# Patient Record
Sex: MALE | Race: BLACK OR AFRICAN AMERICAN | NOT HISPANIC OR LATINO | Employment: OTHER | URBAN - METROPOLITAN AREA
[De-identification: names, ages, dates, MRNs, and addresses within clinical notes are randomized per-mention and may not be internally consistent; named-entity substitution may affect disease eponyms.]

---

## 2017-01-09 ENCOUNTER — HOSPITAL ENCOUNTER (OUTPATIENT)
Dept: NON INVASIVE DIAGNOSTICS | Facility: CLINIC | Age: 72
Discharge: HOME | End: 2017-01-09
Payer: MEDICARE

## 2017-01-09 DIAGNOSIS — I26.99 OTHER PULMONARY EMBOLISM WITHOUT ACUTE COR PULMONALE (HCC): ICD-10-CM

## 2017-01-09 PROCEDURE — 93306 TTE W/DOPPLER COMPLETE: CPT

## 2017-03-16 ENCOUNTER — GENERIC CONVERSION - ENCOUNTER (OUTPATIENT)
Dept: OTHER | Facility: OTHER | Age: 72
End: 2017-03-16

## 2017-03-16 ENCOUNTER — HOSPITAL ENCOUNTER (OUTPATIENT)
Dept: CT IMAGING | Facility: HOSPITAL | Age: 72
Discharge: HOME | End: 2017-03-16
Attending: INTERNAL MEDICINE
Payer: MEDICARE

## 2017-03-16 ENCOUNTER — ALLSCRIPTS OFFICE VISIT (OUTPATIENT)
Dept: OTHER | Facility: OTHER | Age: 72
End: 2017-03-16

## 2017-03-16 ENCOUNTER — APPOINTMENT (OUTPATIENT)
Dept: LAB | Facility: HOSPITAL | Age: 72
End: 2017-03-16
Attending: INTERNAL MEDICINE
Payer: MEDICARE

## 2017-03-16 ENCOUNTER — TRANSCRIBE ORDERS (OUTPATIENT)
Dept: ADMINISTRATIVE | Facility: HOSPITAL | Age: 72
End: 2017-03-16

## 2017-03-16 DIAGNOSIS — R06.09 OTHER FORMS OF DYSPNEA: ICD-10-CM

## 2017-03-16 DIAGNOSIS — I26.99 OTHER PULMONARY EMBOLISM WITHOUT ACUTE COR PULMONALE (HCC): ICD-10-CM

## 2017-03-16 LAB
ANION GAP SERPL CALCULATED.3IONS-SCNC: 9 MMOL/L (ref 4–13)
BUN SERPL-MCNC: 17 MG/DL (ref 5–25)
CALCIUM SERPL-MCNC: 8.9 MG/DL (ref 8.3–10.1)
CHLORIDE SERPL-SCNC: 107 MMOL/L (ref 100–108)
CO2 SERPL-SCNC: 27 MMOL/L (ref 21–32)
CREAT SERPL-MCNC: 1.19 MG/DL (ref 0.6–1.3)
GFR SERPL CREATININE-BSD FRML MDRD: >60 ML/MIN/1.73SQ M
GLUCOSE SERPL-MCNC: 115 MG/DL (ref 65–140)
POTASSIUM SERPL-SCNC: 3.8 MMOL/L (ref 3.5–5.3)
SODIUM SERPL-SCNC: 143 MMOL/L (ref 136–145)

## 2017-03-16 PROCEDURE — 71275 CT ANGIOGRAPHY CHEST: CPT

## 2017-03-16 PROCEDURE — 80048 BASIC METABOLIC PNL TOTAL CA: CPT

## 2017-03-16 PROCEDURE — 36415 COLL VENOUS BLD VENIPUNCTURE: CPT

## 2017-03-16 RX ADMIN — IOHEXOL 85 ML: 350 INJECTION, SOLUTION INTRAVENOUS at 17:23

## 2017-03-21 ENCOUNTER — HOSPITAL ENCOUNTER (OUTPATIENT)
Dept: NON INVASIVE DIAGNOSTICS | Facility: CLINIC | Age: 72
Discharge: HOME | End: 2017-03-21
Payer: MEDICARE

## 2017-03-21 DIAGNOSIS — R06.09 OTHER FORMS OF DYSPNEA: ICD-10-CM

## 2017-03-21 LAB
MAX DIASTOLIC BP: 94 MMHG
MAX HEART RATE: 142 BPM
MAX PREDICTED HEART RATE: 149 BPM
MAX. SYSTOLIC BP: 194 MMHG
PROTOCOL NAME: NORMAL
TARGET HR FORMULA: NORMAL
TEST INDICATION: NORMAL
TIME IN EXERCISE PHASE: 180 S

## 2017-03-21 PROCEDURE — 93350 STRESS TTE ONLY: CPT

## 2017-06-01 ENCOUNTER — ALLSCRIPTS OFFICE VISIT (OUTPATIENT)
Dept: OTHER | Facility: OTHER | Age: 72
End: 2017-06-01

## 2017-07-06 ENCOUNTER — APPOINTMENT (EMERGENCY)
Dept: CT IMAGING | Facility: HOSPITAL | Age: 72
End: 2017-07-06
Payer: MEDICARE

## 2017-07-06 ENCOUNTER — HOSPITAL ENCOUNTER (EMERGENCY)
Facility: HOSPITAL | Age: 72
Discharge: HOME | End: 2017-07-06
Attending: EMERGENCY MEDICINE | Admitting: EMERGENCY MEDICINE
Payer: MEDICARE

## 2017-07-06 VITALS
HEIGHT: 75 IN | HEART RATE: 69 BPM | TEMPERATURE: 98.5 F | WEIGHT: 260.14 LBS | RESPIRATION RATE: 18 BRPM | DIASTOLIC BLOOD PRESSURE: 96 MMHG | BODY MASS INDEX: 32.35 KG/M2 | OXYGEN SATURATION: 96 % | SYSTOLIC BLOOD PRESSURE: 176 MMHG

## 2017-07-06 DIAGNOSIS — M54.32 SCIATICA, LEFT SIDE: Primary | ICD-10-CM

## 2017-07-06 DIAGNOSIS — M51.26 LUMBAR HERNIATED DISC: ICD-10-CM

## 2017-07-06 PROCEDURE — 99283 EMERGENCY DEPT VISIT LOW MDM: CPT

## 2017-07-06 PROCEDURE — 72131 CT LUMBAR SPINE W/O DYE: CPT

## 2017-07-06 PROCEDURE — 96372 THER/PROPH/DIAG INJ SC/IM: CPT

## 2017-07-06 RX ORDER — PREDNISONE 10 MG/1
TABLET ORAL
Qty: 30 TABLET | Refills: 0 | Status: ON HOLD | OUTPATIENT
Start: 2017-07-06 | End: 2017-08-21

## 2017-07-06 RX ORDER — PREDNISONE 20 MG/1
60 TABLET ORAL ONCE
Status: COMPLETED | OUTPATIENT
Start: 2017-07-06 | End: 2017-07-06

## 2017-07-06 RX ORDER — KETOROLAC TROMETHAMINE 30 MG/ML
60 INJECTION, SOLUTION INTRAMUSCULAR; INTRAVENOUS ONCE
Status: COMPLETED | OUTPATIENT
Start: 2017-07-06 | End: 2017-07-06

## 2017-07-06 RX ORDER — CYCLOBENZAPRINE HCL 10 MG
10 TABLET ORAL ONCE
Status: COMPLETED | OUTPATIENT
Start: 2017-07-06 | End: 2017-07-06

## 2017-07-06 RX ORDER — SIMVASTATIN 40 MG
TABLET ORAL
COMMUNITY
End: 2019-04-03 | Stop reason: ALTCHOICE

## 2017-07-06 RX ORDER — CYCLOBENZAPRINE HCL 10 MG
10 TABLET ORAL
Qty: 10 TABLET | Refills: 0 | Status: SHIPPED | OUTPATIENT
Start: 2017-07-06 | End: 2018-02-13

## 2017-07-06 RX ADMIN — PREDNISONE 60 MG: 20 TABLET ORAL at 11:38

## 2017-07-06 RX ADMIN — CYCLOBENZAPRINE HYDROCHLORIDE 10 MG: 10 TABLET, FILM COATED ORAL at 08:42

## 2017-07-06 RX ADMIN — KETOROLAC TROMETHAMINE 60 MG: 30 INJECTION, SOLUTION INTRAMUSCULAR at 08:41

## 2017-07-10 ENCOUNTER — TRANSCRIBE ORDERS (OUTPATIENT)
Dept: ADMINISTRATIVE | Facility: HOSPITAL | Age: 72
End: 2017-07-10

## 2017-07-10 DIAGNOSIS — M54.40 LOW BACK PAIN WITH SCIATICA, SCIATICA LATERALITY UNSPECIFIED, UNSPECIFIED BACK PAIN LATERALITY, UNSPECIFIED CHRONICITY: Primary | ICD-10-CM

## 2017-07-11 ENCOUNTER — HOSPITAL ENCOUNTER (OUTPATIENT)
Dept: MRI IMAGING | Facility: HOSPITAL | Age: 72
Discharge: HOME | End: 2017-07-11
Payer: MEDICARE

## 2017-07-11 DIAGNOSIS — M54.40 LOW BACK PAIN WITH SCIATICA, SCIATICA LATERALITY UNSPECIFIED, UNSPECIFIED BACK PAIN LATERALITY, UNSPECIFIED CHRONICITY: ICD-10-CM

## 2017-07-11 PROCEDURE — 72148 MRI LUMBAR SPINE W/O DYE: CPT

## 2017-07-13 ENCOUNTER — HOSPITAL ENCOUNTER (EMERGENCY)
Facility: HOSPITAL | Age: 72
Discharge: HOME | End: 2017-07-13
Attending: EMERGENCY MEDICINE | Admitting: EMERGENCY MEDICINE
Payer: MEDICARE

## 2017-07-13 VITALS
SYSTOLIC BLOOD PRESSURE: 116 MMHG | OXYGEN SATURATION: 98 % | DIASTOLIC BLOOD PRESSURE: 77 MMHG | RESPIRATION RATE: 18 BRPM | TEMPERATURE: 98.2 F | WEIGHT: 252 LBS | HEIGHT: 76 IN | BODY MASS INDEX: 30.69 KG/M2 | HEART RATE: 90 BPM

## 2017-07-13 DIAGNOSIS — M51.26 LUMBAR DISC HERNIATION: Primary | ICD-10-CM

## 2017-07-13 DIAGNOSIS — M54.32 SCIATICA OF LEFT SIDE: ICD-10-CM

## 2017-07-13 PROCEDURE — 99283 EMERGENCY DEPT VISIT LOW MDM: CPT

## 2017-07-13 PROCEDURE — 96372 THER/PROPH/DIAG INJ SC/IM: CPT

## 2017-07-13 RX ORDER — KETOROLAC TROMETHAMINE 30 MG/ML
30 INJECTION, SOLUTION INTRAMUSCULAR; INTRAVENOUS ONCE
Status: COMPLETED | OUTPATIENT
Start: 2017-07-13 | End: 2017-07-13

## 2017-07-13 RX ORDER — OXYCODONE HYDROCHLORIDE AND ACETAMINOPHEN 5; 325 MG/1; MG/1
1 TABLET ORAL ONCE
Status: COMPLETED | OUTPATIENT
Start: 2017-07-13 | End: 2017-07-13

## 2017-07-13 RX ORDER — OXYCODONE HYDROCHLORIDE AND ACETAMINOPHEN 5; 325 MG/1; MG/1
TABLET ORAL
Qty: 20 TABLET | Refills: 0 | Status: ON HOLD | OUTPATIENT
Start: 2017-07-13 | End: 2017-08-21

## 2017-07-13 RX ADMIN — KETOROLAC TROMETHAMINE 30 MG: 30 INJECTION, SOLUTION INTRAMUSCULAR at 08:55

## 2017-07-13 RX ADMIN — OXYCODONE HYDROCHLORIDE AND ACETAMINOPHEN 1 TABLET: 5; 325 TABLET ORAL at 08:55

## 2017-08-21 ENCOUNTER — APPOINTMENT (EMERGENCY)
Dept: CT IMAGING | Facility: HOSPITAL | Age: 72
DRG: 176 | End: 2017-08-21
Payer: MEDICARE

## 2017-08-21 ENCOUNTER — APPOINTMENT (INPATIENT)
Dept: ULTRASOUND IMAGING | Facility: HOSPITAL | Age: 72
DRG: 176 | End: 2017-08-21
Payer: MEDICARE

## 2017-08-21 ENCOUNTER — HOSPITAL ENCOUNTER (INPATIENT)
Facility: HOSPITAL | Age: 72
LOS: 3 days | Discharge: HOME | DRG: 176 | End: 2017-08-24
Attending: EMERGENCY MEDICINE | Admitting: INTERNAL MEDICINE
Payer: MEDICARE

## 2017-08-21 ENCOUNTER — APPOINTMENT (EMERGENCY)
Dept: RADIOLOGY | Facility: HOSPITAL | Age: 72
DRG: 176 | End: 2017-08-21
Payer: MEDICARE

## 2017-08-21 ENCOUNTER — GENERIC CONVERSION - ENCOUNTER (OUTPATIENT)
Dept: OTHER | Facility: OTHER | Age: 72
End: 2017-08-21

## 2017-08-21 ENCOUNTER — APPOINTMENT (INPATIENT)
Dept: NON INVASIVE DIAGNOSTICS | Facility: HOSPITAL | Age: 72
DRG: 176 | End: 2017-08-21
Payer: MEDICARE

## 2017-08-21 DIAGNOSIS — I26.99 BILATERAL PULMONARY EMBOLISM (HCC): Primary | ICD-10-CM

## 2017-08-21 DIAGNOSIS — R00.0 TACHYCARDIA: ICD-10-CM

## 2017-08-21 DIAGNOSIS — E78.5 HYPERLIPIDEMIA: ICD-10-CM

## 2017-08-21 DIAGNOSIS — R06.02 SOB (SHORTNESS OF BREATH): ICD-10-CM

## 2017-08-21 PROBLEM — D69.6 THROMBOCYTOPENIA (HCC): Status: ACTIVE | Noted: 2017-08-21

## 2017-08-21 PROBLEM — Z86.2 HISTORY OF SICKLE CELL TRAIT: Status: ACTIVE | Noted: 2017-08-21

## 2017-08-21 PROBLEM — Z86.711 HISTORY OF PULMONARY EMBOLISM: Status: ACTIVE | Noted: 2017-08-21

## 2017-08-21 PROBLEM — R60.0 LEG EDEMA, LEFT: Status: ACTIVE | Noted: 2017-08-21

## 2017-08-21 PROBLEM — R91.1 LUNG NODULE: Status: ACTIVE | Noted: 2017-08-21

## 2017-08-21 PROBLEM — E66.9 OBESITY: Status: ACTIVE | Noted: 2017-08-21

## 2017-08-21 PROBLEM — N18.2 CHRONIC KIDNEY DISEASE (CKD), STAGE II (MILD): Status: ACTIVE | Noted: 2017-08-21

## 2017-08-21 LAB
ALBUMIN SERPL BCP-MCNC: 3.4 G/DL (ref 3.5–5)
ALP SERPL-CCNC: 68 U/L (ref 46–116)
ALT SERPL W P-5'-P-CCNC: 17 U/L (ref 12–78)
ANION GAP SERPL CALCULATED.3IONS-SCNC: 9 MMOL/L (ref 4–13)
APTT PPP: 114 SECONDS (ref 23–35)
APTT PPP: 38 SECONDS (ref 23–35)
AST SERPL W P-5'-P-CCNC: 11 U/L (ref 5–45)
BASOPHILS # BLD AUTO: 0.02 THOUSANDS/ΜL (ref 0–0.1)
BASOPHILS NFR BLD AUTO: 0 % (ref 0–1)
BILIRUB DIRECT SERPL-MCNC: 0.18 MG/DL (ref 0–0.2)
BILIRUB SERPL-MCNC: 0.7 MG/DL (ref 0.2–1)
BUN SERPL-MCNC: 10 MG/DL (ref 5–25)
CALCIUM SERPL-MCNC: 9 MG/DL (ref 8.3–10.1)
CHLORIDE SERPL-SCNC: 105 MMOL/L (ref 100–108)
CO2 SERPL-SCNC: 26 MMOL/L (ref 21–32)
COLOR, POC: ABNORMAL
CREAT SERPL-MCNC: 1.32 MG/DL (ref 0.6–1.3)
EOSINOPHIL # BLD AUTO: 0.08 THOUSAND/ΜL (ref 0–0.61)
EOSINOPHIL NFR BLD AUTO: 1 % (ref 0–6)
ERYTHROCYTE [DISTWIDTH] IN BLOOD BY AUTOMATED COUNT: 14.6 % (ref 11.6–15.1)
EXT BILIRUBIN, UA: ABNORMAL
EXT BLOOD URINE: ABNORMAL
EXT GLUCOSE, UA: ABNORMAL
EXT KETONES: ABNORMAL
EXT NITRITE, UA: ABNORMAL
EXT PH, UA: 6
EXT PROTEIN, UA: ABNORMAL
EXT SPECIFIC GRAVITY, UA: 1
EXT UROBILINOGEN: 0.2
GFR SERPL CREATININE-BSD FRML MDRD: 62 ML/MIN/1.73SQ M
GLUCOSE SERPL-MCNC: 127 MG/DL (ref 65–140)
HCT VFR BLD AUTO: 36.9 % (ref 36.5–49.3)
HCYS SERPL-SCNC: 10.6 UMOL/L (ref 5.3–14.2)
HGB BLD-MCNC: 12.7 G/DL (ref 12–17)
INR PPP: 1.12 (ref 0.86–1.16)
LYMPHOCYTES # BLD AUTO: 1.11 THOUSANDS/ΜL (ref 0.6–4.47)
LYMPHOCYTES NFR BLD AUTO: 15 % (ref 14–44)
MAGNESIUM SERPL-MCNC: 2.1 MG/DL (ref 1.6–2.6)
MCH RBC QN AUTO: 31.3 PG (ref 26.8–34.3)
MCHC RBC AUTO-ENTMCNC: 34.4 G/DL (ref 31.4–37.4)
MCV RBC AUTO: 91 FL (ref 82–98)
MONOCYTES # BLD AUTO: 0.88 THOUSAND/ΜL (ref 0.17–1.22)
MONOCYTES NFR BLD AUTO: 12 % (ref 4–12)
NEUTROPHILS # BLD AUTO: 5.32 THOUSANDS/ΜL (ref 1.85–7.62)
NEUTS SEG NFR BLD AUTO: 72 % (ref 43–75)
NRBC BLD AUTO-RTO: 0 /100 WBCS
NT-PROBNP SERPL-MCNC: 43 PG/ML
PLATELET # BLD AUTO: 123 THOUSANDS/UL (ref 149–390)
PMV BLD AUTO: 11.7 FL (ref 8.9–12.7)
POTASSIUM SERPL-SCNC: 3.5 MMOL/L (ref 3.5–5.3)
PROT SERPL-MCNC: 7.2 G/DL (ref 6.4–8.2)
PROTHROMBIN TIME: 14.6 SECONDS (ref 12.1–14.4)
PSA SERPL-MCNC: 0.8 NG/ML (ref 0–4)
RBC # BLD AUTO: 4.06 MILLION/UL (ref 3.88–5.62)
SODIUM SERPL-SCNC: 140 MMOL/L (ref 136–145)
TROPONIN I SERPL-MCNC: <0.02 NG/ML
TSH SERPL DL<=0.05 MIU/L-ACNC: 1.83 UIU/ML (ref 0.36–3.74)
WBC # BLD AUTO: 7.44 THOUSAND/UL (ref 4.31–10.16)
WBC # BLD EST: ABNORMAL 10*3/UL

## 2017-08-21 PROCEDURE — 85732 THROMBOPLASTIN TIME PARTIAL: CPT | Performed by: INTERNAL MEDICINE

## 2017-08-21 PROCEDURE — 85025 COMPLETE CBC W/AUTO DIFF WBC: CPT | Performed by: PHYSICIAN ASSISTANT

## 2017-08-21 PROCEDURE — 86146 BETA-2 GLYCOPROTEIN ANTIBODY: CPT | Performed by: INTERNAL MEDICINE

## 2017-08-21 PROCEDURE — 85303 CLOT INHIBIT PROT C ACTIVITY: CPT | Performed by: INTERNAL MEDICINE

## 2017-08-21 PROCEDURE — 80048 BASIC METABOLIC PNL TOTAL CA: CPT | Performed by: PHYSICIAN ASSISTANT

## 2017-08-21 PROCEDURE — 83090 ASSAY OF HOMOCYSTEINE: CPT | Performed by: INTERNAL MEDICINE

## 2017-08-21 PROCEDURE — 99285 EMERGENCY DEPT VISIT HI MDM: CPT

## 2017-08-21 PROCEDURE — 84443 ASSAY THYROID STIM HORMONE: CPT | Performed by: PHYSICIAN ASSISTANT

## 2017-08-21 PROCEDURE — 93971 EXTREMITY STUDY: CPT

## 2017-08-21 PROCEDURE — 71010 HB CHEST X-RAY 1 VIEW FRONTAL (PORTABLE): CPT

## 2017-08-21 PROCEDURE — 84153 ASSAY OF PSA TOTAL: CPT | Performed by: INTERNAL MEDICINE

## 2017-08-21 PROCEDURE — 85305 CLOT INHIBIT PROT S TOTAL: CPT | Performed by: INTERNAL MEDICINE

## 2017-08-21 PROCEDURE — 36415 COLL VENOUS BLD VENIPUNCTURE: CPT | Performed by: PHYSICIAN ASSISTANT

## 2017-08-21 PROCEDURE — 85300 ANTITHROMBIN III ACTIVITY: CPT | Performed by: INTERNAL MEDICINE

## 2017-08-21 PROCEDURE — 85306 CLOT INHIBIT PROT S FREE: CPT | Performed by: INTERNAL MEDICINE

## 2017-08-21 PROCEDURE — 93005 ELECTROCARDIOGRAM TRACING: CPT | Performed by: PHYSICIAN ASSISTANT

## 2017-08-21 PROCEDURE — 85613 RUSSELL VIPER VENOM DILUTED: CPT | Performed by: INTERNAL MEDICINE

## 2017-08-21 PROCEDURE — 93306 TTE W/DOPPLER COMPLETE: CPT

## 2017-08-21 PROCEDURE — 81240 F2 GENE: CPT | Performed by: INTERNAL MEDICINE

## 2017-08-21 PROCEDURE — 84484 ASSAY OF TROPONIN QUANT: CPT | Performed by: PHYSICIAN ASSISTANT

## 2017-08-21 PROCEDURE — 74177 CT ABD & PELVIS W/CONTRAST: CPT

## 2017-08-21 PROCEDURE — 96374 THER/PROPH/DIAG INJ IV PUSH: CPT

## 2017-08-21 PROCEDURE — 80076 HEPATIC FUNCTION PANEL: CPT | Performed by: PHYSICIAN ASSISTANT

## 2017-08-21 PROCEDURE — 83880 ASSAY OF NATRIURETIC PEPTIDE: CPT | Performed by: PHYSICIAN ASSISTANT

## 2017-08-21 PROCEDURE — 85705 THROMBOPLASTIN INHIBITION: CPT | Performed by: INTERNAL MEDICINE

## 2017-08-21 PROCEDURE — 93005 ELECTROCARDIOGRAM TRACING: CPT

## 2017-08-21 PROCEDURE — 96361 HYDRATE IV INFUSION ADD-ON: CPT

## 2017-08-21 PROCEDURE — 85730 THROMBOPLASTIN TIME PARTIAL: CPT | Performed by: INTERNAL MEDICINE

## 2017-08-21 PROCEDURE — 85610 PROTHROMBIN TIME: CPT | Performed by: PHYSICIAN ASSISTANT

## 2017-08-21 PROCEDURE — 83735 ASSAY OF MAGNESIUM: CPT | Performed by: PHYSICIAN ASSISTANT

## 2017-08-21 PROCEDURE — 86147 CARDIOLIPIN ANTIBODY EA IG: CPT | Performed by: INTERNAL MEDICINE

## 2017-08-21 PROCEDURE — 85730 THROMBOPLASTIN TIME PARTIAL: CPT | Performed by: PHYSICIAN ASSISTANT

## 2017-08-21 PROCEDURE — 71275 CT ANGIOGRAPHY CHEST: CPT

## 2017-08-21 PROCEDURE — 81241 F5 GENE: CPT | Performed by: INTERNAL MEDICINE

## 2017-08-21 PROCEDURE — 85598 HEXAGNAL PHOSPH PLTLT NEUTRL: CPT | Performed by: INTERNAL MEDICINE

## 2017-08-21 PROCEDURE — 81002 URINALYSIS NONAUTO W/O SCOPE: CPT | Performed by: PHYSICIAN ASSISTANT

## 2017-08-21 PROCEDURE — 85670 THROMBIN TIME PLASMA: CPT | Performed by: INTERNAL MEDICINE

## 2017-08-21 RX ORDER — METOPROLOL TARTRATE 5 MG/5ML
5 INJECTION INTRAVENOUS EVERY 6 HOURS PRN
Status: DISCONTINUED | OUTPATIENT
Start: 2017-08-21 | End: 2017-08-24 | Stop reason: HOSPADM

## 2017-08-21 RX ORDER — ATORVASTATIN CALCIUM 40 MG/1
40 TABLET, FILM COATED ORAL
Status: DISCONTINUED | OUTPATIENT
Start: 2017-08-21 | End: 2017-08-24 | Stop reason: HOSPADM

## 2017-08-21 RX ORDER — ACETAMINOPHEN 325 MG/1
650 TABLET ORAL EVERY 6 HOURS PRN
Status: DISCONTINUED | OUTPATIENT
Start: 2017-08-21 | End: 2017-08-24 | Stop reason: HOSPADM

## 2017-08-21 RX ORDER — FAMOTIDINE 20 MG/1
20 TABLET, FILM COATED ORAL 2 TIMES DAILY
Status: DISCONTINUED | OUTPATIENT
Start: 2017-08-21 | End: 2017-08-24 | Stop reason: HOSPADM

## 2017-08-21 RX ORDER — HEPARIN SODIUM 1000 [USP'U]/ML
4600 INJECTION, SOLUTION INTRAVENOUS; SUBCUTANEOUS AS NEEDED
Status: DISCONTINUED | OUTPATIENT
Start: 2017-08-21 | End: 2017-08-23

## 2017-08-21 RX ORDER — HEPARIN SODIUM 10000 [USP'U]/100ML
3-30 INJECTION, SOLUTION INTRAVENOUS
Status: DISCONTINUED | OUTPATIENT
Start: 2017-08-21 | End: 2017-08-23

## 2017-08-21 RX ORDER — FLUTICASONE PROPIONATE 50 MCG
1 SPRAY, SUSPENSION (ML) NASAL DAILY
Status: DISCONTINUED | OUTPATIENT
Start: 2017-08-21 | End: 2017-08-24 | Stop reason: HOSPADM

## 2017-08-21 RX ORDER — OXYCODONE HYDROCHLORIDE AND ACETAMINOPHEN 5; 325 MG/1; MG/1
1 TABLET ORAL EVERY 4 HOURS PRN
Status: DISCONTINUED | OUTPATIENT
Start: 2017-08-21 | End: 2017-08-24 | Stop reason: HOSPADM

## 2017-08-21 RX ORDER — POTASSIUM CHLORIDE 20 MEQ/1
40 TABLET, EXTENDED RELEASE ORAL ONCE
Status: COMPLETED | OUTPATIENT
Start: 2017-08-21 | End: 2017-08-21

## 2017-08-21 RX ORDER — ONDANSETRON 2 MG/ML
4 INJECTION INTRAMUSCULAR; INTRAVENOUS EVERY 6 HOURS PRN
Status: DISCONTINUED | OUTPATIENT
Start: 2017-08-21 | End: 2017-08-24 | Stop reason: HOSPADM

## 2017-08-21 RX ORDER — ASPIRIN 81 MG/1
81 TABLET, CHEWABLE ORAL DAILY
Status: DISCONTINUED | OUTPATIENT
Start: 2017-08-21 | End: 2017-08-24 | Stop reason: HOSPADM

## 2017-08-21 RX ORDER — HEPARIN SODIUM 1000 [USP'U]/ML
9200 INJECTION, SOLUTION INTRAVENOUS; SUBCUTANEOUS ONCE
Status: COMPLETED | OUTPATIENT
Start: 2017-08-21 | End: 2017-08-21

## 2017-08-21 RX ORDER — SIMETHICONE 80 MG
80 TABLET,CHEWABLE ORAL 4 TIMES DAILY PRN
Status: DISCONTINUED | OUTPATIENT
Start: 2017-08-21 | End: 2017-08-24 | Stop reason: HOSPADM

## 2017-08-21 RX ORDER — HEPARIN SODIUM 1000 [USP'U]/ML
9200 INJECTION, SOLUTION INTRAVENOUS; SUBCUTANEOUS AS NEEDED
Status: DISCONTINUED | OUTPATIENT
Start: 2017-08-21 | End: 2017-08-23

## 2017-08-21 RX ORDER — ADENOSINE 3 MG/ML
6 INJECTION INTRAVENOUS ONCE
Status: COMPLETED | OUTPATIENT
Start: 2017-08-21 | End: 2017-08-21

## 2017-08-21 RX ADMIN — IOHEXOL 100 ML: 350 INJECTION, SOLUTION INTRAVENOUS at 09:45

## 2017-08-21 RX ADMIN — POTASSIUM CHLORIDE 40 MEQ: 1500 TABLET, EXTENDED RELEASE ORAL at 12:39

## 2017-08-21 RX ADMIN — METOPROLOL TARTRATE 25 MG: 25 TABLET, FILM COATED ORAL at 13:44

## 2017-08-21 RX ADMIN — METOPROLOL TARTRATE 25 MG: 25 TABLET, FILM COATED ORAL at 20:38

## 2017-08-21 RX ADMIN — FAMOTIDINE 20 MG: 20 TABLET, FILM COATED ORAL at 18:38

## 2017-08-21 RX ADMIN — HEPARIN SODIUM 9200 UNITS: 1000 INJECTION, SOLUTION INTRAVENOUS; SUBCUTANEOUS at 12:26

## 2017-08-21 RX ADMIN — SODIUM CHLORIDE 1000 ML: 0.9 INJECTION, SOLUTION INTRAVENOUS at 08:22

## 2017-08-21 RX ADMIN — HEPARIN SODIUM AND DEXTROSE 18 UNITS/KG/HR: 10000; 5 INJECTION INTRAVENOUS at 12:26

## 2017-08-21 RX ADMIN — ASPIRIN 81 MG 81 MG: 81 TABLET ORAL at 12:38

## 2017-08-21 RX ADMIN — METOPROLOL TARTRATE 5 MG: 5 INJECTION INTRAVENOUS at 13:41

## 2017-08-21 RX ADMIN — ATORVASTATIN CALCIUM 40 MG: 40 TABLET, FILM COATED ORAL at 17:03

## 2017-08-21 RX ADMIN — FAMOTIDINE 20 MG: 20 TABLET, FILM COATED ORAL at 12:39

## 2017-08-21 RX ADMIN — ACETAMINOPHEN 650 MG: 325 TABLET ORAL at 20:38

## 2017-08-21 RX ADMIN — ADENOSINE 6 MG: 3 INJECTION, SOLUTION INTRAVENOUS at 08:31

## 2017-08-22 PROBLEM — G89.29 CHRONIC LOW BACK PAIN: Status: ACTIVE | Noted: 2017-08-22

## 2017-08-22 PROBLEM — M54.50 CHRONIC LOW BACK PAIN: Status: ACTIVE | Noted: 2017-08-22

## 2017-08-22 LAB
ANION GAP SERPL CALCULATED.3IONS-SCNC: 6 MMOL/L (ref 4–13)
APTT PPP: 59 SECONDS (ref 23–35)
APTT PPP: 64 SECONDS (ref 23–35)
APTT PPP: 85 SECONDS (ref 23–35)
APTT PPP: 95 SECONDS (ref 23–35)
ATRIAL RATE: 143 BPM
ATRIAL RATE: 95 BPM
BUN SERPL-MCNC: 11 MG/DL (ref 5–25)
CALCIUM SERPL-MCNC: 8.7 MG/DL (ref 8.3–10.1)
CARDIOLIPIN IGA SER IA-ACNC: <9 APL U/ML (ref 0–11)
CARDIOLIPIN IGG SER IA-ACNC: <9 GPL U/ML (ref 0–14)
CARDIOLIPIN IGM SER IA-ACNC: 12 MPL U/ML (ref 0–12)
CHLORIDE SERPL-SCNC: 105 MMOL/L (ref 100–108)
CHOLEST SERPL-MCNC: 207 MG/DL (ref 50–200)
CO2 SERPL-SCNC: 28 MMOL/L (ref 21–32)
CREAT SERPL-MCNC: 1.19 MG/DL (ref 0.6–1.3)
DEPRECATED AT III PPP: 101 % OF NORMAL (ref 92–136)
ERYTHROCYTE [DISTWIDTH] IN BLOOD BY AUTOMATED COUNT: 14.5 % (ref 11.6–15.1)
GFR SERPL CREATININE-BSD FRML MDRD: 71 ML/MIN/1.73SQ M
GLUCOSE SERPL-MCNC: 122 MG/DL (ref 65–140)
HCT VFR BLD AUTO: 35.6 % (ref 36.5–49.3)
HDLC SERPL-MCNC: 45 MG/DL (ref 40–60)
HGB BLD-MCNC: 12 G/DL (ref 12–17)
LDLC SERPL CALC-MCNC: 129 MG/DL (ref 0–100)
MCH RBC QN AUTO: 31.3 PG (ref 26.8–34.3)
MCHC RBC AUTO-ENTMCNC: 33.7 G/DL (ref 31.4–37.4)
MCV RBC AUTO: 93 FL (ref 82–98)
P AXIS: 44 DEGREES
PLATELET # BLD AUTO: 117 THOUSANDS/UL (ref 149–390)
PMV BLD AUTO: 11.6 FL (ref 8.9–12.7)
POTASSIUM SERPL-SCNC: 4 MMOL/L (ref 3.5–5.3)
PR INTERVAL: 120 MS
PR INTERVAL: 160 MS
QRS AXIS: -23 DEGREES
QRS AXIS: -24 DEGREES
QRSD INTERVAL: 86 MS
QRSD INTERVAL: 92 MS
QT INTERVAL: 334 MS
QT INTERVAL: 344 MS
QTC INTERVAL: 419 MS
QTC INTERVAL: 530 MS
RBC # BLD AUTO: 3.84 MILLION/UL (ref 3.88–5.62)
SODIUM SERPL-SCNC: 139 MMOL/L (ref 136–145)
T WAVE AXIS: -24 DEGREES
T WAVE AXIS: 224 DEGREES
TRIGL SERPL-MCNC: 164 MG/DL
VENTRICULAR RATE: 143 BPM
VENTRICULAR RATE: 95 BPM
WBC # BLD AUTO: 7.02 THOUSAND/UL (ref 4.31–10.16)

## 2017-08-22 PROCEDURE — 85730 THROMBOPLASTIN TIME PARTIAL: CPT | Performed by: FAMILY MEDICINE

## 2017-08-22 PROCEDURE — 80048 BASIC METABOLIC PNL TOTAL CA: CPT | Performed by: INTERNAL MEDICINE

## 2017-08-22 PROCEDURE — 85730 THROMBOPLASTIN TIME PARTIAL: CPT | Performed by: INTERNAL MEDICINE

## 2017-08-22 PROCEDURE — 80061 LIPID PANEL: CPT | Performed by: INTERNAL MEDICINE

## 2017-08-22 PROCEDURE — 85027 COMPLETE CBC AUTOMATED: CPT | Performed by: INTERNAL MEDICINE

## 2017-08-22 RX ORDER — KETOROLAC TROMETHAMINE 30 MG/ML
15 INJECTION, SOLUTION INTRAMUSCULAR; INTRAVENOUS ONCE
Status: COMPLETED | OUTPATIENT
Start: 2017-08-22 | End: 2017-08-22

## 2017-08-22 RX ADMIN — OXYCODONE HYDROCHLORIDE AND ACETAMINOPHEN 1 TABLET: 5; 325 TABLET ORAL at 06:12

## 2017-08-22 RX ADMIN — HEPARIN SODIUM AND DEXTROSE 15 UNITS/KG/HR: 10000; 5 INJECTION INTRAVENOUS at 03:57

## 2017-08-22 RX ADMIN — ASPIRIN 81 MG 81 MG: 81 TABLET ORAL at 08:40

## 2017-08-22 RX ADMIN — ATORVASTATIN CALCIUM 40 MG: 40 TABLET, FILM COATED ORAL at 17:03

## 2017-08-22 RX ADMIN — METOPROLOL TARTRATE 25 MG: 25 TABLET, FILM COATED ORAL at 08:40

## 2017-08-22 RX ADMIN — FLUTICASONE PROPIONATE 1 SPRAY: 50 SPRAY, METERED NASAL at 12:14

## 2017-08-22 RX ADMIN — HEPARIN SODIUM AND DEXTROSE 13 UNITS/KG/HR: 10000; 5 INJECTION INTRAVENOUS at 19:44

## 2017-08-22 RX ADMIN — FAMOTIDINE 20 MG: 20 TABLET, FILM COATED ORAL at 17:03

## 2017-08-22 RX ADMIN — METOPROLOL TARTRATE 25 MG: 25 TABLET, FILM COATED ORAL at 20:37

## 2017-08-22 RX ADMIN — OXYCODONE HYDROCHLORIDE AND ACETAMINOPHEN 1 TABLET: 5; 325 TABLET ORAL at 01:31

## 2017-08-22 RX ADMIN — FAMOTIDINE 20 MG: 20 TABLET, FILM COATED ORAL at 08:40

## 2017-08-22 RX ADMIN — KETOROLAC TROMETHAMINE 15 MG: 30 INJECTION, SOLUTION INTRAMUSCULAR at 13:05

## 2017-08-22 RX ADMIN — HEPARIN SODIUM 4600 UNITS: 1000 INJECTION, SOLUTION INTRAVENOUS; SUBCUTANEOUS at 20:47

## 2017-08-23 PROBLEM — R50.9 FEVER: Status: ACTIVE | Noted: 2017-08-23

## 2017-08-23 LAB
APTT HEX PL PPP: 6 SEC (ref 0–11)
APTT PPP: 53 SECONDS (ref 23–35)
APTT PPP: 84 SECONDS (ref 23–35)
APTT SCREEN TO CONFIRM RATIO: 0.93 RATIO (ref 0–1.4)
APTT-LA IMM 4:1 NP PPP: 56.9 SEC (ref 0–48.9)
B2 GLYCOPROT1 IGA SER-ACNC: <9 GPI IGA UNITS (ref 0–25)
B2 GLYCOPROT1 IGG SER-ACNC: <9 GPI IGG UNITS (ref 0–20)
B2 GLYCOPROT1 IGM SER-ACNC: <9 GPI IGM UNITS (ref 0–32)
CONFIRM APTT/NORMAL: 50.4 SEC (ref 0–55)
DRVVT IMM 1:2 NP PPP: 46.3 SEC (ref 0–47)
LA PPP-IMP: ABNORMAL
PROT S ACT/NOR PPP: 76 % (ref 63–140)
PROT S ACT/NOR PPP: 77 % (ref 57–157)
PROT S PPP-ACNC: 108 % (ref 60–150)
SCREEN APTT: 52.1 SEC (ref 0–51.9)
SCREEN DRVVT: 55.4 SEC (ref 0–47)
SCREEN DRVVT: 56.7 SEC (ref 0–47)
THROMBIN TIME: 16.1 SEC (ref 0–23)

## 2017-08-23 PROCEDURE — 85730 THROMBOPLASTIN TIME PARTIAL: CPT | Performed by: INTERNAL MEDICINE

## 2017-08-23 RX ORDER — ACETAMINOPHEN 325 MG/1
TABLET ORAL
Status: DISPENSED
Start: 2017-08-23 | End: 2017-08-23

## 2017-08-23 RX ORDER — LEVOFLOXACIN 5 MG/ML
750 INJECTION, SOLUTION INTRAVENOUS EVERY 24 HOURS
Status: DISCONTINUED | OUTPATIENT
Start: 2017-08-23 | End: 2017-08-24

## 2017-08-23 RX ADMIN — FAMOTIDINE 20 MG: 20 TABLET, FILM COATED ORAL at 17:03

## 2017-08-23 RX ADMIN — ATORVASTATIN CALCIUM 40 MG: 40 TABLET, FILM COATED ORAL at 15:59

## 2017-08-23 RX ADMIN — FLUTICASONE PROPIONATE 1 SPRAY: 50 SPRAY, METERED NASAL at 08:48

## 2017-08-23 RX ADMIN — ASPIRIN 81 MG 81 MG: 81 TABLET ORAL at 08:48

## 2017-08-23 RX ADMIN — FAMOTIDINE 20 MG: 20 TABLET, FILM COATED ORAL at 08:48

## 2017-08-23 RX ADMIN — APIXABAN 10 MG: 5 TABLET, FILM COATED ORAL at 17:04

## 2017-08-23 RX ADMIN — APIXABAN 10 MG: 5 TABLET, FILM COATED ORAL at 10:09

## 2017-08-23 RX ADMIN — ACETAMINOPHEN 650 MG: 325 TABLET ORAL at 00:08

## 2017-08-23 RX ADMIN — METOPROLOL TARTRATE 25 MG: 25 TABLET, FILM COATED ORAL at 08:48

## 2017-08-23 RX ADMIN — ACETAMINOPHEN 650 MG: 325 TABLET ORAL at 23:31

## 2017-08-23 RX ADMIN — LEVOFLOXACIN 750 MG: 5 INJECTION, SOLUTION INTRAVENOUS at 10:09

## 2017-08-23 RX ADMIN — METOPROLOL TARTRATE 25 MG: 25 TABLET, FILM COATED ORAL at 21:22

## 2017-08-24 ENCOUNTER — APPOINTMENT (INPATIENT)
Dept: RADIOLOGY | Facility: HOSPITAL | Age: 72
DRG: 176 | End: 2017-08-24
Payer: MEDICARE

## 2017-08-24 VITALS
HEIGHT: 75 IN | RESPIRATION RATE: 18 BRPM | OXYGEN SATURATION: 96 % | HEART RATE: 116 BPM | TEMPERATURE: 98.9 F | SYSTOLIC BLOOD PRESSURE: 126 MMHG | BODY MASS INDEX: 32.37 KG/M2 | DIASTOLIC BLOOD PRESSURE: 84 MMHG | WEIGHT: 260.36 LBS

## 2017-08-24 LAB
ALBUMIN SERPL BCP-MCNC: 2.9 G/DL (ref 3.5–5)
ALP SERPL-CCNC: 67 U/L (ref 46–116)
ALT SERPL W P-5'-P-CCNC: 19 U/L (ref 12–78)
ANION GAP SERPL CALCULATED.3IONS-SCNC: 9 MMOL/L (ref 4–13)
AST SERPL W P-5'-P-CCNC: 17 U/L (ref 5–45)
ATRIAL RATE: 143 BPM
BILIRUB SERPL-MCNC: 0.7 MG/DL (ref 0.2–1)
BUN SERPL-MCNC: 12 MG/DL (ref 5–25)
CALCIUM SERPL-MCNC: 9.1 MG/DL (ref 8.3–10.1)
CHLORIDE SERPL-SCNC: 104 MMOL/L (ref 100–108)
CO2 SERPL-SCNC: 26 MMOL/L (ref 21–32)
CREAT SERPL-MCNC: 1.24 MG/DL (ref 0.6–1.3)
ERYTHROCYTE [DISTWIDTH] IN BLOOD BY AUTOMATED COUNT: 13.8 % (ref 11.6–15.1)
GFR SERPL CREATININE-BSD FRML MDRD: 67 ML/MIN/1.73SQ M
GLUCOSE SERPL-MCNC: 111 MG/DL (ref 65–140)
HCT VFR BLD AUTO: 34 % (ref 36.5–49.3)
HGB BLD-MCNC: 11.6 G/DL (ref 12–17)
MCH RBC QN AUTO: 30.9 PG (ref 26.8–34.3)
MCHC RBC AUTO-ENTMCNC: 34.1 G/DL (ref 31.4–37.4)
MCV RBC AUTO: 90 FL (ref 82–98)
P AXIS: -67 DEGREES
PLATELET # BLD AUTO: 142 THOUSANDS/UL (ref 149–390)
PMV BLD AUTO: 11.3 FL (ref 8.9–12.7)
POTASSIUM SERPL-SCNC: 3.6 MMOL/L (ref 3.5–5.3)
PR INTERVAL: 96 MS
PROT C AG ACT/NOR PPP IA: 107 % OF NORMAL (ref 60–150)
PROT SERPL-MCNC: 7 G/DL (ref 6.4–8.2)
QRS AXIS: -21 DEGREES
QRSD INTERVAL: 86 MS
QT INTERVAL: 348 MS
QTC INTERVAL: 537 MS
RBC # BLD AUTO: 3.76 MILLION/UL (ref 3.88–5.62)
SODIUM SERPL-SCNC: 139 MMOL/L (ref 136–145)
T WAVE AXIS: 229 DEGREES
VENTRICULAR RATE: 143 BPM
WBC # BLD AUTO: 7.53 THOUSAND/UL (ref 4.31–10.16)

## 2017-08-24 PROCEDURE — 80053 COMPREHEN METABOLIC PANEL: CPT | Performed by: INTERNAL MEDICINE

## 2017-08-24 PROCEDURE — 71020 HB CHEST X-RAY 2VW FRONTAL&LATL: CPT

## 2017-08-24 PROCEDURE — 85027 COMPLETE CBC AUTOMATED: CPT | Performed by: INTERNAL MEDICINE

## 2017-08-24 PROCEDURE — 94668 MNPJ CHEST WALL SBSQ: CPT

## 2017-08-24 PROCEDURE — 87040 BLOOD CULTURE FOR BACTERIA: CPT | Performed by: INTERNAL MEDICINE

## 2017-08-24 RX ORDER — FAMOTIDINE 20 MG/1
20 TABLET, FILM COATED ORAL 2 TIMES DAILY
Qty: 60 TABLET | Refills: 11 | Status: SHIPPED | OUTPATIENT
Start: 2017-08-24 | End: 2019-04-03 | Stop reason: ALTCHOICE

## 2017-08-24 RX ORDER — LEVOFLOXACIN 750 MG/1
750 TABLET ORAL EVERY 24 HOURS
Qty: 7 TABLET | Refills: 0 | Status: SHIPPED | OUTPATIENT
Start: 2017-08-25 | End: 2017-09-01

## 2017-08-24 RX ADMIN — FAMOTIDINE 20 MG: 20 TABLET, FILM COATED ORAL at 17:06

## 2017-08-24 RX ADMIN — FLUTICASONE PROPIONATE 1 SPRAY: 50 SPRAY, METERED NASAL at 08:35

## 2017-08-24 RX ADMIN — APIXABAN 10 MG: 5 TABLET, FILM COATED ORAL at 17:06

## 2017-08-24 RX ADMIN — METOPROLOL TARTRATE 25 MG: 25 TABLET, FILM COATED ORAL at 08:31

## 2017-08-24 RX ADMIN — ASPIRIN 81 MG 81 MG: 81 TABLET ORAL at 08:30

## 2017-08-24 RX ADMIN — FAMOTIDINE 20 MG: 20 TABLET, FILM COATED ORAL at 08:30

## 2017-08-24 RX ADMIN — OXYCODONE HYDROCHLORIDE AND ACETAMINOPHEN 1 TABLET: 5; 325 TABLET ORAL at 07:50

## 2017-08-24 RX ADMIN — APIXABAN 10 MG: 5 TABLET, FILM COATED ORAL at 08:31

## 2017-08-24 RX ADMIN — ATORVASTATIN CALCIUM 40 MG: 40 TABLET, FILM COATED ORAL at 17:06

## 2017-08-24 RX ADMIN — LEVOFLOXACIN 750 MG: 5 INJECTION, SOLUTION INTRAVENOUS at 08:31

## 2017-08-25 LAB
COMMENT: NORMAL
F5 GENE MUT ANL BLD/T: NORMAL

## 2017-08-28 LAB
F2 GENE MUT ANL BLD/T: NORMAL
Lab: NORMAL

## 2017-08-29 LAB
BACTERIA BLD CULT: NORMAL
BACTERIA BLD CULT: NORMAL

## 2017-09-07 ENCOUNTER — ALLSCRIPTS OFFICE VISIT (OUTPATIENT)
Dept: OTHER | Facility: OTHER | Age: 72
End: 2017-09-07

## 2017-09-13 ENCOUNTER — GENERIC CONVERSION - ENCOUNTER (OUTPATIENT)
Dept: OTHER | Facility: OTHER | Age: 72
End: 2017-09-13

## 2017-09-14 ENCOUNTER — TRANSCRIBE ORDERS (OUTPATIENT)
Dept: ADMINISTRATIVE | Facility: HOSPITAL | Age: 72
End: 2017-09-14

## 2017-09-14 DIAGNOSIS — J42 UNSPECIFIED CHRONIC BRONCHITIS (HCC): Primary | ICD-10-CM

## 2017-09-27 ENCOUNTER — GENERIC CONVERSION - ENCOUNTER (OUTPATIENT)
Dept: OTHER | Facility: OTHER | Age: 72
End: 2017-09-27

## 2017-10-04 ENCOUNTER — GENERIC CONVERSION - ENCOUNTER (OUTPATIENT)
Dept: OTHER | Facility: OTHER | Age: 72
End: 2017-10-04

## 2017-10-04 ENCOUNTER — HOSPITAL ENCOUNTER (OUTPATIENT)
Dept: PULMONOLOGY | Facility: HOSPITAL | Age: 72
Discharge: HOME | End: 2017-10-04
Payer: MEDICARE

## 2017-10-04 DIAGNOSIS — J42 CHRONIC BRONCHITIS, UNSPECIFIED CHRONIC BRONCHITIS TYPE (HCC): ICD-10-CM

## 2017-10-04 DIAGNOSIS — J42 CHRONIC BRONCHITIS (HCC): ICD-10-CM

## 2017-10-04 PROCEDURE — 94760 N-INVAS EAR/PLS OXIMETRY 1: CPT

## 2017-10-04 PROCEDURE — 94060 EVALUATION OF WHEEZING: CPT

## 2017-10-04 PROCEDURE — 94727 GAS DIL/WSHOT DETER LNG VOL: CPT

## 2017-10-04 PROCEDURE — 94729 DIFFUSING CAPACITY: CPT

## 2017-10-04 RX ORDER — ALBUTEROL SULFATE 2.5 MG/3ML
2.5 SOLUTION RESPIRATORY (INHALATION) ONCE
Status: COMPLETED | OUTPATIENT
Start: 2017-10-04 | End: 2017-10-04

## 2017-10-04 RX ADMIN — ALBUTEROL SULFATE 2.5 MG: 2.5 SOLUTION RESPIRATORY (INHALATION) at 10:44

## 2017-10-11 ENCOUNTER — GENERIC CONVERSION - ENCOUNTER (OUTPATIENT)
Dept: OTHER | Facility: OTHER | Age: 72
End: 2017-10-11

## 2018-01-12 VITALS
HEIGHT: 75 IN | HEART RATE: 60 BPM | DIASTOLIC BLOOD PRESSURE: 82 MMHG | SYSTOLIC BLOOD PRESSURE: 142 MMHG | OXYGEN SATURATION: 96 % | WEIGHT: 260 LBS | BODY MASS INDEX: 32.33 KG/M2

## 2018-01-12 NOTE — MISCELLANEOUS
Message  Return to work or school:   Saw Bethea is under my professional care  He was seen in my office on 9/13/17       Patient is cleared to return to work          Signatures   Electronically signed by : Mere Robb, [facility]; Sep 27 2017  9:43AM EST                       (Author)

## 2018-01-13 VITALS
DIASTOLIC BLOOD PRESSURE: 78 MMHG | TEMPERATURE: 97.5 F | HEART RATE: 90 BPM | HEIGHT: 75 IN | SYSTOLIC BLOOD PRESSURE: 122 MMHG | OXYGEN SATURATION: 96 % | BODY MASS INDEX: 32.45 KG/M2 | WEIGHT: 261 LBS | RESPIRATION RATE: 16 BRPM

## 2018-01-14 VITALS
OXYGEN SATURATION: 94 % | DIASTOLIC BLOOD PRESSURE: 82 MMHG | BODY MASS INDEX: 32.33 KG/M2 | SYSTOLIC BLOOD PRESSURE: 120 MMHG | WEIGHT: 260 LBS | HEIGHT: 75 IN | HEART RATE: 76 BPM

## 2018-01-17 NOTE — RESULT NOTES
Verified Results  CTA CHEST PE STUDY [id number] 05:07PM Felix Farley Order Number: [id number]   Performing Comments: follow up CTA on anticoagulation   - Patient Instructions: To schedule this appointment, please contact Central Scheduling at [phone]  Test Name Result Flag Reference   CTA CHEST PE STUDY (Report)     CTA - CHEST WITH IV CONTRAST - PULMONARY ANGIOGRAM     INDICATION: History of prior pulmonary embolus  Shortness breath, chest pain  Patient on anticoagulation  COMPARISON: CTA chest dated December 19, 2016  TECHNIQUE: CTA examination of the chest was performed using angiographic technique according to a protocol specifically tailored to evaluate for pulmonary embolism  Reformatted images were created in axial, sagittal, and coronal planes  In addition,    coronal 3D MIP postprocessing was performed on the acquisition scanner  Radiation dose length product (DLP) for this visit: 957 mGy -cm  This examination, like all CT scans performed in the [facility], was performed utilizing techniques to minimize radiation dose exposure, including the use of iterative    reconstruction and automated exposure control  IV Contrast: 85 mL of iohexol (OMNIPAQUE)           FINDINGS:     PULMONARY ARTERIAL TREE: There is minimal chronic thrombus noted within the distal branches of the right inferior pulmonary artery, decreased from the prior exam  No new foci of acute pulmonary embolus is noted  LUNGS: Lungs are clear  There is no tracheal or endobronchial lesion  PLEURA: There is trace pleural thickening at the posterior right lung base  HEART/AORTA: The heart is mildly enlarged and there is no pericardial effusion  MEDIASTINUM AND SE: Unremarkable  CHEST WALL AND LOWER NECK: Unremarkable  VISUALIZED STRUCTURES IN THE UPPER ABDOMEN: Unremarkable  OSSEOUS STRUCTURES: No acute fracture or destructive osseous lesion         IMPRESSION: Minimal residual chronic thrombus noted within the distal inferior right pulmonary artery branches  No new intraluminal filling defect to suggest acute pulmonary embolus  No acute infiltrate or consolidation  Stable mild cardiomegaly  Workstation performed: [id number]     Signed by:   Yessica Faustin MD   3/16/17     (1) Kristian [id number] 04:36PM Andre Botello Order Number: [id number]     Test Name Result Flag Reference   GLUCOSE,RANDM 115 mg/dL     If the patient is fasting, the ADA then defines impaired fasting glucose as > 100 mg/dL and diabetes as > or equal to 123 mg/dL  SODIUM 143 mmol/L  136-145   POTASSIUM 3 8 mmol/L  3 5-5 3   CHLORIDE 107 mmol/L  100-108   CARBON DIOXIDE 27 mmol/L  21-32   ANION GAP (CALC) 9 mmol/L  4-13   BLOOD UREA NITROGEN 17 mg/dL  5-25   CREATININE 1 19 mg/dL  0 60-1 30   Standardized to IDMS reference method   CALCIUM 8 9 mg/dL  8 3-10 1   eGFR Non-African American      >60 0 ml/min/1 73sq [facility] Disease Education Program recommendations are as follows:  GFR calculation is accurate only with a steady state creatinine  Chronic Kidney disease less than 60 ml/min/1 73 sq  meters  Kidney failure less than 15 ml/min/1 73 sq  meters

## 2018-01-22 VITALS
HEIGHT: 75 IN | DIASTOLIC BLOOD PRESSURE: 78 MMHG | OXYGEN SATURATION: 94 % | WEIGHT: 260 LBS | HEART RATE: 66 BPM | BODY MASS INDEX: 32.33 KG/M2 | SYSTOLIC BLOOD PRESSURE: 130 MMHG

## 2018-01-22 VITALS
WEIGHT: 267 LBS | SYSTOLIC BLOOD PRESSURE: 132 MMHG | HEART RATE: 67 BPM | HEIGHT: 75 IN | OXYGEN SATURATION: 97 % | BODY MASS INDEX: 33.2 KG/M2 | DIASTOLIC BLOOD PRESSURE: 80 MMHG

## 2018-01-24 ENCOUNTER — TRANSCRIBE ORDERS (OUTPATIENT)
Dept: ADMINISTRATIVE | Facility: HOSPITAL | Age: 73
End: 2018-01-24

## 2018-01-24 DIAGNOSIS — R42 DIZZINESS AND GIDDINESS: Primary | ICD-10-CM

## 2018-01-24 DIAGNOSIS — R00.9 ABNORMAL HEART RATE: ICD-10-CM

## 2018-02-01 ENCOUNTER — HOSPITAL ENCOUNTER (OUTPATIENT)
Dept: ULTRASOUND IMAGING | Facility: HOSPITAL | Age: 73
Discharge: HOME | End: 2018-02-01
Payer: MEDICARE

## 2018-02-01 DIAGNOSIS — R42 DIZZINESS AND GIDDINESS: ICD-10-CM

## 2018-02-01 PROCEDURE — 93880 EXTRACRANIAL BILAT STUDY: CPT

## 2018-02-01 PROCEDURE — 93880 EXTRACRANIAL BILAT STUDY: CPT | Performed by: SURGERY

## 2018-02-09 ENCOUNTER — HOSPITAL ENCOUNTER (OUTPATIENT)
Dept: NON INVASIVE DIAGNOSTICS | Facility: CLINIC | Age: 73
Discharge: HOME | End: 2018-02-09
Payer: MEDICARE

## 2018-02-09 DIAGNOSIS — R42 DIZZINESS AND GIDDINESS: ICD-10-CM

## 2018-02-09 DIAGNOSIS — R00.9 ABNORMAL HEART RATE: ICD-10-CM

## 2018-02-09 LAB
ARRHY DURING EX: NORMAL
CHEST PAIN STATEMENT: NORMAL
MAX DIASTOLIC BP: 78 MMHG
MAX HEART RATE: 153 BPM
MAX PREDICTED HEART RATE: 148 BPM
MAX. SYSTOLIC BP: 164 MMHG
PROTOCOL NAME: NORMAL
REASON FOR TERMINATION: NORMAL
TARGET HR FORMULA: NORMAL
TEST INDICATION: NORMAL
TIME IN EXERCISE PHASE: NORMAL

## 2018-02-09 PROCEDURE — A9502 TC99M TETROFOSMIN: HCPCS

## 2018-02-09 PROCEDURE — 78452 HT MUSCLE IMAGE SPECT MULT: CPT

## 2018-02-09 PROCEDURE — 93017 CV STRESS TEST TRACING ONLY: CPT

## 2018-02-13 ENCOUNTER — OFFICE VISIT (OUTPATIENT)
Dept: CARDIOLOGY CLINIC | Facility: CLINIC | Age: 73
End: 2018-02-13
Payer: MEDICARE

## 2018-02-13 VITALS
OXYGEN SATURATION: 96 % | DIASTOLIC BLOOD PRESSURE: 80 MMHG | HEIGHT: 75 IN | SYSTOLIC BLOOD PRESSURE: 134 MMHG | HEART RATE: 83 BPM | WEIGHT: 261 LBS | BODY MASS INDEX: 32.45 KG/M2

## 2018-02-13 DIAGNOSIS — I26.99 BILATERAL PULMONARY EMBOLISM (HCC): ICD-10-CM

## 2018-02-13 DIAGNOSIS — E78.00 PURE HYPERCHOLESTEROLEMIA: ICD-10-CM

## 2018-02-13 DIAGNOSIS — Z79.01 CHRONIC ANTICOAGULATION: ICD-10-CM

## 2018-02-13 DIAGNOSIS — I42.0 DILATED CARDIOMYOPATHY (HCC): ICD-10-CM

## 2018-02-13 DIAGNOSIS — I47.1 SVT (SUPRAVENTRICULAR TACHYCARDIA) (HCC): Primary | ICD-10-CM

## 2018-02-13 PROBLEM — I47.10 SVT (SUPRAVENTRICULAR TACHYCARDIA): Status: ACTIVE | Noted: 2018-02-13

## 2018-02-13 PROBLEM — R00.0 TACHYCARDIA: Status: RESOLVED | Noted: 2017-08-21 | Resolved: 2018-02-13

## 2018-02-13 PROBLEM — R50.9 FEVER: Status: RESOLVED | Noted: 2017-08-23 | Resolved: 2018-02-13

## 2018-02-13 PROBLEM — Z86.711 HISTORY OF PULMONARY EMBOLISM: Status: RESOLVED | Noted: 2017-08-21 | Resolved: 2018-02-13

## 2018-02-13 PROCEDURE — 99214 OFFICE O/P EST MOD 30 MIN: CPT | Performed by: INTERNAL MEDICINE

## 2018-02-13 RX ORDER — TAMSULOSIN HYDROCHLORIDE 0.4 MG/1
0.4 CAPSULE ORAL
COMMUNITY

## 2018-02-13 RX ORDER — ASPIRIN 81 MG/1
81 TABLET ORAL DAILY
COMMUNITY
End: 2018-02-13 | Stop reason: ALTCHOICE

## 2018-02-13 NOTE — PROGRESS NOTES
Cardiology Follow Up    Chelle Johnson   1945  [id number]  [address] [facility] [address] [address]    1  SVT (supraventricular tachycardia) ([facility] [address] )     2  Chronic anticoagulation     3  Dilated cardiomyopathy ([facility] [address] )  Echo limited with contrast if indicated   4  [facility]     5  Pure hypercholesterolemia       Chief Complaint   Patient presents with    Results     Stress test       Interval History: For follow-up visit  Patient recently had a nuclear stress test   Patient had episodes of brief supraventricular tachycardia on the treadmill  Stress test did not show any evidence of ischemia  There was a fixed inferior defect  Ejection fraction was reported as 40 percent which is lower than usual for him  Patient does have history of pulmonary embolism on anticoagulation  Patient has had recurrent pulmonary embolism and needs lifelong anticoagulation  Patient denies any bleeding issues  Patient denies any history of shortness of breath  Occasional palpitations  Patient has a tendency for bradycardia  No history of syncope or presyncope  He states that he has been compliant with all his present medications      Patient Active Problem List   Diagnosis    Bilateral pulmonary embolism (HCC)    SOB (shortness of breath)    Hyperlipidemia    Lung nodule    Obesity    History of sickle cell trait    Leg edema, left    Thrombocytopenia (HCC)    Chronic kidney disease (CKD), stage II (mild)    Chronic low back pain    SVT (supraventricular tachycardia) (HCC)    Chronic anticoagulation     Past Medical History:   Diagnosis Date    Hyperlipidemia     Post-nasal drip     Pulmonary embolism (HCC)      Social History     Social History    Marital status: /Civil Union     Spouse name: N/A    Number of children: N/A    Years of education: N/A Occupational History    Not on file  Social History Main Topics    Smoking status: Former Smoker    Smokeless tobacco: Never Used    Alcohol use Yes      Comment: socially    Drug use: No    Sexual activity: Not on file     Other Topics Concern    Not on file     Social History Narrative    No narrative on file      No family history on file  Past Surgical History:   Procedure Laterality Date    ARTHROSCOPY KNEE Left     30 years ago       Current Outpatient Prescriptions:     apixaban (ELIQUIS) 5 mg, Take 1 tablet by mouth 2 (two) times a day, Disp: 60 tablet, Rfl: 11    famotidine (PEPCID) 20 mg tablet, Take 1 tablet by mouth 2 (two) times a day, Disp: 60 tablet, Rfl: 11    fluticasone (FLONASE) 50 mcg/act nasal spray, 1 spray into each nostril daily  , Disp: , Rfl:     simvastatin (ZOCOR) 40 mg tablet, Take by mouth, Disp: , Rfl:     tamsulosin (FLOMAX) 0 4 mg, Take 0 4 mg by mouth daily with dinner, Disp: , Rfl:   No Known Allergies    Labs:  Hospital Outpatient Visit on 02/09/2018   Component Date Value    Protocol Name 02/09/2018 SKYLER     Time In Exercise Phase 02/09/2018 00:03:54     MAX  SYSTOLIC BP 87/30/1859 782     Max Diastolic Bp 54/28/9938 78     Max Heart Rate 02/09/2018 153     Max Predicted Heart Rate 02/09/2018 148     Reason for Termination 02/09/2018 Target Heart Rate Achieved     Test Indication 02/09/2018 Syncope     Target Hr Formular 02/09/2018 (220 - Age)*85%     Arrhy During Ex 02/09/2018 atrial premature beats, FREQUENT     Chest Pain Statement 02/09/2018 none      Imaging: Vas Carotid Complete Study    Result Date: 2/1/2018  Narrative:  [facility] REPORT CLINICAL: Indications:  Bruit [R09 89]  Patient presents for a general health evaluation secondary to other cardiovascular symptoms  Patient is asymptomatic from a cerebral vascular standpoint  Risk Factors The patient has no history of HTN, Diabetes or hyperlipidemia   Clinical Right Pressure: 138/77 mm Hg, Left Pressure:  142/87 mm Hg  FINDINGS:  Right        Impression  PSV  EDV (cm/s)  Direction of Flow  Ratio  Dist  ICA                 72          30                      1 27  Mid  ICA                  53          20                      0 93  Prox  ICA    Normal       40          13                      0 71  Dist CCA                  42          14                            Mid CCA                   57          15                      0 93  Prox CCA                  61          16                            Ext Carotid               45           6                      0 80  Prox Vert                 76          22  Antegrade                 Subclavian               112           0                             Left         Impression  PSV  EDV (cm/s)  Direction of Flow  Ratio  Dist  ICA                 65          27                      1 40  Mid  ICA                  49          21                      1 06  Prox  ICA    Normal       42          16                      0 91  Dist CCA                  41          11                            Mid CCA                   46          13                      0 82  Prox CCA                  57          12                            Ext Carotid               34           6                      0 74  Prox Vert                 33           7  Antegrade                 Subclavian                98           0                               CONCLUSION: Impression RIGHT: There is no evidence of significant stenosis noted  Vertebral artery flow is antegrade  There is no significant subclavian artery disease  LEFT: There is no evidence of significant stenosis noted  Vertebral artery flow is antegrade  There is no significant subclavian artery disease    Internal carotid artery stenosis determination by consensus criteria from: Basilio Trotter , et al  Carotid Artery Stenosis: Gray-Scale and Doppler US Diagnosis - Society of Radiologists in Ultrasound Consensus Conference, Radiology 2003; [id number]  SIGNATURE: Electronically Signed by: Clark Liriano MD on 2018-02-01 09:03:28 PM      Review of Systems:  Review of Systems   REVIEW OF SYSTEMS:  Constitutional:  Denies fever or chills   Eyes:  Denies change in visual acuity   HENT:  Denies nasal congestion or sore throat   Respiratory:   shortness of breath   Cardiovascular:  Denies chest pain or edema   GI:  Denies abdominal pain, nausea, vomiting, bloody stools or diarrhea   :  Denies dysuria, frequency, difficulty in micturition and nocturia  Musculoskeletal:  Denies back pain or joint pain   Neurologic:  Denies headache, focal weakness or sensory changes   Endocrine:  Denies polyuria or polydipsia   Lymphatic:  Denies swollen glands   Psychiatric:  Denies depression or anxiety   /80   Pulse 83   Ht 6' 3" (1 905 m)   Wt 118 kg (261 lb)   SpO2 96%   BMI 32 62 kg/m²     Physical Exam:  Physical Exam   PHYSICAL EXAM:  General:  Patient is not in acute distress   Head: Normocephalic, Atraumatic  HEENT:  Both pupils normal-size atraumatic, normocephalic, nonicteric  Neck:  JVP not raised  Trachea central  No carotid bruit  Respiratory:  normal breath sounds no crackles  no rhonchi  Cardiovascular:  Regular rate and rhythm no S3 no murmurs  GI:  Abdomen soft nontender  No organomegaly  Lymphatic:  No cervical or inguinal lymphadenopathy  Neurologic:  Patient is awake alert, oriented   Grossly nonfocal    Discussion/Summary:  Patient with multiple medical problems who seems to be doing reasonably well from cardiac standpoint  Previous studies reviewed with patient  Medications reviewed and possible side effects discussed  concepts of cardiovascular disease , signs and symptoms of heart disease  Dietary and risk factor modification reinforced  All questions answered  Safety measures reviewed  Patient advised to report any problems prompting medical attention      Lengthy discussion with patient and family regarding supraventricular tachycardia  Patient is predominantly asymptomatic  In addition patient has a tendency for bradycardia  Adding any AV node slowing medications including beta-blockers or calcium blockers will be fraught with risk of bradycardia  This was discussed with patient and family  If patient is significantly symptomatic with supraventricular tachycardia, will consider electrophysiology evaluation for possibility of ablation  Patient will have a limited echocardiogram to reassess ejection fraction given recent lower ejection fraction by nuclear stress test   Symptoms to watch out from cardiac standpoint discussed at length with patient and family  Patient and family had a few questions which were answered  Follow-up with primary care physician  Patient understands that he needs lifelong anticoagulation for recurrent pulmonary embolism

## 2018-02-14 ENCOUNTER — OFFICE VISIT (OUTPATIENT)
Dept: PULMONOLOGY | Facility: CLINIC | Age: 73
End: 2018-02-14
Payer: MEDICARE

## 2018-02-14 VITALS
BODY MASS INDEX: 32.45 KG/M2 | DIASTOLIC BLOOD PRESSURE: 88 MMHG | HEIGHT: 75 IN | OXYGEN SATURATION: 99 % | WEIGHT: 261 LBS | HEART RATE: 98 BPM | SYSTOLIC BLOOD PRESSURE: 130 MMHG

## 2018-02-14 DIAGNOSIS — I26.99 RECURRENT PULMONARY EMBOLI (HCC): ICD-10-CM

## 2018-02-14 DIAGNOSIS — G47.33 OSA (OBSTRUCTIVE SLEEP APNEA): Primary | ICD-10-CM

## 2018-02-14 DIAGNOSIS — I27.20 PULMONARY HTN (HCC): ICD-10-CM

## 2018-02-14 PROCEDURE — 99214 OFFICE O/P EST MOD 30 MIN: CPT | Performed by: INTERNAL MEDICINE

## 2018-02-14 NOTE — PROGRESS NOTES
Assessment/Plan:   Diagnoses and all orders for this visit:    ARLENE (obstructive sleep apnea)  -     Diagnostic Sleep Study; Future    Recurrent pulmonary emboli (HCC)    Pulmonary HTN     pulmonary embolis, currently on Eliquis he will continue with his medication needs to be on it lifelong currently does not have any bleeding complications  Patient has signs and symptoms of obstructive sleep apnea  Etiology pathogenesis of obstructive sleep apnea discussed in detail  Testing procedure was discussed with the patient in detail  He will undergo an all-night polysomnogram and if there is evidence of abnormal nocturnal breathing he will undergo a CPAP titration study for maximal benefit  Recommend weight loss      Return in about 3 months (around 5/14/2018)  All questions are answered to the patient's satisfaction and understanding  He verbalizes understanding  He is encouraged to call with any further questions or concerns  Portions of the record may have been created with voice recognition software  Occasional wrong word or "sound a like" substitutions may have occurred due to the inherent limitations of voice recognition software  Read the chart carefully and recognize, using context, where substitutions have occurred  ______________________________________________________________________    Chief Complaint: No chief complaint on file  Patient ID: Ruma Hernandez is a 67 y o  y o  male has a past medical history of Hyperlipidemia; Post-nasal drip; and Pulmonary embolism ([facility] [address] )  2/14/2018  HPI the patient is a former smoker who quit several years ago, with history of chronic cough with mild mucoid sputum especially in the mornings, not on any bronchodilators,  History of recurrent PE on lifelong anticoagulation currently on Eliquis doing well  Patient is a  by occupation, he states he goes to bed at around 10 o'clock, falls asleep right away he is out of bed at 530 in the morning    He has 1 nocturnal awakening  When he wakes up in the morning he states he has fresh and he goes to work at around 6:30, he is back home at around 10 o'clock and he states he takes a nap for about an hour, and then goes back to work again in the afternoon to drive for about 3 hours  There is history of snoring, no witnessed apneic spells no early morning headaches comma nocturia only once, no lack of concentration no short-term memory loss,  Review of Systems   Constitutional: Negative for activity change, appetite change, chills, diaphoresis, fatigue, fever and unexpected weight change  HENT: Negative for congestion, dental problem, drooling, nosebleeds, postnasal drip, rhinorrhea, sinus pressure, sore throat and voice change  Eyes: Negative for discharge, itching and visual disturbance  Respiratory: Negative for cough (clear sputum, no hemoptysis), shortness of breath and wheezing  Cardiovascular: Negative for chest pain, palpitations and leg swelling  Endocrine: Negative for cold intolerance and heat intolerance  Allergic/Immunologic: Negative for food allergies and immunocompromised state  Neurological: Negative for dizziness, facial asymmetry, speech difficulty and weakness  Hematological: Negative for adenopathy  Psychiatric/Behavioral: Negative for agitation, confusion and sleep disturbance  The patient is not nervous/anxious  Smoking history: He reports that he has quit smoking  He has never used smokeless tobacco     The following portions of the patient's history were reviewed and updated as appropriate: allergies, current medications, past family history, past medical history, past social history, past surgical history and problem list       There is no immunization history on file for this patient    Current Outpatient Prescriptions   Medication Sig Dispense Refill    apixaban (ELIQUIS) 5 mg Take 1 tablet by mouth 2 (two) times a day 60 tablet 11    famotidine (PEPCID) 20 mg tablet Take 1 tablet by mouth 2 (two) times a day 60 tablet 11    fluticasone (FLONASE) 50 mcg/act nasal spray 1 spray into each nostril daily   simvastatin (ZOCOR) 40 mg tablet Take by mouth      tamsulosin (FLOMAX) 0 4 mg Take 0 4 mg by mouth daily with dinner       No current facility-administered medications for this visit  Allergies: Patient has no known allergies  Objective:  Vitals:    02/14/18 1232 02/14/18 1234   BP: 130/88    Pulse: 98    SpO2:  99%   Weight: 118 kg (261 lb)    Height: 6' 3" (1 905 m)    Oxygen Therapy  SpO2: 99 %  Oxygen Therapy: None (Room air)    Wt Readings from Last 3 Encounters:   02/14/18 118 kg (261 lb)   02/13/18 118 kg (261 lb)   10/11/17 118 kg (260 lb)     Body mass index is 32 62 kg/m²  Physical Exam   Constitutional: He is oriented to person, place, and time  He appears well-developed and well-nourished  HENT:   Head: Normocephalic and atraumatic  Crowded oropharyngeal airways, Mallampati score of 3   Eyes: Conjunctivae are normal  Pupils are equal, round, and reactive to light  Neck: Normal range of motion  Neck supple  No JVD present  No thyromegaly present  Short and wide neck   Cardiovascular: Normal rate, regular rhythm and normal heart sounds  Exam reveals no gallop and no friction rub  No murmur heard  Pulmonary/Chest: Effort normal and breath sounds normal  No respiratory distress  He has no wheezes  He has no rales  He exhibits no tenderness  Abdominal: Soft  Bowel sounds are normal    Musculoskeletal: Normal range of motion  He exhibits no edema, tenderness or deformity  Lymphadenopathy:     He has no cervical adenopathy  Neurological: He is alert and oriented to person, place, and time  Skin: Skin is warm and dry  Psychiatric: He has a normal mood and affect  Nursing note and vitals reviewed  Lab Review:   not applicable    Diagnostics:  I have personally reviewed pertinent reports      none pertinent  Office Spirometry Results:        Vas Carotid Complete Study    Result Date: 2/1/2018  Narrative:  [facility] REPORT CLINICAL: Indications:  Bruit [R09 89]  Patient presents for a general health evaluation secondary to other cardiovascular symptoms  Patient is asymptomatic from a cerebral vascular standpoint  Risk Factors The patient has no history of HTN, Diabetes or hyperlipidemia  Clinical Right Pressure:  138/77 mm Hg, Left Pressure:  142/87 mm Hg  FINDINGS:  Right        Impression  PSV  EDV (cm/s)  Direction of Flow  Ratio  Dist  ICA                 72          30                      1 27  Mid  ICA                  53          20                      0 93  Prox  ICA    Normal       40          13                      0 71  Dist CCA                  42          14                            Mid CCA                   57          15                      0 93  Prox CCA                  61          16                            Ext Carotid               45           6                      0 80  Prox Vert                 76          22  Antegrade                 Subclavian               112           0                             Left         Impression  PSV  EDV (cm/s)  Direction of Flow  Ratio  Dist  ICA                 65          27                      1 40  Mid  ICA                  49          21                      1 06  Prox  ICA    Normal       42          16                      0 91  Dist CCA                  41          11                            Mid CCA                   46          13                      0 82  Prox CCA                  57          12                            Ext Carotid               34           6                      0 74  Prox Vert                 33           7  Antegrade                 Subclavian                98           0                               CONCLUSION: Impression RIGHT: There is no evidence of significant stenosis noted  Vertebral artery flow is antegrade   There is no significant subclavian artery disease  LEFT: There is no evidence of significant stenosis noted  Vertebral artery flow is antegrade  There is no significant subclavian artery disease  Internal carotid artery stenosis determination by consensus criteria from: aidee Alfonso al  Carotid Artery Stenosis: Gray-Scale and Doppler US Diagnosis - Society of Radiologists in [address] [place] [address] Drive, Radiology 2003; [phone]    SIGNATURE: Electronically Signed by: Naun Carvajal MD on 2018-02-01 09:03:28 PM

## 2018-02-22 ENCOUNTER — TRANSCRIBE ORDERS (OUTPATIENT)
Dept: ADMINISTRATIVE | Facility: HOSPITAL | Age: 73
End: 2018-02-22

## 2018-02-22 DIAGNOSIS — Z13.6 ENCOUNTER FOR SCREENING FOR VASCULAR DISEASE: Primary | ICD-10-CM

## 2018-03-30 ENCOUNTER — HOSPITAL ENCOUNTER (OUTPATIENT)
Dept: SLEEP CENTER | Facility: CLINIC | Age: 73
Discharge: HOME | End: 2018-03-30
Payer: MEDICARE

## 2018-03-30 DIAGNOSIS — G47.33 OSA (OBSTRUCTIVE SLEEP APNEA): ICD-10-CM

## 2018-03-30 PROCEDURE — 95810 POLYSOM 6/> YRS 4/> PARAM: CPT

## 2018-03-30 PROCEDURE — 95810 POLYSOM 6/> YRS 4/> PARAM: CPT | Performed by: INTERNAL MEDICINE

## 2018-03-31 NOTE — PROGRESS NOTES
Sleep Study Documentation    Pre-Sleep Study       Sleep testing procedure explained to patient:YES    Patient napped prior to study:YES- less than 30 minutes  Napped after 2PM: no    Caffeine:Dayshift worker after 12PM   Caffeine use:NO    Alcohol:Dayshift workers after 5PM: Alcohol use:NO    Typical day for patient:YES       Study Documentation  Diagnostic   Snore: Moderate  Supplemental O2: no    O2 flow rate (L/min) range N/A  O2 flow rate (L/min) final N/A  Minimum SaO2 80%  Baseline SaO2 97%        Mode of Therapy:N/A    EKG abnormalities: no     EEG abnormalities: no    Study Terminated:no    Patient classification: employed      Post-Sleep Study    Medication used at bedtime or during sleep study:YES other prescription medications    Patient reports time it took to fall asleep:less than 20 minutes    Patient reports waking up during study:1 to 2 times  Patient reports returning to sleep in 10 to 30 minutes  Patient reports sleeping 4 to 6 hours without dreaming  Patient reports sleep during study:better than usual    Patient rated sleepiness: Not sleepy or tired    PAP treatment:no

## 2018-05-09 ENCOUNTER — OFFICE VISIT (OUTPATIENT)
Dept: PULMONOLOGY | Facility: CLINIC | Age: 73
End: 2018-05-09
Payer: MEDICARE

## 2018-05-09 VITALS
WEIGHT: 257 LBS | OXYGEN SATURATION: 94 % | HEIGHT: 75 IN | BODY MASS INDEX: 31.95 KG/M2 | SYSTOLIC BLOOD PRESSURE: 124 MMHG | HEART RATE: 60 BPM | DIASTOLIC BLOOD PRESSURE: 84 MMHG

## 2018-05-09 DIAGNOSIS — E66.9 OBESITY (BMI 30-39.9): ICD-10-CM

## 2018-05-09 DIAGNOSIS — G47.33 OSA (OBSTRUCTIVE SLEEP APNEA): Primary | ICD-10-CM

## 2018-05-09 DIAGNOSIS — I26.99 RECURRENT PULMONARY EMBOLI (HCC): ICD-10-CM

## 2018-05-09 DIAGNOSIS — I27.20 PULMONARY HTN (HCC): ICD-10-CM

## 2018-05-09 PROCEDURE — 99214 OFFICE O/P EST MOD 30 MIN: CPT | Performed by: INTERNAL MEDICINE

## 2018-05-09 NOTE — PROGRESS NOTES
Assessment/Plan:   Diagnoses and all orders for this visit:    ARLENE (obstructive sleep apnea)  -     Polysomnography 4 or more parameters with CPAP; Future    Obesity (BMI 30-39  9)    Recurrent pulmonary emboli (HCC)    Pulmonary HTN ([address] )     obstructiv sleep apnea, reviewed the recent diagnostic study done with moderate obstructive sleep apnea with an AHI of 20 9  Etiology pathogenesis of obstructive sleep apnea discussed in detail  Consequences of untreated sleep apnea discussed  Treatment options for obstructive sleep apnea with weight loss, mandibular advancement device, uvula pharyngoplasty and CPAP titration discussed  Patient would like to go in for the CPAP titration study  Discussed the need for compliance with the CPAP machine  Also discussed the testing procedure in detail  He will undergo the CPAP titration study and will order the CPAP machine for him and will follow up in 3 months following the CPAP use with the CPAP download compliance  Recommend weight loss  Recurrent pulmonary emboli on Eliquis to continue on lifelong anticoagulation  Follow-up in 4 months or p r n  earlier as needed  Return in about 4 months (around 9/9/2018)  All questions are answered to the patient's satisfaction and understanding  He verbalizes understanding  He is encouraged to call with any further questions or concerns  Portions of the record may have been created with voice recognition software  Occasional wrong word or "sound a like" substitutions may have occurred due to the inherent limitations of voice recognition software  Read the chart carefully and recognize, using context, where substitutions have occurred      ______________________________________________________________________    Chief Complaint:   Chief Complaint   Patient presents with    Follow-up    Sleep Apnea       Patient ID: No Pittman is a 67 y o  y o  male has a past medical history of Hyperlipidemia; Post-nasal drip; and Pulmonary embolism ([facility] [address] )  5/9/2018  Patient is here for follow-up of his diagnostic sleep study  Review of Systems   Constitutional: Positive for fatigue  Negative for appetite change, chills, diaphoresis, fever and unexpected weight change  HENT: Negative for congestion, ear discharge, ear pain, nosebleeds, postnasal drip, rhinorrhea, sinus pain, sore throat and voice change  Eyes: Negative for pain, discharge and visual disturbance  Respiratory: Positive for shortness of breath  Negative for apnea, cough, choking, chest tightness, wheezing and stridor  Snoring witnessed apneic spells  Cardiovascular: Negative for chest pain, palpitations and leg swelling  Gastrointestinal: Negative for abdominal pain, blood in stool, constipation, diarrhea and vomiting  Endocrine: Negative for cold intolerance, heat intolerance, polydipsia, polyphagia and polyuria  Genitourinary: Negative for difficulty urinating and dysuria  Musculoskeletal: Negative for arthralgias and neck pain  Skin: Negative for pallor and rash  Allergic/Immunologic: Negative for environmental allergies and food allergies  Neurological: Negative for dizziness, speech difficulty, weakness and light-headedness  Hematological: Negative for adenopathy  Does not bruise/bleed easily  Psychiatric/Behavioral: Negative for agitation, confusion and sleep disturbance  The patient is not nervous/anxious  Smoking history: He reports that he has quit smoking  He has never used smokeless tobacco     The following portions of the patient's history were reviewed and updated as appropriate: allergies, current medications, past family history, past medical history, past social history, past surgical history and problem list       There is no immunization history on file for this patient    Current Outpatient Prescriptions   Medication Sig Dispense Refill    apixaban (ELIQUIS) 5 mg Take 1 tablet by mouth 2 (two) times a day 60 tablet 11  famotidine (PEPCID) 20 mg tablet Take 1 tablet by mouth 2 (two) times a day 60 tablet 11    fluticasone (FLONASE) 50 mcg/act nasal spray 1 spray into each nostril daily   simvastatin (ZOCOR) 40 mg tablet Take by mouth      tamsulosin (FLOMAX) 0 4 mg Take 0 4 mg by mouth daily with dinner       No current facility-administered medications for this visit  Allergies: Patient has no known allergies  Objective:  Vitals:    05/09/18 1143   BP: 124/84   Pulse: 60   SpO2: 94%   Weight: 117 kg (257 lb)   Height: 6' 3" (1 905 m)   Oxygen Therapy  SpO2: 94 %    Wt Readings from Last 3 Encounters:   05/09/18 117 kg (257 lb)   02/14/18 118 kg (261 lb)   02/13/18 118 kg (261 lb)     Body mass index is 32 12 kg/m²  Physical Exam   Constitutional: He is oriented to person, place, and time  He appears well-developed and well-nourished  HENT:   Head: Normocephalic and atraumatic  Crowded oropharyngeal airways, Mallampati score of 3   Eyes: Conjunctivae are normal  Pupils are equal, round, and reactive to light  Neck: Normal range of motion  Neck supple  No JVD present  No thyromegaly present  Short and wide neck   Cardiovascular: Normal rate, regular rhythm and normal heart sounds  Exam reveals no gallop and no friction rub  No murmur heard  Pulmonary/Chest: Effort normal and breath sounds normal  No respiratory distress  He has no wheezes  He has no rales  He exhibits no tenderness  Abdominal: Soft  Bowel sounds are normal    Musculoskeletal: Normal range of motion  He exhibits no edema, tenderness or deformity  Lymphadenopathy:     He has no cervical adenopathy  Neurological: He is alert and oriented to person, place, and time  Skin: Skin is warm and dry  Psychiatric: He has a normal mood and affect  Nursing note and vitals reviewed

## 2018-07-18 ENCOUNTER — HOSPITAL ENCOUNTER (OUTPATIENT)
Dept: NON INVASIVE DIAGNOSTICS | Facility: CLINIC | Age: 73
Discharge: HOME | End: 2018-07-18
Payer: MEDICARE

## 2018-07-18 DIAGNOSIS — I42.0 DILATED CARDIOMYOPATHY (HCC): ICD-10-CM

## 2018-07-18 PROCEDURE — 93308 TTE F-UP OR LMTD: CPT

## 2018-07-19 PROCEDURE — 93321 DOPPLER ECHO F-UP/LMTD STD: CPT | Performed by: INTERNAL MEDICINE

## 2018-07-19 PROCEDURE — 93308 TTE F-UP OR LMTD: CPT | Performed by: INTERNAL MEDICINE

## 2018-07-19 PROCEDURE — 93325 DOPPLER ECHO COLOR FLOW MAPG: CPT | Performed by: INTERNAL MEDICINE

## 2018-08-16 ENCOUNTER — TELEPHONE (OUTPATIENT)
Dept: CARDIOLOGY CLINIC | Facility: CLINIC | Age: 73
End: 2018-08-16

## 2018-08-16 ENCOUNTER — OFFICE VISIT (OUTPATIENT)
Dept: CARDIOLOGY CLINIC | Facility: CLINIC | Age: 73
End: 2018-08-16
Payer: MEDICARE

## 2018-08-16 VITALS
OXYGEN SATURATION: 98 % | DIASTOLIC BLOOD PRESSURE: 78 MMHG | BODY MASS INDEX: 33.12 KG/M2 | HEART RATE: 83 BPM | HEIGHT: 75 IN | SYSTOLIC BLOOD PRESSURE: 142 MMHG | WEIGHT: 266.4 LBS

## 2018-08-16 DIAGNOSIS — I26.99 BILATERAL PULMONARY EMBOLISM (HCC): Primary | ICD-10-CM

## 2018-08-16 DIAGNOSIS — I42.0 DILATED CARDIOMYOPATHY (HCC): ICD-10-CM

## 2018-08-16 DIAGNOSIS — I47.1 SVT (SUPRAVENTRICULAR TACHYCARDIA) (HCC): ICD-10-CM

## 2018-08-16 DIAGNOSIS — E78.00 PURE HYPERCHOLESTEROLEMIA: ICD-10-CM

## 2018-08-16 PROCEDURE — 99214 OFFICE O/P EST MOD 30 MIN: CPT | Performed by: INTERNAL MEDICINE

## 2018-08-16 NOTE — TELEPHONE ENCOUNTER
Patient needs letter for clearance for cataract surgery with [facility] in September  Saw you 8/16  Also he will need in the letter instructions for his eliquis for his procedure   TY

## 2018-08-16 NOTE — PROGRESS NOTES
[facility]  [address]  [facility] [id number]  Cardiology Consultation     Cassandra Tong  [id number]  1945      1  Bilateral pulmonary embolism ([address] )     2  SVT (supraventricular tachycardia) ([facility] [address] )     3  Dilated cardiomyopathy ([facility] [address] )     4  Pure hypercholesterolemia         Chief Complaint   Patient presents with    Follow-up    Palpitations     on occasion       HPI:  Patient with history of recurrent PE on lifelong anticoagulation, paroxysmal SVT, dilated cardiomyopathy, HLD presents for follow up  No recurrence of PE since last visit  Denies chest pain, SOB, lightheadedness, palpitations, leg swelling, syncope  Compliant on medications  Patient Active Problem List   Diagnosis    Bilateral pulmonary embolism (HCC)    SOB (shortness of breath)    Hyperlipidemia    Lung nodule    Obesity    History of sickle cell trait    Leg edema, left    Thrombocytopenia (HCC)    Chronic kidney disease (CKD), stage II (mild)    Chronic low back pain    SVT (supraventricular tachycardia) (HCC)    Chronic anticoagulation    ARLENE (obstructive sleep apnea)     Past Medical History:   Diagnosis Date    Hyperlipidemia     Post-nasal drip     Pulmonary embolism (HCC)      Social History     Social History    Marital status: /Civil Union     Spouse name: N/A    Number of children: 7    Years of education: N/A     Occupational History    employed      Social History Main Topics    Smoking status: Former Smoker    Smokeless tobacco: Never Used    Alcohol use 0 0 oz/week      Comment: socially    Drug use: No    Sexual activity: Not on file     Other Topics Concern    Not on file     Social History Narrative    No narrative on file      History reviewed  No pertinent family history    Past Surgical History:   Procedure Laterality Date    ARTHROSCOPY KNEE Left     30 years ago       Current Outpatient Prescriptions:     apixaban (ELIQUIS) 5 mg, Take 1 tablet by mouth 2 (two) times a day, Disp: 60 tablet, Rfl: 11    Cyanocobalamin (VITAMIN B 12 PO), Take 1 tablet by mouth daily, Disp: , Rfl:     famotidine (PEPCID) 20 mg tablet, Take 1 tablet by mouth 2 (two) times a day, Disp: 60 tablet, Rfl: 11    fluticasone (FLONASE) 50 mcg/act nasal spray, 1 spray into each nostril daily  , Disp: , Rfl:     simvastatin (ZOCOR) 40 mg tablet, Take by mouth, Disp: , Rfl:     tamsulosin (FLOMAX) 0 4 mg, Take 0 4 mg by mouth daily with dinner, Disp: , Rfl:   No Known Allergies  Vitals:    08/16/18 1342   BP: 142/78   BP Location: Left arm   Patient Position: Sitting   Cuff Size: Adult   Pulse: 83   SpO2: 98%   Weight: 121 kg (266 lb 6 4 oz)   Height: 6' 3" (1 905 m)       Labs:  No visits with results within 2 Month(s) from this visit  Latest known visit with results is:   Hospital Outpatient Visit on 02/09/2018   Component Date Value    Protocol Name 02/09/2018 Delfino Tena Time In Exercise Phase 02/09/2018 00:03:54     MAX  SYSTOLIC BP 02/03/7023 959     Max Diastolic Bp 53/25/8950 78     Max Heart Rate 02/09/2018 153     Max Predicted Heart Rate 02/09/2018 148     Reason for Termination 02/09/2018 Target Heart Rate Achieved     Test Indication 02/09/2018 Syncope     Target Hr Formular 02/09/2018 (220 - Age)*85%     Arrhy During Ex 02/09/2018 atrial premature beats, FREQUENT     Chest Pain Statement 02/09/2018 none      Imaging: No results found  Review of Systems:  Review of Systems   Constitutional: Negative for diaphoresis, fatigue and fever  HENT: Negative for congestion, ear discharge, hearing loss, sinus pain and sore throat  Eyes: Negative for pain, redness and visual disturbance  Respiratory: Negative for cough, chest tightness, shortness of breath and wheezing  Cardiovascular: Negative for chest pain, palpitations and leg swelling  Gastrointestinal: Negative for abdominal distention, abdominal pain, constipation, diarrhea, nausea and vomiting  Endocrine: Negative for cold intolerance and heat intolerance  Genitourinary: Negative for difficulty urinating and hematuria  Musculoskeletal: Negative for arthralgias, back pain, gait problem, joint swelling, myalgias, neck pain and neck stiffness  Skin: Negative for color change, pallor, rash and wound  Neurological: Negative for dizziness, syncope, weakness, numbness and headaches  Hematological: Does not bruise/bleed easily  Psychiatric/Behavioral: Negative for agitation, behavioral problems and confusion  The patient is not nervous/anxious  /78 (BP Location: Left arm, Patient Position: Sitting, Cuff Size: Adult)   Pulse 83   Ht 6' 3" (1 905 m)   Wt 121 kg (266 lb 6 4 oz)   SpO2 98%   BMI 33 30 kg/m²     Physical Exam:  Physical Exam   Constitutional: He is oriented to person, place, and time  He appears well-developed and well-nourished  HENT:   Head: Normocephalic and atraumatic  Eyes: Conjunctivae and EOM are normal  Pupils are equal, round, and reactive to light  Neck: Normal range of motion  Neck supple  Cardiovascular: Normal rate, regular rhythm, normal heart sounds and intact distal pulses  Exam reveals no gallop and no friction rub  No murmur heard  Pulmonary/Chest: Effort normal and breath sounds normal  No respiratory distress  He has no wheezes  He has no rales  He exhibits no tenderness  Abdominal: Soft  Bowel sounds are normal  He exhibits no distension  There is no rebound  Musculoskeletal: Normal range of motion  He exhibits no edema  Neurological: He is alert and oriented to person, place, and time  He has normal reflexes  Skin: Skin is warm and dry  Psychiatric: He has a normal mood and affect  His behavior is normal  Judgment and thought content normal        Discussion/Summary:  1  History of recurrent PE: Continue Eliquis    2  SVT: Brief, no recurrence  HR stable without rate control medications   No BB given tendency for bradycardia  3  Dilated cardiomyopathy: ECHO July 2018 shows EF 45-50%, mild diffuse hypokinesis  No BB given tendency for bradycardia  Does not want to take ACEI      4  HLD: Continue statin    F/u 6 months

## 2018-08-17 NOTE — TELEPHONE ENCOUNTER
Letter dated 8/16/2008 in the chart for surgery  Patient can stop Eliquis for 2 days prior to surgery and restart as soon as possible

## 2018-11-01 ENCOUNTER — TELEPHONE (OUTPATIENT)
Dept: CARDIOLOGY CLINIC | Facility: CLINIC | Age: 73
End: 2018-11-01

## 2018-11-02 ENCOUNTER — HOSPITAL ENCOUNTER (OUTPATIENT)
Dept: SLEEP CENTER | Facility: CLINIC | Age: 73
Discharge: HOME | End: 2018-11-02
Payer: MEDICARE

## 2018-11-02 DIAGNOSIS — G47.33 OSA (OBSTRUCTIVE SLEEP APNEA): ICD-10-CM

## 2018-11-02 PROCEDURE — 95811 POLYSOM 6/>YRS CPAP 4/> PARM: CPT

## 2018-11-02 PROCEDURE — 95811 POLYSOM 6/>YRS CPAP 4/> PARM: CPT | Performed by: INTERNAL MEDICINE

## 2018-11-03 NOTE — PROGRESS NOTES
Sleep Study Documentation    Pre-Sleep Study       Sleep testing procedure explained to patient:YES    Patient napped prior to study:YES- more than 30 minutes  Napped after 2PM: no    Caffeine:Dayshift worker after 12PM   Caffeine use:NO    Alcohol:Dayshift workers after 5PM: Alcohol use:NO    Typical day for patient:YES       Study Documentation  Treatment   Optimal PAP pressure: I9/E5  Leak:Small  Snore:Eliminated  REM Obtained:yes  Supplemental O2: no    Minimum SaO2 92%   Baseline SaO2 98%  PAP mask tried (list all) Resmed Airfit F20 full face mask large, N20 nasal mask large, P10 nasal pillows medium  PAP mask choice (final) Resmed Airfit P10 nasal mask medium  PAP mask type:pillows  PAP pressure at which snoring was eliminated 5 cm  Minimum SaO2 at final PAP pressure 93%  Mode of Therapy:BiPAP  ETCO2:No  CPAP changed to BiPAP:Yes  If yes why unable to tolerate pressure    Mode of Therapy:BiPAP    EKG abnormalities: no     EEG abnormalities: no    Study Terminated:no    Patient classification: employed       Post-Sleep Study    Medication used at bedtime or during sleep study:YES other prescription medications    Patient reports time it took to fall asleep:greater than 60 minutes    Patient reports waking up during study:3 or more times  Patient reports returning to sleep in greater than 30 minutes  Patient reports sleeping 2 to 4 hours without dreaming  Patient reports sleep during study:worse than usual    Patient rated sleepiness: Somewhat sleepy or tired    PAP treatment:yes: Post PAP treatment patient reports feeling unsure if a change is noted and  would wear PAP mask at home

## 2018-11-09 ENCOUNTER — TELEPHONE (OUTPATIENT)
Dept: SLEEP CENTER | Facility: CLINIC | Age: 73
End: 2018-11-09

## 2018-11-09 NOTE — TELEPHONE ENCOUNTER
LM for pt that sleep study was completed and he should contact Dr Suleiman Chun office for results and next step  Advised to call our office with any questions

## 2018-11-25 ENCOUNTER — OFFICE VISIT (OUTPATIENT)
Dept: URGENT CARE | Facility: CLINIC | Age: 73
End: 2018-11-25
Payer: MEDICARE

## 2018-11-25 VITALS
HEART RATE: 81 BPM | BODY MASS INDEX: 33.07 KG/M2 | RESPIRATION RATE: 18 BRPM | HEIGHT: 75 IN | WEIGHT: 266 LBS | OXYGEN SATURATION: 97 % | TEMPERATURE: 98 F | SYSTOLIC BLOOD PRESSURE: 140 MMHG | DIASTOLIC BLOOD PRESSURE: 90 MMHG

## 2018-11-25 DIAGNOSIS — R05.9 COUGH: ICD-10-CM

## 2018-11-25 DIAGNOSIS — J01.90 ACUTE SINUSITIS, RECURRENCE NOT SPECIFIED, UNSPECIFIED LOCATION: Primary | ICD-10-CM

## 2018-11-25 PROCEDURE — G0463 HOSPITAL OUTPT CLINIC VISIT: HCPCS | Performed by: PHYSICIAN ASSISTANT

## 2018-11-25 PROCEDURE — 99213 OFFICE O/P EST LOW 20 MIN: CPT | Performed by: PHYSICIAN ASSISTANT

## 2018-11-25 RX ORDER — FLUTICASONE PROPIONATE 50 MCG
1 SPRAY, SUSPENSION (ML) NASAL DAILY
Qty: 16 G | Refills: 0 | Status: SHIPPED | OUTPATIENT
Start: 2018-11-25 | End: 2021-12-10

## 2018-11-25 RX ORDER — AMOXICILLIN AND CLAVULANATE POTASSIUM 875; 125 MG/1; MG/1
1 TABLET, FILM COATED ORAL EVERY 12 HOURS SCHEDULED
Qty: 20 TABLET | Refills: 0 | Status: SHIPPED | OUTPATIENT
Start: 2018-11-25 | End: 2018-12-05

## 2018-11-25 RX ORDER — BENZONATATE 100 MG/1
100 CAPSULE ORAL 3 TIMES DAILY PRN
Qty: 20 CAPSULE | Refills: 0 | Status: SHIPPED | OUTPATIENT
Start: 2018-11-25 | End: 2019-04-03 | Stop reason: ALTCHOICE

## 2018-11-25 NOTE — PATIENT INSTRUCTIONS
1  Sinusitis  -Take Augmentin as directed with food  -use flonase nasal spray  -use tessalon perles as directed for cough  -Increase fluids  -Tylenol/motrin  -Salt H20 gargles/throat lozenges  -Saline nasal spray  -Try using humidifier at nighttime    -Follow-up with PCP within 5 days  -Go to ER with worsening symptoms, difficulty breathing, high fever, or any signs of distress    Sinusitis   AMBULATORY CARE:   Sinusitis  is inflammation or infection of your sinuses  It is most often caused by a virus  Acute sinusitis may last up to 12 weeks  Chronic sinusitis lasts longer than 12 weeks  Recurrent sinusitis means you have 4 or more times in 1 year  Common symptoms include the following:   · Fever    · Pain, pressure, redness, or swelling around the forehead, cheeks, or eyes    · Thick yellow or green discharge from your nose    · Tenderness when you touch your face over your sinuses    · Dry cough that happens mostly at night or when you lie down    · Headache and face pain that is worse when you lean forward    · Tooth pain, or pain when you chew  Seek care immediately if:   · Your eye and eyelid are red, swollen, and painful  · You cannot open your eye  · You have vision changes, such as double vision  · Your eyeball bulges out or you cannot move your eye  · You are more sleepy than normal, or you notice changes in your ability to think, move, or talk  · You have a stiff neck, a fever, or a bad headache  · You have swelling of your forehead or scalp  Contact your healthcare provider if:   · Your symptoms do not improve after 3 days  · Your symptoms do not go away after 10 days  · You have nausea and are vomiting  · Your nose is bleeding  · You have questions or concerns about your condition or care  Treatment for sinusitis:  Your symptoms may go away on their own  Your healthcare provider may recommend watchful waiting for up to 10 days before starting antibiotics   You may need any of the following:  · Acetaminophen  decreases pain and fever  It is available without a doctor's order  Ask how much to take and how often to take it  Follow directions  Read the labels of all other medicines you are using to see if they also contain acetaminophen, or ask your doctor or pharmacist  Acetaminophen can cause liver damage if not taken correctly  Do not use more than 4 grams (4,000 milligrams) total of acetaminophen in one day  · NSAIDs , such as ibuprofen, help decrease swelling, pain, and fever  This medicine is available with or without a doctor's order  NSAIDs can cause stomach bleeding or kidney problems in certain people  If you take blood thinner medicine, always ask your healthcare provider if NSAIDs are safe for you  Always read the medicine label and follow directions  · Nasal steroid sprays  may help decrease inflammation in your nose and sinuses  · Decongestants  help reduce swelling and drain mucus in the nose and sinuses  They may help you breathe easier  · Antihistamines  help dry mucus in the nose and relieve sneezing  · Antibiotics  help treat or prevent a bacterial infection  · Take your medicine as directed  Contact your healthcare provider if you think your medicine is not helping or if you have side effects  Tell him or her if you are allergic to any medicine  Keep a list of the medicines, vitamins, and herbs you take  Include the amounts, and when and why you take them  Bring the list or the pill bottles to follow-up visits  Carry your medicine list with you in case of an emergency  Self-care:   · Rinse your sinuses  Use a sinus rinse device to rinse your nasal passages with a saline (salt water) solution or distilled water  Do not use tap water  This will help thin the mucus in your nose and rinse away pollen and dirt  It will also help reduce swelling so you can breathe normally  Ask your healthcare provider how often to do this       · Breathe in steam   Heat a bowl of water until you see steam  Lean over the bowl and make a tent over your head with a large towel  Breathe deeply for about 20 minutes  Be careful not to get too close to the steam or burn yourself  Do this 3 times a day  You can also breathe deeply when you take a hot shower  · Sleep with your head elevated  Place an extra pillow under your head before you go to sleep to help your sinuses drain  · Drink liquids as directed  Ask your healthcare provider how much liquid to drink each day and which liquids are best for you  Liquids will thin the mucus in your nose and help it drain  Avoid drinks that contain alcohol or caffeine  · Do not smoke, and avoid secondhand smoke  Nicotine and other chemicals in cigarettes and cigars can make your symptoms worse  Ask your healthcare provider for information if you currently smoke and need help to quit  E-cigarettes or smokeless tobacco still contain nicotine  Talk to your healthcare provider before you use these products  Prevent the spread of germs that cause sinusitis:  Wash your hands often with soap and water  Wash your hands after you use the bathroom, change a child's diaper, or sneeze  Wash your hands before you prepare or eat food  Follow up with your healthcare provider as directed: You may be referred to an ear, nose, and throat specialist  Write down your questions so you remember to ask them during your visits  © 2017 [employer] Information is for End User's use only and may not be sold, redistributed or otherwise used for commercial purposes  All illustrations and images included in CareNotes® are the copyrighted property of [employer]  or [employer]  The above information is an  only  It is not intended as medical advice for individual conditions or treatments   Talk to your doctor, nurse or pharmacist before following any medical regimen to see if it is safe and effective for you

## 2018-11-25 NOTE — PROGRESS NOTES
[facility] Now        NAME: Austin Hendrickson  is a 68 y o  male  : 1945    MRN: [id number]  DATE: 2018  TIME: 11:31 AM    Assessment and Plan   Acute sinusitis, recurrence not specified, unspecified location [J01 90]  1  Acute sinusitis, recurrence not specified, unspecified location  amoxicillin-clavulanate (AUGMENTIN) 875-125 mg per tablet    fluticasone (FLONASE) 50 mcg/act nasal spray   2  Cough  benzonatate (TESSALON PERLES) 100 mg capsule         Patient Instructions     1  Sinusitis  -Take Augmentin as directed with food  -use flonase nasal spray  -use tessalon perles as directed for cough  -Increase fluids  -Tylenol/motrin  -Salt H20 gargles/throat lozenges  -Saline nasal spray  -Try using humidifier at nighttime    -Follow-up with PCP within 5 days  -Go to ER with worsening symptoms, difficulty breathing, high fever, or any signs of distress    Chief Complaint     Chief Complaint   Patient presents with    Cold Like Symptoms     x 1 week, c/o sinus congestion    Cough     x 1 week, reports green mucus production         History of Present Illness       Patient is a 68 year-old male who presents today for evaluation of cold symptoms that in present for the past week  Patient admits to nasal congestion along with sinus pain and pressure  Patient also states that he has had a cough for the past week as well  He states that he has tried taking over-the-counter medications without much relief  Review of Systems   Review of Systems   Constitutional: Negative for chills and fever  HENT: Positive for rhinorrhea, sinus pain and sinus pressure  Negative for ear pain and sore throat  Respiratory: Positive for cough  Negative for shortness of breath  Cardiovascular: Negative for chest pain  Musculoskeletal: Negative for arthralgias  Neurological: Negative for headaches           Current Medications       Current Outpatient Prescriptions:     apixaban (ELIQUIS) 5 mg, Take 1 tablet by mouth 2 (two) times a day, Disp: 60 tablet, Rfl: 11    Cyanocobalamin (VITAMIN B 12 PO), Take 1 tablet by mouth daily, Disp: , Rfl:     famotidine (PEPCID) 20 mg tablet, Take 1 tablet by mouth 2 (two) times a day, Disp: 60 tablet, Rfl: 11    fluticasone (FLONASE) 50 mcg/act nasal spray, 1 spray into each nostril daily  , Disp: , Rfl:     simvastatin (ZOCOR) 40 mg tablet, Take by mouth, Disp: , Rfl:     tamsulosin (FLOMAX) 0 4 mg, Take 0 4 mg by mouth daily with dinner, Disp: , Rfl:     amoxicillin-clavulanate (AUGMENTIN) 875-125 mg per tablet, Take 1 tablet by mouth every 12 (twelve) hours for 10 days, Disp: 20 tablet, Rfl: 0    benzonatate (TESSALON PERLES) 100 mg capsule, Take 1 capsule (100 mg total) by mouth 3 (three) times a day as needed for cough, Disp: 20 capsule, Rfl: 0    fluticasone (FLONASE) 50 mcg/act nasal spray, 1 spray into each nostril daily, Disp: 16 g, Rfl: 0    Current Allergies     Allergies as of 11/25/2018    (No Known Allergies)            The following portions of the patient's history were reviewed and updated as appropriate: allergies, current medications, past family history, past medical history, past social history, past surgical history and problem list      Past Medical History:   Diagnosis Date    Hyperlipidemia     Post-nasal drip     Pulmonary embolism ([address] )        Past Surgical History:   Procedure Laterality Date    ARTHROSCOPY KNEE Left     30 years ago       History reviewed  No pertinent family history  Medications have been verified  Objective   /90   Pulse 81   Temp 98 °F (36 7 °C) (Temporal)   Resp 18   Ht 6' 3" (1 905 m)   Wt 121 kg (266 lb)   SpO2 97%   BMI 33 25 kg/m²        Physical Exam     Physical Exam   Constitutional: He is oriented to person, place, and time  He appears well-developed and well-nourished  No distress     HENT:   Right Ear: Tympanic membrane and external ear normal    Left Ear: Tympanic membrane and external ear normal    Nose: Right sinus exhibits maxillary sinus tenderness  Left sinus exhibits maxillary sinus tenderness  Mouth/Throat: Uvula is midline, oropharynx is clear and moist and mucous membranes are normal    Eyes: Pupils are equal, round, and reactive to light  Conjunctivae and EOM are normal    Neck: Normal range of motion  Neck supple  Cardiovascular: Normal rate, regular rhythm and normal heart sounds  Pulmonary/Chest: Effort normal and breath sounds normal  He has no wheezes  He has no rales  Lymphadenopathy:     He has no cervical adenopathy  Neurological: He is alert and oriented to person, place, and time  Skin: Skin is warm and dry  Psychiatric: He has a normal mood and affect  Nursing note and vitals reviewed

## 2018-12-10 ENCOUNTER — TELEPHONE (OUTPATIENT)
Dept: PULMONOLOGY | Facility: CLINIC | Age: 73
End: 2018-12-10

## 2018-12-10 ENCOUNTER — TELEPHONE (OUTPATIENT)
Dept: CARDIOLOGY CLINIC | Facility: CLINIC | Age: 73
End: 2018-12-10

## 2018-12-10 DIAGNOSIS — G47.33 OSA (OBSTRUCTIVE SLEEP APNEA): Primary | ICD-10-CM

## 2018-12-10 DIAGNOSIS — I26.99 RECURRENT PULMONARY EMBOLISM (HCC): Primary | ICD-10-CM

## 2018-12-10 NOTE — TELEPHONE ENCOUNTER
PT NEEDS A NEW SCRIPT FOR ELIQUIS 5MG (30 DAY SUPPLY) SENT TO [facility] [place]  PT ONLY HAS 2 PILLS LEFT   [address]

## 2018-12-10 NOTE — TELEPHONE ENCOUNTER
Please let him know needs a bipap will order and he will start using it and follow up in 3 months    machine with horace

## 2018-12-13 NOTE — TELEPHONE ENCOUNTER
PT WAS UPSET THAT HIS REFILL ON ELIQUIS WASN'T AT THE PHARMACY   SPOKE TO MATT AND SHE IS GOING TO CALL SCRIPT INTO [facility] [place]

## 2019-01-25 ENCOUNTER — TRANSCRIBE ORDERS (OUTPATIENT)
Dept: ADMINISTRATIVE | Facility: HOSPITAL | Age: 74
End: 2019-01-25

## 2019-01-25 DIAGNOSIS — R07.9 CHEST PAIN, UNSPECIFIED TYPE: Primary | ICD-10-CM

## 2019-02-11 ENCOUNTER — HOSPITAL ENCOUNTER (OUTPATIENT)
Dept: NON INVASIVE DIAGNOSTICS | Facility: CLINIC | Age: 74
Discharge: HOME | End: 2019-02-11
Payer: MEDICARE

## 2019-02-11 DIAGNOSIS — R07.9 CHEST PAIN, UNSPECIFIED TYPE: ICD-10-CM

## 2019-02-11 PROCEDURE — 93351 STRESS TTE COMPLETE: CPT | Performed by: INTERNAL MEDICINE

## 2019-02-11 PROCEDURE — 93350 STRESS TTE ONLY: CPT

## 2019-02-12 LAB
MAX DIASTOLIC BP: 84 MMHG
MAX HEART RATE: 160 BPM
MAX PREDICTED HEART RATE: 147 BPM
MAX. SYSTOLIC BP: 176 MMHG
PROTOCOL NAME: NORMAL
TARGET HR FORMULA: NORMAL
TEST INDICATION: NORMAL
TIME IN EXERCISE PHASE: NORMAL

## 2019-03-06 ENCOUNTER — TELEPHONE (OUTPATIENT)
Dept: PULMONOLOGY | Facility: CLINIC | Age: 74
End: 2019-03-06

## 2019-03-07 NOTE — TELEPHONE ENCOUNTER
The order is in there from 12/10/18 for a BiPAP - Dr Nata Herrmann ordered it  Can you send that to [facility]?

## 2019-04-03 ENCOUNTER — CONSULT (OUTPATIENT)
Dept: CARDIOLOGY CLINIC | Facility: CLINIC | Age: 74
End: 2019-04-03
Payer: MEDICARE

## 2019-04-03 VITALS
HEIGHT: 75 IN | WEIGHT: 267.2 LBS | DIASTOLIC BLOOD PRESSURE: 80 MMHG | BODY MASS INDEX: 33.22 KG/M2 | HEART RATE: 73 BPM | SYSTOLIC BLOOD PRESSURE: 128 MMHG

## 2019-04-03 DIAGNOSIS — R00.2 PALPITATIONS: Primary | ICD-10-CM

## 2019-04-03 DIAGNOSIS — Z79.01 CHRONIC ANTICOAGULATION: ICD-10-CM

## 2019-04-03 DIAGNOSIS — I42.8 CARDIOMYOPATHY, NONISCHEMIC (HCC): ICD-10-CM

## 2019-04-03 DIAGNOSIS — E78.00 PURE HYPERCHOLESTEROLEMIA: ICD-10-CM

## 2019-04-03 DIAGNOSIS — I26.99 BILATERAL PULMONARY EMBOLISM (HCC): ICD-10-CM

## 2019-04-03 DIAGNOSIS — N18.2 CHRONIC KIDNEY DISEASE (CKD), STAGE II (MILD): ICD-10-CM

## 2019-04-03 DIAGNOSIS — I47.1 SVT (SUPRAVENTRICULAR TACHYCARDIA) (HCC): ICD-10-CM

## 2019-04-03 DIAGNOSIS — R42 DIZZINESS: ICD-10-CM

## 2019-04-03 PROBLEM — E78.5 DYSLIPIDEMIA: Status: ACTIVE | Noted: 2019-04-03

## 2019-04-03 PROCEDURE — 99215 OFFICE O/P EST HI 40 MIN: CPT | Performed by: INTERNAL MEDICINE

## 2019-04-03 PROCEDURE — 93000 ELECTROCARDIOGRAM COMPLETE: CPT | Performed by: INTERNAL MEDICINE

## 2019-04-03 RX ORDER — ROSUVASTATIN CALCIUM 20 MG/1
20 TABLET, COATED ORAL DAILY
COMMUNITY

## 2019-04-03 RX ORDER — RIBOFLAVIN (VITAMIN B2) 100 MG
100 TABLET ORAL DAILY
COMMUNITY
End: 2022-06-01

## 2019-10-15 ENCOUNTER — TELEPHONE (OUTPATIENT)
Dept: CARDIOLOGY CLINIC | Facility: CLINIC | Age: 74
End: 2019-10-15

## 2019-10-15 NOTE — TELEPHONE ENCOUNTER
Spoke with patient advised that CDL form is ready for pick-up @ [place] office  Patient stated that form did NOT need to be faxed anywhere  Will pick-up today   Form at

## 2019-10-18 ENCOUNTER — TELEPHONE (OUTPATIENT)
Dept: CARDIOLOGY CLINIC | Facility: CLINIC | Age: 74
End: 2019-10-18

## 2019-10-18 DIAGNOSIS — I42.8 CARDIOMYOPATHY, NONISCHEMIC (HCC): Primary | ICD-10-CM

## 2019-10-18 DIAGNOSIS — I42.9 CARDIOMYOPATHY, UNSPECIFIED TYPE (HCC): ICD-10-CM

## 2019-10-18 NOTE — TELEPHONE ENCOUNTER
[employer] called back with questions regarding form  Confirming METS completed on stress test were 3 4  And section D being filled out (this section is for disqualifying a renewal   Spoke with VP6 ~ patient did only reach 3 4 METS and will need a NMS, section D was for additional information patient was NOT being disqualified for renewal       Can you please place order for stress test  We will contact patient to schedule

## 2019-10-18 NOTE — TELEPHONE ENCOUNTER
Pt is currently at [place] and needs #4 filled out on his  cardiovascular waiver form  Please call pt as soon as it is done  [employer] told pt it has to be filled out  I spoke to Ruben Orellana and she is going to contact Dr Mainor Pope regarding this form  Thank you  Pt would like to be contacted on cell number

## 2019-10-18 NOTE — TELEPHONE ENCOUNTER
Spoke with patient advised the below  Verbally understands  Spoke with scheduling ~ will call patient to schedule stress test ASAP     Patient stated that he needs done prior to 10/29

## 2019-10-18 NOTE — TELEPHONE ENCOUNTER
I put in for a pharmacological nuclear stress test for him  Please explain to the patient that he needs this test for [facility] as he did not achieve the Mets outlined by pen dot  If we order treadmill stress test and he does not achieve the Mets, we will be back to square 1  After talking to the patient, please schedule this  Patient should not have caffeine on the day of the stress test     Please let me know if you have any questions

## 2019-10-18 NOTE — TELEPHONE ENCOUNTER
Spoke with Dr Cornelia Schumacher form was updated  Questions #4 date: 2/11/2019 ~ NO    Spoke with patient advised that form was updated   Faxed form to number given by patient for the [facility] [phone]     Form faxed ~ confirmation received

## 2019-10-22 ENCOUNTER — HOSPITAL ENCOUNTER (OUTPATIENT)
Dept: NON INVASIVE DIAGNOSTICS | Facility: CLINIC | Age: 74
Discharge: HOME | End: 2019-10-22
Payer: MEDICARE

## 2019-10-22 DIAGNOSIS — I42.9 CARDIOMYOPATHY, UNSPECIFIED TYPE (HCC): ICD-10-CM

## 2019-10-22 PROCEDURE — 93018 CV STRESS TEST I&R ONLY: CPT | Performed by: INTERNAL MEDICINE

## 2019-10-22 PROCEDURE — 93017 CV STRESS TEST TRACING ONLY: CPT

## 2019-10-22 PROCEDURE — 78452 HT MUSCLE IMAGE SPECT MULT: CPT | Performed by: INTERNAL MEDICINE

## 2019-10-22 PROCEDURE — 78452 HT MUSCLE IMAGE SPECT MULT: CPT

## 2019-10-22 PROCEDURE — A9502 TC99M TETROFOSMIN: HCPCS

## 2019-10-22 PROCEDURE — 93016 CV STRESS TEST SUPVJ ONLY: CPT | Performed by: INTERNAL MEDICINE

## 2019-10-22 RX ADMIN — REGADENOSON 0.4 MG: 0.08 INJECTION, SOLUTION INTRAVENOUS at 13:39

## 2019-10-22 NOTE — TELEPHONE ENCOUNTER
PT STOPPED IN & SAID THAT HE JUST FINISHED THE STRESS TEST & WOULD NEED IT FAXED TO THE [employer] & ALSO WOULD LIKE TO P/UP A COPY FOR HIMSELF

## 2019-10-23 LAB
MAX DIASTOLIC BP: 92 MMHG
MAX HEART RATE: 106 BPM
MAX PREDICTED HEART RATE: 147 BPM
MAX. SYSTOLIC BP: 154 MMHG
PROTOCOL NAME: NORMAL
REASON FOR TERMINATION: NORMAL
TARGET HR FORMULA: NORMAL
TEST INDICATION: NORMAL
TIME IN EXERCISE PHASE: NORMAL

## 2019-10-23 NOTE — TELEPHONE ENCOUNTER
Spoke with patient  Advised the below letter and stress test faxed to [phone]  Patient would also like to  copies at [facility] office

## 2019-10-23 NOTE — TELEPHONE ENCOUNTER
Stress test was read yesterday  Please review and advised so I can give patient an update and fax report to [facility]    Thanks

## 2019-10-23 NOTE — TELEPHONE ENCOUNTER
There is a letter in the chart dated today  Please let the patient know that there was no evidence of ischemia on stress test   Ejection fraction was mildly reduced at 44% which is no different than before  Please fax the latter as well as the stress test report  To [employer]

## 2019-11-05 NOTE — TELEPHONE ENCOUNTER
Pt dropped off another  cardio waiver form that needs to be filled out & signed by Dr Eli Bateman; Pt would like a call when the forms are completed so he can pick them up  Pt said that [employer] sent the forms in to late so now a new one has to be completed

## 2019-11-06 NOTE — TELEPHONE ENCOUNTER
Forms scanned into pts chart & pt notified that they are ready to be picked up in the [place] office

## 2020-02-03 ENCOUNTER — HOSPITAL ENCOUNTER (EMERGENCY)
Facility: HOSPITAL | Age: 75
Discharge: HOME | End: 2020-02-03
Attending: EMERGENCY MEDICINE | Admitting: EMERGENCY MEDICINE
Payer: COMMERCIAL

## 2020-02-03 ENCOUNTER — APPOINTMENT (EMERGENCY)
Dept: CT IMAGING | Facility: HOSPITAL | Age: 75
End: 2020-02-03
Payer: COMMERCIAL

## 2020-02-03 VITALS
HEIGHT: 75 IN | WEIGHT: 268.96 LBS | DIASTOLIC BLOOD PRESSURE: 85 MMHG | OXYGEN SATURATION: 97 % | TEMPERATURE: 98 F | HEART RATE: 59 BPM | BODY MASS INDEX: 33.44 KG/M2 | RESPIRATION RATE: 16 BRPM | SYSTOLIC BLOOD PRESSURE: 142 MMHG

## 2020-02-03 DIAGNOSIS — N20.0 KIDNEY STONE ON LEFT SIDE: ICD-10-CM

## 2020-02-03 DIAGNOSIS — R10.9 LEFT FLANK PAIN: Primary | ICD-10-CM

## 2020-02-03 DIAGNOSIS — N32.89 CALCIFICATION OF BLADDER: ICD-10-CM

## 2020-02-03 LAB
ALBUMIN SERPL BCP-MCNC: 3.6 G/DL (ref 3.5–5)
ALP SERPL-CCNC: 63 U/L (ref 46–116)
ALT SERPL W P-5'-P-CCNC: 21 U/L (ref 12–78)
ANION GAP SERPL CALCULATED.3IONS-SCNC: 8 MMOL/L (ref 4–13)
APTT PPP: 34 SECONDS (ref 23–37)
AST SERPL W P-5'-P-CCNC: 17 U/L (ref 5–45)
ATRIAL RATE: 71 BPM
BASOPHILS # BLD AUTO: 0.01 THOUSANDS/ΜL (ref 0–0.1)
BASOPHILS NFR BLD AUTO: 0 % (ref 0–1)
BILIRUB DIRECT SERPL-MCNC: 0.09 MG/DL (ref 0–0.2)
BILIRUB SERPL-MCNC: 0.4 MG/DL (ref 0.2–1)
BILIRUB UR QL STRIP: NEGATIVE
BUN SERPL-MCNC: 17 MG/DL (ref 5–25)
CALCIUM SERPL-MCNC: 8.8 MG/DL (ref 8.3–10.1)
CHLORIDE SERPL-SCNC: 108 MMOL/L (ref 100–108)
CLARITY UR: CLEAR
CO2 SERPL-SCNC: 27 MMOL/L (ref 21–32)
COLOR UR: YELLOW
CREAT SERPL-MCNC: 1.27 MG/DL (ref 0.6–1.3)
EOSINOPHIL # BLD AUTO: 0.06 THOUSAND/ΜL (ref 0–0.61)
EOSINOPHIL NFR BLD AUTO: 2 % (ref 0–6)
ERYTHROCYTE [DISTWIDTH] IN BLOOD BY AUTOMATED COUNT: 14.5 % (ref 11.6–15.1)
GFR SERPL CREATININE-BSD FRML MDRD: 64 ML/MIN/1.73SQ M
GLUCOSE SERPL-MCNC: 106 MG/DL (ref 65–140)
GLUCOSE UR STRIP-MCNC: NEGATIVE MG/DL
HCT VFR BLD AUTO: 39.9 % (ref 36.5–49.3)
HGB BLD-MCNC: 13.4 G/DL (ref 12–17)
HGB UR QL STRIP.AUTO: NEGATIVE
IMM GRANULOCYTES # BLD AUTO: 0.02 THOUSAND/UL (ref 0–0.2)
IMM GRANULOCYTES NFR BLD AUTO: 1 % (ref 0–2)
INR PPP: 1.07 (ref 0.84–1.19)
KETONES UR STRIP-MCNC: NEGATIVE MG/DL
LEUKOCYTE ESTERASE UR QL STRIP: NEGATIVE
LIPASE SERPL-CCNC: 90 U/L (ref 73–393)
LYMPHOCYTES # BLD AUTO: 0.9 THOUSANDS/ΜL (ref 0.6–4.47)
LYMPHOCYTES NFR BLD AUTO: 25 % (ref 14–44)
MCH RBC QN AUTO: 31.8 PG (ref 26.8–34.3)
MCHC RBC AUTO-ENTMCNC: 33.6 G/DL (ref 31.4–37.4)
MCV RBC AUTO: 95 FL (ref 82–98)
MONOCYTES # BLD AUTO: 0.45 THOUSAND/ΜL (ref 0.17–1.22)
MONOCYTES NFR BLD AUTO: 13 % (ref 4–12)
NEUTROPHILS # BLD AUTO: 2.13 THOUSANDS/ΜL (ref 1.85–7.62)
NEUTS SEG NFR BLD AUTO: 59 % (ref 43–75)
NITRITE UR QL STRIP: NEGATIVE
NRBC BLD AUTO-RTO: 0 /100 WBCS
P AXIS: 44 DEGREES
PH UR STRIP.AUTO: 5.5 [PH]
PLATELET # BLD AUTO: 141 THOUSANDS/UL (ref 149–390)
PMV BLD AUTO: 11.6 FL (ref 8.9–12.7)
POTASSIUM SERPL-SCNC: 4.1 MMOL/L (ref 3.5–5.3)
PR INTERVAL: 162 MS
PROT SERPL-MCNC: 7.2 G/DL (ref 6.4–8.2)
PROT UR STRIP-MCNC: NEGATIVE MG/DL
PROTHROMBIN TIME: 13.9 SECONDS (ref 11.6–14.5)
QRS AXIS: -19 DEGREES
QRSD INTERVAL: 96 MS
QT INTERVAL: 388 MS
QTC INTERVAL: 421 MS
RBC # BLD AUTO: 4.22 MILLION/UL (ref 3.88–5.62)
SODIUM SERPL-SCNC: 143 MMOL/L (ref 136–145)
SP GR UR STRIP.AUTO: 1.01 (ref 1–1.03)
T WAVE AXIS: -14 DEGREES
TROPONIN I SERPL-MCNC: <0.02 NG/ML
UROBILINOGEN UR QL STRIP.AUTO: 0.2 E.U./DL
VENTRICULAR RATE: 71 BPM
WBC # BLD AUTO: 3.57 THOUSAND/UL (ref 4.31–10.16)

## 2020-02-03 PROCEDURE — 96361 HYDRATE IV INFUSION ADD-ON: CPT

## 2020-02-03 PROCEDURE — 84484 ASSAY OF TROPONIN QUANT: CPT | Performed by: EMERGENCY MEDICINE

## 2020-02-03 PROCEDURE — 71275 CT ANGIOGRAPHY CHEST: CPT

## 2020-02-03 PROCEDURE — 93010 ELECTROCARDIOGRAM REPORT: CPT | Performed by: INTERNAL MEDICINE

## 2020-02-03 PROCEDURE — 96374 THER/PROPH/DIAG INJ IV PUSH: CPT

## 2020-02-03 PROCEDURE — 80076 HEPATIC FUNCTION PANEL: CPT | Performed by: EMERGENCY MEDICINE

## 2020-02-03 PROCEDURE — 80048 BASIC METABOLIC PNL TOTAL CA: CPT | Performed by: EMERGENCY MEDICINE

## 2020-02-03 PROCEDURE — 85610 PROTHROMBIN TIME: CPT | Performed by: EMERGENCY MEDICINE

## 2020-02-03 PROCEDURE — 85025 COMPLETE CBC W/AUTO DIFF WBC: CPT | Performed by: EMERGENCY MEDICINE

## 2020-02-03 PROCEDURE — 83690 ASSAY OF LIPASE: CPT | Performed by: EMERGENCY MEDICINE

## 2020-02-03 PROCEDURE — 74177 CT ABD & PELVIS W/CONTRAST: CPT

## 2020-02-03 PROCEDURE — 81003 URINALYSIS AUTO W/O SCOPE: CPT | Performed by: EMERGENCY MEDICINE

## 2020-02-03 PROCEDURE — 36415 COLL VENOUS BLD VENIPUNCTURE: CPT | Performed by: EMERGENCY MEDICINE

## 2020-02-03 PROCEDURE — 87086 URINE CULTURE/COLONY COUNT: CPT | Performed by: EMERGENCY MEDICINE

## 2020-02-03 PROCEDURE — 93005 ELECTROCARDIOGRAM TRACING: CPT

## 2020-02-03 PROCEDURE — 99284 EMERGENCY DEPT VISIT MOD MDM: CPT

## 2020-02-03 PROCEDURE — 99285 EMERGENCY DEPT VISIT HI MDM: CPT | Performed by: EMERGENCY MEDICINE

## 2020-02-03 PROCEDURE — 85730 THROMBOPLASTIN TIME PARTIAL: CPT | Performed by: EMERGENCY MEDICINE

## 2020-02-03 RX ORDER — KETOROLAC TROMETHAMINE 30 MG/ML
15 INJECTION, SOLUTION INTRAMUSCULAR; INTRAVENOUS ONCE
Status: COMPLETED | OUTPATIENT
Start: 2020-02-03 | End: 2020-02-03

## 2020-02-03 RX ADMIN — KETOROLAC TROMETHAMINE 15 MG: 30 INJECTION, SOLUTION INTRAMUSCULAR at 10:16

## 2020-02-03 RX ADMIN — SODIUM CHLORIDE 1000 ML: 0.9 INJECTION, SOLUTION INTRAVENOUS at 10:16

## 2020-02-03 RX ADMIN — IOHEXOL 100 ML: 350 INJECTION, SOLUTION INTRAVENOUS at 11:06

## 2020-02-03 NOTE — ED PROVIDER NOTES
History  Chief Complaint   Patient presents with    Flank Pain     c/o non radiating left flank pain for the past 4 days  Denies N/V,  but reports increased urinary frequency     Patient is a 66-year-old male with past medical history of prior pulmonary embolism 2 years ago on Eliquis, BPH, hyperlipidemia, presents to the emergency department for left flank pain for the past 2 weeks, getting progressively worse  Patient reports the pain is an achy pain that occasionally radiates around to his left side  He describes the pain is fairly constant and worsened when he is lying down  He reports having similar pain years ago when he was diagnosed with pneumonia  He does report having a cough for the past several weeks, productive of yellow and clear phlegm  He also reports new urinary frequency  He also states he has been having occasional dizziness, mostly when he bends forward and then stands up quickly  He denies any fever, shaking chills, headache, URI symptoms, hemoptysis, chest pain, dyspnea or wheezing, abdominal pain, nausea, vomiting, change in bowel habits, blood per rectum or melena, dysuria, hematuria, skin rash or color change, extremity swelling or pain, lateralizing extremity weakness or paresthesia or other focal neurologic deficits  Denies any recent fall or back injury  History provided by:  Patient   used: No    Flank Pain   Associated symptoms: cough    Associated symptoms: no chest pain, no chills, no constipation, no diarrhea, no dysuria, no fever, no hematuria, no nausea, no shortness of breath, no sore throat and no vomiting        Prior to Admission Medications   Prescriptions Last Dose Informant Patient Reported? Taking?    Ascorbic Acid (VITAMIN C) 100 MG tablet   Yes No   Sig: Take 100 mg by mouth daily   apixaban (ELIQUIS) 5 mg  Self No No   Sig: Take 1 tablet (5 mg total) by mouth 2 (two) times a day   fluticasone (FLONASE) 50 mcg/act nasal spray  Self No No Si spray into each nostril daily   rosuvastatin (CRESTOR) 20 MG tablet   Yes No   Sig: Take 20 mg by mouth daily   tamsulosin (FLOMAX) 0 4 mg  Self Yes No   Sig: Take 0 4 mg by mouth daily with dinner      Facility-Administered Medications: None       Past Medical History:   Diagnosis Date    Hyperlipidemia     Post-nasal drip     Pulmonary embolism (HCC)        Past Surgical History:   Procedure Laterality Date    ARTHROSCOPY KNEE Left     30 years ago       History reviewed  No pertinent family history  I have reviewed and agree with the history as documented  Social History     Tobacco Use    Smoking status: Former Smoker    Smokeless tobacco: Never Used   Substance Use Topics    Alcohol use: Yes     Alcohol/week: 0 0 standard drinks     Comment: socially    Drug use: No        Review of Systems   Constitutional: Negative for chills and fever  HENT: Negative for congestion, ear pain, rhinorrhea and sore throat  Respiratory: Positive for cough  Negative for chest tightness, shortness of breath and wheezing  Cardiovascular: Negative for chest pain, palpitations and leg swelling  Gastrointestinal: Negative for abdominal distention, abdominal pain, blood in stool, constipation, diarrhea, nausea and vomiting  Genitourinary: Positive for flank pain and frequency  Negative for dysuria and hematuria  Musculoskeletal: Negative for back pain, neck pain and neck stiffness  Skin: Negative for color change, pallor, rash and wound  Allergic/Immunologic: Negative for immunocompromised state  Neurological: Positive for dizziness and light-headedness  Negative for syncope, weakness, numbness and headaches  Hematological: Negative for adenopathy  Bruises/bleeds easily  Psychiatric/Behavioral: Negative for confusion and decreased concentration  All other systems reviewed and are negative  Physical Exam  Physical Exam   Constitutional: He is oriented to person, place, and time   He appears well-developed and well-nourished  No distress  HENT:   Head: Normocephalic and atraumatic  Mouth/Throat: Oropharynx is clear and moist  No oropharyngeal exudate  Eyes: Pupils are equal, round, and reactive to light  Conjunctivae and EOM are normal    Neck: Normal range of motion  Neck supple  No JVD present  Cardiovascular: Normal rate, regular rhythm, normal heart sounds and intact distal pulses  Exam reveals no gallop and no friction rub  No murmur heard  Pulmonary/Chest: Effort normal and breath sounds normal  No respiratory distress  He has no wheezes  He has no rales  He exhibits no tenderness  Abdominal: Soft  Bowel sounds are normal  He exhibits no distension  There is no tenderness  There is no rebound and no guarding  No CVA or abdominal tenderness  Musculoskeletal: Normal range of motion  He exhibits no edema or tenderness  Neurological: He is alert and oriented to person, place, and time  No gross motor or sensory deficits  Skin: Skin is warm and dry  No rash noted  He is not diaphoretic  No pallor  Psychiatric: He has a normal mood and affect  His behavior is normal    Nursing note and vitals reviewed        Vital Signs  ED Triage Vitals   Temperature Pulse Respirations Blood Pressure SpO2   02/03/20 1024 02/03/20 1024 02/03/20 1024 02/03/20 1024 02/03/20 1024   98 °F (36 7 °C) 70 17 145/86 97 %      Temp Source Heart Rate Source Patient Position - Orthostatic VS BP Location FiO2 (%)   02/03/20 1024 02/03/20 1024 02/03/20 1024 02/03/20 1024 --   Oral Monitor Sitting Left arm       Pain Score       02/03/20 1016       3         Vitals:    02/03/20 1024 02/03/20 1211   BP: 145/86 142/85   BP Location: Left arm Left arm   Pulse: 70 59   Resp: 17 16   Temp: 98 °F (36 7 °C)    TempSrc: Oral    SpO2: 97% 97%   Weight: 122 kg (268 lb 15 4 oz)    Height: 6' 3" (1 905 m)        Visual Acuity      ED Medications  Medications   sodium chloride 0 9 % bolus 1,000 mL (0 mL Intravenous Stopped 2/3/20 1235)   ketorolac (TORADOL) injection 15 mg (15 mg Intravenous Given 2/3/20 1016)   iohexol (OMNIPAQUE) 350 MG/ML injection (MULTI-DOSE) 100 mL (100 mL Intravenous Given 2/3/20 1106)       Diagnostic Studies  Results Reviewed     Procedure Component Value Units Date/Time    Urine culture [[id number]] Collected:  02/03/20 1018    Lab Status:   In process Specimen:  Urine, Clean Catch Updated:  02/03/20 2175    Basic metabolic panel [[id number]] Collected:  02/03/20 1015    Lab Status:  Final result Specimen:  Blood from Arm, Right Updated:  02/03/20 1043     Sodium 143 mmol/L      Potassium 4 1 mmol/L      Chloride 108 mmol/L      CO2 27 mmol/L      ANION GAP 8 mmol/L      BUN 17 mg/dL      Creatinine 1 27 mg/dL      Glucose 106 mg/dL      Calcium 8 8 mg/dL      eGFR 64 ml/min/1 73sq m     Narrative:       [place] guidelines for Chronic Kidney Disease (CKD):     Stage 1 with normal or high GFR (GFR > 90 mL/min/1 73 square meters)    Stage 2 Mild CKD (GFR = 60-89 mL/min/1 73 square meters)    Stage 3A Moderate CKD (GFR = 45-59 mL/min/1 73 square meters)    Stage 3B Moderate CKD (GFR = 30-44 mL/min/1 73 square meters)    Stage 4 Severe CKD (GFR = 15-29 mL/min/1 73 square meters)    Stage 5 End Stage CKD (GFR <15 mL/min/1 73 square meters)  Note: GFR calculation is accurate only with a steady state creatinine    Hepatic function panel [[id number]]  (Normal) Collected:  02/03/20 1015    Lab Status:  Final result Specimen:  Blood from Arm, Right Updated:  02/03/20 1043     Total Bilirubin 0 40 mg/dL      Bilirubin, Direct 0 09 mg/dL      Alkaline Phosphatase 63 U/L      AST 17 U/L      ALT 21 U/L      Total Protein 7 2 g/dL      Albumin 3 6 g/dL     Troponin I [[id number]]  (Normal) Collected:  02/03/20 1015    Lab Status:  Final result Specimen:  Blood from Arm, Right Updated:  02/03/20 1043     Troponin I <0 02 ng/mL     Lipase [[id number]]  (Normal) Collected:  02/03/20 1015 Lab Status:  Final result Specimen:  Blood from Arm, Right Updated:  02/03/20 1043     Lipase 90 u/L     Protime-INR [[id number]]  (Normal) Collected:  02/03/20 1015    Lab Status:  Final result Specimen:  Blood from Arm, Right Updated:  02/03/20 1034     Protime 13 9 seconds      INR 1 07    APTT [[id number]]  (Normal) Collected:  02/03/20 1015    Lab Status:  Final result Specimen:  Blood from Arm, Right Updated:  02/03/20 1034     PTT 34 seconds     UA (URINE) with reflex to Scope [[id number]] Collected:  02/03/20 1017    Lab Status:  Final result Specimen:  Urine, Clean Catch Updated:  02/03/20 1027     Color, UA Yellow     Clarity, UA Clear     Specific Gravity, UA 1 015     pH, UA 5 5     Leukocytes, UA Negative     Nitrite, UA Negative     Protein, UA Negative mg/dl      Glucose, UA Negative mg/dl      Ketones, UA Negative mg/dl      Urobilinogen, UA 0 2 E U /dl      Bilirubin, UA Negative     Blood, UA Negative    CBC and differential [[id number]]  (Abnormal) Collected:  02/03/20 1015    Lab Status:  Final result Specimen:  Blood from Arm, Right Updated:  02/03/20 1024     WBC 3 57 Thousand/uL      RBC 4 22 Million/uL      Hemoglobin 13 4 g/dL      Hematocrit 39 9 %      MCV 95 fL      MCH 31 8 pg      MCHC 33 6 g/dL      RDW 14 5 %      MPV 11 6 fL      Platelets 234 Thousands/uL      nRBC 0 /100 WBCs      Neutrophils Relative 59 %      Immat GRANS % 1 %      Lymphocytes Relative 25 %      Monocytes Relative 13 %      Eosinophils Relative 2 %      Basophils Relative 0 %      Neutrophils Absolute 2 13 Thousands/µL      Immature Grans Absolute 0 02 Thousand/uL      Lymphocytes Absolute 0 90 Thousands/µL      Monocytes Absolute 0 45 Thousand/µL      Eosinophils Absolute 0 06 Thousand/µL      Basophils Absolute 0 01 Thousands/µL                  PE Study with CT Abdomen and Pelvis with contrast   Final Result by Scott Morris MD (02/03 1207)      1    No pulmonary embolus   2   1 mm nonobstructing stone in the left kidney   3  Again noted is calcification within the anterior bladder wall  Recommend nonemergent ultrasound for further evaluation      The study was marked in EPIC for significant notification  Workstation performed: [id number]                    Procedures  ECG 12 Lead Documentation Only  Date/Time: 2/3/2020 10:50 AM  Performed by: Marialuisa Myles DO  Authorized by: Marialuisa Myles DO     ECG reviewed by me, the ED Provider: yes    Patient location:  ED  Previous ECG:     Previous ECG:  Compared to current    Comparison ECG info:  8-; rate has decreased and nonspecific T-wave abnormality has replaced inverted T-waves in the anterior and lateral leads  Rate:     ECG rate:  71    ECG rate assessment: normal    Rhythm:     Rhythm: sinus rhythm    Ectopy:     Ectopy: PAC    QRS:     QRS axis:  Normal    QRS intervals:  Normal  Conduction:     Conduction: normal    ST segments:     ST segments:  Normal  T waves:     T waves: non-specific    Other findings:     Other findings: LVH               ED Course  ED Course as of Feb 03 1238   Mon Feb 03, 2020   1049 Leukocytes, UA: Negative   1049 Nitrite, UA: Negative   1049 Blood, UA: Negative   1049 eGFR: 64   1049 Creatinine: 1 27   1236 Updated patient about essentially normal workup other than CT findings of anterior bladder wall calcification which was seen on prior imaging  Patient was not aware of this calcification and I advised him to follow up with Urology for further evaluation and to rule out bladder malignancy  Advised patient to follow up with his PCP in regards to his left flank pain  This could be muscular, possibly related to kidney stone however less likely since the stone is 1 mm and in the kidney  Recommended extra-strength Tylenol as needed for pain  Discussed ED return parameters                                    MDM  Number of Diagnoses or Management Options  Diagnosis management comments: 70-year-old male presents with 2 weeks of left flank pain, productive cough and urinary frequency  Differential includes left lower lobe pneumonia, recurrent PE, UTI or pyelonephritis, kidney stone, musculoskeletal pain, diverticulitis  Will check cardiac and abdominal labs, EKG, CTA PE study with CT abdomen and pelvis  Will give IV fluids, Toradol for pain  Amount and/or Complexity of Data Reviewed  Clinical lab tests: ordered and reviewed  Tests in the radiology section of CPT®: ordered and reviewed  Tests in the medicine section of CPT®: ordered and reviewed  Independent visualization of images, tracings, or specimens: yes          Disposition  Final diagnoses:   Left flank pain   Kidney stone on left side   Calcification of bladder     Time reflects when diagnosis was documented in both MDM as applicable and the Disposition within this note     Time User Action Codes Description Comment    2/3/2020 12:37 PM Jenna MOSELEY Add [R10 9] Left flank pain     2/3/2020 12:37 PM Jenna MOSELEY Add [N20 0] Kidney stone on left side     2/3/2020 12:37 PM Jenna MOSELEY Add [N32 89] Calcification of bladder       ED Disposition     ED Disposition Condition Date/Time Comment    Discharge Stable Mon Feb 3, 2020 12:36 PM Arben Vargas  discharge to home/self care  Follow-up Information     Follow up With Specialties Details Why Contact Info Additional Information    Brendan Johnson MD Internal Medicine Schedule an appointment as soon as possible for a visit   [address] [facility]  [address]  [address] [place] [address]  [address] [place] [address] For Urology [facility] Urology Schedule an appointment as soon as possible for a visit   [address] [address] [phone] [facility] For Urology [facility], [address], [address], [place], [place], [address] [facility] Emergency Department Emergency Medicine Go to  If symptoms worsen [address] [facility] [address] [phone] [place] [facility], [address] [place], [place], [address]          Patient's Medications   Discharge Prescriptions    No medications on file     No discharge procedures on file      ED Provider  Electronically Signed by           Marcio Skelton DO  02/03/20 1237

## 2020-02-03 NOTE — DISCHARGE INSTRUCTIONS
Flank Pain   WHAT YOU NEED TO KNOW:   Flank pain is felt in the area below your ribcage and above your hip bones, often in the lower back  Your pain may be dull or so severe that you cannot get comfortable  The pain may stay in one area or radiate to another area  It may worsen and lighten in waves  Flank pain is often a sign of problems with your urinary tract, such as a kidney stone or infection  DISCHARGE INSTRUCTIONS:   Return to the emergency department if:   · You have a fever  · Your heart is fluttering or jumping  · You see blood in your urine  · Your pain radiates into your lower abdomen and genital area  · You have intense pain in your low back next to your spine  · You are much more tired than usual and have no desire to eat  · You have a headache and your muscles jerk  Contact your healthcare provider if:   · You have an upset stomach and are vomiting  · You have to urinate more often, and with urgency  · Your pain worsens or does not improve, and you cannot get comfortable  · You pass a stone when you urinate  · You have questions or concerns about your condition or care  Medicines: The following medicines may be ordered for you:  · Pain medicine  may help decrease or relieve your pain  Do not wait until the pain is severe before you take your medicine  · Antibiotics  may help treat a urinary tract infection caused by bacteria  · Take your medicine as directed  Contact your healthcare provider if you think your medicine is not helping or if you have side effects  Tell him of her if you are allergic to any medicine  Keep a list of the medicines, vitamins, and herbs you take  Include the amounts, and when and why you take them  Bring the list or the pill bottles to follow-up visits  Carry your medicine list with you in case of an emergency    Follow up with your healthcare provider in 1 to 2 days or as directed:  Write down your questions so you remember to ask them during your visits  © 2017 [employer] Information is for End User's use only and may not be sold, redistributed or otherwise used for commercial purposes  All illustrations and images included in CareNotes® are the copyrighted property of [employer]  or Ghulam Nunes  The above information is an  only  It is not intended as medical advice for individual conditions or treatments  Talk to your doctor, nurse or pharmacist before following any medical regimen to see if it is safe and effective for you

## 2020-02-04 ENCOUNTER — TELEPHONE (OUTPATIENT)
Dept: UROLOGY | Facility: MEDICAL CENTER | Age: 75
End: 2020-02-04

## 2020-02-04 LAB — BACTERIA UR CULT: NORMAL

## 2020-02-04 NOTE — TELEPHONE ENCOUNTER
Called and spoke to patient  Patient was at ER on 02/03/2020 for a 1 mm non-obstructing kidney stone  Patient denies any blood in urine, pain or burning with urination, fevers, chills, nausea, vomiting  Informed Patient to increase water intake to continue to stay hydrated as well it could help to pass the stone  Patient understanding of information  Informed patient of ER precautions if any symptoms become present such as fever, chills, nausea, vomiting, increased flank pain     Patient scheduled to see PA on 03/16/2020

## 2020-02-04 NOTE — TELEPHONE ENCOUNTER
Complaint/diagnosis: Left flank pain Kidney stone    Insurance: medicare    History of Cancer: no    Previous Urologist:no    Outside testing/where:no    Records requested/where:no    Preferred Location: [facility]

## 2020-03-11 NOTE — PROGRESS NOTES
There are no diagnoses linked to this encounter  Assessment and plan:       1  BPH with lower urinary tract symptoms  - Patient continues to have bothersome lower urinary tract symptoms despite use of tamsulosin  Does report a significant improvement of symptoms with tamsulosin but continues to have also have significant bother   - He works as a  and will need to arrange for work accommodation should he undergo 1 of these procedures  We discussed that it would be reasonable to consider doing this over summer break  - We discussed both the Urolift and trans urethral resection of the prostate today in detail  Patient was provided with patient information regarding the Urolift procedure  - He will return in the near future for cystoscopy and transrectal ultrasound for evaluation of his bladder outlet obstruction  Recommendations for which procedure he would be a better candidate to will be made at that time  - Patient is on Eliquis for a history of blood clots and pulmonary embolism  He has come off of this in the perioperative period for other elective procedures in the past   - Patient elects to undergo digital rectal exam at that time  2  Nephrolithiasis  - Patient was recently diagnosed with a 1 mm nonobstructing renal stone  - We discussed that as this is not causing obstruction and is a passable size, no intervention is recommended  Brown Kolb PA-C      Chief Complaint     Chief Complaint   Patient presents with    Nephrolithiasis         History of Present Illness     Tonio Mack  is a 76 y o  male patient establishing care with [facility] for Urology for nephrolithiasis  He was seen in the emergency department on 02/03/2020 for left flank pain and urinary frequency  CT scan showed a 1 mm nonobstructing stone in the left kidney, midpole  Urine specimen at that time was negative for any abnormal findings      Last PSA was appropriately drawn on 08/21/2017 and normal at 0 8  Patient is emptying well today with a PVR of 31 mL  AUA symptom score today is 25 with an overall bother score of 2  Primary complaints are frequency, weak stream, intermittency, and urgency  Patient is currently on tamsulosin 0 4 mg daily  Patient does express interest in potential bladder outlet obstruction procedures  He is on Eliquis for previous pulmonary embolism  He reports that he has come off of this for short periods of time for other procedures in the past without difficulty  Laboratory     Lab Results   Component Value Date    CREATININE 1 27 02/03/2020       Lab Results   Component Value Date    PSA 0 8 08/21/2017       Recent Results (from the past 1 hour(s))   POCT Measure PVR    Collection Time: 03/16/20 10:04 AM   Result Value Ref Range    POST-VOID RESIDUAL VOLUME, ML POC 31 mL         Review of Systems     Review of Systems   Constitutional: Negative for chills and fever  HENT: Negative  Eyes: Negative  Respiratory: Negative for shortness of breath  Cardiovascular: Negative for chest pain  Gastrointestinal: Negative for constipation, diarrhea, nausea and vomiting  Genitourinary: Positive for frequency and urgency  Negative for difficulty urinating, dysuria, enuresis, flank pain and hematuria  Musculoskeletal: Negative  AUA SYMPTOM SCORE      Most Recent Value   AUA SYMPTOM SCORE   How often have you had a sensation of not emptying your bladder completely after you finished urinating? 5   How often have you had to urinate again less than two hours after you finished urinating? 5   How often have you found you stopped and started again several times when you urinate? 3   How often have you found it difficult to postpone urination? 5   How often have you had a weak urinary stream?  5   How often have you had to push or strain to begin urination?   0   How many times did you most typically get up to urinate from the time you went to bed at night until the time you got up in the morning? 2   Quality of Life: If you were to spend the rest of your life with your urinary condition just the way it is now, how would you feel about that?  2   AUA SYMPTOM SCORE  25                  Allergies     No Known Allergies    Physical Exam     Physical Exam   Constitutional: He is oriented to person, place, and time  He appears well-developed and well-nourished  No distress  HENT:   Head: Normocephalic and atraumatic  Right Ear: External ear normal    Left Ear: External ear normal    Nose: Nose normal    Eyes: Right eye exhibits no discharge  Left eye exhibits no discharge  No scleral icterus  Cardiovascular: Normal rate  Pulmonary/Chest: Effort normal    Genitourinary:   Genitourinary Comments: Deferred to next visit due to requirement of cystoscopy and transrectal ultrasound per patient request   Musculoskeletal:   Ambulates independently   Neurological: He is alert and oriented to person, place, and time  Skin: Skin is warm and dry  He is not diaphoretic  Psychiatric: He has a normal mood and affect  His behavior is normal  Judgment and thought content normal    Nursing note and vitals reviewed          Vital Signs     Vitals:    03/16/20 0957   BP: 140/82   BP Location: Left arm   Patient Position: Sitting   Cuff Size: Standard   Pulse: 75   Weight: 120 kg (264 lb)   Height: 6' 3" (1 905 m)         Current Medications       Current Outpatient Medications:     apixaban (ELIQUIS) 5 mg, Take 1 tablet (5 mg total) by mouth 2 (two) times a day, Disp: 60 tablet, Rfl: 11    Ascorbic Acid (VITAMIN C) 100 MG tablet, Take 100 mg by mouth daily, Disp: , Rfl:     fluticasone (FLONASE) 50 mcg/act nasal spray, 1 spray into each nostril daily, Disp: 16 g, Rfl: 0    rosuvastatin (CRESTOR) 20 MG tablet, Take 20 mg by mouth daily, Disp: , Rfl:     tamsulosin (FLOMAX) 0 4 mg, Take 0 4 mg by mouth daily with dinner, Disp: , Rfl:       Active Problems Patient Active Problem List   Diagnosis    Bilateral pulmonary embolism (HCC)    SOB (shortness of breath)    Hyperlipidemia    Lung nodule    Obesity    History of sickle cell trait    Leg edema, left    Thrombocytopenia (HCC)    Chronic kidney disease (CKD), stage II (mild)    Chronic low back pain    SVT (supraventricular tachycardia) (HCC)    Chronic anticoagulation    ARLENE (obstructive sleep apnea)    Cardiomyopathy, nonischemic (HCC)    Dyslipidemia         Past Medical History     Past Medical History:   Diagnosis Date    Hyperlipidemia     Post-nasal drip     Pulmonary embolism ([address] )          Surgical History     Past Surgical History:   Procedure Laterality Date    ARTHROSCOPY KNEE Left     30 years ago         Family History     History reviewed  No pertinent family history        Social History     Social History       Radiology

## 2020-03-12 ENCOUNTER — TRANSCRIBE ORDERS (OUTPATIENT)
Dept: ADMINISTRATIVE | Facility: HOSPITAL | Age: 75
End: 2020-03-12

## 2020-03-12 DIAGNOSIS — R31.9 HEMATURIA SYNDROME: Primary | ICD-10-CM

## 2020-03-16 ENCOUNTER — OFFICE VISIT (OUTPATIENT)
Dept: UROLOGY | Facility: CLINIC | Age: 75
End: 2020-03-16
Payer: COMMERCIAL

## 2020-03-16 VITALS
SYSTOLIC BLOOD PRESSURE: 140 MMHG | HEART RATE: 75 BPM | DIASTOLIC BLOOD PRESSURE: 82 MMHG | WEIGHT: 264 LBS | HEIGHT: 75 IN | BODY MASS INDEX: 32.83 KG/M2

## 2020-03-16 DIAGNOSIS — N40.1 BENIGN PROSTATIC HYPERPLASIA WITH WEAK URINARY STREAM: ICD-10-CM

## 2020-03-16 DIAGNOSIS — N20.0 NEPHROLITHIASIS: Primary | ICD-10-CM

## 2020-03-16 DIAGNOSIS — R39.12 BENIGN PROSTATIC HYPERPLASIA WITH WEAK URINARY STREAM: ICD-10-CM

## 2020-03-16 LAB — POST-VOID RESIDUAL VOLUME, ML POC: 31 ML

## 2020-03-16 PROCEDURE — 99204 OFFICE O/P NEW MOD 45 MIN: CPT | Performed by: PHYSICIAN ASSISTANT

## 2020-03-16 PROCEDURE — 51798 US URINE CAPACITY MEASURE: CPT | Performed by: PHYSICIAN ASSISTANT

## 2020-03-23 ENCOUNTER — TELEPHONE (OUTPATIENT)
Dept: UROLOGY | Facility: MEDICAL CENTER | Age: 75
End: 2020-03-23

## 2020-03-23 ENCOUNTER — HOSPITAL ENCOUNTER (OUTPATIENT)
Dept: ULTRASOUND IMAGING | Facility: CLINIC | Age: 75
Discharge: HOME | End: 2020-03-23
Payer: COMMERCIAL

## 2020-03-23 DIAGNOSIS — R31.9 HEMATURIA SYNDROME: ICD-10-CM

## 2020-03-23 PROCEDURE — 76770 US EXAM ABDO BACK WALL COMP: CPT

## 2020-03-23 NOTE — TELEPHONE ENCOUNTER
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

## 2020-03-23 NOTE — TELEPHONE ENCOUNTER
Pt recently seen 03/16/20 DANTE Jean,he's calling today stating he was informed by PCP of abnormal findings U/S today and to contact urology for appointment,please review and contact pt directly

## 2020-03-23 NOTE — TELEPHONE ENCOUNTER
Called and spoke to patient  Informed patient that Dr Robyn Plummer will go over US results at day of appointment  Patient worried he might not make appointment if schools open at that time since he works at [employer]  Informed patient that if he needs to reschedule he can call our office  We are unable to give an earlier appointment at this time  Patient verbalized understanding

## 2020-04-05 ENCOUNTER — NURSE TRIAGE (OUTPATIENT)
Dept: OTHER | Facility: OTHER | Age: 75
End: 2020-04-05

## 2020-06-12 ENCOUNTER — PROCEDURE VISIT (OUTPATIENT)
Dept: UROLOGY | Facility: CLINIC | Age: 75
End: 2020-06-12
Payer: COMMERCIAL

## 2020-06-12 VITALS
HEIGHT: 75 IN | DIASTOLIC BLOOD PRESSURE: 98 MMHG | HEART RATE: 68 BPM | WEIGHT: 265.2 LBS | SYSTOLIC BLOOD PRESSURE: 156 MMHG | BODY MASS INDEX: 32.97 KG/M2

## 2020-06-12 DIAGNOSIS — N20.0 NEPHROLITHIASIS: Primary | ICD-10-CM

## 2020-06-12 DIAGNOSIS — N40.1 BPH WITH OBSTRUCTION/LOWER URINARY TRACT SYMPTOMS: ICD-10-CM

## 2020-06-12 DIAGNOSIS — N13.8 BPH WITH OBSTRUCTION/LOWER URINARY TRACT SYMPTOMS: ICD-10-CM

## 2020-06-12 DIAGNOSIS — Z71.2 PERSON CONSULTING FOR EXPLANATION OF EXAMINATION OR TEST FINDING: ICD-10-CM

## 2020-06-12 LAB — POST-VOID RESIDUAL VOLUME, ML POC: 7 ML

## 2020-06-12 PROCEDURE — 51798 US URINE CAPACITY MEASURE: CPT | Performed by: UROLOGY

## 2020-06-12 PROCEDURE — 52000 CYSTOURETHROSCOPY: CPT | Performed by: UROLOGY

## 2020-06-12 PROCEDURE — 99214 OFFICE O/P EST MOD 30 MIN: CPT | Performed by: UROLOGY

## 2020-06-12 PROCEDURE — 76872 US TRANSRECTAL: CPT | Performed by: UROLOGY

## 2020-06-12 PROCEDURE — 51741 ELECTRO-UROFLOWMETRY FIRST: CPT | Performed by: UROLOGY

## 2020-06-12 RX ORDER — FINASTERIDE 5 MG/1
5 TABLET, FILM COATED ORAL DAILY
Qty: 90 TABLET | Refills: 3 | Status: SHIPPED | OUTPATIENT
Start: 2020-06-12 | End: 2020-10-01

## 2020-06-19 ENCOUNTER — CONSULT (OUTPATIENT)
Dept: PAIN MEDICINE | Facility: CLINIC | Age: 75
End: 2020-06-19
Payer: COMMERCIAL

## 2020-06-19 VITALS
WEIGHT: 260.2 LBS | HEART RATE: 137 BPM | DIASTOLIC BLOOD PRESSURE: 87 MMHG | HEIGHT: 75 IN | SYSTOLIC BLOOD PRESSURE: 132 MMHG | BODY MASS INDEX: 32.35 KG/M2 | RESPIRATION RATE: 20 BRPM

## 2020-06-19 DIAGNOSIS — M48.061 SPINAL STENOSIS OF LUMBAR REGION WITHOUT NEUROGENIC CLAUDICATION: Primary | ICD-10-CM

## 2020-06-19 DIAGNOSIS — M51.16 LUMBAR DISC DISEASE WITH RADICULOPATHY: ICD-10-CM

## 2020-06-19 PROCEDURE — 99204 OFFICE O/P NEW MOD 45 MIN: CPT | Performed by: ANESTHESIOLOGY

## 2020-06-21 ENCOUNTER — APPOINTMENT (EMERGENCY)
Dept: ULTRASOUND IMAGING | Facility: HOSPITAL | Age: 75
End: 2020-06-21
Payer: COMMERCIAL

## 2020-06-21 ENCOUNTER — HOSPITAL ENCOUNTER (EMERGENCY)
Facility: HOSPITAL | Age: 75
Discharge: HOME | End: 2020-06-21
Attending: EMERGENCY MEDICINE | Admitting: EMERGENCY MEDICINE
Payer: COMMERCIAL

## 2020-06-21 VITALS
OXYGEN SATURATION: 97 % | SYSTOLIC BLOOD PRESSURE: 128 MMHG | BODY MASS INDEX: 31.55 KG/M2 | HEIGHT: 75 IN | HEART RATE: 85 BPM | WEIGHT: 253.75 LBS | TEMPERATURE: 97.8 F | DIASTOLIC BLOOD PRESSURE: 71 MMHG | RESPIRATION RATE: 18 BRPM

## 2020-06-21 DIAGNOSIS — N45.1 EPIDIDYMITIS: Primary | ICD-10-CM

## 2020-06-21 LAB
ALBUMIN SERPL BCP-MCNC: 3.3 G/DL (ref 3.5–5)
ALP SERPL-CCNC: 70 U/L (ref 46–116)
ALT SERPL W P-5'-P-CCNC: 20 U/L (ref 12–78)
ANION GAP SERPL CALCULATED.3IONS-SCNC: 9 MMOL/L (ref 4–13)
AST SERPL W P-5'-P-CCNC: 15 U/L (ref 5–45)
BACTERIA UR QL AUTO: ABNORMAL /HPF
BASOPHILS # BLD AUTO: 0.02 THOUSANDS/ΜL (ref 0–0.1)
BASOPHILS NFR BLD AUTO: 0 % (ref 0–1)
BILIRUB SERPL-MCNC: 0.5 MG/DL (ref 0.2–1)
BILIRUB UR QL STRIP: NEGATIVE
BUN SERPL-MCNC: 9 MG/DL (ref 5–25)
CALCIUM SERPL-MCNC: 8.8 MG/DL (ref 8.3–10.1)
CHLORIDE SERPL-SCNC: 109 MMOL/L (ref 100–108)
CLARITY UR: CLEAR
CO2 SERPL-SCNC: 24 MMOL/L (ref 21–32)
COLOR UR: YELLOW
CREAT SERPL-MCNC: 1.2 MG/DL (ref 0.6–1.3)
EOSINOPHIL # BLD AUTO: 0.02 THOUSAND/ΜL (ref 0–0.61)
EOSINOPHIL NFR BLD AUTO: 0 % (ref 0–6)
ERYTHROCYTE [DISTWIDTH] IN BLOOD BY AUTOMATED COUNT: 14.6 % (ref 11.6–15.1)
GFR SERPL CREATININE-BSD FRML MDRD: 68 ML/MIN/1.73SQ M
GLUCOSE SERPL-MCNC: 132 MG/DL (ref 65–140)
GLUCOSE UR STRIP-MCNC: NEGATIVE MG/DL
HCT VFR BLD AUTO: 37 % (ref 36.5–49.3)
HGB BLD-MCNC: 12.4 G/DL (ref 12–17)
HGB UR QL STRIP.AUTO: ABNORMAL
IMM GRANULOCYTES # BLD AUTO: 0.08 THOUSAND/UL (ref 0–0.2)
IMM GRANULOCYTES NFR BLD AUTO: 1 % (ref 0–2)
KETONES UR STRIP-MCNC: NEGATIVE MG/DL
LEUKOCYTE ESTERASE UR QL STRIP: NEGATIVE
LYMPHOCYTES # BLD AUTO: 0.7 THOUSANDS/ΜL (ref 0.6–4.47)
LYMPHOCYTES NFR BLD AUTO: 5 % (ref 14–44)
MCH RBC QN AUTO: 30.8 PG (ref 26.8–34.3)
MCHC RBC AUTO-ENTMCNC: 33.5 G/DL (ref 31.4–37.4)
MCV RBC AUTO: 92 FL (ref 82–98)
MONOCYTES # BLD AUTO: 1.18 THOUSAND/ΜL (ref 0.17–1.22)
MONOCYTES NFR BLD AUTO: 9 % (ref 4–12)
NEUTROPHILS # BLD AUTO: 11.25 THOUSANDS/ΜL (ref 1.85–7.62)
NEUTS SEG NFR BLD AUTO: 85 % (ref 43–75)
NITRITE UR QL STRIP: NEGATIVE
NON-SQ EPI CELLS URNS QL MICRO: ABNORMAL /HPF
NRBC BLD AUTO-RTO: 0 /100 WBCS
PH UR STRIP.AUTO: 7 [PH]
PLATELET # BLD AUTO: 157 THOUSANDS/UL (ref 149–390)
PMV BLD AUTO: 11.5 FL (ref 8.9–12.7)
POTASSIUM SERPL-SCNC: 3.6 MMOL/L (ref 3.5–5.3)
PROT SERPL-MCNC: 7.3 G/DL (ref 6.4–8.2)
PROT UR STRIP-MCNC: NEGATIVE MG/DL
RBC # BLD AUTO: 4.02 MILLION/UL (ref 3.88–5.62)
RBC #/AREA URNS AUTO: ABNORMAL /HPF
SODIUM SERPL-SCNC: 142 MMOL/L (ref 136–145)
SP GR UR STRIP.AUTO: 1.01 (ref 1–1.03)
UROBILINOGEN UR QL STRIP.AUTO: 1 E.U./DL
WBC # BLD AUTO: 13.25 THOUSAND/UL (ref 4.31–10.16)
WBC #/AREA URNS AUTO: ABNORMAL /HPF

## 2020-06-21 PROCEDURE — 36415 COLL VENOUS BLD VENIPUNCTURE: CPT | Performed by: EMERGENCY MEDICINE

## 2020-06-21 PROCEDURE — 76870 US EXAM SCROTUM: CPT

## 2020-06-21 PROCEDURE — 81001 URINALYSIS AUTO W/SCOPE: CPT | Performed by: EMERGENCY MEDICINE

## 2020-06-21 PROCEDURE — 99284 EMERGENCY DEPT VISIT MOD MDM: CPT | Performed by: EMERGENCY MEDICINE

## 2020-06-21 PROCEDURE — 99284 EMERGENCY DEPT VISIT MOD MDM: CPT

## 2020-06-21 PROCEDURE — 80053 COMPREHEN METABOLIC PANEL: CPT | Performed by: EMERGENCY MEDICINE

## 2020-06-21 PROCEDURE — 85025 COMPLETE CBC W/AUTO DIFF WBC: CPT | Performed by: EMERGENCY MEDICINE

## 2020-06-21 RX ORDER — LEVOFLOXACIN 500 MG/1
500 TABLET, FILM COATED ORAL DAILY
Qty: 10 TABLET | Refills: 0 | Status: SHIPPED | OUTPATIENT
Start: 2020-06-21 | End: 2020-07-01

## 2020-06-21 RX ORDER — LEVOFLOXACIN 500 MG/1
500 TABLET, FILM COATED ORAL ONCE
Status: COMPLETED | OUTPATIENT
Start: 2020-06-21 | End: 2020-06-21

## 2020-06-21 RX ORDER — HYDROCODONE BITARTRATE AND ACETAMINOPHEN 5; 325 MG/1; MG/1
1 TABLET ORAL ONCE
Status: COMPLETED | OUTPATIENT
Start: 2020-06-21 | End: 2020-06-21

## 2020-06-21 RX ORDER — HYDROCODONE BITARTRATE AND ACETAMINOPHEN 5; 325 MG/1; MG/1
1 TABLET ORAL EVERY 6 HOURS PRN
Qty: 12 TABLET | Refills: 0 | Status: SHIPPED | OUTPATIENT
Start: 2020-06-21 | End: 2020-10-01

## 2020-06-21 RX ADMIN — HYDROCODONE BITARTRATE AND ACETAMINOPHEN 1 TABLET: 5; 325 TABLET ORAL at 13:59

## 2020-06-21 RX ADMIN — LEVOFLOXACIN 500 MG: 500 TABLET, FILM COATED ORAL at 16:09

## 2020-06-22 ENCOUNTER — TELEPHONE (OUTPATIENT)
Dept: UROLOGY | Facility: MEDICAL CENTER | Age: 75
End: 2020-06-22

## 2020-06-22 DIAGNOSIS — R39.9 UTI SYMPTOMS: Primary | ICD-10-CM

## 2020-06-29 ENCOUNTER — EVALUATION (OUTPATIENT)
Dept: PHYSICAL THERAPY | Age: 75
End: 2020-06-29
Payer: COMMERCIAL

## 2020-06-29 DIAGNOSIS — M48.061 SPINAL STENOSIS OF LUMBAR REGION WITHOUT NEUROGENIC CLAUDICATION: Primary | ICD-10-CM

## 2020-06-29 PROCEDURE — 97113 AQUATIC THERAPY/EXERCISES: CPT | Performed by: PHYSICAL THERAPIST

## 2020-06-29 PROCEDURE — 97162 PT EVAL MOD COMPLEX 30 MIN: CPT | Performed by: PHYSICAL THERAPIST

## 2020-07-01 ENCOUNTER — OFFICE VISIT (OUTPATIENT)
Dept: PHYSICAL THERAPY | Age: 75
End: 2020-07-01
Payer: COMMERCIAL

## 2020-07-01 DIAGNOSIS — M48.061 SPINAL STENOSIS OF LUMBAR REGION WITHOUT NEUROGENIC CLAUDICATION: Primary | ICD-10-CM

## 2020-07-01 PROCEDURE — 97113 AQUATIC THERAPY/EXERCISES: CPT

## 2020-07-01 NOTE — PROGRESS NOTES
Daily Note     Today's date: 2020  Patient name: Juan Ramon Trotter  : 1945  MRN: [id number]  Referring provider: Carlos Mota MD  Dx:   Encounter Diagnosis     ICD-10-CM    1  Spinal stenosis of lumbar region without neurogenic claudication M48 061        Start Time: 1400  Stop Time: 1500  Total time in clinic (min): 60 minutes    Subjective: Patient reports he felt good after his initial visit  Objective: See treatment diary below      Assessment: Tolerated treatment well with increased program today, added paddles, noodle and core work today with good tolerance  Patient is motivated with therapy  Patient notes feeling good after therapy  Patient exhibited good technique with therapeutic exercises and would benefit from continued PT      Plan: Continue per plan of care        Precautions: standard    Daily Treatment Diary     Exercise Diary             Water walking 10' 10           Postural training  5'           Gait training             Home exercise pgm/patient education 5'            Wall: t/h raises 1' 1'           Hip abd/add 2' 2'           Marching 1' 1'           squats 1' 1'           Knee flex/ext 1' 1'           Step-ups (fwd/bkwd/ss)             SLS (eyes open/closed)             SLS w UE mvmt  AROM/ball toss             Weight shifting             UE Noodle work x 4   4'           UE AROM             Resistive UE work (paddles, bells, TB)  OB 3'           Core work  Red KB press  5"x10           Sit on noodle with movement             Seated on pool bench w proper posture  1'           Ankle df/pf  1'             1'           Hip Ab/add  1'           Knee flex/ext  1'           Deep water mvmt             Deep water tx/stretching 10' 10'           Specific self - stretches wall/steps 2' 2'               Modalities             whirlpool 10' 10

## 2020-07-02 ENCOUNTER — OFFICE VISIT (OUTPATIENT)
Dept: UROLOGY | Facility: CLINIC | Age: 75
End: 2020-07-02
Payer: COMMERCIAL

## 2020-07-02 VITALS
SYSTOLIC BLOOD PRESSURE: 152 MMHG | HEART RATE: 72 BPM | DIASTOLIC BLOOD PRESSURE: 86 MMHG | BODY MASS INDEX: 32.45 KG/M2 | HEIGHT: 75 IN | WEIGHT: 261 LBS

## 2020-07-02 DIAGNOSIS — N20.0 NEPHROLITHIASIS: Primary | ICD-10-CM

## 2020-07-02 LAB
SL AMB  POCT GLUCOSE, UA: NORMAL
SL AMB LEUKOCYTE ESTERASE,UA: NORMAL
SL AMB POCT BILIRUBIN,UA: NORMAL
SL AMB POCT BLOOD,UA: NORMAL
SL AMB POCT CLARITY,UA: CLEAR
SL AMB POCT COLOR,UA: YELLOW
SL AMB POCT KETONES,UA: NORMAL
SL AMB POCT NITRITE,UA: NORMAL
SL AMB POCT PH,UA: 5
SL AMB POCT SPECIFIC GRAVITY,UA: 1
SL AMB POCT URINE PROTEIN: NORMAL
SL AMB POCT UROBILINOGEN: 0.2

## 2020-07-02 PROCEDURE — 87086 URINE CULTURE/COLONY COUNT: CPT | Performed by: PHYSICIAN ASSISTANT

## 2020-07-02 PROCEDURE — 81002 URINALYSIS NONAUTO W/O SCOPE: CPT | Performed by: PHYSICIAN ASSISTANT

## 2020-07-02 PROCEDURE — 99213 OFFICE O/P EST LOW 20 MIN: CPT | Performed by: PHYSICIAN ASSISTANT

## 2020-07-02 NOTE — PROGRESS NOTES
1  Nephrolithiasis  POCT urine dip       Assessment and plan:       1  BPH with lower urinary symptoms - managed by Dr Harlan Santiago  - patient will continue on tamsulosin monotherapy  He had a versus side effect profile from 5 alpha reductase inhibitor and does not wish to continue on the finasteride   - he is aware should his urinary symptoms progress in the future he would likely require bladder outlet obstruction intervention in the form Uro lift versus TURP  Patient verbalized understanding  2  Resolved epididymitis  - urine specimen from the office today will be submitted for culture  He will be contacted if any additional antibiotics are warranted  Normal scrotal examination in the office today other than a small left hydrocele still present  Encourage proper hydration and avoidance of constipation in the meantime  3  History of nephrolithiasis  -encourage patient to hydrate to minimize risk of future stones  Patient will follow-up scheduled in December 2020 for re-evaluation  Encouraged contact us the meantime with any urologic concerns  All questions answered  Tricia Hardwick PA-C      Chief Complaint     Chief Complaint   Patient presents with    Nephrolithiasis       History of Present Illness     Mary Foreman  is a 76 y o  male patient of Dr aHrlan Santiago with a history of nephrolithiasis, BPH with lower urinary tract symptoms presenting for acute visit for follow-up of an emergency department visit  In February 2020 patient had passed a 1 mm ureteral stone  He was seen in consultation shortly thereafter  He had complaints of lower urinary tract symptoms and underwent a cystoscopy 06/12/2020 for further evaluation of bladder outlet obstruction  Cystoscopy had revealed lateral lobe hypertrophy with a transrectal ultrasound approximately 75g  Patient wished to continue on medical management at that time    He had been started on finasteride however subsequently discontinued this as he felt to cause significant constipation  Continues on tamsulosin  Unfortunately after his cystoscopy patient began to have testicular pain and presented to the emergency department 06/21/2020  An ultrasound of the scrotum and testes were obtained revealing left epididymioorchitis  Patient was started on 10 days of Levaquin and discharged from the emergency department  Patient has been doing well over the past few week after completing his Levaquin  Denies any testicular discomfort or residual urinary symptoms  Urine dip in the office today is leukocyte and nitrite negative  Laboratory     Lab Results   Component Value Date    CREATININE 1 20 06/21/2020       Lab Results   Component Value Date    PSA 0 8 08/21/2017       Review of Systems     Review of Systems   Constitutional: Negative for activity change, appetite change, chills, diaphoresis, fatigue, fever and unexpected weight change  Respiratory: Negative for chest tightness and shortness of breath  Cardiovascular: Negative for chest pain, palpitations and leg swelling  Gastrointestinal: Negative for abdominal distention, abdominal pain, constipation, diarrhea, nausea and vomiting  Genitourinary: Negative for decreased urine volume, difficulty urinating, dysuria, enuresis, flank pain, frequency, genital sores, hematuria and urgency  Musculoskeletal: Negative for back pain, gait problem and myalgias  Skin: Negative for color change, pallor, rash and wound  Psychiatric/Behavioral: Negative for behavioral problems  The patient is not nervous/anxious  Allergies     Allergies   Allergen Reactions    Sildenafil     Simvastatin Myalgia       Physical Exam     Physical Exam   Constitutional: He is oriented to person, place, and time  He appears well-developed and well-nourished  No distress  HENT:   Head: Normocephalic and atraumatic     Right Ear: External ear normal    Left Ear: External ear normal    Nose: Nose normal  Eyes: Conjunctivae are normal  Right eye exhibits no discharge  Left eye exhibits no discharge  Neck: Normal range of motion  No tracheal deviation present  Pulmonary/Chest: Effort normal  No respiratory distress  Abdominal: Hernia confirmed negative in the right inguinal area and confirmed negative in the left inguinal area  Genitourinary: Penis normal  Right testis shows no mass, no swelling and no tenderness  Left testis shows swelling (mild lefy hydrocele present)  Left testis shows no mass and no tenderness  Circumcised  No phimosis, paraphimosis, hypospadias or penile erythema  Genitourinary Comments: Nontender to palpation  No erythema, crepitus, ecchymosis, fluctuance, or drainable collection  Musculoskeletal: He exhibits no edema or deformity  Neurological: He is alert and oriented to person, place, and time  Skin: No rash noted  He is not diaphoretic  No erythema  Psychiatric: He has a normal mood and affect   His behavior is normal        Vital Signs     Vitals:    07/02/20 0757   BP: 152/86   Pulse: 72   Weight: 118 kg (261 lb)   Height: 6' 3" (1 905 m)         Current Medications       Current Outpatient Medications:     apixaban (ELIQUIS) 5 mg, Take 1 tablet (5 mg total) by mouth 2 (two) times a day, Disp: 60 tablet, Rfl: 11    Ascorbic Acid (VITAMIN C) 100 MG tablet, Take 100 mg by mouth daily, Disp: , Rfl:     finasteride (PROSCAR) 5 mg tablet, Take 1 tablet (5 mg total) by mouth daily, Disp: 90 tablet, Rfl: 3    fluticasone (FLONASE) 50 mcg/act nasal spray, 1 spray into each nostril daily, Disp: 16 g, Rfl: 0    HYDROcodone-acetaminophen (NORCO) 5-325 mg per tablet, Take 1 tablet by mouth every 6 (six) hours as needed for painMax Daily Amount: 4 tablets, Disp: 12 tablet, Rfl: 0    rosuvastatin (CRESTOR) 20 MG tablet, Take 20 mg by mouth daily, Disp: , Rfl:     tamsulosin (FLOMAX) 0 4 mg, Take 0 4 mg by mouth daily with dinner, Disp: , Rfl:       Active Problems     Patient Active Problem List   Diagnosis    Bilateral pulmonary embolism (HCC)    SOB (shortness of breath)    Hyperlipidemia    Lung nodule    Obesity    History of sickle cell trait    Leg edema, left    Thrombocytopenia (HCC)    Chronic kidney disease (CKD), stage II (mild)    Chronic low back pain    SVT (supraventricular tachycardia) (HCC)    Chronic anticoagulation    ARLENE (obstructive sleep apnea)    Cardiomyopathy, nonischemic (HCC)    Dyslipidemia    BPH with obstruction/lower urinary tract symptoms    Nephrolithiasis    Lumbar disc disease with radiculopathy - Left    Spinal stenosis of lumbar region without neurogenic claudication - Left         Past Medical History     Past Medical History:   Diagnosis Date    Hyperlipidemia     Post-nasal drip     Pulmonary embolism ([address] )          Surgical History     Past Surgical History:   Procedure Laterality Date    ARTHROSCOPY KNEE Left     30 years ago         Family History     History reviewed  No pertinent family history        Social History     Social History       Radiology

## 2020-07-03 LAB — BACTERIA UR CULT: NORMAL

## 2020-07-07 ENCOUNTER — OFFICE VISIT (OUTPATIENT)
Dept: PHYSICAL THERAPY | Age: 75
End: 2020-07-07
Payer: COMMERCIAL

## 2020-07-07 DIAGNOSIS — M48.061 SPINAL STENOSIS OF LUMBAR REGION WITHOUT NEUROGENIC CLAUDICATION: Primary | ICD-10-CM

## 2020-07-07 PROCEDURE — 97113 AQUATIC THERAPY/EXERCISES: CPT

## 2020-07-07 NOTE — PROGRESS NOTES
Daily Note     Today's date: 2020  Patient name: Deep Murcia  : 1945  MRN: [id number]  Referring provider: Mikey George MD  Dx:   Encounter Diagnosis     ICD-10-CM    1  Spinal stenosis of lumbar region without neurogenic claudication M48 061        Start Time: 1400  Stop Time: 1500  Total time in clinic (min): 60 minutes    Subjective: Reports 5/10 pain at his LB today       Objective: See treatment diary below      Assessment: Tolerated treatment well  Pain diminished post session  Patient tolerated addition of paddle push pull for core and bicycle in deep water today with no increase in sx  Cues t/o session to ensure proper technique and quantity of ex  Patient would benefit from continued PT      Plan: Continue per plan of care        Precautions: standard    Daily Treatment Diary     Exercise Diary            Water walking 10' 10 10'          Postural training  5' 2'          Gait training             Home exercise pgm/patient education 5'  2'          Wall: t/h raises 1' 1' 1'          Hip abd/add 2' 2' 2'          Marching 1' 1' 1'          squats 1' 1' 1'          Knee flex/ext 1' 1' 1'          Step-ups (fwd/bkwd/ss)             SLS (eyes open/closed)             SLS w UE mvmt  AROM/ball toss             Weight shifting             UE Noodle work x 4   4'           UE AROM             Resistive UE work (paddles, bells, TB)  OB 3' 4'          Core work  Red KB press  5"x10 2'          Sit on noodle with movement             Seated on pool bench w proper posture  1' 1'          Ankle df/pf  1' 1'          marching  1' 1'          Hip Ab/add  1' 1'          Knee flex/ext  1' 1'          Deep water mvmt   3'          Deep water tx/stretching 10' 10' 10'          Specific self - stretches wall/steps 2' 2' 3'              Modalities            whirlpool 10' 10 10'

## 2020-07-09 ENCOUNTER — OFFICE VISIT (OUTPATIENT)
Dept: PHYSICAL THERAPY | Age: 75
End: 2020-07-09
Payer: COMMERCIAL

## 2020-07-09 DIAGNOSIS — M48.061 SPINAL STENOSIS OF LUMBAR REGION WITHOUT NEUROGENIC CLAUDICATION: Primary | ICD-10-CM

## 2020-07-09 PROCEDURE — 97113 AQUATIC THERAPY/EXERCISES: CPT

## 2020-07-09 NOTE — PROGRESS NOTES
Daily Note     Today's date: 2020  Patient name: James Cabezas  : 1945  MRN: [id number]  Referring provider: Elias Closs, MD  Dx:   Encounter Diagnosis     ICD-10-CM    1  Spinal stenosis of lumbar region without neurogenic claudication M48 061        Start Time: 1400  Stop Time: 1500  Total time in clinic (min): 60 minutes    Subjective: patient reports 3/10 lbp today  Objective: See treatment diary below      Assessment: Tolerated treatment well, added 5# weight sto his program with good tolerance and no additional pain  Patient exhibited good technique with therapeutic exercises and would benefit from continued PT      Plan: Continue per plan of care    patient wants to try land therapy in two weeks     Precautions: standard    Daily Treatment Diary     Exercise Diary           Water walking 10' 10 10' 10'         Postural training  5' 2' 2'         Gait training             Home exercise pgm/patient education 5'  2'          Wall: t/h raises 1' 1' 1' 1         Hip abd/add 2' 2' 2' 2         Marching 1' 1' 1' 1         squats 1' 1' 1' 1         Knee flex/ext 1' 1' 1' 1         Step-ups (fwd/bkwd/ss)             SLS (eyes open/closed)             SLS w UE mvmt  AROM/ball toss             Weight shifting             UE Noodle work x 4   4'  4'         UE AROM             Resistive UE work (paddles, bells, TB)  OB 3' 4' 4'         Core work  Red KB press  5"x10 2' 2'         Sit on noodle with movement             Seated on pool bench w proper posture  1' 1'          Ankle df/pf  1' 1' 1'5#         marching  1' 1' 1'5#         Hip Ab/add  1' 1' 1'5#         Knee flex/ext  1' 1' 1' 5#         Deep water mvmt   3' 3' 5#         Deep water tx/stretching 10' 10' 10' 10' 5#         Specific self - stretches wall/steps 2' 2' 3' 3'              Modalities           whirlpool 10' 10 10' 10'

## 2020-07-13 ENCOUNTER — OFFICE VISIT (OUTPATIENT)
Dept: PHYSICAL THERAPY | Age: 75
End: 2020-07-13
Payer: COMMERCIAL

## 2020-07-13 DIAGNOSIS — M48.061 SPINAL STENOSIS OF LUMBAR REGION WITHOUT NEUROGENIC CLAUDICATION: Primary | ICD-10-CM

## 2020-07-13 PROCEDURE — 97113 AQUATIC THERAPY/EXERCISES: CPT

## 2020-07-13 NOTE — PROGRESS NOTES
Daily Note     Today's date: 2020  Patient name: Shanna Perez  : 1945  MRN: [id number]  Referring provider: Adolfo Calvo MD  Dx:   Encounter Diagnosis     ICD-10-CM    1  Spinal stenosis of lumbar region without neurogenic claudication M48 061        Start Time: 1000  Stop Time: 1100  Total time in clinic (min): 60 minutes    Subjective: Patient reports 3/10 lower back pain today  Objective: See treatment diary below      Assessment: Tolerated treatment well, notes feeling better after therapy, doing good with exercise program    Patient exhibited good technique with therapeutic exercises and would benefit from continued PT      Plan: Continue per plan of care        Precautions: standard    Daily Treatment Diary     Exercise Diary          Water walking 10' 10 10' 10' 10'        Postural training  5' 2' 2' 2'        Gait training             Home exercise pgm/patient education 5'  2'          Wall: t/h raises 1' 1' 1' 1 5# 1'        Hip abd/add 2' 2' 2' 2 5# 2'        Marching 1' ' ' 1 5# 1'        squats 1' 1' 1' 1 5# 1'        Knee flex/ext 1' 1' 1' 1 5# 1'        Step-ups (fwd/bkwd/ss)             SLS (eyes open/closed)             SLS w UE mvmt  AROM/ball toss             Weight shifting             UE Noodle work x 4   4'  4' 4'        UE AROM             Resistive UE work (paddles, bells, TB)  OB 3' 4' 4' 4'        Core work  Red KB press  5"x10 2' 2' 2'        Core TB press     5"x10        Seated on pool bench w proper posture  1' 1'          Ankle df/pf  1' 1' 1'5# 1' 5#        marching  1' 1' 1'5# 1' 5#        Hip Ab/add  1' 1' 1'5# 1' 5#        Knee flex/ext  1' 1' 1' 5# 1' 5#        Deep water mvmt   3' 3' 5# 3' 5#        Deep water tx/stretching 10' 10' 10' 10' 5# 10' 5#        Specific self - stretches wall/steps 2' 2' 3' 3'  3'            Modalities          whirlpool 10' 10 10' 10' 10'

## 2020-07-15 ENCOUNTER — OFFICE VISIT (OUTPATIENT)
Dept: PHYSICAL THERAPY | Age: 75
End: 2020-07-15
Payer: COMMERCIAL

## 2020-07-15 DIAGNOSIS — M48.061 SPINAL STENOSIS OF LUMBAR REGION WITHOUT NEUROGENIC CLAUDICATION: Primary | ICD-10-CM

## 2020-07-15 PROCEDURE — 97113 AQUATIC THERAPY/EXERCISES: CPT

## 2020-07-15 NOTE — PROGRESS NOTES
Daily Note     Today's date: 7/15/2020  Patient name: Shanna Perez  : 1945  MRN: [id number]  Referring provider: Adolfo Calvo MD  Dx:   Encounter Diagnosis     ICD-10-CM    1  Spinal stenosis of lumbar region without neurogenic claudication M48 061        Start Time: 1500  Stop Time: 1600  Total time in clinic (min): 60 minutes    Subjective: patient reports some numbness in his left leg yesterday due to increased standing activities in the kitchen  Objective: See treatment diary below      Assessment: Tolerated treatment well, good movements in the water, no additional pain with aquatic therapy  Patient exhibited good technique with therapeutic exercises and would benefit from continued PT      Plan: Continue per plan of care        Precautions: standard    Daily Treatment Diary     Exercise Diary  6/29 7/01 7/7 7/9 7/13 7/15       Water walking 10' 10 10' 10' 10' 10'       Postural training  5' 2' 2' 2' 2'       Gait training             Home exercise pgm/patient education 5'  2'          Wall: t/h raises 1' 1' 1' 1 5# 1' 5# 1'       Hip abd/add 2' 2' 2' 2 5# 2' 5# 2'       Marching 1' ' ' 1 5# 1' 5# 1'       squats 1' ' ' 1 5# 1' 5# 1'       Knee flex/ext 1' ' ' 1 5# 1' 5# 1'       Step-ups (fwd/bkwd/ss)             SLS (eyes open/closed)             SLS w UE mvmt  AROM/ball toss             Weight shifting             UE Noodle work x 4   4'  4' 4' 4'       UE AROM             Resistive UE work (paddles, bells, TB)  OB 3' 4' 4' 4' 4'       Core work  Red KB press  5"x10 2' 2' 2' 2'       Core TB press     5"x10 5"x10       Seated on pool bench w proper posture  1' 1'          Ankle df/pf  1' 1' 1'5# 1' 5# 1' 5#       marching  1' 1' 1'5# 1' 5# 1' 5#       Hip Ab/add  1' 1' 1'5# 1' 5# 1' 5#        Knee flex/ext  1' 1' 1' 5# 1' 5# 1' 5#       Deep water mvmt   3' 3' 5# 3' 5# 3' 5#       Deep water tx/stretching 10' 10' 10' 10' 5# 10' 5# 10' 5#       Specific self - stretches wall/steps 2' 2' 3' 3'  3' 3'           Modalities  6/29 7/01 7/7 7/9 7/13 7/15       whirlpool 10' 10 10' 10' 10' 10'

## 2020-07-21 ENCOUNTER — APPOINTMENT (OUTPATIENT)
Dept: PHYSICAL THERAPY | Age: 75
End: 2020-07-21
Payer: COMMERCIAL

## 2020-07-23 ENCOUNTER — APPOINTMENT (OUTPATIENT)
Dept: PHYSICAL THERAPY | Age: 75
End: 2020-07-23
Payer: COMMERCIAL

## 2020-07-27 ENCOUNTER — APPOINTMENT (OUTPATIENT)
Dept: PHYSICAL THERAPY | Age: 75
End: 2020-07-27
Payer: COMMERCIAL

## 2020-07-29 ENCOUNTER — APPOINTMENT (OUTPATIENT)
Dept: PHYSICAL THERAPY | Age: 75
End: 2020-07-29
Payer: COMMERCIAL

## 2020-08-28 NOTE — PROGRESS NOTES
Pt has not been seen for PT since 7/15/2020 due to travel  He was then quarantined for 2 weeks due to travel to [facility]  He has not returned post-quarantine despite repeated attempts to contact

## 2020-09-01 ENCOUNTER — OFFICE VISIT (OUTPATIENT)
Dept: CARDIOLOGY CLINIC | Facility: CLINIC | Age: 75
End: 2020-09-01
Payer: COMMERCIAL

## 2020-09-01 VITALS
DIASTOLIC BLOOD PRESSURE: 82 MMHG | OXYGEN SATURATION: 97 % | HEART RATE: 67 BPM | WEIGHT: 270 LBS | SYSTOLIC BLOOD PRESSURE: 130 MMHG | BODY MASS INDEX: 33.57 KG/M2 | HEIGHT: 75 IN

## 2020-09-01 DIAGNOSIS — I42.9 CARDIOMYOPATHY, UNSPECIFIED TYPE (HCC): Primary | ICD-10-CM

## 2020-09-01 DIAGNOSIS — Z79.01 CHRONIC ANTICOAGULATION: ICD-10-CM

## 2020-09-01 DIAGNOSIS — I26.99 RECURRENT PULMONARY EMBOLISM (HCC): ICD-10-CM

## 2020-09-01 PROCEDURE — 99214 OFFICE O/P EST MOD 30 MIN: CPT | Performed by: INTERNAL MEDICINE

## 2020-09-01 NOTE — PROGRESS NOTES
[facility]  [address] [place] [id number]  Cardiology Follow Up    Rudi Lewis   1945  [id number]      1  Cardiomyopathy, unspecified type ([address] )     2  Chronic anticoagulation     3  Recurrent pulmonary embolism [facility])         Chief Complaint   Patient presents with    Follow-up       Interval History:  Patient presents for follow-up visit  Patient denies any history of chest pain shortness of breath  Patient denies any history of leg edema or orthopnea PND  No history of presyncope syncope  Patient states compliance with the present list of medications  Patient denies any bleeding issues  Patient does have history of mild cardiomyopathy nonischemic ejection fraction 45%      Patient Active Problem List   Diagnosis    Bilateral pulmonary embolism (HCC)    SOB (shortness of breath)    Hyperlipidemia    Lung nodule    Obesity    History of sickle cell trait    Leg edema, left    Thrombocytopenia (HCC)    Chronic kidney disease (CKD), stage II (mild)    Chronic low back pain    SVT (supraventricular tachycardia) (HCC)    Chronic anticoagulation    ARLENE (obstructive sleep apnea)    Cardiomyopathy, nonischemic (HCC)    Dyslipidemia    BPH with obstruction/lower urinary tract symptoms    Nephrolithiasis    Lumbar disc disease with radiculopathy - Left    Spinal stenosis of lumbar region without neurogenic claudication - Left     Past Medical History:   Diagnosis Date    Hyperlipidemia     Post-nasal drip     Pulmonary embolism (HCC)      Social History     Socioeconomic History    Marital status: /Civil Union     Spouse name: Not on file    Number of children: 9    Years of education: Not on file    Highest education level: Not on file   Occupational History    Occupation: employed   Social Needs    Financial resource strain: Not on file    Food insecurity     Worry: Not on file     Inability: Not on file   [employer] needs     Medical: Not on file     Non-medical: Not on file   Tobacco Use    Smoking status: Former Smoker    Smokeless tobacco: Never Used   Substance and Sexual Activity    Alcohol use: Yes     Alcohol/week: 0 0 standard drinks     Comment: socially    Drug use: No    Sexual activity: Not on file   Lifestyle    Physical activity     Days per week: Not on file     Minutes per session: Not on file    Stress: Not on file   Relationships    Social connections     Talks on phone: Not on file     Gets together: Not on file     Attends [employer] service: Not on file     Active member of club or organization: Not on file     Attends meetings of clubs or organizations: Not on file     Relationship status: Not on file    Intimate partner violence     Fear of current or ex partner: Not on file     Emotionally abused: Not on file     Physically abused: Not on file     Forced sexual activity: Not on file   Other Topics Concern    Not on file   Social History Narrative    Not on file      History reviewed  No pertinent family history    Past Surgical History:   Procedure Laterality Date    ARTHROSCOPY KNEE Left     30 years ago       Current Outpatient Medications:     apixaban (ELIQUIS) 5 mg, Take 1 tablet (5 mg total) by mouth 2 (two) times a day, Disp: 60 tablet, Rfl: 11    Ascorbic Acid (VITAMIN C) 100 MG tablet, Take 100 mg by mouth daily, Disp: , Rfl:     finasteride (PROSCAR) 5 mg tablet, Take 1 tablet (5 mg total) by mouth daily, Disp: 90 tablet, Rfl: 3    fluticasone (FLONASE) 50 mcg/act nasal spray, 1 spray into each nostril daily, Disp: 16 g, Rfl: 0    rosuvastatin (CRESTOR) 20 MG tablet, Take 20 mg by mouth daily, Disp: , Rfl:     tamsulosin (FLOMAX) 0 4 mg, Take 0 4 mg by mouth daily with dinner, Disp: , Rfl:     HYDROcodone-acetaminophen (NORCO) 5-325 mg per tablet, Take 1 tablet by mouth every 6 (six) hours as needed for painMax Daily Amount: 4 tablets (Patient not taking: Reported on 9/1/2020), Disp: 12 tablet, Rfl: 0  Allergies   Allergen Reactions    Sildenafil     Simvastatin Myalgia       Labs:  Office Visit on 07/02/2020   Component Date Value    LEUKOCYTE ESTERASE,UA 07/02/2020 -     NITRITE,UA 07/02/2020 -     SL AMB POCT UROBILINOGEN 07/02/2020 0 2     POCT URINE PROTEIN 07/02/2020 trace      PH,UA 07/02/2020 5 0     BLOOD,UA 07/02/2020 -     SPECIFIC GRAVITY,UA 07/02/2020 1 005     KETONES,UA 07/02/2020 -     BILIRUBIN,UA 07/02/2020 -     GLUCOSE, UA 07/02/2020 -      COLOR,UA 07/02/2020 yellow     CLARITY,UA 07/02/2020 clear     Urine Culture 07/02/2020 No Growth <1000 cfu/mL      Imaging: No results found  Review of Systems:  Review of Systems   REVIEW OF SYSTEMS:  Constitutional:  Denies fever or chills   Eyes:  Denies change in visual acuity   HENT:  Denies nasal congestion or sore throat   Respiratory:  Denies cough or shortness of breath   Cardiovascular:  Denies chest pain or edema   GI:  Denies abdominal pain, nausea, vomiting, bloody stools or diarrhea   :  Denies dysuria, frequency, difficulty in micturition and nocturia  Musculoskeletal:  Denies back pain or joint pain   Neurologic:  Denies headache, focal weakness or sensory changes   Endocrine:  Denies polyuria or polydipsia   Lymphatic:  Denies swollen glands   Psychiatric:  Denies depression or anxiety     Physical Exam:    /82   Pulse 67   Ht 6' 3" (1 905 m)   Wt 122 kg (270 lb)   SpO2 97%   BMI 33 75 kg/m²     Physical Exam   PHYSICAL EXAM:  General:  Patient is not in acute distress   Head: Normocephalic, Atraumatic  HEENT:  Both pupils normal-size atraumatic, normocephalic, nonicteric  Neck:  JVP not raised  Trachea central  No carotid bruit  Respiratory:  normal breath sounds no crackles  no rhonchi  Cardiovascular:  Regular rate and rhythm no S3 no murmurs  GI:  Abdomen soft nontender  No organomegaly     Lymphatic:  No cervical or inguinal lymphadenopathy  Neurologic:  Patient is awake alert, oriented   Grossly nonfocal  Extremities no edema    Discussion/Summary:  Patient with multiple medical problems who seems to be doing reasonably well from cardiac standpoint  Previous studies reviewed with patient  Medications reviewed and possible side effects discussed  concepts of cardiovascular disease , signs and symptoms of heart disease  Dietary and risk factor modification reinforced  All questions answered  Safety measures reviewed  Patient advised to report any problems prompting medical attention  Patient understands the risks and benefits of anticoagulation to prevent DVT and pulmonary embolism  Patient does have history of recurrent pulmonary embolism  Patient had pharmacological nuclear stress test October of 2019 which showed no evidence of ischemia with ejection fraction of 44%  Importance of salt restriction and compliance with medications reviewed  Patient is strongly counseled to lose weight to reduce cardiovascular morbidity and mortality  Follow-up with primary care physician  Followup in 6 months or earlier as needed  Patient is agreeable with the plan of care

## 2020-09-08 ENCOUNTER — TELEPHONE (OUTPATIENT)
Dept: CARDIOLOGY CLINIC | Facility: CLINIC | Age: 75
End: 2020-09-08

## 2020-09-08 ENCOUNTER — APPOINTMENT (EMERGENCY)
Dept: RADIOLOGY | Facility: HOSPITAL | Age: 75
End: 2020-09-08
Payer: COMMERCIAL

## 2020-09-08 ENCOUNTER — HOSPITAL ENCOUNTER (OUTPATIENT)
Facility: HOSPITAL | Age: 75
Setting detail: OBSERVATION
Discharge: HOME | End: 2020-09-09
Attending: EMERGENCY MEDICINE | Admitting: STUDENT IN AN ORGANIZED HEALTH CARE EDUCATION/TRAINING PROGRAM
Payer: COMMERCIAL

## 2020-09-08 DIAGNOSIS — R00.0 SINUS TACHYCARDIA: Primary | ICD-10-CM

## 2020-09-08 DIAGNOSIS — R00.0 RAPID HEART RATE: ICD-10-CM

## 2020-09-08 LAB
ANION GAP SERPL CALCULATED.3IONS-SCNC: 10 MMOL/L (ref 4–13)
BASOPHILS # BLD AUTO: 0.01 THOUSANDS/ΜL (ref 0–0.1)
BASOPHILS NFR BLD AUTO: 0 % (ref 0–1)
BUN SERPL-MCNC: 14 MG/DL (ref 5–25)
CALCIUM SERPL-MCNC: 9.1 MG/DL (ref 8.3–10.1)
CHLORIDE SERPL-SCNC: 108 MMOL/L (ref 100–108)
CO2 SERPL-SCNC: 26 MMOL/L (ref 21–32)
CREAT SERPL-MCNC: 1.11 MG/DL (ref 0.6–1.3)
EOSINOPHIL # BLD AUTO: 0.04 THOUSAND/ΜL (ref 0–0.61)
EOSINOPHIL NFR BLD AUTO: 1 % (ref 0–6)
ERYTHROCYTE [DISTWIDTH] IN BLOOD BY AUTOMATED COUNT: 15.5 % (ref 11.6–15.1)
GFR SERPL CREATININE-BSD FRML MDRD: 75 ML/MIN/1.73SQ M
GLUCOSE SERPL-MCNC: 99 MG/DL (ref 65–140)
HCT VFR BLD AUTO: 36.8 % (ref 36.5–49.3)
HGB BLD-MCNC: 12.5 G/DL (ref 12–17)
IMM GRANULOCYTES # BLD AUTO: 0.02 THOUSAND/UL (ref 0–0.2)
IMM GRANULOCYTES NFR BLD AUTO: 1 % (ref 0–2)
LYMPHOCYTES # BLD AUTO: 0.93 THOUSANDS/ΜL (ref 0.6–4.47)
LYMPHOCYTES NFR BLD AUTO: 23 % (ref 14–44)
MCH RBC QN AUTO: 32.1 PG (ref 26.8–34.3)
MCHC RBC AUTO-ENTMCNC: 34 G/DL (ref 31.4–37.4)
MCV RBC AUTO: 94 FL (ref 82–98)
MONOCYTES # BLD AUTO: 0.46 THOUSAND/ΜL (ref 0.17–1.22)
MONOCYTES NFR BLD AUTO: 11 % (ref 4–12)
NEUTROPHILS # BLD AUTO: 2.61 THOUSANDS/ΜL (ref 1.85–7.62)
NEUTS SEG NFR BLD AUTO: 64 % (ref 43–75)
NRBC BLD AUTO-RTO: 0 /100 WBCS
NT-PROBNP SERPL-MCNC: 80 PG/ML
PLATELET # BLD AUTO: 135 THOUSANDS/UL (ref 149–390)
PMV BLD AUTO: 11.6 FL (ref 8.9–12.7)
POTASSIUM SERPL-SCNC: 4.5 MMOL/L (ref 3.5–5.3)
RBC # BLD AUTO: 3.9 MILLION/UL (ref 3.88–5.62)
SODIUM SERPL-SCNC: 144 MMOL/L (ref 136–145)
TROPONIN I SERPL-MCNC: <0.02 NG/ML
TSH SERPL DL<=0.05 MIU/L-ACNC: 1.4 UIU/ML (ref 0.36–3.74)
WBC # BLD AUTO: 4.07 THOUSAND/UL (ref 4.31–10.16)

## 2020-09-08 PROCEDURE — 85025 COMPLETE CBC W/AUTO DIFF WBC: CPT | Performed by: PHYSICIAN ASSISTANT

## 2020-09-08 PROCEDURE — 99285 EMERGENCY DEPT VISIT HI MDM: CPT

## 2020-09-08 PROCEDURE — 96361 HYDRATE IV INFUSION ADD-ON: CPT

## 2020-09-08 PROCEDURE — 84484 ASSAY OF TROPONIN QUANT: CPT | Performed by: PHYSICIAN ASSISTANT

## 2020-09-08 PROCEDURE — 84443 ASSAY THYROID STIM HORMONE: CPT | Performed by: PHYSICIAN ASSISTANT

## 2020-09-08 PROCEDURE — 80048 BASIC METABOLIC PNL TOTAL CA: CPT | Performed by: PHYSICIAN ASSISTANT

## 2020-09-08 PROCEDURE — 96360 HYDRATION IV INFUSION INIT: CPT

## 2020-09-08 PROCEDURE — 93005 ELECTROCARDIOGRAM TRACING: CPT

## 2020-09-08 PROCEDURE — 36415 COLL VENOUS BLD VENIPUNCTURE: CPT | Performed by: PHYSICIAN ASSISTANT

## 2020-09-08 PROCEDURE — 71046 X-RAY EXAM CHEST 2 VIEWS: CPT

## 2020-09-08 PROCEDURE — 99285 EMERGENCY DEPT VISIT HI MDM: CPT | Performed by: PHYSICIAN ASSISTANT

## 2020-09-08 PROCEDURE — 83880 ASSAY OF NATRIURETIC PEPTIDE: CPT | Performed by: PHYSICIAN ASSISTANT

## 2020-09-08 PROCEDURE — 99220 PR INITIAL OBSERVATION CARE/DAY 70 MINUTES: CPT | Performed by: STUDENT IN AN ORGANIZED HEALTH CARE EDUCATION/TRAINING PROGRAM

## 2020-09-08 RX ORDER — TAMSULOSIN HYDROCHLORIDE 0.4 MG/1
0.4 CAPSULE ORAL
Status: DISCONTINUED | OUTPATIENT
Start: 2020-09-08 | End: 2020-09-09 | Stop reason: HOSPADM

## 2020-09-08 RX ORDER — FINASTERIDE 5 MG/1
5 TABLET, FILM COATED ORAL DAILY
Status: DISCONTINUED | OUTPATIENT
Start: 2020-09-09 | End: 2020-09-09 | Stop reason: HOSPADM

## 2020-09-08 RX ADMIN — SODIUM CHLORIDE 1000 ML: 0.9 INJECTION, SOLUTION INTRAVENOUS at 13:02

## 2020-09-08 RX ADMIN — APIXABAN 5 MG: 5 TABLET, FILM COATED ORAL at 17:35

## 2020-09-08 RX ADMIN — TAMSULOSIN HYDROCHLORIDE 0.4 MG: 0.4 CAPSULE ORAL at 17:35

## 2020-09-08 NOTE — ASSESSMENT & PLAN NOTE
· Likely in setting of alcohol use over holiday weekend  · Has previous episodes exacerbated by alcohol  · On eliquis for PE, continue with telemetry  · Per cardiology notes as early as 2018, patient is not on calcium channel blocker or beta blocker due to risk of bradycardia   · Last seen by cardiology on 9/2, will defer to primary team in AM for cardio consult pending telemetry overnight

## 2020-09-08 NOTE — ASSESSMENT & PLAN NOTE
· Last stress test in 2019, negative  · Last TTE in 2019 showing EF 40-45%  · Last seen by cardiology as outpatient on 9/2, continue current management   · Encourage low salt diet

## 2020-09-08 NOTE — ED PROVIDER NOTES
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, SVT, nonischemic cardiomyopathy, HLD arriving ambulatory to the ED from the [facility] for evaluation of rapid heart rate  Patient was undergoing a physical exam for work and was found to have a rapid heart rate to 130s-140s on arrival  He denies any chest pain or palpitations on exam but states that he felt slightly short of breath after being told his heart rate was elevated  Patient states that the shortness of breath is non-exertional  He denies any new or worsening lower leg swelling  Patient admits to consuming 5-6 alcoholic beverages yesterday, and states that alcohol has previously triggered episodes of svt in the past  He denies any tobacco use or substance use  He denies fevers, chills, sick contact, dizziness, lightheadedness, n/v/d  He states that he did feel slightly restless yesterday evening but otherwise was in his usual state of health yesterday and this morning  Patient notes that he was previously on beta blocker therapy in the past but was taken off this medication as his heart rate was too low  History provided by:  Patient  Rapid Heart Rate   Palpitations quality:  Regular  Chronicity:  New  Relieved by:  None tried  Associated symptoms: shortness of breath    Associated symptoms: no back pain, no chest pain, no chest pressure, no cough, no dizziness, no nausea, no vomiting and no weakness    Risk factors: hx of PE        Prior to Admission Medications   Prescriptions Last Dose Informant Patient Reported? Taking?    Ascorbic Acid (VITAMIN C) 100 MG tablet  Self Yes No   Sig: Take 100 mg by mouth daily   HYDROcodone-acetaminophen (NORCO) 5-325 mg per tablet  Self No No   Sig: Take 1 tablet by mouth every 6 (six) hours as needed for painMax Daily Amount: 4 tablets   Patient not taking: Reported on 2020   apixaban (ELIQUIS) 5 mg  Self No No   Sig: Take 1 tablet (5 mg total) by mouth 2 (two) times a day   finasteride (PROSCAR) 5 mg tablet  Self No No   Sig: Take 1 tablet (5 mg total) by mouth daily   fluticasone (FLONASE) 50 mcg/act nasal spray  Self No No   Si spray into each nostril daily   rosuvastatin (CRESTOR) 20 MG tablet  Self Yes No   Sig: Take 20 mg by mouth daily   tamsulosin (FLOMAX) 0 4 mg  Self Yes No   Sig: Take 0 4 mg by mouth daily with dinner      Facility-Administered Medications: None       Past Medical History:   Diagnosis Date    Hyperlipidemia     Post-nasal drip     Pulmonary embolism (HCC)        Past Surgical History:   Procedure Laterality Date    ARTHROSCOPY KNEE Left     30 years ago       History reviewed  No pertinent family history  I have reviewed and agree with the history as documented  E-Cigarette/Vaping    E-Cigarette Use Never User      E-Cigarette/Vaping Substances     Social History     Tobacco Use    Smoking status: Former Smoker    Smokeless tobacco: Never Used   Substance Use Topics    Alcohol use: Yes     Alcohol/week: 0 0 standard drinks     Comment: socially    Drug use: No       Review of Systems   Constitutional: Negative for chills and fever  Eyes: Negative for visual disturbance  Respiratory: Positive for shortness of breath  Negative for cough  Cardiovascular: Positive for palpitations  Negative for chest pain and leg swelling  Gastrointestinal: Negative for abdominal pain, nausea and vomiting  Musculoskeletal: Negative for back pain and neck pain  Skin: Negative for rash  Neurological: Negative for dizziness, weakness, light-headedness and headaches  All other systems reviewed and are negative  Physical Exam  Physical Exam  Vitals signs and nursing note reviewed  Constitutional:       General: He is not in acute distress       Appearance: Normal appearance  He is well-developed  He is not ill-appearing, toxic-appearing or diaphoretic  Comments: 76year old male, appears stated age  Resting comfortably on exam bed in no apparent distress   HENT:      Head: Normocephalic and atraumatic  Mouth/Throat:      Mouth: Mucous membranes are moist    Eyes:      Conjunctiva/sclera: Conjunctivae normal       Pupils: Pupils are equal, round, and reactive to light  Neck:      Musculoskeletal: Normal range of motion and neck supple  Cardiovascular:      Rate and Rhythm: Regular rhythm  Tachycardia present  Pulses: Normal pulses  Heart sounds: Normal heart sounds  No murmur  Pulmonary:      Effort: Pulmonary effort is normal  No tachypnea, accessory muscle usage or respiratory distress  Breath sounds: Normal breath sounds  No decreased breath sounds, wheezing, rhonchi or rales  Comments: No evidence of respiratory distress  Speaking in full sentences, satting well on room air  Abdominal:      General: Bowel sounds are normal  There is no distension  Palpations: Abdomen is soft  Tenderness: There is no abdominal tenderness  Musculoskeletal: Normal range of motion  General: No tenderness  Comments: Mild bilateral lower extremity edema   Skin:     General: Skin is warm and dry  Capillary Refill: Capillary refill takes less than 2 seconds  Coloration: Skin is not pale  Neurological:      General: No focal deficit present  Mental Status: He is alert  Mental status is at baseline  Sensory: No sensory deficit        Gait: Gait normal          Vital Signs  ED Triage Vitals [09/08/20 1101]   Temperature Pulse Respirations Blood Pressure SpO2   98 6 °F (37 °C) (!) 151 21 132/90 98 %      Temp Source Heart Rate Source Patient Position - Orthostatic VS BP Location FiO2 (%)   Oral Monitor Sitting Left arm --      Pain Score       --           Vitals:    09/08/20 1330 09/08/20 1430 09/08/20 1515 09/08/20 1600   BP: 135/74 135/91 139/94 149/75   Pulse: 69 70 (!) 135 66   Patient Position - Orthostatic VS:   Lying          Visual Acuity      ED Medications  Medications   apixaban (ELIQUIS) tablet 5 mg (has no administration in time range)   finasteride (PROSCAR) tablet 5 mg (has no administration in time range)   tamsulosin (FLOMAX) capsule 0 4 mg (has no administration in time range)   sodium chloride 0 9 % bolus 1,000 mL (0 mL Intravenous Stopped 9/8/20 1638)       Diagnostic Studies  Results Reviewed     Procedure Component Value Units Date/Time    Basic metabolic panel [[id number]] Collected:  09/08/20 1207    Lab Status:  Final result Specimen:  Blood from Arm, Right Updated:  09/08/20 1242     Sodium 144 mmol/L      Potassium 4 5 mmol/L      Chloride 108 mmol/L      CO2 26 mmol/L      ANION GAP 10 mmol/L      BUN 14 mg/dL      Creatinine 1 11 mg/dL      Glucose 99 mg/dL      Calcium 9 1 mg/dL      eGFR 75 ml/min/1 73sq m     Narrative:       [facility] guidelines for Chronic Kidney Disease (CKD):     Stage 1 with normal or high GFR (GFR > 90 mL/min/1 73 square meters)    Stage 2 Mild CKD (GFR = 60-89 mL/min/1 73 square meters)    Stage 3A Moderate CKD (GFR = 45-59 mL/min/1 73 square meters)    Stage 3B Moderate CKD (GFR = 30-44 mL/min/1 73 square meters)    Stage 4 Severe CKD (GFR = 15-29 mL/min/1 73 square meters)    Stage 5 End Stage CKD (GFR <15 mL/min/1 73 square meters)  Note: GFR calculation is accurate only with a steady state creatinine    NT-BNP PRO [[id number]]  (Normal) Collected:  09/08/20 1207    Lab Status:  Final result Specimen:  Blood from Arm, Right Updated:  09/08/20 1242     NT-proBNP 80 pg/mL     TSH [[id number]]  (Normal) Collected:  09/08/20 1207    Lab Status:  Final result Specimen:  Blood from Arm, Right Updated:  09/08/20 1242     TSH 3RD GENERATON 1 398 uIU/mL     Narrative:       Patients undergoing fluorescein dye angiography may retain small amounts of fluorescein in the body for 48-72 hours post procedure  Samples containing fluorescein can produce falsely depressed TSH values  If the patient had this procedure,a specimen should be resubmitted post fluorescein clearance  CBC and differential [[id number]]  (Abnormal) Collected:  09/08/20 1207    Lab Status:  Final result Specimen:  Blood from Arm, Right Updated:  09/08/20 1240     WBC 4 07 Thousand/uL      RBC 3 90 Million/uL      Hemoglobin 12 5 g/dL      Hematocrit 36 8 %      MCV 94 fL      MCH 32 1 pg      MCHC 34 0 g/dL      RDW 15 5 %      MPV 11 6 fL      Platelets 412 Thousands/uL      nRBC 0 /100 WBCs      Neutrophils Relative 64 %      Immat GRANS % 1 %      Lymphocytes Relative 23 %      Monocytes Relative 11 %      Eosinophils Relative 1 %      Basophils Relative 0 %      Neutrophils Absolute 2 61 Thousands/µL      Immature Grans Absolute 0 02 Thousand/uL      Lymphocytes Absolute 0 93 Thousands/µL      Monocytes Absolute 0 46 Thousand/µL      Eosinophils Absolute 0 04 Thousand/µL      Basophils Absolute 0 01 Thousands/µL     Troponin I [[id number]]  (Normal) Collected:  09/08/20 1207    Lab Status:  Final result Specimen:  Blood from Arm, Right Updated:  09/08/20 1238     Troponin I <0 02 ng/mL                  XR chest 2 views   Final Result by Carli Villalba MD (09/08 1153)      No acute cardiopulmonary disease              Workstation performed: [id number]                    Procedures  ECG 12 Lead Documentation Only    Date/Time: 9/8/2020 11:00 AM  Performed by: Kalie Bourgeois PA-C  Authorized by: Kalie Bourgeois PA-C     Indications / Diagnosis:  Tachycardia  ECG reviewed by me, the ED Provider: yes    Patient location:  ED  Previous ECG:     Previous ECG:  Unavailable    Comparison to cardiac monitor: Yes    Interpretation:     Interpretation: abnormal    Rate:     ECG rate:  148    ECG rate assessment: tachycardic    Rhythm:     Rhythm: sinus tachycardia    Ectopy:     Ectopy: PVCs PVCs:  Infrequent  QRS:     QRS axis:  Left  Conduction:     Conduction: normal    ST segments:     ST segments:  Non-specific  Comments:      ECG reviewed by myself and attending, none prior for comparison  ECG 12 Lead Documentation Only    Date/Time: 9/8/2020 11:59 AM  Performed by: oBbby Meyers PA-C  Authorized by: Bobby Meyers PA-C     Indications / Diagnosis:  Tachycardia  ECG reviewed by me, the ED Provider: yes    Patient location:  ED  Previous ECG:     Previous ECG:  Compared to current    Comparison ECG info:  9/8/20 at 11:00    Similarity:  Changes noted    Comparison to cardiac monitor: Yes    Interpretation:     Interpretation: abnormal    Rate:     ECG rate:  75  Rhythm:     Rhythm: sinus rhythm      Rhythm comment:  Sinus arrhythmia  QRS:     QRS axis:  Left    QRS intervals:  Normal  ST segments:     ST segments:  Normal  T waves:     T waves: non-specific    Comments:      ECG reviewed by myself and attending  ECG 12 Lead Documentation Only    Date/Time: 9/8/2020 12:57 PM  Performed by: Bobby Meyers PA-C  Authorized by: Bobby Meyers PA-C     Indications / Diagnosis:  Tachycardia  ECG reviewed by me, the ED Provider: yes    Patient location:  ED  Previous ECG:     Previous ECG:  Compared to current    Comparison ECG info:  9/8/20 at 11:59    Similarity:  Changes noted  Interpretation:     Interpretation: abnormal    Rate:     ECG rate:  142    ECG rate assessment: tachycardic    Rhythm:     Rhythm: sinus tachycardia    ST segments:     ST segments:  Normal  T waves:     T waves: non-specific    Other findings:     Other findings comment:  Short [place] interval  Comments:      ECG reviewed by myself and attending, compared with prior  ECG 12 Lead Documentation Only    Date/Time: 9/8/2020 1:10 PM  Performed by: Bobby Meyers PA-C  Authorized by: Bobby Meyers PA-C     Indications / Diagnosis:  Tachycardia  ECG reviewed by me, the ED Provider: yes    Patient location: ED  Previous ECG:     Previous ECG:  Compared to current    Comparison ECG info:  9/8/20 at 12:57    Similarity:  Changes noted  Interpretation:     Interpretation: abnormal    Rate:     ECG rate:  72    ECG rate assessment: normal    Rhythm:     Rhythm: sinus rhythm      Rhythm comment:  Sinus arrhythmia  QRS:     QRS axis:  Left  ST segments:     ST segments:  Normal  T waves:     T waves: non-specific    Comments:      ECG reviewed by myself and attending, compared to prior             ED Course  ED Course as of Sep 08 1731   Tue Sep 08, 2020   1110 BP stable on arrival   Pulse(!): 151   1214 HR improved without intervention  Patient continues to deny any chest pain or shortness of breath  Cardiology consulted on TT   Pulse: 81   1229 Spoke with Dr Judie Raymundo, Cardiology  He recommends awaiting lab work results, but given that this patient is currently hemodynamically stable and otherwise asymptomatic, he may follow up in their office next week as an outpatient      1239 Troponin I: <0 02   1251 CXR IMPRESSION:     No acute cardiopulmonary disease  1301 HR intermittently rises to 140s while seated on exam bed, patient offers no complaints  Will give 1L fluids and reassess      1549 Patient ambulated in the ED - HR oscar to 150s, when returned to exam bed, HR in the 60s  Patient advised of recommendation for admission to continue telemetry monitoring and consult Cardiology  He is understanding and agreeable, SLIM paged for admission      1620 Case was discussed with Dr Onelia Cain, who accepts patient for admission                MDM  Number of Diagnoses or Management Options  Rapid heart rate: new and requires workup  Sinus tachycardia: new and requires workup  Diagnosis management comments: This is a 76year-old male with history of SVT, CM, and bilateral PE on Eliquis, arriving ambulatory to the ED for evaluation of elevated heart rate that was discovered while having a physical exam done for work at the [employer]  Patient was asymptomatic initially, but noticed that he felt mildly short of breath after being told his heart rate was elevated and he should come to the ED for further evaluation  Patient otherwise offers no other complaints, denying any chest pain, palpitations, or leg swelling  Patient does admit to alcohol consumption of 5-6 drinks yesterday, noting that alcohol has triggered episodes of SVT for him in the past  He denies any abdominal pain, n/v  He additionally notes that he was on beta blocker therapy in the past for SVT however he was found to be bradycardic with this and was stopped on this medication  He had a follow up appointment with his Cardiologist last week which went well, without any changes in his current medication regimen  Differential diagnosis includes but not limited to: palpitations, arrhythmia, type 2 mi, dehydration, electrolyte dysfunction, thyroid disease, atrial fibrillation/holiday heart, CHF exacerbation    Initial ED plan: ECG, labs, CXR, Cardiology consult    Final ED Assessment: Vitals on arrival notable for tachycardia to 130s-140s, BP stable  Patient was nearly asymptomatic with this with exception of complaint of mild shortness of breath  Examination as documented above  Patient was then noted to have improved HR in the 80s without intervention  Labs and CXR independently reviewed and overall unremarkable with negative troponin and BNP within normal  Case was discussed with Dr Bassam Kim, Cardiology, who personally reviewed ECGs and found that the patient was in sinus rhythm  He initially recommended outpatient follow up in one week as an outpatient, as the patient had remained in NSR in the 80s at that time; however, patient was then found to have intermittent runs of sinus tachycardia to the 130s  This did not improve after a fluid bolus   He was ambulated in the ED at which time he had a further increase in HR to the 150s, and upon return to his exam bed, his heart rate decreased to the 62s  Of note, at no point in time did the patient have any chest pain or palpitations  He did not have any clinical evidence of respiratory distress, and did not experience worsening shortness of breath while ambulating in the ED  Due to this range in heart rate, decision was made to hold beta blocker treatment, especially as the patient has a history of bradycardia with beta blocker therapy in the past  Patient was advised of recommendation to admit to the hospital for Cardiology consultation and telemetry monitoring  He is understanding and agreeable  Case was discussed with Dr Amira Jeong, who accepts patient for admission  Patient is stable for admission at this time  He was monitored in the ED for greater than 4 hours remaining hemodynamically stable during this time  Amount and/or Complexity of Data Reviewed  Clinical lab tests: ordered and reviewed  Tests in the radiology section of CPT®: reviewed and ordered  Review and summarize past medical records: yes  Discuss the patient with other providers: yes  Independent visualization of images, tracings, or specimens: yes    Risk of Complications, Morbidity, and/or Mortality  Presenting problems: moderate  Diagnostic procedures: moderate  Management options: moderate    Patient Progress  Patient progress: stable      Disposition  Final diagnoses:   Sinus tachycardia   Rapid heart rate     Time reflects when diagnosis was documented in both MDM as applicable and the Disposition within this note     Time User Action Codes Description Comment    9/8/2020  4:26 PM Nat Seaman [address] [R00 0] Sinus tachycardia     9/8/2020  4:26 PM Luz [address] [R00 0] Rapid heart rate       ED Disposition     ED Disposition Condition Date/Time Comment    Admit Stable Tue Sep 8, 2020  4:16 PM Case was discussed with Dr Mary Funes and the patient's admission status was agreed to be Admission Status: observation status to the service of Dr Mary Funes           Follow-up Information    None         Patient's Medications   Discharge Prescriptions    No medications on file     No discharge procedures on file      PDMP Review     None          ED Provider  Electronically Signed by           Esmer Ceballos PA-C  09/08/20 4631

## 2020-09-08 NOTE — TELEPHONE ENCOUNTER
Patient came into the [facility] office today and stated that he was coming from is [employer] physical  While at the appt his pulse was 145-150  Denied any symptoms out of the ordinary  Patient was informed by [employer] physical physician to report to the ER  Patient wanted to make you aware to know what he can do about elevated HR  Patient currently arrived at [facility]

## 2020-09-08 NOTE — ED NOTES
1  CC: rapid heart rate   2  Orientation status: A/O X4   3  Abnormal labs/vitals/assessment: WBC 4 07; abnormal heart rhythm   4  Iv/drains/etc : 20 right AC   5  Last time narcotics given: none   6  Medications/drips: none   7  Ambulation status: independent   8  Isolation status: none   9  Skin: not fully assessed but appears clean,dry and intact   10  Trauma: none   11   ED phone number: John Paul Rivera RN  09/08/20 [id number]

## 2020-09-08 NOTE — ED ATTENDING ATTESTATION
9/8/2020  ILisa MD, saw and evaluated the patient  I have discussed the patient with the resident/non-physician practitioner and agree with the resident's/non-physician practitioner's findings, Plan of Care, and MDM as documented in the resident's/non-physician practitioner's note, except where noted  All available labs and Radiology studies were reviewed  I was present for key portions of any procedure(s) performed by the resident/non-physician practitioner and I was immediately available to provide assistance  At this point I agree with the current assessment done in the Emergency Department  I have conducted an independent evaluation of this patient a history and physical is as follows:    ED Course   68yo male sent by PCPs office for rapid HR  Went there for a [employer] exam and was noted to have a rapid HR  He did not notice it or have sx, but does have a history of SVT  Sent here for evaluation  EKG showed rapid narrow complex, unclear of Stach vs AFIB vs SVT  Pt HR went to 80s on it's own  PE: NAD, HR tachy and regular, Lungs no resp distress, awake/alert, neuro intact  Will check labs for TSH/trop/BNP and electrolytes  D/w cardiology  Pt is already anticoagulated         Critical Care Time  Procedures

## 2020-09-08 NOTE — H&P
H&P- Desmond Zarate  1945, 76 y o  male MRN: [id number]    Unit/Bed#: FT 03 Encounter: [id number]    Primary Care Provider: Jose Mcclure MD   Date and time admitted to hospital: 9/8/2020 11:04 AM        * SVT (supraventricular tachycardia) ([facility] )  Assessment & Plan  · Likely in setting of alcohol use over holiday weekend  · Has previous episodes exacerbated by alcohol  · On eliquis for PE, continue with telemetry  · Per cardiology notes as early as 2018, patient is not on calcium channel blocker or beta blocker due to risk of bradycardia   · Last seen by cardiology on 9/2, will defer to primary team in AM for cardio consult pending telemetry overnight    BPH with obstruction/lower urinary tract symptoms  Assessment & Plan  · Continue home dose flomax and finasteride    Cardiomyopathy, nonischemic ([facility] )  Assessment & Plan  · Last stress test in 2019, negative  · Last TTE in 2019 showing EF 40-45%  · Last seen by cardiology as outpatient on 9/2, continue current management   · Encourage low salt diet     ARLENE (obstructive sleep apnea)  Assessment & Plan  · Cpap nightly     Obesity  Assessment & Plan  ·  on lifestyle modifications    Hyperlipidemia  Assessment & Plan  · Has history of myalgias, even tho prescribed crestor previously  · Hold crestor for now     Bilateral pulmonary embolism (HCC)  Assessment & Plan  · Continue home dose eliquis   · History of recurrent Pulmonary embolism      VTE Prophylaxis: Apixaban (Eliquis)  / sequential compression device   Code Status:  Full code  POLST: POLST form is not discussed and not completed at this time  Discussion with family:  Wife at bedside    Anticipated Length of Stay:  Patient will be admitted on an Observation basis with an anticipated length of stay of 2 midnights  Justification for Hospital Stay:  Tachycardia    Total Time for Visit, including Counseling / Coordination of Care: 30 minutes    Greater than 50% of this total time spent on direct patient counseling and coordination of care  Chief Complaint:   Chest palpitations    History of Present Illness:    James Cabezas  is a 76 y o  male who presents with acute onset chest palpitations that were 1st noted overnight in setting of recent episode of alcohol binge on Labor Day  Patient has known history of exacerbation of tachycardia in setting of alcohol use  Is not on any rate control medications secondary to history of bradycardia  Denies any chest pain, shortness of breath, headache, blurred vision, nausea, vomiting, abdominal pain, lower extremity edema, PND, orthopnea associated with the event  Denies any exertional dyspnea  Patient is a  for the [employer]  Head is physical examination today for his work and was noted during that examination to have tachycardia at which point he was brought to the ED for further evaluation  Patient is now admitted to Medicine for further management    Review of Systems:    See above    Past Medical and Surgical History:     Past Medical History:   Diagnosis Date    Hyperlipidemia     Post-nasal drip     Pulmonary embolism (HCC)        Past Surgical History:   Procedure Laterality Date    ARTHROSCOPY KNEE Left     30 years ago       Meds/Allergies:    Prior to Admission medications    Medication Sig Start Date End Date Taking?  Authorizing Provider   apixaban (ELIQUIS) 5 mg Take 1 tablet (5 mg total) by mouth 2 (two) times a day 12/10/18   Samira Leavitt MD   Ascorbic Acid (VITAMIN C) 100 MG tablet Take 100 mg by mouth daily    Historical Provider, MD   finasteride (PROSCAR) 5 mg tablet Take 1 tablet (5 mg total) by mouth daily 6/12/20 9/10/20  Aicha King MD   fluticasone [facility]) 50 mcg/act nasal spray 1 spray into each nostril daily 11/25/18   Leopoldo Gutierrez PA-C   HYDROcodone-acetaminophen (NORCO) 5-325 mg per tablet Take 1 tablet by mouth every 6 (six) hours as needed for painMax Daily Amount: 4 tablets  Patient not taking: Reported on 9/1/2020 6/21/20   Aria Oconnor MD   rosuvastatin (CRESTOR) 20 MG tablet Take 20 mg by mouth daily    Historical Provider, MD   tamsulosin (FLOMAX) 0 4 mg Take 0 4 mg by mouth daily with dinner    Historical Provider, MD     I have reviewed home medications with patient personally  Allergies: Allergies   Allergen Reactions    Sildenafil     Simvastatin Myalgia       Social History:     Marital Status: /Civil Union   Occupation: na  Patient Pre-hospital Living Situation: na  Patient Pre-hospital Level of Mobility: na  Patient Pre-hospital Diet Restrictions: na  Substance Use History:   Social History     Substance and Sexual Activity   Alcohol Use Yes    Alcohol/week: 0 0 standard drinks    Comment: socially     Social History     Tobacco Use   Smoking Status Former Smoker   Smokeless Tobacco Never Used     Social History     Substance and Sexual Activity   Drug Use No       Family History:    non-contributory    Physical Exam:     Vitals:   Blood Pressure: 145/78(Simultaneous filing  User may not have seen previous data ) (09/08/20 1730)  Pulse: 59(Simultaneous filing  User may not have seen previous data ) (09/08/20 1730)  Temperature: 98 6 °F (37 °C) (09/08/20 1101)  Temp Source: Oral (09/08/20 1101)  Respirations: 20(Simultaneous filing  User may not have seen previous data ) (09/08/20 1730)  SpO2: 96 %(Simultaneous filing  User may not have seen previous data ) (09/08/20 1730)    Physical Exam  Constitutional:       Appearance: Normal appearance  HENT:      Head: Normocephalic and atraumatic  Cardiovascular:      Rate and Rhythm: Bradycardia present  Pulmonary:      Effort: Pulmonary effort is normal  No respiratory distress  Abdominal:      General: Abdomen is flat  Bowel sounds are normal  There is distension  Skin:     General: Skin is warm and dry  Neurological:      General: No focal deficit present        Mental Status: He is alert and oriented to person, place, and time  Psychiatric:         Mood and Affect: Mood normal          Behavior: Behavior normal            Additional Data:     Lab Results: I have personally reviewed pertinent reports  Results from last 7 days   Lab Units 09/08/20  1207   WBC Thousand/uL 4 07*   HEMOGLOBIN g/dL 12 5   HEMATOCRIT % 36 8   PLATELETS Thousands/uL 135*   NEUTROS PCT % 64   LYMPHS PCT % 23   MONOS PCT % 11   EOS PCT % 1     Results from last 7 days   Lab Units 09/08/20  1207   SODIUM mmol/L 144   POTASSIUM mmol/L 4 5   CHLORIDE mmol/L 108   CO2 mmol/L 26   BUN mg/dL 14   CREATININE mg/dL 1 11   ANION GAP mmol/L 10   CALCIUM mg/dL 9 1   GLUCOSE RANDOM mg/dL 99                       Imaging: I have personally reviewed pertinent reports  XR chest 2 views   Final Result by Darcy Javier MD (09/08 1153)      No acute cardiopulmonary disease  Workstation performed: [id number]             EKG, Pathology, and Other Studies Reviewed on Admission:   · EKG:  Pending scan into the epic chart    Allscripts / Epic Records Reviewed: Yes     ** Please Note: This note has been constructed using a voice recognition system   **

## 2020-09-08 NOTE — LETTER
[address] MED SURG UNIT  [address] [facility]  [place] [address]  Dept: [phone]    September 9, 2020     Patient: Sammy Kapoor  YOB: 1945   Date of Visit: 9/8/2020       To Whom it May Concern:    Amauri Ortiz is under my professional care  He was seen in the hospital from 9/8/2020   to 09/09/20  He may return to work on 09/10/2020 without limitations  If you have any questions or concerns, please don't hesitate to call           Sincerely,          Parish Gomez MD

## 2020-09-08 NOTE — ED NOTES
Walking pulse ox showed pt became tachycardic into 150s with minimal exertion      Pia Bernal RN  09/08/20 9835

## 2020-09-09 VITALS
RESPIRATION RATE: 18 BRPM | HEART RATE: 74 BPM | BODY MASS INDEX: 32.2 KG/M2 | OXYGEN SATURATION: 98 % | TEMPERATURE: 98.5 F | WEIGHT: 259 LBS | SYSTOLIC BLOOD PRESSURE: 132 MMHG | DIASTOLIC BLOOD PRESSURE: 69 MMHG | HEIGHT: 75 IN

## 2020-09-09 LAB
ALBUMIN SERPL BCP-MCNC: 3.1 G/DL (ref 3.5–5)
ALP SERPL-CCNC: 58 U/L (ref 46–116)
ALT SERPL W P-5'-P-CCNC: 25 U/L (ref 12–78)
ANION GAP SERPL CALCULATED.3IONS-SCNC: 7 MMOL/L (ref 4–13)
AST SERPL W P-5'-P-CCNC: 18 U/L (ref 5–45)
BASOPHILS # BLD AUTO: 0.01 THOUSANDS/ΜL (ref 0–0.1)
BASOPHILS NFR BLD AUTO: 0 % (ref 0–1)
BILIRUB SERPL-MCNC: 0.5 MG/DL (ref 0.2–1)
BUN SERPL-MCNC: 14 MG/DL (ref 5–25)
CALCIUM SERPL-MCNC: 8.4 MG/DL (ref 8.3–10.1)
CHLORIDE SERPL-SCNC: 111 MMOL/L (ref 100–108)
CO2 SERPL-SCNC: 28 MMOL/L (ref 21–32)
CREAT SERPL-MCNC: 1.25 MG/DL (ref 0.6–1.3)
EOSINOPHIL # BLD AUTO: 0.07 THOUSAND/ΜL (ref 0–0.61)
EOSINOPHIL NFR BLD AUTO: 2 % (ref 0–6)
ERYTHROCYTE [DISTWIDTH] IN BLOOD BY AUTOMATED COUNT: 15.6 % (ref 11.6–15.1)
GFR SERPL CREATININE-BSD FRML MDRD: 65 ML/MIN/1.73SQ M
GLUCOSE P FAST SERPL-MCNC: 101 MG/DL (ref 65–99)
GLUCOSE SERPL-MCNC: 101 MG/DL (ref 65–140)
HCT VFR BLD AUTO: 34.6 % (ref 36.5–49.3)
HGB BLD-MCNC: 11.5 G/DL (ref 12–17)
IMM GRANULOCYTES # BLD AUTO: 0.01 THOUSAND/UL (ref 0–0.2)
IMM GRANULOCYTES NFR BLD AUTO: 0 % (ref 0–2)
LYMPHOCYTES # BLD AUTO: 0.92 THOUSANDS/ΜL (ref 0.6–4.47)
LYMPHOCYTES NFR BLD AUTO: 26 % (ref 14–44)
MAGNESIUM SERPL-MCNC: 1.9 MG/DL (ref 1.6–2.6)
MCH RBC QN AUTO: 31.9 PG (ref 26.8–34.3)
MCHC RBC AUTO-ENTMCNC: 33.2 G/DL (ref 31.4–37.4)
MCV RBC AUTO: 96 FL (ref 82–98)
MONOCYTES # BLD AUTO: 0.4 THOUSAND/ΜL (ref 0.17–1.22)
MONOCYTES NFR BLD AUTO: 11 % (ref 4–12)
NEUTROPHILS # BLD AUTO: 2.09 THOUSANDS/ΜL (ref 1.85–7.62)
NEUTS SEG NFR BLD AUTO: 61 % (ref 43–75)
NRBC BLD AUTO-RTO: 0 /100 WBCS
PLATELET # BLD AUTO: 126 THOUSANDS/UL (ref 149–390)
PMV BLD AUTO: 11.7 FL (ref 8.9–12.7)
POTASSIUM SERPL-SCNC: 3.6 MMOL/L (ref 3.5–5.3)
PROT SERPL-MCNC: 6.1 G/DL (ref 6.4–8.2)
RBC # BLD AUTO: 3.61 MILLION/UL (ref 3.88–5.62)
SODIUM SERPL-SCNC: 146 MMOL/L (ref 136–145)
WBC # BLD AUTO: 3.5 THOUSAND/UL (ref 4.31–10.16)

## 2020-09-09 PROCEDURE — 99217 PR OBSERVATION CARE DISCHARGE MANAGEMENT: CPT | Performed by: FAMILY MEDICINE

## 2020-09-09 PROCEDURE — 83735 ASSAY OF MAGNESIUM: CPT | Performed by: STUDENT IN AN ORGANIZED HEALTH CARE EDUCATION/TRAINING PROGRAM

## 2020-09-09 PROCEDURE — 80053 COMPREHEN METABOLIC PANEL: CPT | Performed by: STUDENT IN AN ORGANIZED HEALTH CARE EDUCATION/TRAINING PROGRAM

## 2020-09-09 PROCEDURE — 85025 COMPLETE CBC W/AUTO DIFF WBC: CPT | Performed by: STUDENT IN AN ORGANIZED HEALTH CARE EDUCATION/TRAINING PROGRAM

## 2020-09-09 RX ADMIN — APIXABAN 5 MG: 5 TABLET, FILM COATED ORAL at 10:04

## 2020-09-09 NOTE — DISCHARGE SUMMARY
Discharge- Dallas Mcgill  1945, 76 y o  male MRN: [id number]    Unit/Bed#: -01 Encounter: [id number]    Primary Care Provider: Caroline Mayorga MD   Date and time admitted to hospital: 9/8/2020 11:04 AM        * SVT (supraventricular tachycardia) ([facility] )  Assessment & Plan  · Likely in setting of alcohol use over holiday weekend  · Has previous episodes exacerbated by alcohol  · On eliquis for PE, continue with telemetry  · Per cardiology notes as early as 2018, patient is not on calcium channel blocker or beta blocker due to risk of bradycardia   · Last seen by cardiology on 9/2, will defer to primary team in AM for cardio consult pending telemetry overnight    BPH with obstruction/lower urinary tract symptoms  Assessment & Plan  · Continue home dose flomax and finasteride    Cardiomyopathy, nonischemic [facility] )  Assessment & Plan  · Last stress test in 2019, negative  · Last TTE in 2019 showing EF 40-45%  · Last seen by cardiology as outpatient on 9/2, continue current management   · Encourage low salt diet     ARLENE (obstructive sleep apnea)  Assessment & Plan  · Cpap nightly     Obesity  Assessment & Plan  ·  on lifestyle modifications    Hyperlipidemia  Assessment & Plan  · Has history of myalgias, even tho prescribed crestor previously  · Hold crestor for now     Bilateral pulmonary embolism ([facility] )  Assessment & Plan  · Continue home dose eliquis   · History of recurrent Pulmonary embolism                Resolved Problems  Date Reviewed: 9/1/2020    None          Admission Date:   Admission Orders (From admission, onward)     Ordered        09/08/20 1617  Place in Observation  Once                     Admitting Diagnosis: Sinus tachycardia [R00 0]  Rapid heart beat [R00 0]  Rapid heart rate [R00 0]    HPI: Dallas Mcgill  is a 76 y o  male who presents with acute onset chest palpitations that were 1st noted overnight in setting of recent episode of alcohol binge on Labor Day    Patient has known history of exacerbation of tachycardia in setting of alcohol use  Is not on any rate control medications secondary to history of bradycardia  Denies any chest pain, shortness of breath, headache, blurred vision, nausea, vomiting, abdominal pain, lower extremity edema, PND, orthopnea associated with the event  Denies any exertional dyspnea  Patient is a  for the school [employer]  Head is physical examination today for his work and was noted during that examination to have tachycardia at which point he was brought to the ED for further evaluation  Patient is now admitted to Medicine for further management    Procedures Performed:   Orders Placed This Encounter   Procedures    ED ECG Documentation Only    ED ECG Documentation Only    ED ECG Documentation Only    ED ECG Documentation Only       Summary of Hospital Course:  [facility]  Patient was in regular sinus rhythm with rate control during his hospitalization  No beta-blockers or other rate control medications were given to the patient  There is a history that patient was bradycardic with the use of beta-blockers in the past that is why he is not on any beta-blockers  I called Cardiology Service on-call and they will see him as an outpatient soon  All information given  Patient was recently seen by cardiologist and was deemed stable  There is mild increase in creatinine and sodium for which patient has been advised to drink enough water  Script for blood work at been given to the patient and should follow-up with PCP within a week  Stable for discharge and cleared for work      Significant Findings, Care, Treatment and Services Provided:   XR chest 2 views [[id number]]  Collected: 09/08/20 1152    Order Status: Completed  Updated: 09/08/20 1154    Narrative:      CHEST     INDICATION:   shortness of breath     COMPARISON:  Chest radiograph from 8/24/2017 and chest CT from 2/3/2020       EXAM PERFORMED/VIEWS: Susana Hernandez CHEST PA & LATERAL WITH DUAL ENERGY SUBTRACTION  FINDINGS:     Cardiomediastinal silhouette is normal      Lungs are clear   No effusion or pneumothorax  Osseous structures are within normal limits for age  Impression:        No acute cardiopulmonary disease  Complications:  None    Condition at Discharge: stable     General- Awake, alert and oriented x 3, looks comfortable  HEENT- Normocephalic, atraumatic, oral mucosa- moist  Neck- Supple, No carotid bruit, no JVD  CVS- Normal S1/ S2, Regular rate and rhythm, No murmur, No edema  Respiratory system- B/L clear breath sounds, no wheezing  Abdomen- Soft, Non distended, no tenderness, Bowel sound- present 4 quads  Genitourinary- No suprapubic tenderness, No CVA tenderness  Skin- No new bruise or rash  Musculoskeletal- No gross deformity  Psych- No acute psychosis  CNS- CN II- XII grossly intact, No acute focal neurologic deficit noted      Discharge instructions/Information to patient and family:   See after visit summary for information provided to patient and family  Provisions for Follow-Up Care:  See after visit summary for information related to follow-up care and any pertinent home health orders  PCP: Shantell Mike MD    Disposition: Home    Planned Readmission: No    Discharge Statement   I spent 45 minutes discharging the patient  This time was spent on the day of discharge  I had direct contact with the patient on the day of discharge  Additional documentation is required if more than 30 minutes were spent on discharge  Discharge Medications:  See after visit summary for reconciled discharge medications provided to patient and family

## 2020-09-09 NOTE — PLAN OF CARE
Problem: Potential for Falls  Goal: Patient will remain free of falls  Description: INTERVENTIONS:  - Assess patient frequently for physical needs  -  Identify cognitive and physical deficits and behaviors that affect risk of falls    -  [place] fall precautions as indicated by assessment   - Educate patient/family on patient safety including physical limitations  - Instruct patient to call for assistance with activity based on assessment  - Modify environment to reduce risk of injury  - Consider OT/PT consult to assist with strengthening/mobility  Outcome: Progressing

## 2020-09-09 NOTE — DISCHARGE INSTRUCTIONS
Dehydration   WHAT YOU NEED TO KNOW:   Dehydration is a condition that develops when your body does not have enough fluid  You may become dehydrated if you do not drink enough water or lose too much fluid  Fluid loss may also cause loss of electrolytes (minerals), such as sodium  DISCHARGE INSTRUCTIONS:   Seek care immediately if:   · You have a seizure  · You are confused or cannot think clearly  · You are extremely sleepy, or another person cannot wake you  · You become dizzy or faint when you stand  · You are not able to urinate  · You have trouble breathing  · You have a fast or irregular heartbeat  · Your hands or feet are cold, or your face is pale  Contact your healthcare provider if:   · You have trouble drinking liquids because you are vomiting  · Your symptoms get worse  · You have a fever  · You feel very weak or tired  · You have questions or concerns about your condition or care  Follow up with your healthcare provider as directed:  Write down your questions so you remember to ask them during your visits  Prevent or manage dehydration:   · Drink liquids as directed  Liquids that contain water, sugar, and minerals can help your body hold in fluid and help prevent dehydration  Drink liquids throughout the day, not just when you feel thirsty  Men should drink about 3 liters (13 eight-ounce cups) of liquid each day  Women should drink about 2 liters (9 eight-ounce cups) of liquid each day  Drink even more liquid if you will be outdoors, in the sun for a long time, or exercising  · Stay cool  Limit the time you spend outdoors during the hottest part of the day  Dress in lightweight clothes  · Keep track of how often you urinate  If you urinate less than usual or your urine is darker, drink more liquids  © 2017 [employer] Information is for End User's use only and may not be sold, redistributed or otherwise used for commercial purposes  All illustrations and images included in CareNotes® are the copyrighted property of A D A M , Inc  or Ghulam Nunes  The above information is an  only  It is not intended as medical advice for individual conditions or treatments  Talk to your doctor, nurse or pharmacist before following any medical regimen to see if it is safe and effective for you

## 2020-09-09 NOTE — RESPIRATORY THERAPY NOTE
Spoke with patient regarding use of CPAP machine, pt states he does not use on at home, has never used one and that he had a sleep study but doesn't think he needed one  Sent tiger text to on call slim for DC

## 2020-09-09 NOTE — UTILIZATION REVIEW
Initial Clinical Review    Admission: Date/Time/Statement:   Admission Orders (From admission, onward)     Ordered        09/08/20 1617  Place in Observation  Once                   Orders Placed This Encounter   Procedures    Place in Observation     Standing Status:   Standing     Number of Occurrences:   1     Order Specific Question:   Admitting Physician     Answer:   Laura Gore     Order Specific Question:   Level of Care     Answer:   Med Surg [16]     ED Arrival Information     Expected Arrival Acuity Means of Arrival Escorted By Service Admission Type    - 9/8/2020 10:55 Emergent Walk-In Family Member General Medicine Emergency    Arrival Complaint    Rapid heartbeat        Chief Complaint   Patient presents with    Rapid Heart Rate     pt sent via pcp for rapid heart rate, pt reports intermittent SOB, denies chest pain  Assessment/Plan: 76year old male to the ED from home with complaints of rapid heart rate with intermittent SOB  H/O bilateral pulmonary embolism on Eliquis, SVT, nonischemic cardiomyopathy, HLD, hea was having a physical for workwhen he was found to have heart rate 130-140s  On arrival to the ED, he is tachycardic, lungs are clear, he has mild bilateral lower extremity edema  Tropnon negative  Heart rate improved with rest, on ambulation, heart rate up to 140s-150s  Admits to having multiple alcoholic drinks the day prior which has triggered previous episodes of SVT in the past  Continue Eliquis for PE  Monitor tele     ED Triage Vitals   Temperature Pulse Respirations Blood Pressure SpO2   09/08/20 1101 09/08/20 1101 09/08/20 1101 09/08/20 1101 09/08/20 1101   98 6 °F (37 °C) (!) 151 21 132/90 98 %      Temp Source Heart Rate Source Patient Position - Orthostatic VS BP Location FiO2 (%)   09/08/20 1101 09/08/20 1101 09/08/20 1101 09/08/20 1101 --   Oral Monitor Sitting Left arm       Pain Score       09/08/20 2013       No Pain          Wt Readings from Last 1 Encounters:   09/08/20 117 kg (259 lb)     Additional Vital Signs:   Date/Time   Temp  Pulse   Resp   BP   MAP (mmHg)   SpO2   O2 Device  Patient Position - Orthostatic VS    09/09/20 03:08:48   98 2 °F (36 8 °C)     17   128/80   96            09/08/20 22:22:59   98 5 °F (36 9 °C)  74   17   113/65   81   98 %   None (Room air)  Lying    09/08/20 2100                    None (Room air)      09/08/20 20:21:25   98 6 °F (37 °C)  74   18   136/71   93   98 %   None (Room air)  Lying    09/08/20 1900     74   22   140/75   102   98 %   None (Room air)      09/08/20 1730     59    20    145/78    102    96 %    None (Room air)   Lying    09/08/20 1600     66   21   149/75   106   98 %   None (Room air)      09/08/20 1515     135Abnormal     22   139/94   107   98 %   None (Room air)  Lying    09/08/20 1430     70   22   135/91   109   99 %   None (Room air)      09/08/20 1330     69   25Abnormal     135/74   99   98 %   None (Room air)      09/08/20 1300     137Abnormal     22   126/91   105   98 %   None (Room air)      09/08/20 1230     78   21   119/83   97   96 %   None (Room air)      09/08/20 1212                    None (Room air)      09/08/20 1207     81   22   138/99              Pertinent Labs/Diagnostic Test Results:   9/8 CXR: No acute cardiopulmonary disease     EKG 9/8/20 @ 1100  Interpretation:     Interpretation: abnormal    Rate:     ECG rate:  148    ECG rate assessment: tachycardic    Rhythm:     Rhythm: sinus tachycardia    Ectopy:     Ectopy: PVCs      PVCs:  Infrequent  QRS:     QRS axis:  Left  Conduction:     Conduction: normal    ST segments:     ST segments:  Non-specific  EKG 9/8/20 @ 1257  Indications / Diagnosis:  Tachycardia  ECG reviewed by me, the ED Provider: yes    Patient location:  ED  Previous ECG:     Previous ECG:  Compared to current    Comparison ECG info:  9/8/20 at 11:00    Similarity:  Changes noted    Comparison to cardiac monitor: Yes Interpretation:     Interpretation: abnormal    Rate:     ECG rate:  75  Rhythm:     Rhythm: sinus rhythm      Rhythm comment:  Sinus arrhythmia  QRS:     QRS axis:  Left    QRS intervals:  Normal  ST segments:     ST segments:  Normal  T waves:     T waves: non-specific        Results from last 7 days   Lab Units 09/09/20  0452 09/08/20  1207   WBC Thousand/uL 3 50* 4 07*   HEMOGLOBIN g/dL 11 5* 12 5   HEMATOCRIT % 34 6* 36 8   PLATELETS Thousands/uL 126* 135*   NEUTROS ABS Thousands/µL 2 09 2 61         Results from last 7 days   Lab Units 09/09/20  0452 09/08/20  1207   SODIUM mmol/L 146* 144   POTASSIUM mmol/L 3 6 4 5   CHLORIDE mmol/L 111* 108   CO2 mmol/L 28 26   ANION GAP mmol/L 7 10   BUN mg/dL 14 14   CREATININE mg/dL 1 25 1 11   EGFR ml/min/1 73sq m 65 75   CALCIUM mg/dL 8 4 9 1   MAGNESIUM mg/dL 1 9  --      Results from last 7 days   Lab Units 09/09/20  0452   AST U/L 18   ALT U/L 25   ALK PHOS U/L 58   TOTAL PROTEIN g/dL 6 1*   ALBUMIN g/dL 3 1*   TOTAL BILIRUBIN mg/dL 0 50         Results from last 7 days   Lab Units 09/09/20  0452 09/08/20  1207   GLUCOSE RANDOM mg/dL 101 99     Results from last 7 days   Lab Units 09/08/20  1207   TROPONIN I ng/mL <0 02             Results from last 7 days   Lab Units 09/08/20  1207   TSH 3RD GENERATON uIU/mL 1 398         Results from last 7 days   Lab Units 09/08/20  1207   NT-PRO BNP pg/mL 80     ED Treatment:   Medication Administration from 09/08/2020 1055 to 09/08/2020 2004       Date/Time Order Dose Route Action     09/08/2020 1302 sodium chloride 0 9 % bolus 1,000 mL 1,000 mL Intravenous New Bag     09/08/2020 1735 apixaban (ELIQUIS) tablet 5 mg 5 mg Oral Given     09/08/2020 1735 tamsulosin (FLOMAX) capsule 0 4 mg 0 4 mg Oral Given        Past Medical History:   Diagnosis Date    Hyperlipidemia     Post-nasal drip     Pulmonary embolism (HCC)        Admitting Diagnosis: Sinus tachycardia [R00 0]  Rapid heart beat [R00 0]  Rapid heart rate [R00 0]  Age/Sex: 76 y o  male  Admission Orders:  Tele  SCDs  CBC, MAg, CMP,  Scheduled Medications:  apixaban, 5 mg, Oral, BID  finasteride, 5 mg, Oral, Daily  tamsulosin, 0 4 mg, Oral, Daily With Dinner      Continuous IV Infusions:     PRN Meds:       None    Network Utilization Review Department  [email or web address]  ATTENTION: Please call with any questions or concerns to [phone] and carefully listen to the prompts so that you are directed to the right person  All voicemails are confidential   Tucker Reyes all requests for admission clinical reviews, approved or denied determinations and any other requests to dedicated fax number below belonging to the campus where the patient is receiving treatment   List of dedicated fax numbers for the Facilities:  [address] DENIALS (Administrative/Medical Necessity) [phone]   [address] (Maternity/NICU/Pediatrics) [phone]   [facility] [phone]   [facility] [phone]   [facility] [phone]   [facility] [phone]   [address] [phone]   [facility]  [phone]   [address] [facility] [address] [facility]  [facility] [address] [facility] [phone]

## 2020-09-09 NOTE — DISCHARGE INSTR - AVS FIRST PAGE
-YOUR HEART RHYTHM HAS BEEN STABLE ON THE MONITOR HERE IN [address] PCP WITHIN A WEEK  -CALL YOUR CARDIOLOGIST TO SCHEDULE AN APPOINTMENT FOR A FOLLOW-UP FOR THIS HOSPITALIZATION  -REFRAIN FROM DRINKING ANY ALCOHOL  -IF YOUR SYMPTOMS RECUR COME BACK TO THE ER

## 2020-09-09 NOTE — PLAN OF CARE
Problem: Potential for Falls  Goal: Patient will remain free of falls  Description: INTERVENTIONS:  - Assess patient frequently for physical needs  -  Identify cognitive and physical deficits and behaviors that affect risk of falls    -  [place] fall precautions as indicated by assessment   - Educate patient/family on patient safety including physical limitations  - Instruct patient to call for assistance with activity based on assessment  - Modify environment to reduce risk of injury  - Consider OT/PT consult to assist with strengthening/mobility  9/9/2020 1559 by Shavon Reynaga RN  Outcome: Adequate for Discharge  9/9/2020 1559 by Shavon Reynaga RN  Outcome: Adequate for Discharge     Problem: PAIN - ADULT  Goal: Verbalizes/displays adequate comfort level or baseline comfort level  Description: Interventions:  - Encourage patient to monitor pain and request assistance  - Assess pain using appropriate pain scale  - Administer analgesics based on type and severity of pain and evaluate response  - Implement non-pharmacological measures as appropriate and evaluate response  - Consider cultural and social influences on pain and pain management  - Notify physician/advanced practitioner if interventions unsuccessful or patient reports new pain  9/9/2020 1559 by Shavon Reynaga RN  Outcome: Adequate for Discharge  9/9/2020 1559 by Shavon Reynaga RN  Outcome: Adequate for Discharge     Problem: INFECTION - ADULT  Goal: Absence or prevention of progression during hospitalization  Description: INTERVENTIONS:  - Assess and monitor for signs and symptoms of infection  - Monitor lab/diagnostic results  - Monitor all insertion sites, i e  indwelling lines, tubes, and drains  - Monitor endotracheal if appropriate and nasal secretions for changes in amount and color  - [place] appropriate cooling/warming therapies per order  - Administer medications as ordered  - Instruct and encourage patient and family to use good hand hygiene technique  - Identify and instruct in appropriate isolation precautions for identified infection/condition  9/9/2020 1559 by Liza Silva RN  Outcome: Adequate for Discharge  9/9/2020 1559 by Liza Silva RN  Outcome: Adequate for Discharge  Goal: Absence of fever/infection during neutropenic period  Description: INTERVENTIONS:  - Monitor WBC    9/9/2020 1559 by Liza Silva RN  Outcome: Adequate for Discharge  9/9/2020 1559 by Liza Silva RN  Outcome: Adequate for Discharge     Problem: SAFETY ADULT  Goal: Patient will remain free of falls  Description: INTERVENTIONS:  - Assess patient frequently for physical needs  -  Identify cognitive and physical deficits and behaviors that affect risk of falls    -  [place] fall precautions as indicated by assessment   - Educate patient/family on patient safety including physical limitations  - Instruct patient to call for assistance with activity based on assessment  - Modify environment to reduce risk of injury  - Consider OT/PT consult to assist with strengthening/mobility  9/9/2020 1559 by Liza Silva RN  Outcome: Adequate for Discharge  9/9/2020 1559 by Liza Silva RN  Outcome: Adequate for Discharge  Goal: Maintain or return to baseline ADL function  Description: INTERVENTIONS:  -  Assess patient's ability to carry out ADLs; assess patient's baseline for ADL function and identify physical deficits which impact ability to perform ADLs (bathing, care of mouth/teeth, toileting, grooming, dressing, etc )  - Assess/evaluate cause of self-care deficits   - Assess range of motion  - Assess patient's mobility; develop plan if impaired  - Assess patient's need for assistive devices and provide as appropriate  - Encourage maximum independence but intervene and supervise when necessary  - Involve family in performance of ADLs  - Assess for home care needs following discharge   - Consider OT consult to assist with ADL evaluation and planning for discharge  - Provide patient education as appropriate  9/9/2020 1559 by Aracelis Kwon RN  Outcome: Adequate for Discharge  9/9/2020 1559 by Aracelis Kwon RN  Outcome: Adequate for Discharge  Goal: Maintain or return mobility status to optimal level  Description: INTERVENTIONS:  - Assess patient's baseline mobility status (ambulation, transfers, stairs, etc )    - Identify cognitive and physical deficits and behaviors that affect mobility  - Identify mobility aids required to assist with transfers and/or ambulation (gait belt, sit-to-stand, lift, walker, cane, etc )  - [place] fall precautions as indicated by assessment  - Record patient progress and toleration of activity level on Mobility SBAR; progress patient to next Phase/Stage  - Instruct patient to call for assistance with activity based on assessment  - Consider rehabilitation consult to assist with strengthening/weightbearing, etc   9/9/2020 1559 by Aracelis Kwon RN  Outcome: Adequate for Discharge  9/9/2020 1559 by Aracelis Kwon RN  Outcome: Adequate for Discharge     Problem: DISCHARGE PLANNING  Goal: Discharge to home or other facility with appropriate resources  Description: INTERVENTIONS:  - Identify barriers to discharge w/patient and caregiver  - Arrange for needed discharge resources and transportation as appropriate  - Identify discharge learning needs (meds, wound care, etc )  - Arrange for interpretive services to assist at discharge as needed  - Refer to Case Management Department for coordinating discharge planning if the patient needs post-hospital services based on physician/advanced practitioner order or complex needs related to functional status, cognitive ability, or social support system  9/9/2020 1559 by Aracelis Kwon RN  Outcome: Adequate for Discharge  9/9/2020 1559 by Aracelis Kwon RN  Outcome: Adequate for Discharge     Problem: Knowledge Deficit  Goal: Patient/family/caregiver demonstrates understanding of disease process, treatment plan, medications, and discharge instructions  Description: Complete learning assessment and assess knowledge base    Interventions:  - Provide teaching at level of understanding  - Provide teaching via preferred learning methods  9/9/2020 1559 by Yenny Arredondo RN  Outcome: Adequate for Discharge  9/9/2020 1559 by Yenny Arredondo RN  Outcome: Adequate for Discharge

## 2020-09-10 ENCOUNTER — TELEPHONE (OUTPATIENT)
Dept: CARDIOLOGY CLINIC | Facility: CLINIC | Age: 75
End: 2020-09-10

## 2020-09-10 LAB
ATRIAL RATE: 142 BPM
ATRIAL RATE: 72 BPM
ATRIAL RATE: 75 BPM
P AXIS: 218 DEGREES
P AXIS: 47 DEGREES
P AXIS: 52 DEGREES
PR INTERVAL: 140 MS
PR INTERVAL: 162 MS
PR INTERVAL: 166 MS
QRS AXIS: -22 DEGREES
QRS AXIS: -23 DEGREES
QRS AXIS: -27 DEGREES
QRSD INTERVAL: 88 MS
QRSD INTERVAL: 90 MS
QRSD INTERVAL: 92 MS
QT INTERVAL: 264 MS
QT INTERVAL: 374 MS
QT INTERVAL: 388 MS
QTC INTERVAL: 406 MS
QTC INTERVAL: 417 MS
QTC INTERVAL: 424 MS
T WAVE AXIS: -39 DEGREES
T WAVE AXIS: -53 DEGREES
T WAVE AXIS: 63 DEGREES
VENTRICULAR RATE: 142 BPM
VENTRICULAR RATE: 72 BPM
VENTRICULAR RATE: 75 BPM

## 2020-09-10 PROCEDURE — 93010 ELECTROCARDIOGRAM REPORT: CPT | Performed by: INTERNAL MEDICINE

## 2020-09-10 NOTE — TELEPHONE ENCOUNTER
Pt stopped in the office and dropped of a Cardiac form that he needs to be completed by Dr  [facility]  Please call pt when he can pick it up      Reminder- pt will need to pay the fee for the paperwork when he picks it back up

## 2020-09-10 NOTE — TELEPHONE ENCOUNTER
Spoke with patient advised that form is ready for pick-up  Also that there would be a form fee  Also advised that we could schedule his hospital follow-up at that time      [facility]

## 2020-09-11 ENCOUNTER — TELEPHONE (OUTPATIENT)
Dept: CARDIOLOGY CLINIC | Facility: CLINIC | Age: 75
End: 2020-09-11

## 2020-09-11 NOTE — TELEPHONE ENCOUNTER
Spoke with patient  He was on his way to the office to  his forms  When I asked him about returning a missed call, pt advised he called the wrong number

## 2020-09-11 NOTE — TELEPHONE ENCOUNTER
Melissa Mello from [facility] called stating pt called their office, returning missed call

## 2020-09-11 NOTE — TELEPHONE ENCOUNTER
----- Message from Naomy Henriquez sent at 9/11/2020  9:04 AM EDT -----  Regarding: RE: Appointment  Good Morning,    Left message for patient to contact office to schedule EP Consult for SVT  Thank you,  Jason Mckinnon    ----- Message -----  From: Lennox Curls  Sent: 9/10/2020   4:16 PM EDT  To: Kristin Vaughan  Subject: FW: Appointment                                  Another appointment  Thanks  For atrial tachycardia/PAT  ----- Message -----  From: Ilan Yun MD  Sent: 9/10/2020   4:12 PM EDT  To: Cardiology Ep [facility]  Subject: Appointment                                      This patient has history of recurrent pulmonary embolism on anticoagulation  Patient also has history of mild cardiomyopathy likely nonischemic  Patient has history of SVT  Patient has not been on any medication because of tendency for bradycardia  Patient was recently seen in the emergency room at [facility] [place]  EKG appears to be consistent with ectopic atrial tachycardia/PAT  If this patient can be seen in the next few weeks    Thank you

## 2020-09-30 ENCOUNTER — TELEPHONE (OUTPATIENT)
Dept: CARDIOLOGY CLINIC | Facility: CLINIC | Age: 75
End: 2020-09-30

## 2020-09-30 ENCOUNTER — CONSULT (OUTPATIENT)
Dept: CARDIOLOGY CLINIC | Facility: CLINIC | Age: 75
End: 2020-09-30
Payer: COMMERCIAL

## 2020-09-30 VITALS
HEART RATE: 68 BPM | WEIGHT: 266.8 LBS | HEIGHT: 75 IN | RESPIRATION RATE: 16 BRPM | DIASTOLIC BLOOD PRESSURE: 88 MMHG | SYSTOLIC BLOOD PRESSURE: 146 MMHG | BODY MASS INDEX: 33.17 KG/M2 | TEMPERATURE: 98.7 F

## 2020-09-30 DIAGNOSIS — E78.2 MIXED HYPERLIPIDEMIA: ICD-10-CM

## 2020-09-30 DIAGNOSIS — I26.99 BILATERAL PULMONARY EMBOLISM (HCC): ICD-10-CM

## 2020-09-30 DIAGNOSIS — Z79.01 CHRONIC ANTICOAGULATION: ICD-10-CM

## 2020-09-30 DIAGNOSIS — E66.09 CLASS 1 OBESITY DUE TO EXCESS CALORIES WITH SERIOUS COMORBIDITY AND BODY MASS INDEX (BMI) OF 33.0 TO 33.9 IN ADULT: ICD-10-CM

## 2020-09-30 DIAGNOSIS — I42.8 CARDIOMYOPATHY, NONISCHEMIC (HCC): ICD-10-CM

## 2020-09-30 DIAGNOSIS — I47.1 SVT (SUPRAVENTRICULAR TACHYCARDIA) (HCC): Primary | ICD-10-CM

## 2020-09-30 DIAGNOSIS — G47.33 OSA (OBSTRUCTIVE SLEEP APNEA): ICD-10-CM

## 2020-09-30 PROCEDURE — 99205 OFFICE O/P NEW HI 60 MIN: CPT | Performed by: INTERNAL MEDICINE

## 2020-09-30 PROCEDURE — 93000 ELECTROCARDIOGRAM COMPLETE: CPT | Performed by: INTERNAL MEDICINE

## 2020-09-30 NOTE — TELEPHONE ENCOUNTER
This pt need auth for Zio Patch? It will be for 2 weeks  Dr Carli Whitlock may order another 2 week monitor after this to gather more info  Thanks!

## 2020-10-01 ENCOUNTER — OFFICE VISIT (OUTPATIENT)
Dept: CARDIOLOGY CLINIC | Facility: CLINIC | Age: 75
End: 2020-10-01
Payer: COMMERCIAL

## 2020-10-01 VITALS
OXYGEN SATURATION: 97 % | DIASTOLIC BLOOD PRESSURE: 72 MMHG | HEART RATE: 61 BPM | SYSTOLIC BLOOD PRESSURE: 124 MMHG | HEIGHT: 75 IN | WEIGHT: 264 LBS | BODY MASS INDEX: 32.83 KG/M2

## 2020-10-01 DIAGNOSIS — I42.9 CARDIOMYOPATHY, UNSPECIFIED TYPE (HCC): ICD-10-CM

## 2020-10-01 DIAGNOSIS — I47.1 SVT (SUPRAVENTRICULAR TACHYCARDIA) (HCC): Primary | ICD-10-CM

## 2020-10-01 DIAGNOSIS — Z79.01 CHRONIC ANTICOAGULATION: ICD-10-CM

## 2020-10-01 PROCEDURE — 99214 OFFICE O/P EST MOD 30 MIN: CPT | Performed by: INTERNAL MEDICINE

## 2020-10-05 ENCOUNTER — TELEPHONE (OUTPATIENT)
Dept: CARDIOLOGY CLINIC | Facility: CLINIC | Age: 75
End: 2020-10-05

## 2020-10-08 DIAGNOSIS — I42.9 CARDIOMYOPATHY, UNSPECIFIED TYPE (HCC): Primary | ICD-10-CM

## 2020-10-13 ENCOUNTER — HOSPITAL ENCOUNTER (OUTPATIENT)
Dept: NON INVASIVE DIAGNOSTICS | Facility: CLINIC | Age: 75
Discharge: HOME | End: 2020-10-13
Payer: COMMERCIAL

## 2020-10-13 ENCOUNTER — TELEPHONE (OUTPATIENT)
Dept: CARDIOLOGY CLINIC | Facility: CLINIC | Age: 75
End: 2020-10-13

## 2020-10-13 DIAGNOSIS — I42.9 CARDIOMYOPATHY, UNSPECIFIED TYPE (HCC): ICD-10-CM

## 2020-10-13 PROCEDURE — 93306 TTE W/DOPPLER COMPLETE: CPT | Performed by: INTERNAL MEDICINE

## 2020-10-13 PROCEDURE — 93306 TTE W/DOPPLER COMPLETE: CPT

## 2020-10-22 DIAGNOSIS — I42.8 CARDIOMYOPATHY, NONISCHEMIC (HCC): ICD-10-CM

## 2020-10-22 DIAGNOSIS — I47.1 SVT (SUPRAVENTRICULAR TACHYCARDIA) (HCC): ICD-10-CM

## 2020-10-26 ENCOUNTER — CLINICAL SUPPORT (OUTPATIENT)
Dept: CARDIOLOGY CLINIC | Facility: CLINIC | Age: 75
End: 2020-10-26
Payer: COMMERCIAL

## 2020-10-26 DIAGNOSIS — I42.8 CARDIOMYOPATHY, NONISCHEMIC (HCC): ICD-10-CM

## 2020-10-26 DIAGNOSIS — I47.1 SVT (SUPRAVENTRICULAR TACHYCARDIA) (HCC): ICD-10-CM

## 2020-10-26 PROCEDURE — 0298T PR EXT ECG > 48HR TO 21 DAY REVIEW AND INTERPRETATN: CPT | Performed by: INTERNAL MEDICINE

## 2020-10-29 ENCOUNTER — TELEPHONE (OUTPATIENT)
Dept: CARDIOLOGY CLINIC | Facility: CLINIC | Age: 75
End: 2020-10-29

## 2020-11-02 ENCOUNTER — LAB (OUTPATIENT)
Dept: LAB | Facility: HOSPITAL | Age: 75
End: 2020-11-02
Payer: COMMERCIAL

## 2020-11-02 DIAGNOSIS — R39.9 UTI SYMPTOMS: ICD-10-CM

## 2020-11-02 LAB
BACTERIA UR QL AUTO: ABNORMAL /HPF
BILIRUB UR QL STRIP: NEGATIVE
CLARITY UR: CLEAR
COLOR UR: YELLOW
GLUCOSE UR STRIP-MCNC: NEGATIVE MG/DL
HGB UR QL STRIP.AUTO: NEGATIVE
KETONES UR STRIP-MCNC: NEGATIVE MG/DL
LEUKOCYTE ESTERASE UR QL STRIP: ABNORMAL
NITRITE UR QL STRIP: NEGATIVE
NON-SQ EPI CELLS URNS QL MICRO: ABNORMAL /HPF
PH UR STRIP.AUTO: 6.5 [PH]
PROT UR STRIP-MCNC: NEGATIVE MG/DL
RBC #/AREA URNS AUTO: ABNORMAL /HPF
SP GR UR STRIP.AUTO: 1.01 (ref 1–1.03)
UROBILINOGEN UR QL STRIP.AUTO: 0.2 E.U./DL
WBC #/AREA URNS AUTO: ABNORMAL /HPF

## 2020-11-02 PROCEDURE — 81001 URINALYSIS AUTO W/SCOPE: CPT

## 2020-11-02 PROCEDURE — 87086 URINE CULTURE/COLONY COUNT: CPT

## 2020-11-03 LAB — BACTERIA UR CULT: NORMAL

## 2020-12-07 ENCOUNTER — TELEPHONE (OUTPATIENT)
Dept: UROLOGY | Facility: CLINIC | Age: 75
End: 2020-12-07

## 2021-01-14 ENCOUNTER — OFFICE VISIT (OUTPATIENT)
Dept: CARDIOLOGY CLINIC | Facility: CLINIC | Age: 76
End: 2021-01-14
Payer: COMMERCIAL

## 2021-01-14 VITALS
OXYGEN SATURATION: 96 % | WEIGHT: 262 LBS | SYSTOLIC BLOOD PRESSURE: 142 MMHG | HEART RATE: 80 BPM | DIASTOLIC BLOOD PRESSURE: 88 MMHG | HEIGHT: 75 IN | BODY MASS INDEX: 32.58 KG/M2

## 2021-01-14 DIAGNOSIS — E66.09 CLASS 1 OBESITY DUE TO EXCESS CALORIES WITH SERIOUS COMORBIDITY AND BODY MASS INDEX (BMI) OF 33.0 TO 33.9 IN ADULT: ICD-10-CM

## 2021-01-14 DIAGNOSIS — I47.1 SVT (SUPRAVENTRICULAR TACHYCARDIA) (HCC): ICD-10-CM

## 2021-01-14 DIAGNOSIS — I26.99 BILATERAL PULMONARY EMBOLISM (HCC): Primary | ICD-10-CM

## 2021-01-14 DIAGNOSIS — Z79.01 CHRONIC ANTICOAGULATION: ICD-10-CM

## 2021-01-14 DIAGNOSIS — I42.8 CARDIOMYOPATHY, NONISCHEMIC (HCC): ICD-10-CM

## 2021-01-14 PROCEDURE — 99214 OFFICE O/P EST MOD 30 MIN: CPT | Performed by: INTERNAL MEDICINE

## 2021-01-14 NOTE — PROGRESS NOTES
[facility] [place]  [id number] [place] [address]  Cardiology Follow Up    Sanjeev Castaneda   1945  [id number]      1  Bilateral pulmonary embolism ([address] )     2  Chronic anticoagulation     3  Class 1 obesity due to excess calories with serious comorbidity and body mass index (BMI) of 33 0 to 33 9 in adult     4  SVT (supraventricular tachycardia) [facility])         Chief Complaint   Patient presents with    Follow-up       Interval History:  Patient presents for follow-up visit  Patient denies any history of chest pain shortness of breath  Patient denies any history of leg edema or orthopnea PND  No history of presyncope syncope  Patient states compliance with the present list of medications  Patient has occasional episodes of palpitations  Patient does have history of supraventricular tachycardia and takes metoprolol as needed  Patient was evaluated by electrophysiology      Patient Active Problem List   Diagnosis    Bilateral pulmonary embolism (HCC)    SOB (shortness of breath)    Hyperlipidemia    Lung nodule    Obesity    History of sickle cell trait    Leg edema, left    Thrombocytopenia (HCC)    Chronic kidney disease (CKD), stage II (mild)    Chronic low back pain    SVT (supraventricular tachycardia) (HCC)    Chronic anticoagulation    ARLENE (obstructive sleep apnea)    Cardiomyopathy, nonischemic (HCC)    Dyslipidemia    BPH with obstruction/lower urinary tract symptoms    Nephrolithiasis    Lumbar disc disease with radiculopathy - Left    Spinal stenosis of lumbar region without neurogenic claudication - Left     Past Medical History:   Diagnosis Date    Hyperlipidemia     Post-nasal drip     Pulmonary embolism (HCC)      Social History     Socioeconomic History    Marital status: /Civil Union     Spouse name: Not on file    Number of children: 9    Years of education: Not on file    Highest education level: Not on file   Occupational History    Occupation: employed   Social Needs    Financial resource strain: Not on file    Food insecurity     Worry: Not on file     Inability: Not on file   [employer] needs     Medical: Not on file     Non-medical: Not on file   Tobacco Use    Smoking status: Former Smoker    Smokeless tobacco: Never Used   Substance and Sexual Activity    Alcohol use: Yes     Alcohol/week: 0 0 standard drinks     Comment: socially    Drug use: No    Sexual activity: Not on file   Lifestyle    Physical activity     Days per week: Not on file     Minutes per session: Not on file    Stress: Not on file   Relationships    Social connections     Talks on phone: Not on file     Gets together: Not on file     Attends Spiritism service: Not on file     Active member of club or organization: Not on file     Attends meetings of clubs or organizations: Not on file     Relationship status: Not on file    Intimate partner violence     Fear of current or ex partner: Not on file     Emotionally abused: Not on file     Physically abused: Not on file     Forced sexual activity: Not on file   Other Topics Concern    Not on file   Social History Narrative    Not on file      No family history on file    Past Surgical History:   Procedure Laterality Date    ARTHROSCOPY KNEE Left     30 years ago       Current Outpatient Medications:     apixaban (ELIQUIS) 5 mg, Take 1 tablet (5 mg total) by mouth 2 (two) times a day, Disp: 60 tablet, Rfl: 11    Ascorbic Acid (VITAMIN C) 100 MG tablet, Take 100 mg by mouth daily, Disp: , Rfl:     fluticasone (FLONASE) 50 mcg/act nasal spray, 1 spray into each nostril daily, Disp: 16 g, Rfl: 0    metoprolol tartrate (LOPRESSOR) 25 mg tablet, PLEASE SEE ATTACHED FOR DETAILED DIRECTIONS, Disp: 30 tablet, Rfl: 0    rosuvastatin (CRESTOR) 20 MG tablet, Take 20 mg by mouth daily, Disp: , Rfl:     tamsulosin (FLOMAX) 0 4 mg, Take 0 4 mg by mouth daily with dinner, Disp: , Rfl:   Allergies Allergen Reactions    Sildenafil     Simvastatin Myalgia       Labs:  No visits with results within 2 Month(s) from this visit  Latest known visit with results is:   Lab on 11/02/2020   Component Date Value    Urine Culture 11/02/2020 No Growth <1000 cfu/mL     Clarity, UA 11/02/2020 Clear     Color, UA 11/02/2020 Yellow     Specific Gravity, UA 11/02/2020 1 015     pH, UA 11/02/2020 6 5     Glucose, UA 11/02/2020 Negative     Ketones, UA 11/02/2020 Negative     Blood, UA 11/02/2020 Negative     Protein, UA 11/02/2020 Negative     Nitrite, UA 11/02/2020 Negative     Bilirubin, UA 11/02/2020 Negative     Urobilinogen, UA 11/02/2020 0 2     Leukocytes, UA 11/02/2020 Trace*    WBC, UA 11/02/2020 0-1*    RBC, UA 11/02/2020 None Seen     Bacteria, UA 11/02/2020 None Seen     Epithelial Cells 11/02/2020 None Seen      Imaging: No results found  Review of Systems:  Review of Systems   REVIEW OF SYSTEMS:  Constitutional:  Denies fever or chills   Eyes:  Denies change in visual acuity   HENT:  Denies nasal congestion or sore throat   Respiratory:  Denies cough or shortness of breath   Cardiovascular:  Denies chest pain or edema   GI:  Denies abdominal pain, nausea, vomiting, bloody stools or diarrhea   :  Denies dysuria, frequency, difficulty in micturition and nocturia  Musculoskeletal:  Denies back pain or joint pain   Neurologic:  Denies headache, focal weakness or sensory changes   Endocrine:  Denies polyuria or polydipsia   Lymphatic:  Denies swollen glands   Psychiatric:  Denies depression or anxiety   Physical Exam:    /88   Pulse 80   Ht 6' 3" (1 905 m)   Wt 119 kg (262 lb)   SpO2 96%   BMI 32 75 kg/m²     Physical Exam   PHYSICAL EXAM:  General:  Patient is not in acute distress   Head: Normocephalic, Atraumatic  HEENT:  Both pupils normal-size atraumatic, normocephalic, nonicteric  Neck:  JVP not raised   Trachea central  No carotid bruit  Respiratory:  normal breath sounds no crackles  no rhonchi  Cardiovascular:  Regular rate and rhythm no S3 no murmurs  GI:  Abdomen soft nontender  No organomegaly  Lymphatic:  No cervical or inguinal lymphadenopathy  Neurologic:  Patient is awake alert, oriented   Grossly nonfocal  Extremities no edema    Discussion/Summary:  Patient with multiple medical problems who seems to be doing reasonably well from cardiac standpoint  Previous studies reviewed with patient  Medications reviewed and possible side effects discussed  concepts of cardiovascular disease , signs and symptoms of heart disease  Dietary and risk factor modification reinforced  All questions answered  Safety measures reviewed  Patient advised to report any problems prompting medical attention  Reviewed electrophysiology evaluation for supraventricular tachycardia  Patient is comfortable with p r n  Metoprolol at this time  If patient has recurrent episodes of SVT, patient has been counseled about possibility of ablation  Patient counseled to lose weight to reduce cardiovascular morbidity and mortality  Patient also counseled about alcohol excess  Follow-up in 6 months or earlier as needed  Patient is agreeable with the plan of care

## 2021-01-25 ENCOUNTER — TELEPHONE (OUTPATIENT)
Dept: CARDIOLOGY CLINIC | Facility: CLINIC | Age: 76
End: 2021-01-25

## 2021-01-25 ENCOUNTER — TELEPHONE (OUTPATIENT)
Dept: PULMONOLOGY | Facility: CLINIC | Age: 76
End: 2021-01-25

## 2021-01-25 DIAGNOSIS — R06.02 SHORTNESS OF BREATH: Primary | ICD-10-CM

## 2021-01-25 NOTE — TELEPHONE ENCOUNTER
Pt needs order for a stress test  He needs it for the [employer]  He needs one every year       Nona [facility] - [phone]

## 2021-01-27 ENCOUNTER — TELEMEDICINE (OUTPATIENT)
Dept: CARDIOLOGY CLINIC | Facility: CLINIC | Age: 76
End: 2021-01-27
Payer: COMMERCIAL

## 2021-01-27 VITALS — BODY MASS INDEX: 32.58 KG/M2 | WEIGHT: 262 LBS | HEIGHT: 75 IN

## 2021-01-27 DIAGNOSIS — D69.6 THROMBOCYTOPENIA (HCC): ICD-10-CM

## 2021-01-27 DIAGNOSIS — R60.0 LEG EDEMA, LEFT: ICD-10-CM

## 2021-01-27 DIAGNOSIS — I26.99 BILATERAL PULMONARY EMBOLISM (HCC): ICD-10-CM

## 2021-01-27 DIAGNOSIS — Z86.2 HISTORY OF SICKLE CELL TRAIT: ICD-10-CM

## 2021-01-27 DIAGNOSIS — Z79.01 CHRONIC ANTICOAGULATION: ICD-10-CM

## 2021-01-27 DIAGNOSIS — I47.1 SVT (SUPRAVENTRICULAR TACHYCARDIA) (HCC): Primary | ICD-10-CM

## 2021-01-27 DIAGNOSIS — N40.1 BPH WITH OBSTRUCTION/LOWER URINARY TRACT SYMPTOMS: ICD-10-CM

## 2021-01-27 DIAGNOSIS — N18.2 CHRONIC KIDNEY DISEASE (CKD), STAGE II (MILD): ICD-10-CM

## 2021-01-27 DIAGNOSIS — G47.33 OSA (OBSTRUCTIVE SLEEP APNEA): ICD-10-CM

## 2021-01-27 DIAGNOSIS — E66.09 CLASS 1 OBESITY DUE TO EXCESS CALORIES WITH SERIOUS COMORBIDITY AND BODY MASS INDEX (BMI) OF 33.0 TO 33.9 IN ADULT: ICD-10-CM

## 2021-01-27 DIAGNOSIS — N13.8 BPH WITH OBSTRUCTION/LOWER URINARY TRACT SYMPTOMS: ICD-10-CM

## 2021-01-27 DIAGNOSIS — E78.2 MIXED HYPERLIPIDEMIA: ICD-10-CM

## 2021-01-27 DIAGNOSIS — I42.8 CARDIOMYOPATHY, NONISCHEMIC (HCC): ICD-10-CM

## 2021-01-27 PROCEDURE — 99214 OFFICE O/P EST MOD 30 MIN: CPT | Performed by: INTERNAL MEDICINE

## 2021-01-27 NOTE — LETTER
January 31, 2021     Srinivasa Schaeffer MD  [address] [facility]  [address] [id number]    Patient: Zach Heredia  YOB: 1945   Date of Visit: 1/27/2021       Dear Dr Crews Frames: Thank you for referring Chase Mckoy to me for evaluation  Below are my notes for this consultation  If you have questions, please do not hesitate to call me  I look forward to following your patient along with you  Sincerely,        Lyman Koyanagi, MD        CC: Zach Sox Lovena Rockers, MD Lyman Koyanagi, MD  1/31/2021  6:25 PM  Incomplete  It was my intent to perform this visit via video technology but the patient was not able to do a video connection so the visit was completed via audio telephone only  Virtual Regular Visit      Assessment/Plan:     1  SVT   review of prior records- heart rate in 140s - raise suspicion of reentry tachycardia   review of event monitor- heart rate is variable in tachycardia- suspicion of atrial tachycardia    The patient does inform of Previous episodes which have resulted in hospital visits and lightheadedness     30 day event monitor has been reviewed   over 6000 episodes of SVT   Majority of them are very brief   Longest  episode however lasted 1 hour and 12 minutes    patient denied any major symptoms during this duration of event monitoring  His EF is normalized to 50%       my recommendation for this patient   - continue patient on metoprolol succinate,  25 mg 2 times daily  -if any further episode of tachycardia or hospital visit, proceed with ablation   suspicion of pulmonary vein tachycardia and hence pulmonary vein isolation may be needed             2   Congestive heart failure- LVEF around 40-45% -  Currently improved to 50%  Patient does have leg swelling  body weight 119 kg low, BMI 32 7    Cardiac catheterization, confirmed by the patient, was not available in the patient's chart  Need to obtain these records     Blood pressure is not well controlled  He has symptoms suggestive of obstructive sleep apnea  High dietary salt intake     Patient needs to be started on full therapy for the same  Will need beta-blocker, ACE-inhibitor/ ARB or isosorbide/hydralazine  Referred back to primary cardiologist for management of the same           3  Hypertension  Blood pressure is 146/88  During previous visit   I do not have blood pressure as of today  Discussed need to stop salt  Patient will need to be started on beta-blocker, isosorbide, hydralazine  Patient prefers to discuss with primary cardiologist as he will have more regular follow-up with then        4  Hyperlipidemia  Patient is currently on rosuvastatin  No myositis or myalgia  Continue with the same        5  Bilateral pulmonary embolism  As per patient this was not provoked   his on apixaban  He is going to be on a blood thinner which is to be continued indefinitely        6  long-term anticoagulation  On Eliquis for bilateral pulmonary embolism        7  Obesity  Discussed role of diet and exercise  Diet is responsible for 80% of obesity  BMI is 33           8   Obstructive sleep apnea  Patient has a history of snoring, morning fatigue and daytime sleepiness  He has a  large thick neck and crowded posterior pharynx  He needs to be evaluated and treated for sleep apnea              Summary of my recommendation for the patient   patient does have SVT- AT/PV tachycardia possible -  try metoprolol-if it fails will proceed with comprehensive ablation     Aggressive management of hypertension-beta-blocker, isosorbide, hydralazine-to be started by primary cardiologist     Sleep medicine consult to evaluate and treat for sleep apnea               Problem List Items Addressed This Visit        Respiratory    ARLENE (obstructive sleep apnea) - Primary       Cardiovascular and Mediastinum    Bilateral pulmonary embolism (HCC)    SVT (supraventricular tachycardia) ([facility] )    [facility]iomyopathy, nonischemic ([facility] ) Genitourinary    Chronic kidney disease (CKD), stage II (mild)    BPH with obstruction/lower urinary tract symptoms       Other    Hyperlipidemia    Obesity    History of sickle cell trait    Leg edema, left    Thrombocytopenia (HCC)    Chronic anticoagulation               Reason for visit is   Chief Complaint   Patient presents with    Virtual Regular Visit     3 month follow up, Rare dizziness     Virtual Regular Visit        Encounter provider Saira Fu MD    Provider located at [address] [id number]      Recent Visits  Date Type Provider Dept   01/27/21 Telemedicine Saira Fu, [address] recent visits within past 7 days and meeting all other requirements     Future Appointments  No visits were found meeting these conditions  Showing future appointments within next 150 days and meeting all other requirements        The patient was identified by name and date of birth  Erwin Suarez  was informed that this is a telemedicine visit and that the visit is being conducted through telephone  My office door was closed  No one else was in the room  He acknowledged consent and understanding of privacy and security of the video platform  The patient has agreed to participate and understands they can discontinue the visit at any time  Patient is aware this is a billable service  Subjective  Erwin Suarez  is a 76 y o  male Who is being followed up for SVT   he underwent 30 day event monitor  He did not feel any significant palpitation  Over 6000 episodes of SVT, most are very brief, longest is 1 hour and 12 minutes  Tachycardia has variable cycle length, suspect automatic, atrial tachycardia, can be pulmonary vein tachycardia  He also underwent echocardiogram which revealed LVEF 50%       as patient is feeling symptomatically  well, he does not want to proceed with ablation at this time   I will recommend use of metoprolol succinate 25 mg 2 times daily  If the same fails will need to proceed with ablation       history of illness  The patient has been referred to me for management of palpitations, SVT - by Dr Vicki Robles     He has significant medical illnesses which include  Supraventricular tachycardia  Nonischemic dilated cardiomyopathy  Obesity  Obstructive sleep apnea  History of bilateral pulmonary embolism  Hyperlipidemia  BPH     As far as palpitations are concerned  Has been present for over 5 years  There has been a recent increase  Most episodes are brief lasting a minute  Recent increase in longer episodes  Associated symptoms of lightheadedness and presyncope     Currently he is having 2-3 episodes a week  Previous records show narrow complex tachycardia, long RP, heart rate around 140 per minute  There is record from 2017 and 2020 which show heart rate in the 140s        The patient is a retired  personal  He is relatively active though not much  His not complaining of anginal like chest pain or chest pressure  His not complaining of worsening orthopnea or PND  He does have chronic leg swelling     Palpitations and presyncope is as described above  There has been no episode of syncope  There is rarely orthostatic lightheadedness     The patient is having exertional intolerance     He does have a history of hypertension  He has diet has been a little uncontrolled lately  Both he and his wife have noted worsening hypertension     The patient does have a history of bilateral pulmonary embolism  He has been on blood thinner ever since then        The patient does have a history of cardiomyopathy  LVEF is around 45%  He informs that there has been a cardiac catheterization  However we were unable to pull up the records for the same - done at [facility] patient is a former smoker  He does not abuse alcohol  He does not use recreational drugs     There is family history of heart disease and hypertension            Past Medical History:   Diagnosis Date    Hyperlipidemia     Post-nasal drip     Pulmonary embolism (HCC)        Past Surgical History:   Procedure Laterality Date    ARTHROSCOPY KNEE Left     30 years ago       Current Outpatient Medications   Medication Sig Dispense Refill    apixaban (ELIQUIS) 5 mg Take 1 tablet (5 mg total) by mouth 2 (two) times a day 60 tablet 11    Ascorbic Acid (VITAMIN C) 100 MG tablet Take 100 mg by mouth daily      fluticasone (FLONASE) 50 mcg/act nasal spray 1 spray into each nostril daily 16 g 0    rosuvastatin (CRESTOR) 20 MG tablet Take 20 mg by mouth daily      tamsulosin (FLOMAX) 0 4 mg Take 0 4 mg by mouth daily with dinner      metoprolol tartrate (LOPRESSOR) 25 mg tablet 1 tab po needed for Palpitations (Patient not taking: Reported on 1/27/2021) 30 tablet 1     No current facility-administered medications for this visit  Allergies   Allergen Reactions    Sildenafil     Simvastatin Myalgia          ambulatory event monitor   October 2020           from sinus rhythm to atrial tachycardia         atrial tachycardia with variable cycle length/  Burst of PAF? sinus bradycardia         atrial tachycardia with variable cycle length            echocardiogram   October 2020  LEFT VENTRICLE:  Systolic function was at the lower limits of normal  Ejection fraction was estimated in the range of 50 % to 55 %  There were no regional wall motion abnormalities  Doppler parameters were consistent with abnormal left ventricular relaxation (grade 1 diastolic dysfunction)      RIGHT VENTRICLE:  The size was normal   Systolic function was normal      LEFT ATRIUM:  The atrium was mildly dilated      RIGHT ATRIUM:  The atrium was mildly dilated      MITRAL VALVE:  There was trace to mild regurgitation      TRICUSPID VALVE:  There was trace regurgitation  Estimated peak PA pressure was 38 mmHg              Nuclear study  October 2019  IMPRESSIONS: There was a fixed defect of the basal inferior wall that improves with prone imaging suggesting it is secondary to attenuation artifact  There was no diagnostic ischemia  Left ventricular function was low normal with possible  hypokinesis of the basal inferior wall  The patient had a brief episode of atrial flutter with regadenoson infusion            Echocardiogram  July 2018  LEFT VENTRICLE:  Ejection fraction was estimated to be 45 % to 50%  There was mild diffuse hypokinesis           EKG of tachycardia  The patient does get recurrent tachycardia, narrow complex, long RP  The maximum rate is almost always in the 140s             Review of Systems   All other systems reviewed and are negative  as described in my history of present illness    Video Exam    Vitals:    01/27/21 0932   Weight: 119 kg (262 lb)   Height: 6' 3" (1 905 m)       Physical Exam    this is limited as consultation was done over the phone   Speech is normal   Thought process is normal           VIRTUAL VISIT DISCLAIMER    Dagoberto Gustafson  acknowledges that he has consented to an online visit or consultation  He understands that the online visit is based solely on information provided by him, and that, in the absence of a face-to-face physical evaluation by the physician, the diagnosis he receives is both limited and provisional in terms of accuracy and completeness  This is not intended to replace a full medical face-to-face evaluation by the physician  Dagoberto Gustafson  understands and accepts these terms  Jayro Martines MD  1/27/2021 12:47 PM  Sign when Signing Visit  It was my intent to perform this visit via video technology but the patient was not able to do a video connection so the visit was completed via audio telephone only  Virtual Regular Visit      Assessment/Plan:     1   SVT  Every time heart rate is in the 140s which raise suspicion of reentry tachycardia  It is long RP tachycardia  Differentials are - atrial tachycardia, atypical AVNRT, AVRT with decremental pathway        Most of the episodes are brief lasting less than a minute  These are happening 2 to 3 times a week  The patient does inform of episodes which have resulted in hospital visits and lightheadedness     My recommendation for the patient:  - use event monitor for 30 days to document the tachycardia  Initiation, cycle length and termination will be helpful to plan ablation  - start metoprolol tartrate on an as-needed basis at this time           2  Congestive heart failure  LVEF around 40-45%     Patient does have leg swelling     Cardiac catheterization, confirmed by the patient, was not available in the patient's chart  Need to obtain these records     Blood pressure is not well controlled  He has symptoms suggestive of obstructive sleep apnea  High dietary salt intake     Patient needs to be started on full therapy for the same  Will need beta-blocker, ACE-inhibitor/ ARB or isosorbide/hydralazine  Referred back to primary cardiologist for management of the same           3  Hypertension  Blood pressure is 146/88  This is not well controlled  Discussed need to stop salt  Patient will need to be started on beta-blocker, isosorbide, hydralazine  Patient prefers to discuss with primary cardiologist as he will have more regular follow-up with then        4  Hyperlipidemia  Patient is currently on rosuvastatin  No myositis or myalgia  Continue with the same        5  Bilateral pulmonary embolism  As per patient this was not provoked  He is going to be on a blood thinner which is to be continued indefinitely        6  long-term anticoagulation  On Eliquis for bilateral pulmonary embolism        7  Obesity  Discussed role of diet and exercise  Diet is responsible for 80% of obesity  BMI is 33           8   Obstructive sleep apnea  Patient has a history of snoring, morning fatigue and daytime sleepiness  He has a  large thick neck and crowded posterior pharynx  He needs to be evaluated and treated for sleep apnea              Summary of my recommendation for the patient  30 d  event monitor -Start metoprolol tartrate on a p r n  basis when palpitations more than 5 minutes     Aggressive management of hypertension-beta-blocker, isosorbide, hydralazine-to be started by primary cardiologist  Avoid the beta-blocker till the event monitor is done     Aggressive management of heart failure     Get results of cardiac catheterization     Sleep medicine consult to evaluate and treat for sleep apnea     Review with me in 2 months to discuss on definitive therapy for SVT          Problem List Items Addressed This Visit     None               Reason for visit is   Chief Complaint   Patient presents with    Virtual Regular Visit     3 month follow up, Rare dizziness     Virtual Regular Visit        Encounter provider Aaron Barker MD    Provider located at [place] [address]  [address] [place] [address] [address] [place] [address]      Recent Visits  No visits were found meeting these conditions  Showing recent visits within past 7 days and meeting all other requirements     Today's Visits  Date Type Provider Dept   01/27/21 Telemedicine Aaron Barker, [facility] today's visits and meeting all other requirements     Future Appointments  No visits were found meeting these conditions  Showing future appointments within next 150 days and meeting all other requirements        The patient was identified by name and date of birth  Arben Vargas  was informed that this is a telemedicine visit and that the visit is being conducted through telephone  My office door was closed  No one else was in the room  He acknowledged consent and understanding of privacy and security of the video platform   The patient has agreed to participate and understands they can discontinue the visit at any time     Patient is aware this is a billable service  Subjective  Blossom Dale  is a 76 y o  male ***     The patient has been referred to me for management of palpitations, SVT - by Dr Oscar Phipps     He has significant medical illnesses which include  Supraventricular tachycardia  Nonischemic dilated cardiomyopathy  Obesity  Obstructive sleep apnea  History of bilateral pulmonary embolism  Hyperlipidemia  BPH     As far as palpitations are concerned  Has been present for over 5 years  There has been a recent increase  Most episodes are brief lasting a minute  Recent increase in longer episodes  Associated symptoms of lightheadedness and presyncope     Currently he is having 2-3 episodes a week  Previous records show narrow complex tachycardia, long RP, heart rate around 140 per minute  There is record from 2017 and 2020 which show heart rate in the 140s        The patient is a retired  personal  He is relatively active though not much  His not complaining of anginal like chest pain or chest pressure  His not complaining of worsening orthopnea or PND  He does have chronic leg swelling     Palpitations and presyncope is as described above  There has been no episode of syncope  There is rarely orthostatic lightheadedness     The patient is having exertional intolerance     He does have a history of hypertension  He has diet has been a little uncontrolled lately  Both he and his wife have noted worsening hypertension     The patient does have a history of bilateral pulmonary embolism  He has been on blood thinner ever since then        The patient does have a history of cardiomyopathy  LVEF is around 45%  He informs that there has been a cardiac catheterization  However we were unable to pull up the records for the same - done at [facility] patient is a former smoker  He does not abuse alcohol  He does not use recreational drugs     There is family history of heart disease and hypertension        HPI     Past Medical History:   Diagnosis Date    Hyperlipidemia     Post-nasal drip     Pulmonary embolism (HCC)        Past Surgical History:   Procedure Laterality Date    ARTHROSCOPY KNEE Left     30 years ago       Current Outpatient Medications   Medication Sig Dispense Refill    apixaban (ELIQUIS) 5 mg Take 1 tablet (5 mg total) by mouth 2 (two) times a day 60 tablet 11    Ascorbic Acid (VITAMIN C) 100 MG tablet Take 100 mg by mouth daily      fluticasone (FLONASE) 50 mcg/act nasal spray 1 spray into each nostril daily 16 g 0    rosuvastatin (CRESTOR) 20 MG tablet Take 20 mg by mouth daily      tamsulosin (FLOMAX) 0 4 mg Take 0 4 mg by mouth daily with dinner      metoprolol tartrate (LOPRESSOR) 25 mg tablet 1 tab po needed for Palpitations (Patient not taking: Reported on 1/27/2021) 30 tablet 1     No current facility-administered medications for this visit  Allergies   Allergen Reactions    Sildenafil     Simvastatin Myalgia          ambulatory event monitor   October 2020           from sinus rhythm to atrial tachycardia         atrial tachycardia with variable cycle length/  Burst of PAF? sinus bradycardia         atrial tachycardia with variable cycle length            echocardiogram   October 2020  LEFT VENTRICLE:  Systolic function was at the lower limits of normal  Ejection fraction was estimated in the range of 50 % to 55 %  There were no regional wall motion abnormalities  Doppler parameters were consistent with abnormal left ventricular relaxation (grade 1 diastolic dysfunction)      RIGHT VENTRICLE:  The size was normal   Systolic function was normal      LEFT ATRIUM:  The atrium was mildly dilated      RIGHT ATRIUM:  The atrium was mildly dilated      MITRAL VALVE:  There was trace to mild regurgitation      TRICUSPID VALVE:  There was trace regurgitation  Estimated peak PA pressure was 38 mmHg              Nuclear study  October 2019  IMPRESSIONS: There was a fixed defect of the basal inferior wall that improves with prone imaging suggesting it is secondary to attenuation artifact  There was no diagnostic ischemia  Left ventricular function was low normal with possible  hypokinesis of the basal inferior wall  The patient had a brief episode of atrial flutter with regadenoson infusion            Echocardiogram  July 2018  LEFT VENTRICLE:  Ejection fraction was estimated to be 45 % to 50%  There was mild diffuse hypokinesis           EKG of tachycardia  The patient does get recurrent tachycardia, narrow complex, long RP  The maximum rate is almost always in the 140s             Review of Systems   All other systems reviewed and are negative  as described in my history of present illness    Video Exam    Vitals:    01/27/21 0932   Weight: 119 kg (262 lb)   Height: 6' 3" (1 905 m)       Physical Exam           VIRTUAL VISIT DISCLAIMER    Dougie Dong  acknowledges that he has consented to an online visit or consultation  He understands that the online visit is based solely on information provided by him, and that, in the absence of a face-to-face physical evaluation by the physician, the diagnosis he receives is both limited and provisional in terms of accuracy and completeness  This is not intended to replace a full medical face-to-face evaluation by the physician  Dougie Dong  understands and accepts these terms

## 2021-01-27 NOTE — LETTER
January 31, 2021     Mirna Douglas MD  [address] [facility]  [address]  [address] [id number]    Patient: Kaitlin Bear  YOB: 1945   Date of Visit: 1/27/2021       Dear Dr Fiona Lopez: Thank you for referring Polina Barragan to me for evaluation  Below are my notes for this consultation  If you have questions, please do not hesitate to call me  I look forward to following your patient along with you  Sincerely,        Mack Calle MD        CC: MD Mack Pringle MD  1/31/2021  6:25 PM  Incomplete  It was my intent to perform this visit via video technology but the patient was not able to do a video connection so the visit was completed via audio telephone only  Virtual Regular Visit      Assessment/Plan:     1  SVT   review of prior records- heart rate in 140s - raise suspicion of reentry tachycardia   review of event monitor- heart rate is variable in tachycardia- suspicion of atrial tachycardia    The patient does inform of Previous episodes which have resulted in hospital visits and lightheadedness     30 day event monitor has been reviewed   over 6000 episodes of SVT   Majority of them are very brief   Longest  episode however lasted 1 hour and 12 minutes    patient denied any major symptoms during this duration of event monitoring  His EF is normalized to 50%       my recommendation for this patient   - continue patient on metoprolol succinate,  25 mg 2 times daily  -if any further episode of tachycardia or hospital visit, proceed with ablation   suspicion of pulmonary vein tachycardia and hence pulmonary vein isolation may be needed             2   Congestive heart failure- LVEF around 40-45% -  Currently improved to 50%  Patient does have leg swelling  body weight 119 kg low, BMI 32 7    Cardiac catheterization, confirmed by the patient, was not available in the patient's chart  Need to obtain these records     Blood pressure is not well controlled  He has symptoms suggestive of obstructive sleep apnea  High dietary salt intake     Patient needs to be started on full therapy for the same  Will need beta-blocker, ACE-inhibitor/ ARB or isosorbide/hydralazine  Referred back to primary cardiologist for management of the same           3  Hypertension  Blood pressure is 146/88  During previous visit   I do not have blood pressure as of today  Discussed need to stop salt  Patient will need to be started on beta-blocker, isosorbide, hydralazine  Patient prefers to discuss with primary cardiologist as he will have more regular follow-up with then        4  Hyperlipidemia  Patient is currently on rosuvastatin  No myositis or myalgia  Continue with the same        5  Bilateral pulmonary embolism  As per patient this was not provoked   his on apixaban  He is going to be on a blood thinner which is to be continued indefinitely        6  long-term anticoagulation  On Eliquis for bilateral pulmonary embolism        7  Obesity  Discussed role of diet and exercise  Diet is responsible for 80% of obesity  BMI is 33           8   Obstructive sleep apnea  Patient has a history of snoring, morning fatigue and daytime sleepiness  He has a  large thick neck and crowded posterior pharynx  He needs to be evaluated and treated for sleep apnea              Summary of my recommendation for the patient   patient does have SVT- AT/PV tachycardia possible -  try metoprolol-if it fails will proceed with comprehensive ablation     Aggressive management of hypertension-beta-blocker, isosorbide, hydralazine-to be started by primary cardiologist     Sleep medicine consult to evaluate and treat for sleep apnea               Problem List Items Addressed This Visit        Respiratory    ARLENE (obstructive sleep apnea) - Primary       Cardiovascular and Mediastinum    Bilateral pulmonary embolism (HCC)    SVT (supraventricular tachycardia) ([facility] )    Cardiomyopathy, nonischemic ([facility] ) Genitourinary    Chronic kidney disease (CKD), stage II (mild)    BPH with obstruction/lower urinary tract symptoms       Other    Hyperlipidemia    Obesity    History of sickle cell trait    Leg edema, left    Thrombocytopenia (HCC)    Chronic anticoagulation               Reason for visit is   Chief Complaint   Patient presents with    Virtual Regular Visit     3 month follow up, Rare dizziness     Virtual Regular Visit        Encounter provider Valentín Orta MD    Provider located at [address] [place] [id number]      Recent Visits  Date Type Provider Dept   01/27/21 Telemedicine Valentín Orta, [facility] recent visits within past 7 days and meeting all other requirements     Future Appointments  No visits were found meeting these conditions  Showing future appointments within next 150 days and meeting all other requirements        The patient was identified by name and date of birth  Tonio Mack  was informed that this is a telemedicine visit and that the visit is being conducted through telephone  My office door was closed  No one else was in the room  He acknowledged consent and understanding of privacy and security of the video platform  The patient has agreed to participate and understands they can discontinue the visit at any time  Patient is aware this is a billable service  Subjective  Tonio Mack  is a 76 y o  male Who is being followed up for SVT   he underwent 30 day event monitor  He did not feel any significant palpitation  Over 6000 episodes of SVT, most are very brief, longest is 1 hour and 12 minutes  Tachycardia has variable cycle length, suspect automatic, atrial tachycardia, can be pulmonary vein tachycardia  He also underwent echocardiogram which revealed LVEF 50%       as patient is feeling symptomatically  well, he does not want to proceed with ablation at this time   I will recommend use of metoprolol succinate 25 mg 2 times daily  If the same fails will need to proceed with ablation       history of illness  The patient has been referred to me for management of palpitations, SVT - by Dr Abelardo Vigil     He has significant medical illnesses which include  Supraventricular tachycardia  Nonischemic dilated cardiomyopathy  Obesity  Obstructive sleep apnea  History of bilateral pulmonary embolism  Hyperlipidemia  BPH     As far as palpitations are concerned  Has been present for over 5 years  There has been a recent increase  Most episodes are brief lasting a minute  Recent increase in longer episodes  Associated symptoms of lightheadedness and presyncope     Currently he is having 2-3 episodes a week  Previous records show narrow complex tachycardia, long RP, heart rate around 140 per minute  There is record from 2017 and 2020 which show heart rate in the 140s        The patient is a retired  personal  He is relatively active though not much  His not complaining of anginal like chest pain or chest pressure  His not complaining of worsening orthopnea or PND  He does have chronic leg swelling     Palpitations and presyncope is as described above  There has been no episode of syncope  There is rarely orthostatic lightheadedness     The patient is having exertional intolerance     He does have a history of hypertension  He has diet has been a little uncontrolled lately  Both he and his wife have noted worsening hypertension     The patient does have a history of bilateral pulmonary embolism  He has been on blood thinner ever since then        The patient does have a history of cardiomyopathy  LVEF is around 45%  He informs that there has been a cardiac catheterization  However we were unable to pull up the records for the same - done at [facility] patient is a former smoker  He does not abuse alcohol  He does not use recreational drugs     There is family history of heart disease and hypertension            Past Medical History:   Diagnosis Date    Hyperlipidemia     Post-nasal drip     Pulmonary embolism (HCC)        Past Surgical History:   Procedure Laterality Date    ARTHROSCOPY KNEE Left     30 years ago       Current Outpatient Medications   Medication Sig Dispense Refill    apixaban (ELIQUIS) 5 mg Take 1 tablet (5 mg total) by mouth 2 (two) times a day 60 tablet 11    Ascorbic Acid (VITAMIN C) 100 MG tablet Take 100 mg by mouth daily      fluticasone (FLONASE) 50 mcg/act nasal spray 1 spray into each nostril daily 16 g 0    rosuvastatin (CRESTOR) 20 MG tablet Take 20 mg by mouth daily      tamsulosin (FLOMAX) 0 4 mg Take 0 4 mg by mouth daily with dinner      metoprolol tartrate (LOPRESSOR) 25 mg tablet 1 tab po needed for Palpitations (Patient not taking: Reported on 1/27/2021) 30 tablet 1     No current facility-administered medications for this visit  Allergies   Allergen Reactions    Sildenafil     Simvastatin Myalgia          ambulatory event monitor   October 2020           from sinus rhythm to atrial tachycardia         atrial tachycardia with variable cycle length/  Burst of PAF? sinus bradycardia         atrial tachycardia with variable cycle length            echocardiogram   October 2020  LEFT VENTRICLE:  Systolic function was at the lower limits of normal  Ejection fraction was estimated in the range of 50 % to 55 %  There were no regional wall motion abnormalities  Doppler parameters were consistent with abnormal left ventricular relaxation (grade 1 diastolic dysfunction)      RIGHT VENTRICLE:  The size was normal   Systolic function was normal      LEFT ATRIUM:  The atrium was mildly dilated      RIGHT ATRIUM:  The atrium was mildly dilated      MITRAL VALVE:  There was trace to mild regurgitation      TRICUSPID VALVE:  There was trace regurgitation  Estimated peak PA pressure was 38 mmHg              Nuclear study  October 2019  IMPRESSIONS: There was a fixed defect of the basal inferior wall that improves with prone imaging suggesting it is secondary to attenuation artifact  There was no diagnostic ischemia  Left ventricular function was low normal with possible  hypokinesis of the basal inferior wall  The patient had a brief episode of atrial flutter with regadenoson infusion            Echocardiogram  July 2018  LEFT VENTRICLE:  Ejection fraction was estimated to be 45 % to 50%  There was mild diffuse hypokinesis           EKG of tachycardia  The patient does get recurrent tachycardia, narrow complex, long RP  The maximum rate is almost always in the 140s             Review of Systems   All other systems reviewed and are negative  as described in my history of present illness    Video Exam    Vitals:    01/27/21 0932   Weight: 119 kg (262 lb)   Height: 6' 3" (1 905 m)       Physical Exam    this is limited as consultation was done over the phone   Speech is normal   Thought process is normal           VIRTUAL VISIT DISCLAIMER    Donita Livingston  acknowledges that he has consented to an online visit or consultation  He understands that the online visit is based solely on information provided by him, and that, in the absence of a face-to-face physical evaluation by the physician, the diagnosis he receives is both limited and provisional in terms of accuracy and completeness  This is not intended to replace a full medical face-to-face evaluation by the physician  Donita Livingston  understands and accepts these terms  Osei Montaño MD  1/27/2021 12:47 PM  Sign when Signing Visit  It was my intent to perform this visit via video technology but the patient was not able to do a video connection so the visit was completed via audio telephone only  Virtual Regular Visit      Assessment/Plan:     1   SVT  Every time heart rate is in the 140s which raise suspicion of reentry tachycardia  It is long RP tachycardia  Differentials are - atrial tachycardia, atypical AVNRT, AVRT with decremental pathway        Most of the episodes are brief lasting less than a minute  These are happening 2 to 3 times a week  The patient does inform of episodes which have resulted in hospital visits and lightheadedness     My recommendation for the patient:  - use event monitor for 30 days to document the tachycardia  Initiation, cycle length and termination will be helpful to plan ablation  - start metoprolol tartrate on an as-needed basis at this time           2  Congestive heart failure  LVEF around 40-45%     Patient does have leg swelling     Cardiac catheterization, confirmed by the patient, was not available in the patient's chart  Need to obtain these records     Blood pressure is not well controlled  He has symptoms suggestive of obstructive sleep apnea  High dietary salt intake     Patient needs to be started on full therapy for the same  Will need beta-blocker, ACE-inhibitor/ ARB or isosorbide/hydralazine  Referred back to primary cardiologist for management of the same           3  Hypertension  Blood pressure is 146/88  This is not well controlled  Discussed need to stop salt  Patient will need to be started on beta-blocker, isosorbide, hydralazine  Patient prefers to discuss with primary cardiologist as he will have more regular follow-up with then        4  Hyperlipidemia  Patient is currently on rosuvastatin  No myositis or myalgia  Continue with the same        5  Bilateral pulmonary embolism  As per patient this was not provoked  He is going to be on a blood thinner which is to be continued indefinitely        6  long-term anticoagulation  On Eliquis for bilateral pulmonary embolism        7  Obesity  Discussed role of diet and exercise  Diet is responsible for 80% of obesity  BMI is 33           8   Obstructive sleep apnea  Patient has a history of snoring, morning fatigue and daytime sleepiness  He has a  large thick neck and crowded posterior pharynx  He needs to be evaluated and treated for sleep apnea              Summary of my recommendation for the patient  30 d  event monitor -Start metoprolol tartrate on a p r n  basis when palpitations more than 5 minutes     Aggressive management of hypertension-beta-blocker, isosorbide, hydralazine-to be started by primary cardiologist  Avoid the beta-blocker till the event monitor is done     Aggressive management of heart failure     Get results of cardiac catheterization     Sleep medicine consult to evaluate and treat for sleep apnea     Review with me in 2 months to discuss on definitive therapy for SVT          Problem List Items Addressed This Visit     None               Reason for visit is   Chief Complaint   Patient presents with    Virtual Regular Visit     3 month follow up, Rare dizziness     Virtual Regular Visit        Encounter provider Roque Arteaga MD    Provider located at [place] [address]  [address] [place] [address] [address] [place] [address]      Recent Visits  No visits were found meeting these conditions  Showing recent visits within past 7 days and meeting all other requirements     Today's Visits  Date Type Provider Dept   01/27/21 Telemedicine Roque Arteaga, [facility] today's visits and meeting all other requirements     Future Appointments  No visits were found meeting these conditions  Showing future appointments within next 150 days and meeting all other requirements        The patient was identified by name and date of birth  Celine Salas  was informed that this is a telemedicine visit and that the visit is being conducted through telephone  My office door was closed  No one else was in the room  He acknowledged consent and understanding of privacy and security of the video platform   The patient has agreed to participate and understands they can discontinue the visit at any time     Patient is aware this is a billable service  Subjective  Diana Sinclair  is a 76 y o  male ***     The patient has been referred to me for management of palpitations, SVT - by Dr Froilan Wharton     He has significant medical illnesses which include  Supraventricular tachycardia  Nonischemic dilated cardiomyopathy  Obesity  Obstructive sleep apnea  History of bilateral pulmonary embolism  Hyperlipidemia  BPH     As far as palpitations are concerned  Has been present for over 5 years  There has been a recent increase  Most episodes are brief lasting a minute  Recent increase in longer episodes  Associated symptoms of lightheadedness and presyncope     Currently he is having 2-3 episodes a week  Previous records show narrow complex tachycardia, long RP, heart rate around 140 per minute  There is record from 2017 and 2020 which show heart rate in the 140s        The patient is a retired  personal  He is relatively active though not much  His not complaining of anginal like chest pain or chest pressure  His not complaining of worsening orthopnea or PND  He does have chronic leg swelling     Palpitations and presyncope is as described above  There has been no episode of syncope  There is rarely orthostatic lightheadedness     The patient is having exertional intolerance     He does have a history of hypertension  He has diet has been a little uncontrolled lately  Both he and his wife have noted worsening hypertension     The patient does have a history of bilateral pulmonary embolism  He has been on blood thinner ever since then        The patient does have a history of cardiomyopathy  LVEF is around 45%  He informs that there has been a cardiac catheterization  However we were unable to pull up the records for the same - done at [facility] patient is a former smoker  He does not abuse alcohol  He does not use recreational drugs     There is family history of heart disease and hypertension        HPI     Past Medical History:   Diagnosis Date    Hyperlipidemia     Post-nasal drip     Pulmonary embolism (HCC)        Past Surgical History:   Procedure Laterality Date    ARTHROSCOPY KNEE Left     30 years ago       Current Outpatient Medications   Medication Sig Dispense Refill    apixaban (ELIQUIS) 5 mg Take 1 tablet (5 mg total) by mouth 2 (two) times a day 60 tablet 11    Ascorbic Acid (VITAMIN C) 100 MG tablet Take 100 mg by mouth daily      fluticasone (FLONASE) 50 mcg/act nasal spray 1 spray into each nostril daily 16 g 0    rosuvastatin (CRESTOR) 20 MG tablet Take 20 mg by mouth daily      tamsulosin (FLOMAX) 0 4 mg Take 0 4 mg by mouth daily with dinner      metoprolol tartrate (LOPRESSOR) 25 mg tablet 1 tab po needed for Palpitations (Patient not taking: Reported on 1/27/2021) 30 tablet 1     No current facility-administered medications for this visit  Allergies   Allergen Reactions    Sildenafil     Simvastatin Myalgia          ambulatory event monitor   October 2020           from sinus rhythm to atrial tachycardia         atrial tachycardia with variable cycle length/  Burst of PAF? sinus bradycardia         atrial tachycardia with variable cycle length            echocardiogram   October 2020  LEFT VENTRICLE:  Systolic function was at the lower limits of normal  Ejection fraction was estimated in the range of 50 % to 55 %  There were no regional wall motion abnormalities  Doppler parameters were consistent with abnormal left ventricular relaxation (grade 1 diastolic dysfunction)      RIGHT VENTRICLE:  The size was normal   Systolic function was normal      LEFT ATRIUM:  The atrium was mildly dilated      RIGHT ATRIUM:  The atrium was mildly dilated      MITRAL VALVE:  There was trace to mild regurgitation      TRICUSPID VALVE:  There was trace regurgitation  Estimated peak PA pressure was 38 mmHg              Nuclear study  October 2019  IMPRESSIONS: There was a fixed defect of the basal inferior wall that improves with prone imaging suggesting it is secondary to attenuation artifact  There was no diagnostic ischemia  Left ventricular function was low normal with possible  hypokinesis of the basal inferior wall  The patient had a brief episode of atrial flutter with regadenoson infusion            Echocardiogram  July 2018  LEFT VENTRICLE:  Ejection fraction was estimated to be 45 % to 50%  There was mild diffuse hypokinesis           EKG of tachycardia  The patient does get recurrent tachycardia, narrow complex, long RP  The maximum rate is almost always in the 140s             Review of Systems   All other systems reviewed and are negative  as described in my history of present illness    Video Exam    Vitals:    01/27/21 0932   Weight: 119 kg (262 lb)   Height: 6' 3" (1 905 m)       Physical Exam           VIRTUAL VISIT DISCLAIMER    Natasha Collins  acknowledges that he has consented to an online visit or consultation  He understands that the online visit is based solely on information provided by him, and that, in the absence of a face-to-face physical evaluation by the physician, the diagnosis he receives is both limited and provisional in terms of accuracy and completeness  This is not intended to replace a full medical face-to-face evaluation by the physician  Natasha Collins  understands and accepts these terms

## 2021-01-27 NOTE — LETTER
January 31, 2021     Brendan Johnson MD  [address] [facility]  [address]  [address] [id number]    Patient: Arben Vargas  YOB: 1945   Date of Visit: 1/27/2021       Dear Dr Tucker Gupta: Thank you for referring Teodoro Landers to me for evaluation  Below are my notes for this consultation  If you have questions, please do not hesitate to call me  I look forward to following your patient along with you  Sincerely,        Aaron Barker MD        CC: Arben Vargas  MD Aaron De León MD  1/31/2021  6:29 PM  Sign when Signing Visit  It was my intent to perform this visit via video technology but the patient was not able to do a video connection so the visit was completed via audio telephone only  Virtual Regular Visit      Assessment/Plan:     1  SVT   review of prior records- heart rate in 140s - raise suspicion of reentry tachycardia   review of event monitor- heart rate is variable in tachycardia- suspicion of atrial tachycardia    The patient does inform of Previous episodes which have resulted in hospital visits and lightheadedness     30 day event monitor has been reviewed   over 6000 episodes of SVT   Majority of them are very brief   Longest  episode however lasted 1 hour and 12 minutes    patient denied any major symptoms during this duration of event monitoring  His EF is normalized to 50%       my recommendation for this patient   - continue patient on metoprolol succinate,  25 mg 2 times daily  -if any further episode of tachycardia or hospital visit, proceed with ablation   suspicion of pulmonary vein tachycardia and hence pulmonary vein isolation may be needed             2   Congestive heart failure- LVEF around 40-45% -  Currently improved to 50%  Patient does have leg swelling  body weight 119 kg low, BMI 32 7    Cardiac catheterization, confirmed by the patient, was not available in the patient's chart  Need to obtain these records     Blood pressure is not well controlled  He has symptoms suggestive of obstructive sleep apnea  High dietary salt intake     Patient needs to be started on full therapy for the same  Will need beta-blocker, ACE-inhibitor/ ARB or isosorbide/hydralazine  Referred back to primary cardiologist for management of the same           3  Hypertension  Blood pressure is 146/88  During previous visit   I do not have blood pressure as of today  Discussed need to stop salt  Patient will need to be started on beta-blocker, isosorbide, hydralazine  Patient prefers to discuss with primary cardiologist as he will have more regular follow-up with then        4  Hyperlipidemia  Patient is currently on rosuvastatin  No myositis or myalgia  Continue with the same        5  Bilateral pulmonary embolism  As per patient this was not provoked   his on apixaban  He is going to be on a blood thinner which is to be continued indefinitely        6  long-term anticoagulation  On Eliquis for bilateral pulmonary embolism        7  Obesity  Discussed role of diet and exercise  Diet is responsible for 80% of obesity  BMI is 33           8   Obstructive sleep apnea  Patient has a history of snoring, morning fatigue and daytime sleepiness  He has a  large thick neck and crowded posterior pharynx  He needs to be evaluated and treated for sleep apnea              Summary of my recommendation for the patient   patient does have SVT- AT/PV tachycardia possible -  try metoprolol-if it fails will proceed with comprehensive ablation     Aggressive management of hypertension-beta-blocker, isosorbide, hydralazine-to be started by primary cardiologist     Sleep medicine consult to evaluate and treat for sleep apnea               Problem List Items Addressed This Visit        Respiratory    ARLENE (obstructive sleep apnea) - Primary       Cardiovascular and Mediastinum    Bilateral pulmonary embolism (HCC)    SVT (supraventricular tachycardia) ([facility] [address] ) Cardiomyopathy, nonischemic (HCC)       Genitourinary    Chronic kidney disease (CKD), stage II (mild)    BPH with obstruction/lower urinary tract symptoms       Other    Hyperlipidemia    Obesity    History of sickle cell trait    Leg edema, left    Thrombocytopenia (HCC)    Chronic anticoagulation               Reason for visit is   Chief Complaint   Patient presents with    Virtual Regular Visit     3 month follow up, Rare dizziness     Virtual Regular Visit        Encounter provider Alejandra Watson MD    Provider located at [address] [place] [id number]      Recent Visits  Date Type Provider Dept   01/27/21 Telemedicine Alejandra Watson, [address] recent visits within past 7 days and meeting all other requirements     Future Appointments  No visits were found meeting these conditions  Showing future appointments within next 150 days and meeting all other requirements        The patient was identified by name and date of birth  Cassandra Knight  was informed that this is a telemedicine visit and that the visit is being conducted through telephone  My office door was closed  No one else was in the room  He acknowledged consent and understanding of privacy and security of the video platform  The patient has agreed to participate and understands they can discontinue the visit at any time  Patient is aware this is a billable service  Subjective  Cassandra Knight  is a 76 y o  male Who is being followed up for SVT   he underwent 30 day event monitor  He did not feel any significant palpitation  Over 6000 episodes of SVT, most are very brief, longest is 1 hour and 12 minutes  Tachycardia has variable cycle length, suspect automatic, atrial tachycardia, can be pulmonary vein tachycardia  He also underwent echocardiogram which revealed LVEF 50%       as patient is feeling symptomatically  well, he does not want to proceed with ablation at this time   I will recommend use of metoprolol succinate 25 mg 2 times daily  If the same fails will need to proceed with ablation       history of illness  The patient has been referred to me for management of palpitations, SVT - by Dr Abbe Stein     He has significant medical illnesses which include  Supraventricular tachycardia  Nonischemic dilated cardiomyopathy  Obesity  Obstructive sleep apnea  History of bilateral pulmonary embolism  Hyperlipidemia  BPH     As far as palpitations are concerned  Has been present for over 5 years  There has been a recent increase  Most episodes are brief lasting a minute  Recent increase in longer episodes  Associated symptoms of lightheadedness and presyncope     Currently he is having 2-3 episodes a week  Previous records show narrow complex tachycardia, long RP, heart rate around 140 per minute  There is record from 2017 and 2020 which show heart rate in the 140s        The patient is a retired  personal  He is relatively active though not much  His not complaining of anginal like chest pain or chest pressure  His not complaining of worsening orthopnea or PND  He does have chronic leg swelling     Palpitations and presyncope is as described above  There has been no episode of syncope  There is rarely orthostatic lightheadedness     The patient is having exertional intolerance     He does have a history of hypertension  He has diet has been a little uncontrolled lately  Both he and his wife have noted worsening hypertension     The patient does have a history of bilateral pulmonary embolism  He has been on blood thinner ever since then        The patient does have a history of cardiomyopathy  LVEF is around 45%  He informs that there has been a cardiac catheterization  However we were unable to pull up the records for the same - done at [facility] patient is a former smoker  He does not abuse alcohol  He does not use recreational drugs     There is family history of heart disease and hypertension            Past Medical History:   Diagnosis Date    Hyperlipidemia     Post-nasal drip     Pulmonary embolism (HCC)        Past Surgical History:   Procedure Laterality Date    ARTHROSCOPY KNEE Left     30 years ago       Current Outpatient Medications   Medication Sig Dispense Refill    apixaban (ELIQUIS) 5 mg Take 1 tablet (5 mg total) by mouth 2 (two) times a day 60 tablet 11    Ascorbic Acid (VITAMIN C) 100 MG tablet Take 100 mg by mouth daily      fluticasone (FLONASE) 50 mcg/act nasal spray 1 spray into each nostril daily 16 g 0    rosuvastatin (CRESTOR) 20 MG tablet Take 20 mg by mouth daily      tamsulosin (FLOMAX) 0 4 mg Take 0 4 mg by mouth daily with dinner      metoprolol tartrate (LOPRESSOR) 25 mg tablet 1 tab po needed for Palpitations (Patient not taking: Reported on 1/27/2021) 30 tablet 1     No current facility-administered medications for this visit  Allergies   Allergen Reactions    Sildenafil     Simvastatin Myalgia          ambulatory event monitor   October 2020           from sinus rhythm to atrial tachycardia         atrial tachycardia with variable cycle length/  Burst of PAF? sinus bradycardia         atrial tachycardia with variable cycle length            echocardiogram   October 2020  LEFT VENTRICLE:  Systolic function was at the lower limits of normal  Ejection fraction was estimated in the range of 50 % to 55 %  There were no regional wall motion abnormalities  Doppler parameters were consistent with abnormal left ventricular relaxation (grade 1 diastolic dysfunction)      RIGHT VENTRICLE:  The size was normal   Systolic function was normal      LEFT ATRIUM:  The atrium was mildly dilated      RIGHT ATRIUM:  The atrium was mildly dilated      MITRAL VALVE:  There was trace to mild regurgitation      TRICUSPID VALVE:  There was trace regurgitation    Estimated peak PA pressure was 38 mmHg  Nuclear study  October 2019  IMPRESSIONS: There was a fixed defect of the basal inferior wall that improves with prone imaging suggesting it is secondary to attenuation artifact  There was no diagnostic ischemia  Left ventricular function was low normal with possible  hypokinesis of the basal inferior wall  The patient had a brief episode of atrial flutter with regadenoson infusion            Echocardiogram  July 2018  LEFT VENTRICLE:  Ejection fraction was estimated to be 45 % to 50%  There was mild diffuse hypokinesis           EKG of tachycardia  The patient does get recurrent tachycardia, narrow complex, long RP  The maximum rate is almost always in the 140s             Review of Systems   All other systems reviewed and are negative  as described in my history of present illness    Video Exam    Vitals:    01/27/21 0932   Weight: 119 kg (262 lb)   Height: 6' 3" (1 905 m)       Physical Exam    this is limited as consultation was done over the phone   Speech is normal   Thought process is normal           VIRTUAL VISIT DISCLAIMER    Frida Alvarez  acknowledges that he has consented to an online visit or consultation  He understands that the online visit is based solely on information provided by him, and that, in the absence of a face-to-face physical evaluation by the physician, the diagnosis he receives is both limited and provisional in terms of accuracy and completeness  This is not intended to replace a full medical face-to-face evaluation by the physician  Frida Alvarez  understands and accepts these terms

## 2021-01-27 NOTE — PROGRESS NOTES
It was my intent to perform this visit via video technology but the patient was not able to do a video connection so the visit was completed via audio telephone only  Virtual Regular Visit      Assessment/Plan:     1  SVT   review of prior records- heart rate in 140s - raise suspicion of reentry tachycardia   review of event monitor- heart rate is variable in tachycardia- suspicion of atrial tachycardia    The patient does inform of Previous episodes which have resulted in hospital visits and lightheadedness     30 day event monitor has been reviewed   over 6000 episodes of SVT   Majority of them are very brief   Longest  episode however lasted 1 hour and 12 minutes    patient denied any major symptoms during this duration of event monitoring  His EF is normalized to 50%       my recommendation for this patient   - continue patient on metoprolol succinate,  25 mg 2 times daily  -if any further episode of tachycardia or hospital visit, proceed with ablation   suspicion of pulmonary vein tachycardia and hence pulmonary vein isolation may be needed             2  Congestive heart failure- LVEF around 40-45% -  Currently improved to 50%  Patient does have leg swelling  body weight 119 kg low, BMI 32 7    Cardiac catheterization, confirmed by the patient, was not available in the patient's chart  Need to obtain these records     Blood pressure is not well controlled  He has symptoms suggestive of obstructive sleep apnea  High dietary salt intake     Patient needs to be started on full therapy for the same  Will need beta-blocker, ACE-inhibitor/ ARB or isosorbide/hydralazine  Referred back to primary cardiologist for management of the same           3   Hypertension  Blood pressure is 146/88  During previous visit   I do not have blood pressure as of today  Discussed need to stop salt  Patient will need to be started on beta-blocker, isosorbide, hydralazine  Patient prefers to discuss with primary cardiologist as he will have more regular follow-up with then        4  Hyperlipidemia  Patient is currently on rosuvastatin  No myositis or myalgia  Continue with the same        5  Bilateral pulmonary embolism  As per patient this was not provoked   his on apixaban  He is going to be on a blood thinner which is to be continued indefinitely        6  long-term anticoagulation  On Eliquis for bilateral pulmonary embolism        7  Obesity  Discussed role of diet and exercise  Diet is responsible for 80% of obesity  BMI is 33           8   Obstructive sleep apnea  Patient has a history of snoring, morning fatigue and daytime sleepiness  He has a  large thick neck and crowded posterior pharynx  He needs to be evaluated and treated for sleep apnea              Summary of my recommendation for the patient   patient does have SVT- AT/PV tachycardia possible -  try metoprolol-if it fails will proceed with comprehensive ablation     Aggressive management of hypertension-beta-blocker, isosorbide, hydralazine-to be started by primary cardiologist     Sleep medicine consult to evaluate and treat for sleep apnea               Problem List Items Addressed This Visit        Respiratory    ARLENE (obstructive sleep apnea) - Primary       Cardiovascular and Mediastinum    Bilateral pulmonary embolism (HCC)    SVT (supraventricular tachycardia) (HCC)    Cardiomyopathy, nonischemic (HCC)       Genitourinary    Chronic kidney disease (CKD), stage II (mild)    BPH with obstruction/lower urinary tract symptoms       Other    Hyperlipidemia    Obesity    History of sickle cell trait    Leg edema, left    Thrombocytopenia (HCC)    Chronic anticoagulation               Reason for visit is   Chief Complaint   Patient presents with    Virtual Regular Visit     3 month follow up, Rare dizziness     Virtual Regular Visit        Encounter provider Taylor Correa MD    Provider located at [address] [place] [address] [facility] [id number]      Recent Visits  Date Type Provider Dept   01/27/21 Telemedicine Abiola Saenz, [address] recent visits within past 7 days and meeting all other requirements     Future Appointments  No visits were found meeting these conditions  Showing future appointments within next 150 days and meeting all other requirements        The patient was identified by name and date of birth  Mitzy Yo  was informed that this is a telemedicine visit and that the visit is being conducted through telephone  My office door was closed  No one else was in the room  He acknowledged consent and understanding of privacy and security of the video platform  The patient has agreed to participate and understands they can discontinue the visit at any time  Patient is aware this is a billable service  Subjective  Mitzy Yo  is a 76 y o  male Who is being followed up for SVT   he underwent 30 day event monitor  He did not feel any significant palpitation  Over 6000 episodes of SVT, most are very brief, longest is 1 hour and 12 minutes  Tachycardia has variable cycle length, suspect automatic, atrial tachycardia, can be pulmonary vein tachycardia  He also underwent echocardiogram which revealed LVEF 50%       as patient is feeling symptomatically  well, he does not want to proceed with ablation at this time   I will recommend use of metoprolol succinate 25 mg 2 times daily  If the same fails will need to proceed with ablation       history of illness  The patient has been referred to me for management of palpitations, SVT - by Dr Burak Dumont     He has significant medical illnesses which include  Supraventricular tachycardia  Nonischemic dilated cardiomyopathy  Obesity  Obstructive sleep apnea  History of bilateral pulmonary embolism  Hyperlipidemia  BPH     As far as palpitations are concerned  Has been present for over 5 years  There has been a recent increase  Most episodes are brief lasting a minute  Recent increase in longer episodes  Associated symptoms of lightheadedness and presyncope     Currently he is having 2-3 episodes a week  Previous records show narrow complex tachycardia, long RP, heart rate around 140 per minute  There is record from 2017 and 2020 which show heart rate in the 140s        The patient is a retired  personal  He is relatively active though not much  His not complaining of anginal like chest pain or chest pressure  His not complaining of worsening orthopnea or PND  He does have chronic leg swelling     Palpitations and presyncope is as described above  There has been no episode of syncope  There is rarely orthostatic lightheadedness     The patient is having exertional intolerance     He does have a history of hypertension  He has diet has been a little uncontrolled lately  Both he and his wife have noted worsening hypertension     The patient does have a history of bilateral pulmonary embolism  He has been on blood thinner ever since then        The patient does have a history of cardiomyopathy  LVEF is around 45%  He informs that there has been a cardiac catheterization  However we were unable to pull up the records for the same - done at [facility] patient is a former smoker  He does not abuse alcohol  He does not use recreational drugs     There is family history of heart disease and hypertension            Past Medical History:   Diagnosis Date    Hyperlipidemia     Post-nasal drip     Pulmonary embolism ([facility] [address] )        Past Surgical History:   Procedure Laterality Date    ARTHROSCOPY KNEE Left     30 years ago       Current Outpatient Medications   Medication Sig Dispense Refill    apixaban (ELIQUIS) 5 mg Take 1 tablet (5 mg total) by mouth 2 (two) times a day 60 tablet 11    Ascorbic Acid (VITAMIN C) 100 MG tablet Take 100 mg by mouth daily      fluticasone (FLONASE) 50 mcg/act nasal spray 1 spray into each nostril daily 16 g 0    rosuvastatin (CRESTOR) 20 MG tablet Take 20 mg by mouth daily      tamsulosin (FLOMAX) 0 4 mg Take 0 4 mg by mouth daily with dinner      metoprolol tartrate (LOPRESSOR) 25 mg tablet 1 tab po needed for Palpitations (Patient not taking: Reported on 1/27/2021) 30 tablet 1     No current facility-administered medications for this visit  Allergies   Allergen Reactions    Sildenafil     Simvastatin Myalgia          ambulatory event monitor   October 2020           from sinus rhythm to atrial tachycardia         atrial tachycardia with variable cycle length/  Burst of PAF? sinus bradycardia         atrial tachycardia with variable cycle length            echocardiogram   October 2020  LEFT VENTRICLE:  Systolic function was at the lower limits of normal  Ejection fraction was estimated in the range of 50 % to 55 %  There were no regional wall motion abnormalities  Doppler parameters were consistent with abnormal left ventricular relaxation (grade 1 diastolic dysfunction)      RIGHT VENTRICLE:  The size was normal   Systolic function was normal      LEFT ATRIUM:  The atrium was mildly dilated      RIGHT ATRIUM:  The atrium was mildly dilated      MITRAL VALVE:  There was trace to mild regurgitation      TRICUSPID VALVE:  There was trace regurgitation  Estimated peak PA pressure was 38 mmHg  Nuclear study  October 2019  IMPRESSIONS: There was a fixed defect of the basal inferior wall that improves with prone imaging suggesting it is secondary to attenuation artifact  There was no diagnostic ischemia  Left ventricular function was low normal with possible  hypokinesis of the basal inferior wall  The patient had a brief episode of atrial flutter with regadenoson infusion            Echocardiogram  July 2018  LEFT VENTRICLE:  Ejection fraction was estimated to be 45 % to 50%  There was mild diffuse hypokinesis             EKG of tachycardia  The patient does get recurrent tachycardia, narrow complex, long RP  The maximum rate is almost always in the 140s             Review of Systems   All other systems reviewed and are negative  as described in my history of present illness    Video Exam    Vitals:    01/27/21 0932   Weight: 119 kg (262 lb)   Height: 6' 3" (1 905 m)       Physical Exam    this is limited as consultation was done over the phone   Speech is normal   Thought process is normal           VIRTUAL VISIT DISCLAIMER    Estephanie Lainez  acknowledges that he has consented to an online visit or consultation  He understands that the online visit is based solely on information provided by him, and that, in the absence of a face-to-face physical evaluation by the physician, the diagnosis he receives is both limited and provisional in terms of accuracy and completeness  This is not intended to replace a full medical face-to-face evaluation by the physician  Estephanie Lainez  understands and accepts these terms

## 2021-01-31 RX ORDER — METOPROLOL SUCCINATE 25 MG/1
25 TABLET, EXTENDED RELEASE ORAL 2 TIMES DAILY
Qty: 60 TABLET | Refills: 5 | Status: SHIPPED | OUTPATIENT
Start: 2021-01-31 | End: 2021-04-02 | Stop reason: SDUPTHER

## 2021-02-12 ENCOUNTER — HOSPITAL ENCOUNTER (OUTPATIENT)
Dept: NON INVASIVE DIAGNOSTICS | Facility: CLINIC | Age: 76
Discharge: HOME | End: 2021-02-12
Payer: COMMERCIAL

## 2021-02-12 DIAGNOSIS — R06.02 SHORTNESS OF BREATH: ICD-10-CM

## 2021-02-12 PROCEDURE — G1004 CDSM NDSC: HCPCS

## 2021-02-12 PROCEDURE — 78452 HT MUSCLE IMAGE SPECT MULT: CPT

## 2021-02-12 PROCEDURE — 78452 HT MUSCLE IMAGE SPECT MULT: CPT | Performed by: INTERNAL MEDICINE

## 2021-02-12 PROCEDURE — 93017 CV STRESS TEST TRACING ONLY: CPT

## 2021-02-12 PROCEDURE — 93016 CV STRESS TEST SUPVJ ONLY: CPT | Performed by: INTERNAL MEDICINE

## 2021-02-12 PROCEDURE — 93018 CV STRESS TEST I&R ONLY: CPT | Performed by: INTERNAL MEDICINE

## 2021-02-12 PROCEDURE — A9502 TC99M TETROFOSMIN: HCPCS

## 2021-02-12 RX ADMIN — REGADENOSON 0.4 MG: 0.08 INJECTION, SOLUTION INTRAVENOUS at 13:00

## 2021-02-15 ENCOUNTER — TELEPHONE (OUTPATIENT)
Dept: CARDIOLOGY CLINIC | Facility: CLINIC | Age: 76
End: 2021-02-15

## 2021-02-15 NOTE — TELEPHONE ENCOUNTER
Stress test shows some abnormalities with ejection fraction 39%  Lower than the previous readings  Please schedule him for an office visit next week to go over this and possibility of cardiac catheterization

## 2021-02-16 LAB
MAX DIASTOLIC BP: 90 MMHG
MAX HEART RATE: 101 BPM
MAX PREDICTED HEART RATE: 145 BPM
MAX. SYSTOLIC BP: 152 MMHG
PROTOCOL NAME: NORMAL
REASON FOR TERMINATION: NORMAL
TARGET HR FORMULA: NORMAL
TEST INDICATION: NORMAL
TIME IN EXERCISE PHASE: NORMAL

## 2021-02-22 ENCOUNTER — APPOINTMENT (OUTPATIENT)
Dept: LAB | Facility: CLINIC | Age: 76
End: 2021-02-22
Payer: COMMERCIAL

## 2021-02-22 ENCOUNTER — TELEPHONE (OUTPATIENT)
Dept: CARDIOLOGY CLINIC | Facility: CLINIC | Age: 76
End: 2021-02-22

## 2021-02-22 ENCOUNTER — OFFICE VISIT (OUTPATIENT)
Dept: CARDIOLOGY CLINIC | Facility: CLINIC | Age: 76
End: 2021-02-22
Payer: COMMERCIAL

## 2021-02-22 VITALS
BODY MASS INDEX: 33.07 KG/M2 | OXYGEN SATURATION: 97 % | SYSTOLIC BLOOD PRESSURE: 144 MMHG | HEIGHT: 75 IN | DIASTOLIC BLOOD PRESSURE: 80 MMHG | WEIGHT: 266 LBS | HEART RATE: 67 BPM

## 2021-02-22 DIAGNOSIS — I42.8 CARDIOMYOPATHY, NONISCHEMIC (HCC): Primary | ICD-10-CM

## 2021-02-22 DIAGNOSIS — I26.99 BILATERAL PULMONARY EMBOLISM (HCC): ICD-10-CM

## 2021-02-22 DIAGNOSIS — Z79.01 CHRONIC ANTICOAGULATION: ICD-10-CM

## 2021-02-22 DIAGNOSIS — R60.0 LEG EDEMA, LEFT: ICD-10-CM

## 2021-02-22 DIAGNOSIS — I42.0 DILATED CARDIOMYOPATHY (HCC): ICD-10-CM

## 2021-02-22 DIAGNOSIS — E66.09 CLASS 1 OBESITY DUE TO EXCESS CALORIES WITH SERIOUS COMORBIDITY AND BODY MASS INDEX (BMI) OF 33.0 TO 33.9 IN ADULT: ICD-10-CM

## 2021-02-22 LAB
ALBUMIN SERPL BCP-MCNC: 3.9 G/DL (ref 3.5–5)
ALP SERPL-CCNC: 67 U/L (ref 46–116)
ALT SERPL W P-5'-P-CCNC: 21 U/L (ref 12–78)
ANION GAP SERPL CALCULATED.3IONS-SCNC: 5 MMOL/L (ref 4–13)
AST SERPL W P-5'-P-CCNC: 16 U/L (ref 5–45)
BASOPHILS # BLD AUTO: 0.01 THOUSANDS/ΜL (ref 0–0.1)
BASOPHILS NFR BLD AUTO: 0 % (ref 0–1)
BILIRUB SERPL-MCNC: 0.54 MG/DL (ref 0.2–1)
BUN SERPL-MCNC: 14 MG/DL (ref 5–25)
CALCIUM SERPL-MCNC: 9.5 MG/DL (ref 8.3–10.1)
CHLORIDE SERPL-SCNC: 108 MMOL/L (ref 100–108)
CO2 SERPL-SCNC: 31 MMOL/L (ref 21–32)
CREAT SERPL-MCNC: 1.29 MG/DL (ref 0.6–1.3)
EOSINOPHIL # BLD AUTO: 0.06 THOUSAND/ΜL (ref 0–0.61)
EOSINOPHIL NFR BLD AUTO: 1 % (ref 0–6)
ERYTHROCYTE [DISTWIDTH] IN BLOOD BY AUTOMATED COUNT: 14.6 % (ref 11.6–15.1)
GFR SERPL CREATININE-BSD FRML MDRD: 62 ML/MIN/1.73SQ M
GLUCOSE P FAST SERPL-MCNC: 108 MG/DL (ref 65–99)
HCT VFR BLD AUTO: 42.2 % (ref 36.5–49.3)
HGB BLD-MCNC: 13.7 G/DL (ref 12–17)
IMM GRANULOCYTES # BLD AUTO: 0.02 THOUSAND/UL (ref 0–0.2)
IMM GRANULOCYTES NFR BLD AUTO: 1 % (ref 0–2)
INR PPP: 1.15 (ref 0.84–1.19)
LYMPHOCYTES # BLD AUTO: 1.31 THOUSANDS/ΜL (ref 0.6–4.47)
LYMPHOCYTES NFR BLD AUTO: 31 % (ref 14–44)
MCH RBC QN AUTO: 30.9 PG (ref 26.8–34.3)
MCHC RBC AUTO-ENTMCNC: 32.5 G/DL (ref 31.4–37.4)
MCV RBC AUTO: 95 FL (ref 82–98)
MONOCYTES # BLD AUTO: 0.51 THOUSAND/ΜL (ref 0.17–1.22)
MONOCYTES NFR BLD AUTO: 12 % (ref 4–12)
NEUTROPHILS # BLD AUTO: 2.33 THOUSANDS/ΜL (ref 1.85–7.62)
NEUTS SEG NFR BLD AUTO: 55 % (ref 43–75)
NRBC BLD AUTO-RTO: 0 /100 WBCS
PLATELET # BLD AUTO: 146 THOUSANDS/UL (ref 149–390)
PMV BLD AUTO: 12.8 FL (ref 8.9–12.7)
POTASSIUM SERPL-SCNC: 4.1 MMOL/L (ref 3.5–5.3)
PROT SERPL-MCNC: 7.4 G/DL (ref 6.4–8.2)
PROTHROMBIN TIME: 14.7 SECONDS (ref 11.6–14.5)
RBC # BLD AUTO: 4.44 MILLION/UL (ref 3.88–5.62)
SODIUM SERPL-SCNC: 144 MMOL/L (ref 136–145)
WBC # BLD AUTO: 4.24 THOUSAND/UL (ref 4.31–10.16)

## 2021-02-22 PROCEDURE — 36415 COLL VENOUS BLD VENIPUNCTURE: CPT | Performed by: INTERNAL MEDICINE

## 2021-02-22 PROCEDURE — 85025 COMPLETE CBC W/AUTO DIFF WBC: CPT | Performed by: INTERNAL MEDICINE

## 2021-02-22 PROCEDURE — 80053 COMPREHEN METABOLIC PANEL: CPT | Performed by: INTERNAL MEDICINE

## 2021-02-22 PROCEDURE — 99214 OFFICE O/P EST MOD 30 MIN: CPT | Performed by: INTERNAL MEDICINE

## 2021-02-22 PROCEDURE — 85610 PROTHROMBIN TIME: CPT | Performed by: INTERNAL MEDICINE

## 2021-02-22 NOTE — H&P (VIEW-ONLY)
[facility]  [phone] [facility] [place] [address]  Cardiology Follow Up    Maryuri Palacios   1945  [id number]      1  Cardiomyopathy, nonischemic ([facility] [address] )     2  Chronic anticoagulation     3  Bilateral pulmonary embolism ([facility] [address] )     4  Leg edema, left     5  Class 1 obesity due to excess calories with serious comorbidity and body mass index (BMI) of 33 0 to 33 9 in adult         Chief Complaint   Patient presents with    Follow-up       Interval History:   Patient presents for follow-up visit  Patient had symptoms of shortness of breath  Patient had a pharmacological nuclear stress test which showed ejection fraction of 39% which is  Low  Patient does have history of intermittent cardiomyopathy  Patient never had any cardiac evaluation with cardiac catheterization  Patient does have history of supraventricular tachycardia as well as obesity sleep apnea and history of recurrent pulmonary embolism on anticoagulation      Patient Active Problem List   Diagnosis    Bilateral pulmonary embolism (HCC)    SOB (shortness of breath)    Hyperlipidemia    Lung nodule    Obesity    History of sickle cell trait    Leg edema, left    Thrombocytopenia (HCC)    Chronic kidney disease (CKD), stage II (mild)    Chronic low back pain    SVT (supraventricular tachycardia) (HCC)    Chronic anticoagulation    ARLENE (obstructive sleep apnea)    Cardiomyopathy, nonischemic (HCC)    Dyslipidemia    BPH with obstruction/lower urinary tract symptoms    Nephrolithiasis    Lumbar disc disease with radiculopathy - Left    Spinal stenosis of lumbar region without neurogenic claudication - Left     Past Medical History:   Diagnosis Date    Hyperlipidemia     Post-nasal drip     Pulmonary embolism (HCC)      Social History     Socioeconomic History    Marital status: /Civil Union     Spouse name: Not on file    Number of children: 7    Years of education: Not on file    Highest education level: Not on file   Occupational History    Occupation: employed   Social Needs    Financial resource strain: Not on file    Food insecurity     Worry: Not on file     Inability: Not on file   [employer] needs     Medical: Not on file     Non-medical: Not on file   Tobacco Use    Smoking status: Former Smoker    Smokeless tobacco: Never Used   Substance and Sexual Activity    Alcohol use: Yes     Alcohol/week: 0 0 standard drinks     Comment: socially    Drug use: No    Sexual activity: Not on file   Lifestyle    Physical activity     Days per week: Not on file     Minutes per session: Not on file    Stress: Not on file   Relationships    Social connections     Talks on phone: Not on file     Gets together: Not on file     Attends Zoroastrian service: Not on file     Active member of club or organization: Not on file     Attends meetings of clubs or organizations: Not on file     Relationship status: Not on file    Intimate partner violence     Fear of current or ex partner: Not on file     Emotionally abused: Not on file     Physically abused: Not on file     Forced sexual activity: Not on file   Other Topics Concern    Not on file   Social History Narrative    Not on file      No family history on file    Past Surgical History:   Procedure Laterality Date    ARTHROSCOPY KNEE Left     30 years ago       Current Outpatient Medications:     apixaban (ELIQUIS) 5 mg, Take 1 tablet (5 mg total) by mouth 2 (two) times a day, Disp: 60 tablet, Rfl: 11    Ascorbic Acid (VITAMIN C) 100 MG tablet, Take 100 mg by mouth daily, Disp: , Rfl:     fluticasone (FLONASE) 50 mcg/act nasal spray, 1 spray into each nostril daily, Disp: 16 g, Rfl: 0    tamsulosin (FLOMAX) 0 4 mg, Take 0 4 mg by mouth daily with dinner, Disp: , Rfl:     metoprolol succinate (TOPROL-XL) 25 mg 24 hr tablet, Take 1 tablet (25 mg total) by mouth 2 (two) times a day (Patient not taking: Reported on 2/22/2021), Disp: 60 tablet, Rfl: 5    rosuvastatin (CRESTOR) 20 MG tablet, Take 20 mg by mouth daily, Disp: , Rfl:   Allergies   Allergen Reactions    Sildenafil     Simvastatin Myalgia       Labs:  Hospital Outpatient Visit on 02/12/2021   Component Date Value    Protocol Name 02/12/2021 LEXISCAN-SIT     Time In Exercise Phase 02/12/2021 00:03:00     MAX  SYSTOLIC BP 63/07/7260 701     Max Diastolic Bp 34/44/6033 90     Max Heart Rate 02/12/2021 101     Max Predicted Heart Rate 02/12/2021 145     Reason for Termination 02/12/2021 Protocol Complete     Test Indication 02/12/2021 SOB     Target Hr Formular 02/12/2021 (220 - Age)*85%      Imaging: No results found  Review of Systems:  Review of Systems     REVIEW OF SYSTEMS:  Constitutional:  Denies fever or chills   Eyes:  Denies change in visual acuity   HENT:  Denies nasal congestion or sore throat   Respiratory:   shortness of breath   Cardiovascular:  Denies chest pain or edema   GI:  Denies abdominal pain, nausea, vomiting, bloody stools or diarrhea   :  Denies dysuria, frequency, difficulty in micturition and nocturia  Musculoskeletal:  Denies back pain or joint pain   Neurologic:  Denies headache, focal weakness or sensory changes   Endocrine:  Denies polyuria or polydipsia   Lymphatic:  Denies swollen glands   Psychiatric:  Denies depression or anxiety     Physical Exam:    /80   Pulse 67   Ht 6' 3" (1 905 m)   Wt 121 kg (266 lb)   SpO2 97%   BMI 33 25 kg/m²     Physical Exam   PHYSICAL EXAM:  General:  Patient is not in acute distress   Head: Normocephalic, Atraumatic  HEENT:  Both pupils normal-size atraumatic, normocephalic, nonicteric  Neck:  JVP not raised  Trachea central  No carotid bruit  Respiratory:  Decreased breath sounds bilateral  Cardiovascular:  Regular rate and rhythm no S3 no murmurs  GI:  Abdomen soft nontender  No organomegaly  Lymphatic:  No cervical or inguinal lymphadenopathy  Neurologic:  Patient is awake alert, oriented    Grossly nonfocal    Extremities trace edema    Discussion/Summary:   patient with symptoms of shortness of breath as well as cardiomyopathy with ejection fraction of 39%  Lengthy discussion with patient regarding options of cardiac catheterization to rule out coronary artery disease as etiology of cardiomyopathy discussed  Risks and benefits of cardiac catheterization and possible revascularization options including but not limited to risk of infection, bleeding, risk of myocardial infarction, stroke, and risk of death discussed at length with patient  Patient understands the risks and benefits and wishes to proceed  Cardiac catheterization will be scheduled   Preprocedure workup will be done  Further cardiac evaluation and management after the results of cardiac catheterization  Patient denies any history of dye allergy  Patient instructed to hold Eliquis 2 days prior to procedure  Continue rest of the medications which were reviewed with the patient  Patient used to have history of alcohol excess but has cut down

## 2021-02-22 NOTE — TELEPHONE ENCOUNTER
S/w pt while in the [facility]  Pt is aware to hold Eliquis 2 days prior as per Dr Vazquez Alert  Pt states that he will go to a [facility] lab for BW  Once results are received, the bed res will be sent to the [facility], this pt is to have a LC @[place] on 2/25/21  Can we check for auth please?  Thank you

## 2021-02-22 NOTE — PROGRESS NOTES
[facility] [place]  [phone] [facility] [place] [address]  Cardiology Follow Up    Compa Hernandez   1945  [id number]      1  Cardiomyopathy, nonischemic ([address] )     2  Chronic anticoagulation     3  Bilateral pulmonary embolism ([facility] [address] )     4  Leg edema, left     5  Class 1 obesity due to excess calories with serious comorbidity and body mass index (BMI) of 33 0 to 33 9 in adult         Chief Complaint   Patient presents with    Follow-up       Interval History:   Patient presents for follow-up visit  Patient had symptoms of shortness of breath  Patient had a pharmacological nuclear stress test which showed ejection fraction of 39% which is  Low  Patient does have history of intermittent cardiomyopathy  Patient never had any cardiac evaluation with cardiac catheterization  Patient does have history of supraventricular tachycardia as well as obesity sleep apnea and history of recurrent pulmonary embolism on anticoagulation      Patient Active Problem List   Diagnosis    Bilateral pulmonary embolism (HCC)    SOB (shortness of breath)    Hyperlipidemia    Lung nodule    Obesity    History of sickle cell trait    Leg edema, left    Thrombocytopenia (HCC)    Chronic kidney disease (CKD), stage II (mild)    Chronic low back pain    SVT (supraventricular tachycardia) (HCC)    Chronic anticoagulation    ARLENE (obstructive sleep apnea)    Cardiomyopathy, nonischemic (HCC)    Dyslipidemia    BPH with obstruction/lower urinary tract symptoms    Nephrolithiasis    Lumbar disc disease with radiculopathy - Left    Spinal stenosis of lumbar region without neurogenic claudication - Left     Past Medical History:   Diagnosis Date    Hyperlipidemia     Post-nasal drip     Pulmonary embolism (HCC)      Social History     Socioeconomic History    Marital status: /Civil Union     Spouse name: Not on file    Number of children: 7    Years of education: Not on file    Highest education level: Not on file   Occupational History    Occupation: employed   Social Needs    Financial resource strain: Not on file    Food insecurity     Worry: Not on file     Inability: Not on file   [employer] needs     Medical: Not on file     Non-medical: Not on file   Tobacco Use    Smoking status: Former Smoker    Smokeless tobacco: Never Used   Substance and Sexual Activity    Alcohol use: Yes     Alcohol/week: 0 0 standard drinks     Comment: socially    Drug use: No    Sexual activity: Not on file   Lifestyle    Physical activity     Days per week: Not on file     Minutes per session: Not on file    Stress: Not on file   Relationships    Social connections     Talks on phone: Not on file     Gets together: Not on file     Attends [employer] service: Not on file     Active member of club or organization: Not on file     Attends meetings of clubs or organizations: Not on file     Relationship status: Not on file    Intimate partner violence     Fear of current or ex partner: Not on file     Emotionally abused: Not on file     Physically abused: Not on file     Forced sexual activity: Not on file   Other Topics Concern    Not on file   Social History Narrative    Not on file      No family history on file    Past Surgical History:   Procedure Laterality Date    ARTHROSCOPY KNEE Left     30 years ago       Current Outpatient Medications:     apixaban (ELIQUIS) 5 mg, Take 1 tablet (5 mg total) by mouth 2 (two) times a day, Disp: 60 tablet, Rfl: 11    Ascorbic Acid (VITAMIN C) 100 MG tablet, Take 100 mg by mouth daily, Disp: , Rfl:     fluticasone (FLONASE) 50 mcg/act nasal spray, 1 spray into each nostril daily, Disp: 16 g, Rfl: 0    tamsulosin (FLOMAX) 0 4 mg, Take 0 4 mg by mouth daily with dinner, Disp: , Rfl:     metoprolol succinate (TOPROL-XL) 25 mg 24 hr tablet, Take 1 tablet (25 mg total) by mouth 2 (two) times a day (Patient not taking: Reported on 2/22/2021), Disp: 60 tablet, Rfl: 5    rosuvastatin (CRESTOR) 20 MG tablet, Take 20 mg by mouth daily, Disp: , Rfl:   Allergies   Allergen Reactions    Sildenafil     Simvastatin Myalgia       Labs:  Hospital Outpatient Visit on 02/12/2021   Component Date Value    Protocol Name 02/12/2021 LEXISCAN-SIT     Time In Exercise Phase 02/12/2021 00:03:00     MAX  SYSTOLIC BP 31/75/8142 405     Max Diastolic Bp 14/36/3490 90     Max Heart Rate 02/12/2021 101     Max Predicted Heart Rate 02/12/2021 145     Reason for Termination 02/12/2021 Protocol Complete     Test Indication 02/12/2021 SOB     Target Hr Formular 02/12/2021 (220 - Age)*85%      Imaging: No results found  Review of Systems:  Review of Systems     REVIEW OF SYSTEMS:  Constitutional:  Denies fever or chills   Eyes:  Denies change in visual acuity   HENT:  Denies nasal congestion or sore throat   Respiratory:   shortness of breath   Cardiovascular:  Denies chest pain or edema   GI:  Denies abdominal pain, nausea, vomiting, bloody stools or diarrhea   :  Denies dysuria, frequency, difficulty in micturition and nocturia  Musculoskeletal:  Denies back pain or joint pain   Neurologic:  Denies headache, focal weakness or sensory changes   Endocrine:  Denies polyuria or polydipsia   Lymphatic:  Denies swollen glands   Psychiatric:  Denies depression or anxiety     Physical Exam:    /80   Pulse 67   Ht 6' 3" (1 905 m)   Wt 121 kg (266 lb)   SpO2 97%   BMI 33 25 kg/m²     Physical Exam   PHYSICAL EXAM:  General:  Patient is not in acute distress   Head: Normocephalic, Atraumatic  HEENT:  Both pupils normal-size atraumatic, normocephalic, nonicteric  Neck:  JVP not raised  Trachea central  No carotid bruit  Respiratory:  Decreased breath sounds bilateral  Cardiovascular:  Regular rate and rhythm no S3 no murmurs  GI:  Abdomen soft nontender  No organomegaly  Lymphatic:  No cervical or inguinal lymphadenopathy  Neurologic:  Patient is awake alert, oriented    Grossly nonfocal    Extremities trace edema    Discussion/Summary:   patient with symptoms of shortness of breath as well as cardiomyopathy with ejection fraction of 39%  Lengthy discussion with patient regarding options of cardiac catheterization to rule out coronary artery disease as etiology of cardiomyopathy discussed  Risks and benefits of cardiac catheterization and possible revascularization options including but not limited to risk of infection, bleeding, risk of myocardial infarction, stroke, and risk of death discussed at length with patient  Patient understands the risks and benefits and wishes to proceed  Cardiac catheterization will be scheduled   Preprocedure workup will be done  Further cardiac evaluation and management after the results of cardiac catheterization  Patient denies any history of dye allergy  Patient instructed to hold Eliquis 2 days prior to procedure  Continue rest of the medications which were reviewed with the patient  Patient used to have history of alcohol excess but has cut down

## 2021-02-25 ENCOUNTER — TELEPHONE (OUTPATIENT)
Dept: CARDIOLOGY CLINIC | Facility: CLINIC | Age: 76
End: 2021-02-25

## 2021-02-25 NOTE — TELEPHONE ENCOUNTER
Would like results from his blood work  Went after appt with us  He is supposed to have another test done and is waiting for a call with the appt       Angela Lewis - [phone]

## 2021-02-25 NOTE — TELEPHONE ENCOUNTER
Please call  Blood work overall good  Patient is supposed to be set up for cardiac catheterization at [facility]    Please check with [facility]

## 2021-02-26 ENCOUNTER — TELEPHONE (OUTPATIENT)
Dept: INTERVENTIONAL RADIOLOGY/VASCULAR | Facility: HOSPITAL | Age: 76
End: 2021-02-26

## 2021-02-26 RX ORDER — SODIUM CHLORIDE 9 MG/ML
75 INJECTION, SOLUTION INTRAVENOUS CONTINUOUS
Status: CANCELLED | OUTPATIENT
Start: 2021-02-26

## 2021-02-26 NOTE — PRE-PROCEDURE INSTRUCTIONS
Informed patient of location, date and time of procedure  [facility]21 at 7am  NPO at midnight  Take meds in morning with sip of water  Hold Eliquis for 2 days prior  Have someone drive him home from procedure  No further questions at this time   Pt verbalized understanding

## 2021-03-03 ENCOUNTER — HOSPITAL ENCOUNTER (OUTPATIENT)
Dept: INTERVENTIONAL RADIOLOGY/VASCULAR | Facility: HOSPITAL | Age: 76
Discharge: HOME | End: 2021-03-03
Attending: INTERNAL MEDICINE | Admitting: INTERNAL MEDICINE
Payer: COMMERCIAL

## 2021-03-03 VITALS
DIASTOLIC BLOOD PRESSURE: 86 MMHG | WEIGHT: 269.4 LBS | HEART RATE: 63 BPM | SYSTOLIC BLOOD PRESSURE: 145 MMHG | RESPIRATION RATE: 18 BRPM | TEMPERATURE: 97.6 F | OXYGEN SATURATION: 98 % | HEIGHT: 75 IN | BODY MASS INDEX: 33.5 KG/M2

## 2021-03-03 DIAGNOSIS — I42.0 DILATED CARDIOMYOPATHY (HCC): ICD-10-CM

## 2021-03-03 PROCEDURE — 93458 L HRT ARTERY/VENTRICLE ANGIO: CPT | Performed by: INTERNAL MEDICINE

## 2021-03-03 PROCEDURE — C1894 INTRO/SHEATH, NON-LASER: HCPCS | Performed by: INTERNAL MEDICINE

## 2021-03-03 PROCEDURE — 99152 MOD SED SAME PHYS/QHP 5/>YRS: CPT | Performed by: INTERNAL MEDICINE

## 2021-03-03 PROCEDURE — C1769 GUIDE WIRE: HCPCS | Performed by: INTERNAL MEDICINE

## 2021-03-03 PROCEDURE — 99153 MOD SED SAME PHYS/QHP EA: CPT | Performed by: INTERNAL MEDICINE

## 2021-03-03 RX ORDER — ADENOSINE 3 MG/ML
INJECTION INTRAVENOUS CODE/TRAUMA/SEDATION MEDICATION
Status: COMPLETED | OUTPATIENT
Start: 2021-03-03 | End: 2021-03-03

## 2021-03-03 RX ORDER — LIDOCAINE WITH 8.4% SOD BICARB 0.9%(10ML)
SYRINGE (ML) INJECTION CODE/TRAUMA/SEDATION MEDICATION
Status: COMPLETED | OUTPATIENT
Start: 2021-03-03 | End: 2021-03-03

## 2021-03-03 RX ORDER — SODIUM CHLORIDE 9 MG/ML
75 INJECTION, SOLUTION INTRAVENOUS CONTINUOUS
Status: DISCONTINUED | OUTPATIENT
Start: 2021-03-03 | End: 2021-03-07 | Stop reason: HOSPADM

## 2021-03-03 RX ORDER — MIDAZOLAM HYDROCHLORIDE 2 MG/2ML
INJECTION, SOLUTION INTRAMUSCULAR; INTRAVENOUS CODE/TRAUMA/SEDATION MEDICATION
Status: COMPLETED | OUTPATIENT
Start: 2021-03-03 | End: 2021-03-03

## 2021-03-03 RX ORDER — NITROGLYCERIN 20 MG/100ML
INJECTION INTRAVENOUS CODE/TRAUMA/SEDATION MEDICATION
Status: COMPLETED | OUTPATIENT
Start: 2021-03-03 | End: 2021-03-03

## 2021-03-03 RX ORDER — FENTANYL CITRATE 50 UG/ML
INJECTION, SOLUTION INTRAMUSCULAR; INTRAVENOUS CODE/TRAUMA/SEDATION MEDICATION
Status: COMPLETED | OUTPATIENT
Start: 2021-03-03 | End: 2021-03-03

## 2021-03-03 RX ORDER — HEPARIN SODIUM 1000 [USP'U]/ML
INJECTION, SOLUTION INTRAVENOUS; SUBCUTANEOUS CODE/TRAUMA/SEDATION MEDICATION
Status: COMPLETED | OUTPATIENT
Start: 2021-03-03 | End: 2021-03-03

## 2021-03-03 RX ADMIN — ADENOSINE 6 MG: 3 INJECTION, SOLUTION INTRAVENOUS at 08:39

## 2021-03-03 RX ADMIN — HEPARIN SODIUM 5000 UNITS: 1000 INJECTION INTRAVENOUS; SUBCUTANEOUS at 08:23

## 2021-03-03 RX ADMIN — NITROGLYCERIN 200 MCG: 20 INJECTION INTRAVENOUS at 08:21

## 2021-03-03 RX ADMIN — IODIXANOL 30 ML: 320 INJECTION, SOLUTION INTRAVASCULAR at 08:40

## 2021-03-03 RX ADMIN — FENTANYL CITRATE 50 MCG: 50 INJECTION, SOLUTION INTRAMUSCULAR; INTRAVENOUS at 08:10

## 2021-03-03 RX ADMIN — Medication 1 ML: at 08:19

## 2021-03-03 RX ADMIN — MIDAZOLAM HYDROCHLORIDE 1 MG: 1 INJECTION, SOLUTION INTRAMUSCULAR; INTRAVENOUS at 08:10

## 2021-03-03 NOTE — INTERVAL H&P NOTE
Update: (This section must be completed if the H&P was completed greater than 24 hrs to procedure or admission)    H&P reviewed  After examining the patient, I find no changed to the H&P since it had been written  Patient re-evaluated   Accept as history and physical     Parth MD Almaz/March 3, 2021/7:58 AM

## 2021-03-03 NOTE — BRIEF OP NOTE (RAD/CATH)
Indication:  1  Decrease in LV function  2  Multiple risk factors for coronary artery disease  3  History of supraventricular tachycardia  The patient presented to the [facility] area is supraventricular tachycardia which was asymptomatic  It was at a rate of 130 beats per minute  He also had it during the cardiac catheterization requiring 6 mg of IV adenosine  Procedure performed:    1  Left heart catheterization  2  Selective coronary angiography  3  Right radial artery access  4  Conscious sedation  5  IV adenosine  Coronary angiography  1  Left main coronary artery short free of significant disease  2  Left anterior descending coronary artery extends around the apex and has multiple diagonal branches all of which are widely patent  The LAD wraps around the apex of type 3  3  Left circumflex coronary artery is widely patent as well supplying the proximal portion of the PDA  Conclusions:  1  No evidence of significant epicardial disease and a left dominant system  2  A nondominant right coronary artery was not cannulated due to clinical situation and marked tortuosity  3  Successful abatement of his supraventricular tachycardia with 6 mg of IV adenosine  The results procedure were discussed with the patient and his primary cardiologist     Recommendation:  1  Patient will follow up with his primary cardiologist and electrophysiologist in the near future            I am currently unable to complete a formal dictation due to information technology connectivity issues

## 2021-03-03 NOTE — DISCHARGE INSTRUCTIONS
After Radial Heart Catheterization   WHAT YOU NEED TO KNOW:   What will happen after a radial heart catheterization? · You will be attached to a heart monitor until you are fully awake  A heart monitor is an EKG that stays on continuously to record your heart's electrical activity  Healthcare providers will monitor your vital signs and pulses in your arm  They will frequently check your pressure bandage for bleeding or swelling  · You may have a band wrapped tightly around your wrist  The band puts pressure on your wound and helps prevent bleeding  A healthcare provider can put air into the band or remove air from the band  A healthcare provider will gradually remove air from the band and decrease pressure on your wrist  The band may be removed in 2 hours or when your wound stops bleeding  · You will need to keep your wrist straight for 2 to 4 hours  Do not  push or pull with your arm  Arm movements can cause serious bleeding  After you are monitored for several hours, you may go home or may need to stay in the hospital overnight  What do I need to know before I go home? · Care for your wound as directed  Remove the pressure bandage in 24 hours or as directed  Mild bruising is normal and expected  A small bandage can be placed on your wound after you remove the pressure bandage  Do not put powders, lotions, or creams on your wound  They may cause your wound to get infected  Monitor your wound every day for signs of infection, such as redness, swelling, or pus  · Shower the day after your procedure or as directed  Remove your pressure bandage before you shower  Do not take baths or go in hot tubs or pools  Carefully wash the wound with soap and water  Pat the area dry  A small bandage can be placed on your wound after you shower  · Apply firm, steady pressure to your wound if it bleeds  Apply pressure with a clean gauze or towel for 5 to 10 minutes   Call 911 if bleeding becomes heavy or does not stop  · Drink liquids as directed  Liquids will help flush the contrast liquid from your body  Ask how much liquid to drink each day and which liquids are best for you  · Do not lift anything heavier than 5 pounds until directed by your healthcare provider  Heavy lifting can put stress on your wound and cause bleeding  Do not push or pull with the arm that was used for the procedure  Do not do vigorous activity for at least 48 hours  Vigorous activity may cause bleeding from your wound  Rest and do quiet activities  Take short walks around the house to prevent a blood clot  Ask your healthcare provider when you can return to your normal activities  · Do not drive or return to work until your healthcare provider says it is okay  Your healthcare provider may tell you to wait 48 hours before you drive to decrease your risk for bleeding  You may not be able to return to work for at least 2 days after your procedure if your job involves heavy lifting  What medicines may I need? You may need any of the following:  · Blood thinners  help prevent blood clots  Clots can cause strokes, heart attacks, and death  The following are general safety guidelines to follow while you are taking a blood thinner:    ? Watch for bleeding and bruising while you take blood thinners  Watch for bleeding from your gums or nose  Watch for blood in your urine and bowel movements  Use a soft washcloth on your skin, and a soft toothbrush to brush your teeth  This can keep your skin and gums from bleeding  If you shave, use an electric shaver  Do not play contact sports  ? Tell your dentist and other healthcare providers that you take a blood thinner  Wear a bracelet or necklace that says you take this medicine  ? Do not start or stop any other medicines unless your healthcare provider tells you to  Many medicines cannot be used with blood thinners      ? Take your blood thinner exactly as prescribed by your healthcare provider  Do not skip does or take less than prescribed  Tell your provider right away if you forget to take your blood thinner, or if you take too much  ? Warfarin  is a blood thinner that you may need to take  The following are things you should be aware of if you take warfarin:     § Foods and medicines can affect the amount of warfarin in your blood  Do not make major changes to your diet while you take warfarin  Warfarin works best when you eat about the same amount of vitamin K every day  Vitamin K is found in green leafy vegetables and certain other foods  Ask for more information about what to eat when you are taking warfarin  § You will need to see your healthcare provider for follow-up visits when you are on warfarin  You will need regular blood tests  These tests are used to decide how much medicine you need  · Acetaminophen  helps decrease pain and fever  This medicine is available without a doctor's order  Ask how much medicine is safe to take, and how often to take it  Acetaminophen can cause liver damage if not taken correctly  · Take your medicine as directed  Contact your healthcare provider if you think your medicine is not helping or if you have side effects  Tell him or her if you are allergic to any medicine  Keep a list of the medicines, vitamins, and herbs you take  Include the amounts, and when and why you take them  Bring the list or the pill bottles to follow-up visits  Carry your medicine list with you in case of an emergency  Call your local emergency number (911 in the [address] [facility]) if:   · You have chest pain  · You have any of the following signs of a heart attack:      ? Squeezing, pressure, or pain in your chest    ? You may  also have any of the following:     ? Discomfort or pain in your back, neck, jaw, stomach, or arm    ? Shortness of breath    ? Nausea or vomiting    ?  Lightheadedness or a sudden cold sweat    · You have any of the following signs of a stroke:      ? Numbness or drooping on one side of your face     ? Weakness in an arm or leg    ? Confusion or difficulty speaking    ? Dizziness, a severe headache, or vision loss    · You cough up blood  · You have trouble breathing  · You cannot stop the bleeding from your wound even after you hold firm pressure for 10 minutes  When should I call my doctor? · You have a fever or chills  · Blood soaks through your bandage  · Your stitches come apart  · Your hand or arm feels numb, cool, or looks pale  · Your wound gets swollen quickly  · Your wound is red, swollen, or draining pus  · Your wound looks more bruised or you have new bruising on the side of your wrist      · You have nausea or are vomiting  · Your skin is itchy, swollen, or you have a rash  · You have questions or concerns about your condition or care  Healthy living tips: The following are general healthy guidelines  If your chest pain is caused by a heart problem, your healthcare provider will give you specific guidelines to follow  · Manage other health conditions  Diabetes and high cholesterol increases your risk for another heart attack and stroke  Talk to your healthcare provider about your management plan  He or she will make a plan that helps you manage your conditions  · Do not smoke  Nicotine and other chemicals in cigarettes and cigars can cause lung and heart damage  Ask your healthcare provider for information if you currently smoke and need help to quit  E-cigarettes or smokeless tobacco still contain nicotine  Talk to your healthcare provider before you use these products  · Eat a variety of healthy, low-fat, low-salt foods  Healthy foods include fruits, vegetables, whole-grain breads, low-fat dairy products, beans, lean meats, and fish  Ask for more information about a heart healthy diet  · Drink plenty of water every day  Your body is made of mostly water   Water helps your body to control your temperature and blood pressure  Ask your healthcare provider how much water you should drink every day  · Ask about activity  Your healthcare provider will tell you which activities to limit or avoid  Ask when you can drive, return to work, and have sex  Ask about the best exercise plan for you  · Maintain a healthy weight  Ask your healthcare provider how much you should weigh  Ask him or her to help you create a weight loss plan if you are overweight  · Get the flu and pneumonia vaccines  All adults should get the influenza (flu) vaccine  Get it every year as soon as it becomes available  The pneumococcal vaccine is given to adults aged 72 years or older  The vaccine is given every 5 years to prevent pneumococcal disease, such as pneumonia  If you have a stent:   · Carry your stent card with you at all times  · Let all healthcare providers know that you have a stent  CARE AGREEMENT:   You have the right to help plan your care  Learn about your health condition and how it may be treated  Discuss treatment options with your healthcare providers to decide what care you want to receive  You always have the right to refuse treatment  The above information is an  only  It is not intended as medical advice for individual conditions or treatments  Talk to your doctor, nurse or pharmacist before following any medical regimen to see if it is safe and effective for you  © Copyright [address] Information is for End User's use only and may not be sold, redistributed or otherwise used for commercial purposes   All illustrations and images included in CareNotes® are the copyrighted property of [employer]  or [address] [place], [place] [address]

## 2021-03-05 ENCOUNTER — TELEPHONE (OUTPATIENT)
Dept: CARDIOLOGY CLINIC | Facility: CLINIC | Age: 76
End: 2021-03-05

## 2021-03-05 DIAGNOSIS — I47.1 SVT (SUPRAVENTRICULAR TACHYCARDIA) (HCC): Primary | ICD-10-CM

## 2021-03-05 NOTE — TELEPHONE ENCOUNTER
Patient's cardiac catheterization did not show any significant coronary artery disease  Patient was found to have recurrent episodes of SVT  Patient had seen Ciro Yen  In the past   I would prefer him to be evaluated for possible SVT ablation before I see him in follow-up  Thank you

## 2021-03-05 NOTE — TELEPHONE ENCOUNTER
Pt would like to know if he should be seen sooner than 4/1 because he had his procedure on 03/03  Please advise

## 2021-03-08 NOTE — TELEPHONE ENCOUNTER
Good Morning Mohinder Canas,    Just verifying you received this message      Thanks,  Leopoldo Phipps

## 2021-03-09 DIAGNOSIS — Z23 ENCOUNTER FOR IMMUNIZATION: ICD-10-CM

## 2021-03-10 ENCOUNTER — HOSPITAL ENCOUNTER (OUTPATIENT)
Dept: RADIOLOGY | Facility: HOSPITAL | Age: 76
Discharge: HOME | End: 2021-03-10
Payer: COMMERCIAL

## 2021-03-10 ENCOUNTER — TRANSCRIBE ORDERS (OUTPATIENT)
Dept: ADMINISTRATIVE | Facility: HOSPITAL | Age: 76
End: 2021-03-10

## 2021-03-10 DIAGNOSIS — M54.2 CERVICALGIA: Primary | ICD-10-CM

## 2021-03-10 DIAGNOSIS — M54.2 CERVICALGIA: ICD-10-CM

## 2021-03-10 DIAGNOSIS — G58.9 PINCHED NERVE: ICD-10-CM

## 2021-03-10 PROCEDURE — 72050 X-RAY EXAM NECK SPINE 4/5VWS: CPT

## 2021-03-12 NOTE — TELEPHONE ENCOUNTER
COVID Pre-Visit Screening     1  Is this a family member screening? Yes  2  Have you traveled outside of your state in the past 2 weeks? No  3  Do you presently have a fever or flu-like symptoms? No  4  Do you have symptoms of an upper respiratory infection like runny nose, sore throat, or cough? No  5  Are you suffering from new headache that you have not had in the past?  No  6  Do you have/have you experienced any new shortness of breath recently? No  7  Do you have any new diarrhea, nausea or vomiting? No  8  Have you been in contact with anyone who has been sick or diagnosed with COVID-19? No  9  Do you have any new loss of taste or smell? No  10  Are you able to wear a mask without a valve for the entire visit? Yes     Patient schedule for SVT ablation at [facility] on 4/12/21 with DR Luis Felipe Nair  Patient aware of general instructions, medications holds (Metoprolol) and blood test require  Please Kenya Roman can you check for insurance approval     Dr Luis Felipe Nair patient is on [employer] any holds to apply? Please advice

## 2021-03-12 NOTE — TELEPHONE ENCOUNTER
He is on Eliquis for pulmonary embolism  Hold it in only the day before and the morning of procedure   It needs to be started immediately after the procedure

## 2021-03-18 ENCOUNTER — TELEPHONE (OUTPATIENT)
Dept: CARDIOLOGY CLINIC | Facility: CLINIC | Age: 76
End: 2021-03-18

## 2021-03-18 ENCOUNTER — TRANSCRIBE ORDERS (OUTPATIENT)
Dept: ADMINISTRATIVE | Facility: HOSPITAL | Age: 76
End: 2021-03-18

## 2021-03-18 DIAGNOSIS — M54.10 RADICULAR SYNDROME: Primary | ICD-10-CM

## 2021-03-19 NOTE — TELEPHONE ENCOUNTER
[address]  [address]  [facility]  Nat Miller would like to have implants done  Because he is on blood thinners, they would like to know if he can have that procedure  If so please call to give instructions on how he should be taking the blood thinners       [address] [facility]  [phone]
Hemal Gamez Bachelor, please see below  Thank you!
Notation from Dr Francesca Quarles  03/05/2021    Set up for ablation  [facility]   Hold metoprolol for 3 days before the ablation
Patient had telemedicine visit with Dr Samy Rosenthal on 1/27/21 and his note states:    Summary of my recommendation for the patient   patient does have SVT- AT/PV tachycardia possible -  try metoprolol-if it fails will proceed with comprehensive ablation
Please see message received from dental office and advise
Thank you Kendra Bustillos !
This patient was supposed to get a SVT ablation through [facility]  Please check the status of the same  I would prefer him to have this done prior to his other procedures 
Home

## 2021-03-23 ENCOUNTER — PREP FOR PROCEDURE (OUTPATIENT)
Dept: UROLOGY | Facility: CLINIC | Age: 76
End: 2021-03-23

## 2021-03-23 ENCOUNTER — OFFICE VISIT (OUTPATIENT)
Dept: UROLOGY | Facility: CLINIC | Age: 76
End: 2021-03-23
Payer: COMMERCIAL

## 2021-03-23 VITALS
DIASTOLIC BLOOD PRESSURE: 72 MMHG | HEART RATE: 75 BPM | HEIGHT: 75 IN | SYSTOLIC BLOOD PRESSURE: 128 MMHG | BODY MASS INDEX: 33.32 KG/M2 | WEIGHT: 268 LBS

## 2021-03-23 DIAGNOSIS — R35.0 BENIGN PROSTATIC HYPERPLASIA WITH URINARY FREQUENCY: Primary | ICD-10-CM

## 2021-03-23 DIAGNOSIS — N40.1 BENIGN PROSTATIC HYPERPLASIA WITH URINARY FREQUENCY: Primary | ICD-10-CM

## 2021-03-23 DIAGNOSIS — N13.8 BPH WITH OBSTRUCTION/LOWER URINARY TRACT SYMPTOMS: Primary | ICD-10-CM

## 2021-03-23 DIAGNOSIS — N40.1 BPH WITH OBSTRUCTION/LOWER URINARY TRACT SYMPTOMS: Primary | ICD-10-CM

## 2021-03-23 DIAGNOSIS — R39.89 SUSPECTED UTI: ICD-10-CM

## 2021-03-23 PROCEDURE — 99213 OFFICE O/P EST LOW 20 MIN: CPT | Performed by: PHYSICIAN ASSISTANT

## 2021-03-23 RX ORDER — SODIUM CHLORIDE, SODIUM LACTATE, POTASSIUM CHLORIDE, CALCIUM CHLORIDE 600; 310; 30; 20 MG/100ML; MG/100ML; MG/100ML; MG/100ML
125 INJECTION, SOLUTION INTRAVENOUS CONTINUOUS
Status: CANCELLED | OUTPATIENT
Start: 2021-03-23

## 2021-03-23 NOTE — PROGRESS NOTES
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  He is on finasteride and tamsulosin  He does notice retrograde ejaculation  He has persistent symptoms with urinary frequency and nocturia 3-4 times per night  Uroflow in June showed a maximal flow of 15 mL/sec with average flow of 7 3 mL/sec  Cystoscopy showing bilateral lateral lobe hypertrophy  Transrectal ultrasound showing 74 6 g prostate gland without contour abnormality        Reason for visit:  BPH with obstruction     Objective:     VITALS:    Vitals:    03/23/21 1004   BP: 128/72   Pulse: 75           LABS:  Lab Results   Component Value Date    HGB 13 7 02/22/2021    HCT 42 2 02/22/2021    WBC 4 24 (L) 02/22/2021     (L) 02/22/2021   ]    Lab Results   Component Value Date    K 4 1 02/22/2021     02/22/2021    CO2 31 02/22/2021    BUN 14 02/22/2021    CREATININE 1 29 02/22/2021    CALCIUM 9 5 02/22/2021   ]        INPATIENT MEDS:    Current Outpatient Medications:     apixaban (ELIQUIS) 5 mg, Take 1 tablet (5 mg total) by mouth 2 (two) times a day, Disp: 60 tablet, Rfl: 11    Ascorbic Acid (VITAMIN C) 100 MG tablet, Take 100 mg by mouth daily, Disp: , Rfl:     fluticasone (FLONASE) 50 mcg/act nasal spray, 1 spray into each nostril daily, Disp: 16 g, Rfl: 0    metoprolol succinate (TOPROL-XL) 25 mg 24 hr tablet, Take 1 tablet (25 mg total) by mouth 2 (two) times a day, Disp: 60 tablet, Rfl: 5    rosuvastatin (CRESTOR) 20 MG tablet, Take 20 mg by mouth daily, Disp: , Rfl:     tamsulosin (FLOMAX) 0 4 mg, Take 0 4 mg by mouth daily with dinner, Disp: , Rfl:       Physical Exam:   /72 (BP Location: Left arm, Patient Position: Sitting, Cuff Size: Adult)   Pulse 75   Ht 6' 3" (1 905 m)   Wt 122 kg (268 lb)   BMI 33 50 kg/m²   GEN: no acute distress    RESP: breathing comfortably with no accessory muscle use ABD: soft, non-tender, non-distended   INCISION:    EXT: no significant peripheral edema       RADIOLOGY:    none     Assessment:    1   BPH with obstruction     Plan:   - I reviewed the options management with the patient  - he wishes to proceed with scheduling Urolift  - he states he had an unpleasant experience after his cystoscopy and would like to switch doctors  - cases requested and the  was notified

## 2021-03-25 ENCOUNTER — TELEPHONE (OUTPATIENT)
Dept: CARDIOLOGY CLINIC | Facility: CLINIC | Age: 76
End: 2021-03-25

## 2021-03-25 ENCOUNTER — TELEPHONE (OUTPATIENT)
Dept: UROLOGY | Facility: MEDICAL CENTER | Age: 76
End: 2021-03-25

## 2021-03-25 ENCOUNTER — TELEPHONE (OUTPATIENT)
Dept: UROLOGY | Facility: AMBULATORY SURGERY CENTER | Age: 76
End: 2021-03-25

## 2021-03-25 NOTE — TELEPHONE ENCOUNTER
Jennifer Jurist from Urology called asking for directions on blood thinner hold for procedure pt will be having with Dr Anitra Price on 6/17   Please advise DISPLAY PLAN FREE TEXT

## 2021-03-25 NOTE — TELEPHONE ENCOUNTER
I spoke with pt this afternoon to discuss scheduling him for a cysto/Urolift with Dr Eleonora Smith at the [facility] on 5/27/21  Pt declined this date and asked to be scheduled after 6/11/21  He is now scheduled for 6/17/21 at the [facility]  I verbally went over all of pt 's pre op instructions and prep information with pt  Per pt 's request I am mailing him a copy of his surgical packet and instructed him to call our office with any questions or concerns regarding this procedure

## 2021-03-25 NOTE — TELEPHONE ENCOUNTER
Patient can hold Eliquis 2 days prior  Patient is supposed to have SVT ablation soon  Please check and see if he has been scheduled

## 2021-03-25 NOTE — TELEPHONE ENCOUNTER
Juliette from Dr Philip Has the cardiologist office called regarding the patient  She wanted me to let you know that [facility]  If you need to reach [facility] her phone number is

## 2021-03-31 ENCOUNTER — TRANSCRIBE ORDERS (OUTPATIENT)
Dept: ADMINISTRATIVE | Facility: HOSPITAL | Age: 76
End: 2021-03-31

## 2021-03-31 ENCOUNTER — APPOINTMENT (OUTPATIENT)
Dept: LAB | Facility: CLINIC | Age: 76
End: 2021-03-31
Payer: COMMERCIAL

## 2021-03-31 DIAGNOSIS — E78.2 MIXED HYPERLIPIDEMIA: ICD-10-CM

## 2021-03-31 DIAGNOSIS — E78.2 MIXED HYPERLIPIDEMIA: Primary | ICD-10-CM

## 2021-03-31 LAB
ALBUMIN SERPL BCP-MCNC: 3.7 G/DL (ref 3.5–5)
ALP SERPL-CCNC: 65 U/L (ref 46–116)
ALT SERPL W P-5'-P-CCNC: 25 U/L (ref 12–78)
ANION GAP SERPL CALCULATED.3IONS-SCNC: 5 MMOL/L (ref 4–13)
AST SERPL W P-5'-P-CCNC: 16 U/L (ref 5–45)
BASOPHILS # BLD AUTO: 0.02 THOUSANDS/ΜL (ref 0–0.1)
BASOPHILS NFR BLD AUTO: 1 % (ref 0–1)
BILIRUB SERPL-MCNC: 0.47 MG/DL (ref 0.2–1)
BUN SERPL-MCNC: 12 MG/DL (ref 5–25)
CALCIUM SERPL-MCNC: 9 MG/DL (ref 8.3–10.1)
CHLORIDE SERPL-SCNC: 109 MMOL/L (ref 100–108)
CHOLEST SERPL-MCNC: 201 MG/DL (ref 50–200)
CO2 SERPL-SCNC: 28 MMOL/L (ref 21–32)
CREAT SERPL-MCNC: 1.22 MG/DL (ref 0.6–1.3)
EOSINOPHIL # BLD AUTO: 0.06 THOUSAND/ΜL (ref 0–0.61)
EOSINOPHIL NFR BLD AUTO: 2 % (ref 0–6)
ERYTHROCYTE [DISTWIDTH] IN BLOOD BY AUTOMATED COUNT: 14.4 % (ref 11.6–15.1)
GFR SERPL CREATININE-BSD FRML MDRD: 67 ML/MIN/1.73SQ M
GLUCOSE P FAST SERPL-MCNC: 93 MG/DL (ref 65–99)
HCT VFR BLD AUTO: 38.7 % (ref 36.5–49.3)
HDLC SERPL-MCNC: 46 MG/DL
HGB BLD-MCNC: 12.9 G/DL (ref 12–17)
IMM GRANULOCYTES # BLD AUTO: 0.02 THOUSAND/UL (ref 0–0.2)
IMM GRANULOCYTES NFR BLD AUTO: 1 % (ref 0–2)
LDLC SERPL CALC-MCNC: 126 MG/DL (ref 0–100)
LYMPHOCYTES # BLD AUTO: 1.08 THOUSANDS/ΜL (ref 0.6–4.47)
LYMPHOCYTES NFR BLD AUTO: 28 % (ref 14–44)
MCH RBC QN AUTO: 31.7 PG (ref 26.8–34.3)
MCHC RBC AUTO-ENTMCNC: 33.3 G/DL (ref 31.4–37.4)
MCV RBC AUTO: 95 FL (ref 82–98)
MONOCYTES # BLD AUTO: 0.45 THOUSAND/ΜL (ref 0.17–1.22)
MONOCYTES NFR BLD AUTO: 12 % (ref 4–12)
NEUTROPHILS # BLD AUTO: 2.23 THOUSANDS/ΜL (ref 1.85–7.62)
NEUTS SEG NFR BLD AUTO: 56 % (ref 43–75)
NONHDLC SERPL-MCNC: 155 MG/DL
NRBC BLD AUTO-RTO: 0 /100 WBCS
PLATELET # BLD AUTO: 128 THOUSANDS/UL (ref 149–390)
PMV BLD AUTO: 12.4 FL (ref 8.9–12.7)
POTASSIUM SERPL-SCNC: 3.9 MMOL/L (ref 3.5–5.3)
PROT SERPL-MCNC: 7 G/DL (ref 6.4–8.2)
RBC # BLD AUTO: 4.07 MILLION/UL (ref 3.88–5.62)
SODIUM SERPL-SCNC: 142 MMOL/L (ref 136–145)
TRIGL SERPL-MCNC: 143 MG/DL
WBC # BLD AUTO: 3.86 THOUSAND/UL (ref 4.31–10.16)

## 2021-03-31 PROCEDURE — 36415 COLL VENOUS BLD VENIPUNCTURE: CPT

## 2021-03-31 PROCEDURE — 80053 COMPREHEN METABOLIC PANEL: CPT

## 2021-03-31 PROCEDURE — 80061 LIPID PANEL: CPT

## 2021-03-31 PROCEDURE — 85025 COMPLETE CBC W/AUTO DIFF WBC: CPT

## 2021-04-01 ENCOUNTER — TELEPHONE (OUTPATIENT)
Dept: CARDIOLOGY CLINIC | Facility: CLINIC | Age: 76
End: 2021-04-01

## 2021-04-01 NOTE — TELEPHONE ENCOUNTER
Pt called because he forgot about his appt w/ Dr Amaris Salazar this morning & he still wants to come in today or tomorrow  Please advise, schedule is already full & overbooked

## 2021-04-01 NOTE — TELEPHONE ENCOUNTER
----- Message from Ann Marie Olmedo MD sent at 3/31/2021  9:44 PM EDT -----  Regarding: RE: SVT ablation  Please set up SVT ablation for this patient [facility]  Hold metoprolol for 3 days before the ablation  ----- Message -----  From: Emma Rdz MD  Sent: 3/31/2021   8:34 PM EDT  To: Ann Marie Olmedo MD, #  Subject: SVT ablation                                       Can you let me know what is the status of SVT ablation for this patient?

## 2021-04-01 NOTE — TELEPHONE ENCOUNTER
This patient had an appointment today but did not show up  Patient is supposed to have SVT ablation scheduled through [facility]  They may be trying to reach him  Please see if we can facilitate this

## 2021-04-01 NOTE — TELEPHONE ENCOUNTER
----- Message from Navin Hoyles, [address] sent at 4/1/2021 11:35 AM EDT -----  Regarding: RE: SVT ablation  Patient schedule on 3/12/21 to have his procedure done by Dr Sis Franco on 4/12/21  Thank you   ----- Message -----  From: Lucy Hubbard MD  Sent: 3/31/2021   9:44 PM EDT  To: Marek Faust MD, Navin Feliciano MA, #  Subject: RE: SVT ablation                                 Please set up SVT ablation for this patient [facility]  Hold metoprolol for 3 days before the ablation  ----- Message -----  From: Marek Faust MD  Sent: 3/31/2021   8:34 PM EDT  To: Lucy Hubbard MD, #  Subject: SVT ablation                                       Can you let me know what is the status of SVT ablation for this patient?

## 2021-04-02 ENCOUNTER — OFFICE VISIT (OUTPATIENT)
Dept: CARDIOLOGY CLINIC | Facility: CLINIC | Age: 76
End: 2021-04-02
Payer: COMMERCIAL

## 2021-04-02 VITALS
SYSTOLIC BLOOD PRESSURE: 134 MMHG | OXYGEN SATURATION: 95 % | HEIGHT: 75 IN | WEIGHT: 276 LBS | DIASTOLIC BLOOD PRESSURE: 70 MMHG | HEART RATE: 72 BPM | BODY MASS INDEX: 34.32 KG/M2

## 2021-04-02 DIAGNOSIS — I26.99 BILATERAL PULMONARY EMBOLISM (HCC): ICD-10-CM

## 2021-04-02 DIAGNOSIS — I42.8 CARDIOMYOPATHY, NONISCHEMIC (HCC): ICD-10-CM

## 2021-04-02 DIAGNOSIS — I47.1 SVT (SUPRAVENTRICULAR TACHYCARDIA) (HCC): Primary | ICD-10-CM

## 2021-04-02 DIAGNOSIS — Z79.01 CHRONIC ANTICOAGULATION: ICD-10-CM

## 2021-04-02 PROCEDURE — 99214 OFFICE O/P EST MOD 30 MIN: CPT | Performed by: INTERNAL MEDICINE

## 2021-04-02 RX ORDER — METOPROLOL SUCCINATE 25 MG/1
TABLET, EXTENDED RELEASE ORAL
Qty: 60 TABLET | Refills: 5
Start: 2021-04-02 | End: 2021-04-15 | Stop reason: HOSPADM

## 2021-04-02 NOTE — PROGRESS NOTES
[facility] [place] [id number] [facility] [address] [place] [id number]  Cardiology Follow Up    Dallas Mcgill   1945  [id number]      1  SVT (supraventricular tachycardia) ([address] )     2  Chronic anticoagulation     3  Bilateral pulmonary embolism [facility])         Chief Complaint   Patient presents with    Follow-up       Interval History: Patient presents for follow-up visit  Patient does have history of recurrent pulmonary embolism on anticoagulation with Eliquis  Patient also has history of supraventricular tachycardia  Patient does have an element of nonischemic cardiomyopathy with improvement in the past   Cardiac catheterization done recently showed no evidence of CAD  Patient does have tendency for bradycardia  Patient scheduled for SVT ablation by Dr oMntero  This month  Patient did have history of excess alcohol use in the past but states that he has done significantly better  Wife present during the office visit      Patient Active Problem List   Diagnosis    Bilateral pulmonary embolism (HCC)    SOB (shortness of breath)    Hyperlipidemia    Lung nodule    Obesity    History of sickle cell trait    Leg edema, left    Thrombocytopenia (HCC)    Chronic kidney disease (CKD), stage II (mild)    Chronic low back pain    SVT (supraventricular tachycardia) (HCC)    Chronic anticoagulation    ARLENE (obstructive sleep apnea)    Cardiomyopathy, nonischemic (HCC)    Dyslipidemia    BPH with obstruction/lower urinary tract symptoms    Nephrolithiasis    Lumbar disc disease with radiculopathy - Left    Spinal stenosis of lumbar region without neurogenic claudication - Left     Past Medical History:   Diagnosis Date    Hyperlipidemia     Post-nasal drip     Pulmonary embolism (HCC)      Social History     Socioeconomic History    Marital status: /Civil Union     Spouse name: Not on file    Number of children: 7    Years of education: Not on file    Highest education level: Not on file   Occupational History    Occupation: employed   Social Needs    Financial resource strain: Not on file    Food insecurity     Worry: Not on file     Inability: Not on file   [employer] needs     Medical: Not on file     Non-medical: Not on file   Tobacco Use    Smoking status: Former Smoker    Smokeless tobacco: Never Used   Substance and Sexual Activity    Alcohol use: Yes     Alcohol/week: 0 0 standard drinks     Comment: socially    Drug use: No    Sexual activity: Not on file   Lifestyle    Physical activity     Days per week: Not on file     Minutes per session: Not on file    Stress: Not on file   Relationships    Social connections     Talks on phone: Not on file     Gets together: Not on file     Attends [employer] service: Not on file     Active member of club or organization: Not on file     Attends meetings of clubs or organizations: Not on file     Relationship status: Not on file    Intimate partner violence     Fear of current or ex partner: Not on file     Emotionally abused: Not on file     Physically abused: Not on file     Forced sexual activity: Not on file   Other Topics Concern    Not on file   Social History Narrative    Not on file      No family history on file    Past Surgical History:   Procedure Laterality Date    ARTHROSCOPY KNEE Left     30 years ago       Current Outpatient Medications:     apixaban (ELIQUIS) 5 mg, Take 1 tablet (5 mg total) by mouth 2 (two) times a day, Disp: 60 tablet, Rfl: 11    Ascorbic Acid (VITAMIN C) 100 MG tablet, Take 100 mg by mouth daily, Disp: , Rfl:     fluticasone (FLONASE) 50 mcg/act nasal spray, 1 spray into each nostril daily, Disp: 16 g, Rfl: 0    rosuvastatin (CRESTOR) 20 MG tablet, Take 20 mg by mouth daily, Disp: , Rfl:     tamsulosin (FLOMAX) 0 4 mg, Take 0 4 mg by mouth daily with dinner, Disp: , Rfl:     metoprolol succinate (TOPROL-XL) 25 mg 24 hr tablet, Take 1 tablet (25 mg total) by mouth 2 (two) times a day (Patient not taking: Reported on 4/2/2021), Disp: 60 tablet, Rfl: 5  Allergies   Allergen Reactions    Sildenafil     Simvastatin Myalgia       Labs:  Appointment on 03/31/2021   Component Date Value    Cholesterol 03/31/2021 201*    Triglycerides 03/31/2021 143     HDL, Direct 03/31/2021 46     LDL Calculated 03/31/2021 126*    Non-HDL-Chol (CHOL-HDL) 03/31/2021 155    Telephone on 03/05/2021   Component Date Value    WBC 03/31/2021 3 86*    RBC 03/31/2021 4 07     Hemoglobin 03/31/2021 12 9     Hematocrit 03/31/2021 38 7     MCV 03/31/2021 95     MCH 03/31/2021 31 7     MCHC 03/31/2021 33 3     RDW 03/31/2021 14 4     MPV 03/31/2021 12 4     Platelets 71/75/8463 128*    nRBC 03/31/2021 0     Neutrophils Relative 03/31/2021 56     Immat GRANS % 03/31/2021 1     Lymphocytes Relative 03/31/2021 28     Monocytes Relative 03/31/2021 12     Eosinophils Relative 03/31/2021 2     Basophils Relative 03/31/2021 1     Neutrophils Absolute 03/31/2021 2 23     Immature Grans Absolute 03/31/2021 0 02     Lymphocytes Absolute 03/31/2021 1 08     Monocytes Absolute 03/31/2021 0 45     Eosinophils Absolute 03/31/2021 0 06     Basophils Absolute 03/31/2021 0 02     Sodium 03/31/2021 142     Potassium 03/31/2021 3 9     Chloride 03/31/2021 109*    CO2 03/31/2021 28     ANION GAP 03/31/2021 5     BUN 03/31/2021 12     Creatinine 03/31/2021 1 22     Glucose, Fasting 03/31/2021 93     Calcium 03/31/2021 9 0     AST 03/31/2021 16     ALT 03/31/2021 25     Alkaline Phosphatase 03/31/2021 65     Total Protein 03/31/2021 7 0     Albumin 03/31/2021 3 7     Total Bilirubin 03/31/2021 0 47     eGFR 03/31/2021 67    Office Visit on 02/22/2021   Component Date Value    WBC 02/22/2021 4 24*    RBC 02/22/2021 4 44     Hemoglobin 02/22/2021 13 7     Hematocrit 02/22/2021 42 2     MCV 02/22/2021 95     MCH 02/22/2021 30 9     MCHC 02/22/2021 32 5     RDW 02/22/2021 14 6     MPV 02/22/2021 12 8*    Platelets 95/78/7786 146*    nRBC 02/22/2021 0     Neutrophils Relative 02/22/2021 55     Immat GRANS % 02/22/2021 1     Lymphocytes Relative 02/22/2021 31     Monocytes Relative 02/22/2021 12     Eosinophils Relative 02/22/2021 1     Basophils Relative 02/22/2021 0     Neutrophils Absolute 02/22/2021 2 33     Immature Grans Absolute 02/22/2021 0 02     Lymphocytes Absolute 02/22/2021 1 31     Monocytes Absolute 02/22/2021 0 51     Eosinophils Absolute 02/22/2021 0 06     Basophils Absolute 02/22/2021 0 01     Sodium 02/22/2021 144     Potassium 02/22/2021 4 1     Chloride 02/22/2021 108     CO2 02/22/2021 31     ANION GAP 02/22/2021 5     BUN 02/22/2021 14     Creatinine 02/22/2021 1 29     Glucose, Fasting 02/22/2021 108*    Calcium 02/22/2021 9 5     AST 02/22/2021 16     ALT 02/22/2021 21     Alkaline Phosphatase 02/22/2021 67     Total Protein 02/22/2021 7 4     Albumin 02/22/2021 3 9     Total Bilirubin 02/22/2021 0 54     eGFR 02/22/2021 62     Protime 02/22/2021 14 7*    INR 02/22/2021 1 15    Hospital Outpatient Visit on 02/12/2021   Component Date Value    Protocol Name 02/12/2021 LEXISCAN-SIT     Time In Exercise Phase 02/12/2021 00:03:00     MAX  SYSTOLIC BP 08/10/4325 125     Max Diastolic Bp 34/24/1431 90     Max Heart Rate 02/12/2021 101     Max Predicted Heart Rate 02/12/2021 145     Reason for Termination 02/12/2021 Protocol Complete     Test Indication 02/12/2021 SOB     Target Hr Formular 02/12/2021 (220 - Age)*85%      Imaging: Xr Spine Cervical Complete 4 Or 5 Vw Non Injury    Result Date: 3/17/2021  Narrative: CERVICAL SPINE INDICATION:   M54 2: Cervicalgia  COMPARISON:  None VIEWS:  XR SPINE CERVICAL COMPLETE 4 OR 5 VW NON INJURY FINDINGS: No fracture  Normal alignment without subluxation  There are endplate degenerative changes throughout the cervical spine most prominent at C4-5 and C5-6  The neuroforamina are patent   The prevertebral soft tissues are within normal limits  The lung apices are clear  Impression: No acute osseous abnormality  Degenerative changes as above  Workstation performed: [id number]       Review of Systems:  Review of Systems     REVIEW OF SYSTEMS:  Constitutional:  Denies fever or chills   Eyes:  Denies change in visual acuity   HENT:  Denies nasal congestion or sore throat   Respiratory:  Denies cough or shortness of breath   Cardiovascular:  Denies chest pain or edema   GI:  Denies abdominal pain, nausea, vomiting, bloody stools or diarrhea   :   For prostate surgery in June  Musculoskeletal:  Denies back pain or joint pain   Neurologic:  Denies headache, focal weakness or sensory changes   Endocrine:  Denies polyuria or polydipsia   Lymphatic:  Denies swollen glands   Psychiatric:  Denies depression or anxiety     Physical Exam:    /70   Pulse 72   Ht 6' 3" (1 905 m)   Wt 125 kg (276 lb)   SpO2 95%   BMI 34 50 kg/m²     Physical Exam    PHYSICAL EXAM:  General:  Patient is not in acute distress   Head: Normocephalic, Atraumatic  HEENT:  Both pupils normal-size atraumatic, normocephalic, nonicteric  Neck:  JVP not raised  Trachea central  No carotid bruit  Respiratory:  normal breath sounds no crackles  no rhonchi  Cardiovascular:  Regular rate and rhythm no S3 no murmurs  GI:  Abdomen soft nontender  No organomegaly  Lymphatic:  No cervical or inguinal lymphadenopathy  Neurologic:  Patient is awake alert, oriented   Grossly nonfocal   extremities no edema    Discussion/Summary:   Patient with multiple medical problems who seems to be doing reasonably well from cardiac standpoint  Previous studies reviewed with patient  Medications reviewed and possible side effects discussed  concepts of cardiovascular disease , signs and symptoms of heart disease  Dietary and risk factor modification reinforced  All questions answered  Safety measures reviewed   Patient advised to report any problems prompting medical attention  patient is scheduled for SVT ablation this month  Patient does have an element of mild nonischemic cardiomyopathy  Recent cardiac catheterization did not show any significant coronary artery disease  Patient will continue anticoagulation for history of recurrent pulmonary embolism  Patient is scheduled for prostate surgery in June  Patient will be seen in follow-up in 3 to 4 weeks  regarding preoperative risk assessment

## 2021-04-02 NOTE — H&P (VIEW-ONLY)
[facility] [place] [id number] [facility] [place] [address]  Cardiology Follow Up    Maddie Route   1945  [id number]      1  SVT (supraventricular tachycardia) ([address] )     2  Chronic anticoagulation     3  Bilateral pulmonary embolism [facility])         Chief Complaint   Patient presents with    Follow-up       Interval History: Patient presents for follow-up visit  Patient does have history of recurrent pulmonary embolism on anticoagulation with Eliquis  Patient also has history of supraventricular tachycardia  Patient does have an element of nonischemic cardiomyopathy with improvement in the past   Cardiac catheterization done recently showed no evidence of CAD  Patient does have tendency for bradycardia  Patient scheduled for SVT ablation by Dr Montero  This month  Patient did have history of excess alcohol use in the past but states that he has done significantly better  Wife present during the office visit      Patient Active Problem List   Diagnosis    Bilateral pulmonary embolism (HCC)    SOB (shortness of breath)    Hyperlipidemia    Lung nodule    Obesity    History of sickle cell trait    Leg edema, left    Thrombocytopenia (HCC)    Chronic kidney disease (CKD), stage II (mild)    Chronic low back pain    SVT (supraventricular tachycardia) (HCC)    Chronic anticoagulation    ARLENE (obstructive sleep apnea)    Cardiomyopathy, nonischemic (HCC)    Dyslipidemia    BPH with obstruction/lower urinary tract symptoms    Nephrolithiasis    Lumbar disc disease with radiculopathy - Left    Spinal stenosis of lumbar region without neurogenic claudication - Left     Past Medical History:   Diagnosis Date    Hyperlipidemia     Post-nasal drip     Pulmonary embolism (HCC)      Social History     Socioeconomic History    Marital status: /Civil Union     Spouse name: Not on file    Number of children: 7    Years of education: Not on file    Highest education level: Not on file   Occupational History    Occupation: employed   Social Needs    Financial resource strain: Not on file    Food insecurity     Worry: Not on file     Inability: Not on file   [employer] needs     Medical: Not on file     Non-medical: Not on file   Tobacco Use    Smoking status: Former Smoker    Smokeless tobacco: Never Used   Substance and Sexual Activity    Alcohol use: Yes     Alcohol/week: 0 0 standard drinks     Comment: socially    Drug use: No    Sexual activity: Not on file   Lifestyle    Physical activity     Days per week: Not on file     Minutes per session: Not on file    Stress: Not on file   Relationships    Social connections     Talks on phone: Not on file     Gets together: Not on file     Attends [employer] service: Not on file     Active member of club or organization: Not on file     Attends meetings of clubs or organizations: Not on file     Relationship status: Not on file    Intimate partner violence     Fear of current or ex partner: Not on file     Emotionally abused: Not on file     Physically abused: Not on file     Forced sexual activity: Not on file   Other Topics Concern    Not on file   Social History Narrative    Not on file      No family history on file    Past Surgical History:   Procedure Laterality Date    ARTHROSCOPY KNEE Left     30 years ago       Current Outpatient Medications:     apixaban (ELIQUIS) 5 mg, Take 1 tablet (5 mg total) by mouth 2 (two) times a day, Disp: 60 tablet, Rfl: 11    Ascorbic Acid (VITAMIN C) 100 MG tablet, Take 100 mg by mouth daily, Disp: , Rfl:     fluticasone (FLONASE) 50 mcg/act nasal spray, 1 spray into each nostril daily, Disp: 16 g, Rfl: 0    rosuvastatin (CRESTOR) 20 MG tablet, Take 20 mg by mouth daily, Disp: , Rfl:     tamsulosin (FLOMAX) 0 4 mg, Take 0 4 mg by mouth daily with dinner, Disp: , Rfl:     metoprolol succinate (TOPROL-XL) 25 mg 24 hr tablet, Take 1 tablet (25 mg total) by mouth 2 (two) times a day (Patient not taking: Reported on 4/2/2021), Disp: 60 tablet, Rfl: 5  Allergies   Allergen Reactions    Sildenafil     Simvastatin Myalgia       Labs:  Appointment on 03/31/2021   Component Date Value    Cholesterol 03/31/2021 201*    Triglycerides 03/31/2021 143     HDL, Direct 03/31/2021 46     LDL Calculated 03/31/2021 126*    Non-HDL-Chol (CHOL-HDL) 03/31/2021 155    Telephone on 03/05/2021   Component Date Value    WBC 03/31/2021 3 86*    RBC 03/31/2021 4 07     Hemoglobin 03/31/2021 12 9     Hematocrit 03/31/2021 38 7     MCV 03/31/2021 95     MCH 03/31/2021 31 7     MCHC 03/31/2021 33 3     RDW 03/31/2021 14 4     MPV 03/31/2021 12 4     Platelets 47/75/4287 128*    nRBC 03/31/2021 0     Neutrophils Relative 03/31/2021 56     Immat GRANS % 03/31/2021 1     Lymphocytes Relative 03/31/2021 28     Monocytes Relative 03/31/2021 12     Eosinophils Relative 03/31/2021 2     Basophils Relative 03/31/2021 1     Neutrophils Absolute 03/31/2021 2 23     Immature Grans Absolute 03/31/2021 0 02     Lymphocytes Absolute 03/31/2021 1 08     Monocytes Absolute 03/31/2021 0 45     Eosinophils Absolute 03/31/2021 0 06     Basophils Absolute 03/31/2021 0 02     Sodium 03/31/2021 142     Potassium 03/31/2021 3 9     Chloride 03/31/2021 109*    CO2 03/31/2021 28     ANION GAP 03/31/2021 5     BUN 03/31/2021 12     Creatinine 03/31/2021 1 22     Glucose, Fasting 03/31/2021 93     Calcium 03/31/2021 9 0     AST 03/31/2021 16     ALT 03/31/2021 25     Alkaline Phosphatase 03/31/2021 65     Total Protein 03/31/2021 7 0     Albumin 03/31/2021 3 7     Total Bilirubin 03/31/2021 0 47     eGFR 03/31/2021 67    Office Visit on 02/22/2021   Component Date Value    WBC 02/22/2021 4 24*    RBC 02/22/2021 4 44     Hemoglobin 02/22/2021 13 7     Hematocrit 02/22/2021 42 2     MCV 02/22/2021 95     MCH 02/22/2021 30 9     MCHC 02/22/2021 32 5     RDW 02/22/2021 14 6     MPV 02/22/2021 12 8*    Platelets 26/48/8411 146*    nRBC 02/22/2021 0     Neutrophils Relative 02/22/2021 55     Immat GRANS % 02/22/2021 1     Lymphocytes Relative 02/22/2021 31     Monocytes Relative 02/22/2021 12     Eosinophils Relative 02/22/2021 1     Basophils Relative 02/22/2021 0     Neutrophils Absolute 02/22/2021 2 33     Immature Grans Absolute 02/22/2021 0 02     Lymphocytes Absolute 02/22/2021 1 31     Monocytes Absolute 02/22/2021 0 51     Eosinophils Absolute 02/22/2021 0 06     Basophils Absolute 02/22/2021 0 01     Sodium 02/22/2021 144     Potassium 02/22/2021 4 1     Chloride 02/22/2021 108     CO2 02/22/2021 31     ANION GAP 02/22/2021 5     BUN 02/22/2021 14     Creatinine 02/22/2021 1 29     Glucose, Fasting 02/22/2021 108*    Calcium 02/22/2021 9 5     AST 02/22/2021 16     ALT 02/22/2021 21     Alkaline Phosphatase 02/22/2021 67     Total Protein 02/22/2021 7 4     Albumin 02/22/2021 3 9     Total Bilirubin 02/22/2021 0 54     eGFR 02/22/2021 62     Protime 02/22/2021 14 7*    INR 02/22/2021 1 15    Hospital Outpatient Visit on 02/12/2021   Component Date Value    Protocol Name 02/12/2021 LEXISCAN-SIT     Time In Exercise Phase 02/12/2021 00:03:00     MAX  SYSTOLIC BP 81/42/7608 243     Max Diastolic Bp 20/07/2422 90     Max Heart Rate 02/12/2021 101     Max Predicted Heart Rate 02/12/2021 145     Reason for Termination 02/12/2021 Protocol Complete     Test Indication 02/12/2021 SOB     Target Hr Formular 02/12/2021 (220 - Age)*85%      Imaging: Xr Spine Cervical Complete 4 Or 5 Vw Non Injury    Result Date: 3/17/2021  Narrative: CERVICAL SPINE INDICATION:   M54 2: Cervicalgia  COMPARISON:  None VIEWS:  XR SPINE CERVICAL COMPLETE 4 OR 5 VW NON INJURY FINDINGS: No fracture  Normal alignment without subluxation  There are endplate degenerative changes throughout the cervical spine most prominent at C4-5 and C5-6  The neuroforamina are patent   The prevertebral soft tissues are within normal limits  The lung apices are clear  Impression: No acute osseous abnormality  Degenerative changes as above  Workstation performed: [id number]       Review of Systems:  Review of Systems     REVIEW OF SYSTEMS:  Constitutional:  Denies fever or chills   Eyes:  Denies change in visual acuity   HENT:  Denies nasal congestion or sore throat   Respiratory:  Denies cough or shortness of breath   Cardiovascular:  Denies chest pain or edema   GI:  Denies abdominal pain, nausea, vomiting, bloody stools or diarrhea   :   For prostate surgery in June  Musculoskeletal:  Denies back pain or joint pain   Neurologic:  Denies headache, focal weakness or sensory changes   Endocrine:  Denies polyuria or polydipsia   Lymphatic:  Denies swollen glands   Psychiatric:  Denies depression or anxiety     Physical Exam:    /70   Pulse 72   Ht 6' 3" (1 905 m)   Wt 125 kg (276 lb)   SpO2 95%   BMI 34 50 kg/m²     Physical Exam    PHYSICAL EXAM:  General:  Patient is not in acute distress   Head: Normocephalic, Atraumatic  HEENT:  Both pupils normal-size atraumatic, normocephalic, nonicteric  Neck:  JVP not raised  Trachea central  No carotid bruit  Respiratory:  normal breath sounds no crackles  no rhonchi  Cardiovascular:  Regular rate and rhythm no S3 no murmurs  GI:  Abdomen soft nontender  No organomegaly  Lymphatic:  No cervical or inguinal lymphadenopathy  Neurologic:  Patient is awake alert, oriented   Grossly nonfocal   extremities no edema    Discussion/Summary:   Patient with multiple medical problems who seems to be doing reasonably well from cardiac standpoint  Previous studies reviewed with patient  Medications reviewed and possible side effects discussed  concepts of cardiovascular disease , signs and symptoms of heart disease  Dietary and risk factor modification reinforced  All questions answered  Safety measures reviewed   Patient advised to report any problems prompting medical attention  patient is scheduled for SVT ablation this month  Patient does have an element of mild nonischemic cardiomyopathy  Recent cardiac catheterization did not show any significant coronary artery disease  Patient will continue anticoagulation for history of recurrent pulmonary embolism  Patient is scheduled for prostate surgery in June  Patient will be seen in follow-up in 3 to 4 weeks  regarding preoperative risk assessment

## 2021-04-05 NOTE — TELEPHONE ENCOUNTER
[id number] can you please check for this patient this week I sent the request early on March, hope will be approve

## 2021-04-06 ENCOUNTER — HOSPITAL ENCOUNTER (OUTPATIENT)
Dept: MRI IMAGING | Facility: HOSPITAL | Age: 76
Discharge: HOME | End: 2021-04-06
Payer: COMMERCIAL

## 2021-04-06 DIAGNOSIS — M54.10 RADICULAR SYNDROME: ICD-10-CM

## 2021-04-06 PROCEDURE — 72141 MRI NECK SPINE W/O DYE: CPT

## 2021-04-06 PROCEDURE — G1004 CDSM NDSC: HCPCS

## 2021-04-08 NOTE — TELEPHONE ENCOUNTER
His Ablation [id number] on 4/12/21 at [facility] was approved    Auth# [id number]  4/12/21-8/11/21

## 2021-04-09 ENCOUNTER — TELEPHONE (OUTPATIENT)
Dept: SURGERY | Facility: HOSPITAL | Age: 76
End: 2021-04-09

## 2021-04-09 ENCOUNTER — TELEPHONE (OUTPATIENT)
Dept: OTHER | Facility: OTHER | Age: 76
End: 2021-04-09

## 2021-04-09 NOTE — TELEPHONE ENCOUNTER
Pt called asking about his Eliquis medication  He has a procedure Monday and he Is wondering if and when he should stop taking it before procedure  Informed patient I would be paging on call for his medication question

## 2021-04-12 ENCOUNTER — ANESTHESIA EVENT (OUTPATIENT)
Dept: NON INVASIVE DIAGNOSTICS | Facility: HOSPITAL | Age: 76
DRG: 274 | End: 2021-04-12
Payer: COMMERCIAL

## 2021-04-12 ENCOUNTER — HOSPITAL ENCOUNTER (INPATIENT)
Dept: NON INVASIVE DIAGNOSTICS | Facility: HOSPITAL | Age: 76
LOS: 2 days | Discharge: HOME | DRG: 274 | End: 2021-04-15
Attending: INTERNAL MEDICINE | Admitting: INTERNAL MEDICINE
Payer: COMMERCIAL

## 2021-04-12 DIAGNOSIS — I47.1 SVT (SUPRAVENTRICULAR TACHYCARDIA) (HCC): ICD-10-CM

## 2021-04-12 LAB
ANION GAP SERPL CALCULATED.3IONS-SCNC: 4 MMOL/L (ref 4–13)
ATRIAL RATE: 127 BPM
ATRIAL RATE: 80 BPM
ATRIAL RATE: 81 BPM
BASOPHILS # BLD AUTO: 0.01 THOUSANDS/ΜL (ref 0–0.1)
BASOPHILS NFR BLD AUTO: 0 % (ref 0–1)
BUN SERPL-MCNC: 13 MG/DL (ref 5–25)
CALCIUM SERPL-MCNC: 8.9 MG/DL (ref 8.3–10.1)
CHLORIDE SERPL-SCNC: 114 MMOL/L (ref 100–108)
CO2 SERPL-SCNC: 25 MMOL/L (ref 21–32)
CREAT SERPL-MCNC: 1.23 MG/DL (ref 0.6–1.3)
EOSINOPHIL # BLD AUTO: 0.06 THOUSAND/ΜL (ref 0–0.61)
EOSINOPHIL NFR BLD AUTO: 1 % (ref 0–6)
ERYTHROCYTE [DISTWIDTH] IN BLOOD BY AUTOMATED COUNT: 14.4 % (ref 11.6–15.1)
GFR SERPL CREATININE-BSD FRML MDRD: 66 ML/MIN/1.73SQ M
GLUCOSE P FAST SERPL-MCNC: 111 MG/DL (ref 65–99)
GLUCOSE SERPL-MCNC: 111 MG/DL (ref 65–140)
HCT VFR BLD AUTO: 39.8 % (ref 36.5–49.3)
HGB BLD-MCNC: 12.9 G/DL (ref 12–17)
IMM GRANULOCYTES # BLD AUTO: 0.02 THOUSAND/UL (ref 0–0.2)
IMM GRANULOCYTES NFR BLD AUTO: 0 % (ref 0–2)
INR PPP: 1.02 (ref 0.84–1.19)
KCT BLD-ACNC: 219 SEC (ref 89–137)
KCT BLD-ACNC: 225 SEC (ref 89–137)
KCT BLD-ACNC: 237 SEC (ref 89–137)
KCT BLD-ACNC: 312 SEC (ref 89–137)
KCT BLD-ACNC: 318 SEC (ref 89–137)
KCT BLD-ACNC: 325 SEC (ref 89–137)
KCT BLD-ACNC: 344 SEC (ref 89–137)
LYMPHOCYTES # BLD AUTO: 1.25 THOUSANDS/ΜL (ref 0.6–4.47)
LYMPHOCYTES NFR BLD AUTO: 28 % (ref 14–44)
MCH RBC QN AUTO: 30.6 PG (ref 26.8–34.3)
MCHC RBC AUTO-ENTMCNC: 32.4 G/DL (ref 31.4–37.4)
MCV RBC AUTO: 95 FL (ref 82–98)
MONOCYTES # BLD AUTO: 0.47 THOUSAND/ΜL (ref 0.17–1.22)
MONOCYTES NFR BLD AUTO: 11 % (ref 4–12)
NEUTROPHILS # BLD AUTO: 2.65 THOUSANDS/ΜL (ref 1.85–7.62)
NEUTS SEG NFR BLD AUTO: 60 % (ref 43–75)
NRBC BLD AUTO-RTO: 0 /100 WBCS
P AXIS: 51 DEGREES
P AXIS: 54 DEGREES
PLATELET # BLD AUTO: 141 THOUSANDS/UL (ref 149–390)
PMV BLD AUTO: 12.2 FL (ref 8.9–12.7)
POTASSIUM SERPL-SCNC: 3.7 MMOL/L (ref 3.5–5.3)
PR INTERVAL: 120 MS
PR INTERVAL: 164 MS
PR INTERVAL: 170 MS
PROTHROMBIN TIME: 13.4 SECONDS (ref 11.6–14.5)
QRS AXIS: -28 DEGREES
QRS AXIS: -30 DEGREES
QRS AXIS: -31 DEGREES
QRSD INTERVAL: 100 MS
QRSD INTERVAL: 100 MS
QRSD INTERVAL: 94 MS
QT INTERVAL: 372 MS
QT INTERVAL: 372 MS
QT INTERVAL: 392 MS
QTC INTERVAL: 429 MS
QTC INTERVAL: 455 MS
QTC INTERVAL: 540 MS
RBC # BLD AUTO: 4.21 MILLION/UL (ref 3.88–5.62)
SODIUM SERPL-SCNC: 143 MMOL/L (ref 136–145)
SPECIMEN SOURCE: ABNORMAL
T WAVE AXIS: 13 DEGREES
T WAVE AXIS: 26 DEGREES
T WAVE AXIS: 61 DEGREES
VENTRICULAR RATE: 127 BPM
VENTRICULAR RATE: 80 BPM
VENTRICULAR RATE: 81 BPM
WBC # BLD AUTO: 4.46 THOUSAND/UL (ref 4.31–10.16)

## 2021-04-12 PROCEDURE — C1731 CATH, EP, 20 OR MORE ELEC: HCPCS

## 2021-04-12 PROCEDURE — 93005 ELECTROCARDIOGRAM TRACING: CPT

## 2021-04-12 PROCEDURE — 85025 COMPLETE CBC W/AUTO DIFF WBC: CPT | Performed by: PHYSICIAN ASSISTANT

## 2021-04-12 PROCEDURE — C1894 INTRO/SHEATH, NON-LASER: HCPCS

## 2021-04-12 PROCEDURE — 93621 COMP EP EVL L PAC&REC C SINS: CPT | Performed by: INTERNAL MEDICINE

## 2021-04-12 PROCEDURE — 93623 PRGRMD STIMJ&PACG IV RX NFS: CPT

## 2021-04-12 PROCEDURE — C1730 CATH, EP, 19 OR FEW ELECT: HCPCS

## 2021-04-12 PROCEDURE — 93613 INTRACARDIAC EPHYS 3D MAPG: CPT | Performed by: INTERNAL MEDICINE

## 2021-04-12 PROCEDURE — 85347 COAGULATION TIME ACTIVATED: CPT

## 2021-04-12 PROCEDURE — 80048 BASIC METABOLIC PNL TOTAL CA: CPT | Performed by: PHYSICIAN ASSISTANT

## 2021-04-12 PROCEDURE — 93010 ELECTROCARDIOGRAM REPORT: CPT | Performed by: INTERNAL MEDICINE

## 2021-04-12 PROCEDURE — 93655 ICAR CATH ABLTJ DSCRT ARRHYT: CPT | Performed by: INTERNAL MEDICINE

## 2021-04-12 PROCEDURE — 93621 COMP EP EVL L PAC&REC C SINS: CPT

## 2021-04-12 PROCEDURE — 02583ZZ DESTRUCTION OF CONDUCTION MECHANISM, PERCUTANEOUS APPROACH: ICD-10-PCS | Performed by: INTERNAL MEDICINE

## 2021-04-12 PROCEDURE — 76937 US GUIDE VASCULAR ACCESS: CPT

## 2021-04-12 PROCEDURE — 93655 ICAR CATH ABLTJ DSCRT ARRHYT: CPT

## 2021-04-12 PROCEDURE — 85610 PROTHROMBIN TIME: CPT | Performed by: PHYSICIAN ASSISTANT

## 2021-04-12 PROCEDURE — C1733 CATH, EP, OTHR THAN COOL-TIP: HCPCS

## 2021-04-12 PROCEDURE — C1893 INTRO/SHEATH, FIXED,NON-PEEL: HCPCS

## 2021-04-12 PROCEDURE — 93613 INTRACARDIAC EPHYS 3D MAPG: CPT

## 2021-04-12 PROCEDURE — C1732 CATH, EP, DIAG/ABL, 3D/VECT: HCPCS

## 2021-04-12 PROCEDURE — NC001 PR NO CHARGE: Performed by: INTERNAL MEDICINE

## 2021-04-12 PROCEDURE — 93653 COMPRE EP EVAL TX SVT: CPT

## 2021-04-12 PROCEDURE — 93653 COMPRE EP EVAL TX SVT: CPT | Performed by: INTERNAL MEDICINE

## 2021-04-12 PROCEDURE — 02K83ZZ MAP CONDUCTION MECHANISM, PERCUTANEOUS APPROACH: ICD-10-PCS | Performed by: INTERNAL MEDICINE

## 2021-04-12 RX ORDER — GLYCOPYRROLATE 0.2 MG/ML
INJECTION INTRAMUSCULAR; INTRAVENOUS AS NEEDED
Status: DISCONTINUED | OUTPATIENT
Start: 2021-04-12 | End: 2021-04-12

## 2021-04-12 RX ORDER — ACETAMINOPHEN 325 MG/1
650 TABLET ORAL EVERY 4 HOURS PRN
Status: DISCONTINUED | OUTPATIENT
Start: 2021-04-12 | End: 2021-04-15 | Stop reason: HOSPADM

## 2021-04-12 RX ORDER — TAMSULOSIN HYDROCHLORIDE 0.4 MG/1
0.4 CAPSULE ORAL
Status: DISCONTINUED | OUTPATIENT
Start: 2021-04-12 | End: 2021-04-15 | Stop reason: HOSPADM

## 2021-04-12 RX ORDER — HYDRALAZINE HYDROCHLORIDE 20 MG/ML
INJECTION INTRAMUSCULAR; INTRAVENOUS AS NEEDED
Status: DISCONTINUED | OUTPATIENT
Start: 2021-04-12 | End: 2021-04-12

## 2021-04-12 RX ORDER — HEPARIN SODIUM 1000 [USP'U]/ML
INJECTION, SOLUTION INTRAVENOUS; SUBCUTANEOUS CODE/TRAUMA/SEDATION MEDICATION
Status: COMPLETED | OUTPATIENT
Start: 2021-04-12 | End: 2021-04-12

## 2021-04-12 RX ORDER — OXYCODONE HYDROCHLORIDE 5 MG/1
5 TABLET ORAL EVERY 4 HOURS PRN
Status: DISCONTINUED | OUTPATIENT
Start: 2021-04-12 | End: 2021-04-15 | Stop reason: HOSPADM

## 2021-04-12 RX ORDER — SOTALOL HYDROCHLORIDE 80 MG/1
120 TABLET ORAL EVERY 12 HOURS SCHEDULED
Status: DISCONTINUED | OUTPATIENT
Start: 2021-04-12 | End: 2021-04-15 | Stop reason: HOSPADM

## 2021-04-12 RX ORDER — KETAMINE HYDROCHLORIDE 50 MG/ML
INJECTION, SOLUTION, CONCENTRATE INTRAMUSCULAR; INTRAVENOUS AS NEEDED
Status: DISCONTINUED | OUTPATIENT
Start: 2021-04-12 | End: 2021-04-12

## 2021-04-12 RX ORDER — PROPOFOL 10 MG/ML
INJECTION, EMULSION INTRAVENOUS AS NEEDED
Status: DISCONTINUED | OUTPATIENT
Start: 2021-04-12 | End: 2021-04-12

## 2021-04-12 RX ORDER — HEPARIN SODIUM 10000 [USP'U]/100ML
INJECTION, SOLUTION INTRAVENOUS
Status: COMPLETED | OUTPATIENT
Start: 2021-04-12 | End: 2021-04-12

## 2021-04-12 RX ORDER — PROPOFOL 10 MG/ML
INJECTION, EMULSION INTRAVENOUS CONTINUOUS PRN
Status: DISCONTINUED | OUTPATIENT
Start: 2021-04-12 | End: 2021-04-12

## 2021-04-12 RX ORDER — FENTANYL CITRATE 50 UG/ML
INJECTION, SOLUTION INTRAMUSCULAR; INTRAVENOUS AS NEEDED
Status: DISCONTINUED | OUTPATIENT
Start: 2021-04-12 | End: 2021-04-12

## 2021-04-12 RX ORDER — LIDOCAINE HYDROCHLORIDE 10 MG/ML
INJECTION, SOLUTION EPIDURAL; INFILTRATION; INTRACAUDAL; PERINEURAL CODE/TRAUMA/SEDATION MEDICATION
Status: COMPLETED | OUTPATIENT
Start: 2021-04-12 | End: 2021-04-12

## 2021-04-12 RX ORDER — SODIUM CHLORIDE 9 MG/ML
INJECTION, SOLUTION INTRAVENOUS CONTINUOUS PRN
Status: DISCONTINUED | OUTPATIENT
Start: 2021-04-12 | End: 2021-04-12

## 2021-04-12 RX ORDER — FLUTICASONE PROPIONATE 50 MCG
1 SPRAY, SUSPENSION (ML) NASAL DAILY
Status: DISCONTINUED | OUTPATIENT
Start: 2021-04-12 | End: 2021-04-15 | Stop reason: HOSPADM

## 2021-04-12 RX ORDER — MIDAZOLAM HYDROCHLORIDE 2 MG/2ML
INJECTION, SOLUTION INTRAMUSCULAR; INTRAVENOUS AS NEEDED
Status: DISCONTINUED | OUTPATIENT
Start: 2021-04-12 | End: 2021-04-12

## 2021-04-12 RX ADMIN — PROPOFOL 20 MG: 10 INJECTION, EMULSION INTRAVENOUS at 12:49

## 2021-04-12 RX ADMIN — GLYCOPYRROLATE 0.1 MG: 0.2 INJECTION, SOLUTION INTRAMUSCULAR; INTRAVENOUS at 11:56

## 2021-04-12 RX ADMIN — PROPOFOL 20 MCG/KG/MIN: 10 INJECTION, EMULSION INTRAVENOUS at 08:13

## 2021-04-12 RX ADMIN — LIDOCAINE HYDROCHLORIDE 10 ML: 10 INJECTION, SOLUTION EPIDURAL; INFILTRATION; INTRACAUDAL; PERINEURAL at 12:46

## 2021-04-12 RX ADMIN — PROPOFOL 20 MG: 10 INJECTION, EMULSION INTRAVENOUS at 10:53

## 2021-04-12 RX ADMIN — SODIUM CHLORIDE: 0.9 INJECTION, SOLUTION INTRAVENOUS at 07:59

## 2021-04-12 RX ADMIN — HEPARIN SODIUM 10000 UNITS: 1000 INJECTION INTRAVENOUS; SUBCUTANEOUS at 08:49

## 2021-04-12 RX ADMIN — PROPOFOL 20 MG: 10 INJECTION, EMULSION INTRAVENOUS at 08:09

## 2021-04-12 RX ADMIN — FENTANYL CITRATE 12.5 MCG: 50 INJECTION INTRAMUSCULAR; INTRAVENOUS at 09:13

## 2021-04-12 RX ADMIN — KETAMINE HYDROCHLORIDE 0.2 MG/KG/HR: 50 INJECTION, SOLUTION INTRAMUSCULAR; INTRAVENOUS at 11:56

## 2021-04-12 RX ADMIN — PROPOFOL 10 MG: 10 INJECTION, EMULSION INTRAVENOUS at 08:40

## 2021-04-12 RX ADMIN — KETAMINE HYDROCHLORIDE 4 MG: 50 INJECTION, SOLUTION INTRAMUSCULAR; INTRAVENOUS at 13:02

## 2021-04-12 RX ADMIN — PROPOFOL 20 MG: 10 INJECTION, EMULSION INTRAVENOUS at 09:56

## 2021-04-12 RX ADMIN — FENTANYL CITRATE 25 MCG: 50 INJECTION INTRAMUSCULAR; INTRAVENOUS at 11:11

## 2021-04-12 RX ADMIN — FENTANYL CITRATE 12.5 MCG: 50 INJECTION INTRAMUSCULAR; INTRAVENOUS at 09:03

## 2021-04-12 RX ADMIN — OXYCODONE HYDROCHLORIDE 5 MG: 5 TABLET ORAL at 15:14

## 2021-04-12 RX ADMIN — HYDRALAZINE HYDROCHLORIDE 10 MG: 20 INJECTION, SOLUTION INTRAMUSCULAR; INTRAVENOUS at 11:09

## 2021-04-12 RX ADMIN — FENTANYL CITRATE 25 MCG: 50 INJECTION INTRAMUSCULAR; INTRAVENOUS at 10:49

## 2021-04-12 RX ADMIN — FENTANYL CITRATE 25 MCG: 50 INJECTION INTRAMUSCULAR; INTRAVENOUS at 11:48

## 2021-04-12 RX ADMIN — FENTANYL CITRATE 12.5 MCG: 50 INJECTION INTRAMUSCULAR; INTRAVENOUS at 10:23

## 2021-04-12 RX ADMIN — PROPOFOL 20 MG: 10 INJECTION, EMULSION INTRAVENOUS at 11:39

## 2021-04-12 RX ADMIN — LIDOCAINE HYDROCHLORIDE 10 ML: 10 INJECTION, SOLUTION EPIDURAL; INFILTRATION; INTRACAUDAL; PERINEURAL at 08:37

## 2021-04-12 RX ADMIN — PROPOFOL 20 MG: 10 INJECTION, EMULSION INTRAVENOUS at 10:19

## 2021-04-12 RX ADMIN — MIDAZOLAM 1 MG: 1 INJECTION INTRAMUSCULAR; INTRAVENOUS at 09:49

## 2021-04-12 RX ADMIN — PROPOFOL 10 MG: 10 INJECTION, EMULSION INTRAVENOUS at 08:46

## 2021-04-12 RX ADMIN — ISOPROTERENOL HYDROCHLORIDE 1 MCG/MIN: 0.2 INJECTION, SOLUTION INTRAMUSCULAR; INTRAVENOUS at 09:36

## 2021-04-12 RX ADMIN — FENTANYL CITRATE 25 MCG: 50 INJECTION INTRAMUSCULAR; INTRAVENOUS at 09:49

## 2021-04-12 RX ADMIN — PROPOFOL 20 MG: 10 INJECTION, EMULSION INTRAVENOUS at 08:34

## 2021-04-12 RX ADMIN — MIDAZOLAM 1 MG: 1 INJECTION INTRAMUSCULAR; INTRAVENOUS at 07:59

## 2021-04-12 RX ADMIN — TAMSULOSIN HYDROCHLORIDE 0.4 MG: 0.4 CAPSULE ORAL at 21:28

## 2021-04-12 RX ADMIN — FENTANYL CITRATE 50 MCG: 50 INJECTION INTRAMUSCULAR; INTRAVENOUS at 10:34

## 2021-04-12 RX ADMIN — SODIUM CHLORIDE: 0.9 INJECTION, SOLUTION INTRAVENOUS at 12:40

## 2021-04-12 RX ADMIN — HYDRALAZINE HYDROCHLORIDE 10 MG: 20 INJECTION, SOLUTION INTRAMUSCULAR; INTRAVENOUS at 11:32

## 2021-04-12 RX ADMIN — FENTANYL CITRATE 25 MCG: 50 INJECTION INTRAMUSCULAR; INTRAVENOUS at 10:53

## 2021-04-12 RX ADMIN — FENTANYL CITRATE 25 MCG: 50 INJECTION INTRAMUSCULAR; INTRAVENOUS at 11:25

## 2021-04-12 RX ADMIN — KETAMINE HYDROCHLORIDE 10 MG: 50 INJECTION, SOLUTION INTRAMUSCULAR; INTRAVENOUS at 11:56

## 2021-04-12 RX ADMIN — FENTANYL CITRATE 12.5 MCG: 50 INJECTION INTRAMUSCULAR; INTRAVENOUS at 10:19

## 2021-04-12 RX ADMIN — FENTANYL CITRATE 25 MCG: 50 INJECTION INTRAMUSCULAR; INTRAVENOUS at 11:38

## 2021-04-12 RX ADMIN — APIXABAN 5 MG: 5 TABLET, FILM COATED ORAL at 18:00

## 2021-04-12 RX ADMIN — HEPARIN SODIUM 1400 UNITS/HR: 10000 INJECTION, SOLUTION INTRAVENOUS at 09:15

## 2021-04-12 RX ADMIN — FLUTICASONE PROPIONATE 1 SPRAY: 50 SPRAY, METERED NASAL at 21:28

## 2021-04-12 RX ADMIN — HEPARIN SODIUM 15000 UNITS: 1000 INJECTION INTRAVENOUS; SUBCUTANEOUS at 09:15

## 2021-04-12 RX ADMIN — FENTANYL CITRATE 25 MCG: 50 INJECTION INTRAMUSCULAR; INTRAVENOUS at 08:21

## 2021-04-12 NOTE — ANESTHESIA PREPROCEDURE EVALUATION
Procedure:  CARDIAC EPS/SVT ABLATION    Relevant Problems   CARDIO   (+) Bilateral pulmonary embolism (HCC)   (+) Hyperlipidemia   (+) SVT (supraventricular tachycardia) (HCC)      ENDO (within normal limits)      GI/HEPATIC (within normal limits)      /RENAL   (+) BPH with obstruction/lower urinary tract symptoms   (+) Chronic kidney disease (CKD), stage II (mild)   (+) Nephrolithiasis      HEMATOLOGY   (+) Thrombocytopenia (HCC)      MUSCULOSKELETAL   (+) Chronic low back pain      NEURO/PSYCH (within normal limits)      PULMONARY   (+) ARLENE (obstructive sleep apnea) (does not use CPAP)   (+) SOB (shortness of breath)      Other   (+) Cardiomyopathy, nonischemic (HCC)   (+) Fever (Resolved)   (+) History of sickle cell trait   (+) Lumbar disc disease with radiculopathy - Left   (+) Lung nodule   (+) Obesity      EKG 4/12/2021:  Sinus rhythm with occasional Premature ventricular complexes  Left axis deviation  Minimal voltage criteria for LVH, may be normal variant  Abnormal ECG  When compared with ECG of 12-APR-2021 07:15, (unconfirmed)  Premature ventricular complexes are now Present  Vent  rate has decreased BY  47 BPM  T wave inversion no longer evident in Anterior leads  Confirmed by Clari Rob ([id number]) on 4/12/2021 7:32:55 AM    Cardiac Cath 3/3/2021:  CORONARY CIRCULATION:  There was mild 3-vessel coronary artery disease (LAD, RCA, and circumflex)  Left main: Normal   LAD: Normal   RCA: This vessel was not visualized  None dominant    TTE 10/2020:  LEFT VENTRICLE:  Systolic function was at the lower limits of normal  Ejection fraction was estimated in the range of 50 % to 55 %  There were no regional wall motion abnormalities    Doppler parameters were consistent with abnormal left ventricular relaxation (grade 1 diastolic dysfunction)      RIGHT VENTRICLE:  The size was normal   Systolic function was normal      LEFT ATRIUM:  The atrium was mildly dilated      RIGHT ATRIUM:  The atrium was mildly dilated      MITRAL VALVE:  There was trace to mild regurgitation      TRICUSPID VALVE:  There was trace regurgitation  Estimated peak PA pressure was 38 mmHg  Lab Results   Component Value Date    WBC 4 46 04/12/2021    HGB 12 9 04/12/2021    HCT 39 8 04/12/2021    MCV 95 04/12/2021     (L) 04/12/2021     Lab Results   Component Value Date    SODIUM 142 03/31/2021    K 3 9 03/31/2021     (H) 03/31/2021    CO2 28 03/31/2021    BUN 12 03/31/2021    CREATININE 1 22 03/31/2021    GLUC 101 09/09/2020    CALCIUM 9 0 03/31/2021     Lab Results   Component Value Date    INR 1 15 02/22/2021    INR 1 07 02/03/2020    INR 1 12 08/21/2017    PROTIME 14 7 (H) 02/22/2021    PROTIME 13 9 02/03/2020    PROTIME 14 6 (H) 08/21/2017     No results found for: HGBA1C       Physical Exam    Airway    Mallampati score: II  TM Distance: >3 FB  Neck ROM: full     Dental   No notable dental hx     Cardiovascular      Pulmonary  Pulmonary exam normal     Other Findings        Anesthesia Plan  ASA Score- 3     Anesthesia Type- IV sedation with anesthesia with ASA Monitors  Additional Monitors:   Airway Plan:           Plan Factors-Exercise tolerance (METS): <4 METS  Chart reviewed  EKG reviewed  Existing labs reviewed  Patient summary reviewed  Induction- intravenous  Postoperative Plan-     Informed Consent- Anesthetic plan and risks discussed with patient  I personally reviewed this patient with the CRNA  Discussed and agreed on the Anesthesia Plan with the CRNA  Onesimo Andrade

## 2021-04-12 NOTE — ANESTHESIA POSTPROCEDURE EVALUATION
Post-Op Assessment Note    CV Status:  Stable  Pain Score: 0    Pain management: adequate     Mental Status:  Alert and awake   Hydration Status:  Euvolemic   PONV Controlled:  Controlled   Airway Patency:  Patent and adequate      Post Op Vitals Reviewed: Yes      Staff: CRNA         No complications documented      BP   164/91   Temp      Pulse  107   Resp   20   SpO2 98% on RA

## 2021-04-12 NOTE — LETTER
[address] [address]  Dept: [phone]    April 15, 2021     Patient: Annamarie Morris  YOB: 1945   Date of Visit: 4/12/2021       To Whom it May Concern:    Clarissa Dominguez is under my professional care and recently underwent a procedure  He was seen in the hospital from 4/12/2021 to 04/15/21  He may return to work on Monday, 4/19/2021, without restrictions or limitations  If you have any questions or concerns, please do not hesitate to contact our office - [phone]           Sincerely,          [facility] - Cardiac Electrophysiology

## 2021-04-12 NOTE — INTERVAL H&P NOTE
Please see recent office visit with Dr Toya Huang for full details  Briefly this patient is a pleasant 80-year-old male with likely nonischemic cardiomyopathy with LVEF 50-55% per echo 10/2020 however 39% per recent nuclear stress test 02/2021, no obstructive CAD per cardiac catheterization 03/2021, recurrent SVT, recurrent pulmonary embolism maintained on Eliquis, hyperlipidemia, and obesity with BMI 34  He has a history of palpitations, and prior monitoring showed frequent bursts of SVT  He was previously seen by Dr Mary Stinson, an ablation versus ongoing medical therapy was discussed  At that time, it was decided to continue Toprol-XL, and to pursue ablation if his episodes continued  Per that note, pulmonary vein tachycardia was suspected  Earlier this year, he underwent a nuclear stress test as part of a yearly physical for [employer]  This showed LVEF 39%, but subsequent cardiac catheterization showed no obstructive CAD  However, during the procedure he was noted to have ongoing runs of SVT  Thus, an EP study/SVT ablation was recommended and he presents today to undergo that procedure  No significant changes since he was last seen, other than ongoing palpitations  He denies chest pain, shortness of breath, dizziness, lightheadedness, presyncope, or syncope  He typically feels his palpitations in the morning, and is currently having ongoing episodes which we are able to capture on ECG  Physical exam unchanged         Vitals:    04/12/21 0720   BP: 152/98   Pulse: 84   Resp: 20   Temp: 98 °F (36 7 °C)   SpO2: 99%

## 2021-04-13 LAB
ANION GAP SERPL CALCULATED.3IONS-SCNC: 7 MMOL/L (ref 4–13)
ATRIAL RATE: 101 BPM
ATRIAL RATE: 65 BPM
BUN SERPL-MCNC: 9 MG/DL (ref 5–25)
CALCIUM SERPL-MCNC: 8.6 MG/DL (ref 8.3–10.1)
CHLORIDE SERPL-SCNC: 110 MMOL/L (ref 100–108)
CO2 SERPL-SCNC: 25 MMOL/L (ref 21–32)
CREAT SERPL-MCNC: 1.04 MG/DL (ref 0.6–1.3)
ERYTHROCYTE [DISTWIDTH] IN BLOOD BY AUTOMATED COUNT: 14.6 % (ref 11.6–15.1)
GFR SERPL CREATININE-BSD FRML MDRD: 81 ML/MIN/1.73SQ M
GLUCOSE P FAST SERPL-MCNC: 104 MG/DL (ref 65–99)
GLUCOSE SERPL-MCNC: 104 MG/DL (ref 65–140)
HCT VFR BLD AUTO: 35.8 % (ref 36.5–49.3)
HGB BLD-MCNC: 11.8 G/DL (ref 12–17)
MCH RBC QN AUTO: 31.2 PG (ref 26.8–34.3)
MCHC RBC AUTO-ENTMCNC: 33 G/DL (ref 31.4–37.4)
MCV RBC AUTO: 95 FL (ref 82–98)
P AXIS: 51 DEGREES
P AXIS: 57 DEGREES
PLATELET # BLD AUTO: 124 THOUSANDS/UL (ref 149–390)
PMV BLD AUTO: 12.1 FL (ref 8.9–12.7)
POTASSIUM SERPL-SCNC: 3.3 MMOL/L (ref 3.5–5.3)
PR INTERVAL: 168 MS
PR INTERVAL: 172 MS
QRS AXIS: -23 DEGREES
QRS AXIS: -35 DEGREES
QRSD INTERVAL: 94 MS
QRSD INTERVAL: 96 MS
QT INTERVAL: 340 MS
QT INTERVAL: 416 MS
QTC INTERVAL: 432 MS
QTC INTERVAL: 440 MS
RBC # BLD AUTO: 3.78 MILLION/UL (ref 3.88–5.62)
SODIUM SERPL-SCNC: 142 MMOL/L (ref 136–145)
T WAVE AXIS: -38 DEGREES
T WAVE AXIS: 26 DEGREES
VENTRICULAR RATE: 101 BPM
VENTRICULAR RATE: 65 BPM
WBC # BLD AUTO: 6.03 THOUSAND/UL (ref 4.31–10.16)

## 2021-04-13 PROCEDURE — 93005 ELECTROCARDIOGRAM TRACING: CPT

## 2021-04-13 PROCEDURE — 93010 ELECTROCARDIOGRAM REPORT: CPT | Performed by: INTERNAL MEDICINE

## 2021-04-13 PROCEDURE — 99232 SBSQ HOSP IP/OBS MODERATE 35: CPT | Performed by: INTERNAL MEDICINE

## 2021-04-13 PROCEDURE — 85027 COMPLETE CBC AUTOMATED: CPT | Performed by: PHYSICIAN ASSISTANT

## 2021-04-13 PROCEDURE — 80048 BASIC METABOLIC PNL TOTAL CA: CPT | Performed by: PHYSICIAN ASSISTANT

## 2021-04-13 RX ORDER — POTASSIUM CHLORIDE 20 MEQ/1
40 TABLET, EXTENDED RELEASE ORAL ONCE
Status: COMPLETED | OUTPATIENT
Start: 2021-04-13 | End: 2021-04-13

## 2021-04-13 RX ADMIN — APIXABAN 5 MG: 5 TABLET, FILM COATED ORAL at 08:48

## 2021-04-13 RX ADMIN — APIXABAN 5 MG: 5 TABLET, FILM COATED ORAL at 17:44

## 2021-04-13 RX ADMIN — POTASSIUM CHLORIDE 40 MEQ: 1500 TABLET, EXTENDED RELEASE ORAL at 16:41

## 2021-04-13 RX ADMIN — FLUTICASONE PROPIONATE 1 SPRAY: 50 SPRAY, METERED NASAL at 21:03

## 2021-04-13 RX ADMIN — SOTALOL HYDROCHLORIDE TABLES AF 120 MG: 80 TABLET ORAL at 21:03

## 2021-04-13 RX ADMIN — TAMSULOSIN HYDROCHLORIDE 0.4 MG: 0.4 CAPSULE ORAL at 17:46

## 2021-04-13 RX ADMIN — SOTALOL HYDROCHLORIDE TABLES AF 120 MG: 80 TABLET ORAL at 08:48

## 2021-04-13 NOTE — PROCEDURES
Post procedure operative note - supraventricular tachycardia - multiple kinds-regular and irregular      History and physical were reviewed  Patient was examined and history was reviewed  No change in patient's condition Since history and physical has been completed        The pre- operative diagnosis:  Atrial tachycardia 1  AVNRT  Atrial tachycardia 2      Postoperative diagnosis:  Atrial tachycardia 1  AVNRT  Atrial tachycardia 2      Procedure:  1  Radiofrequency ablation (RFA)  of Atrial tachycardia 1    2  RFA of AVNRT -slow pathway is in the right inferior extension    3  RFA of Atrial tachycardia 2    4  Comprehensive EP study with stimulation also from HRA, proximal and distal CS    5  Program stimulation with drug infusion - isoprenaline    6  3 D electro-anatomic mapping with NAVX    7   All access under ultrasound guidance using Seldinger technique        Surgeon: Carmela Alfredo    Assistants -none    Specimens - none    Estimated blood loss-20 mL    Findings-none    Complications none    Anesthesia- modified by anesthesiologist, local lidocaine by myself      Details of the procedure  The patient has been seen as an outpatient  He is having recurrent episodes of narrow complex tachycardia       Patient was brought to the electrophysiologic laboratory  Proper consent was signed  Patient was brought to the procedure  room  Proper time-out was done  Sterile dressing and draping done  All access obtained under ultrasound guidance  All sheaths and catheters placed as described below        Sheaths used  All  access was obtained under ultrasound guidance  Right femoral vein- 8F upgraded to SRO, 7F for CS   Left femoral vein-  5F for HRA, 6F for His, 5F for RV          Catheters used  HRA catheters - Jesse  His catheter - CRD   RV catheter - Jesse  Coronary sinus catheter - [email or web address] Krueger - FJ curve    Ablation catheters  Irrigated Tacti cath - 3 5 mm tip - for atrial tachycardia  Not indicated - Sapphire - 4 mm tip for AVNRT    Mapping catheter-Grid          Imaging systems used  1  Fluoroscopy   Right anterior oblique views were used whenever we needed to determine between anterior and posterior  Left anterior oblique views were used whenever we needed to determine between right and left  2  Electrotomy mapping - 3D electro-anatomic mapping was done by the [address]  This was also used whenever catheter was moved  The His bundle was marked        Anticoagulation:  Heparin   ACT kept between 300-350 as  Grid was being used      Details of the ablation  Once all catheters were in place we went ahead  with electrophysiologic study  The patient went into narrow complex tachycardia with decremental atrial pacing            Step 1- Ablation 1-atrial tachycardia 1  Narrow complex tachycardia, TCL - 500 ms   HRA and proximal CS activation is tied  Induced from HRA by 600/460    A drives V    Entrainment / overdrive suppression mapping from different sites in A  PPI - TCL - 65 ms from HRA, 154 ms from CS 9 -10    Detailed activation mapping done with Grid catheter    Ablation done with the irrigated catheter  Power varied 20-30 w   Temperature varied between 30 and 35 degree centigrade  Impedance drop of 10 Ohms, impedance never allowed to jump more than 20 Ohms  All near field signals ablated  Tachycardia terminated by ablation      3D electroanatomic map of atrial tachycardia 1          Automated lesion sets for atrial tachycardia 1                Step 2-ablation 2-RFA of AVNRT    After ablation of atrial tachycardia 1 the patient went into 2nd tachycardia  TCL  290 milliseconds  HVA activation sequence  VA 56 ms   This is very suggestive of AVNRT      Ablation  This was done with Sapphire catheter, 4 mm tip, standard curve  Power varied anywhere from 30 w to 50 w  Temperature at all times kept more than 50 degree centigrade  Impedance fall of 10 Ohms to represent adequate heating of tissue  All near field atrial signals were ablated      3D electroanatomic map of AVNRT ablation                  Step 3-ablation 3-atrial tachycardia 2  Narrow complex tachycardia, TCL - 440 ms   HRA earlier than proximal CS activation   Induced from HRA by ADEC and AEST    A drives V    Entrainment / overdrive suppression mapping from different sites in A  PPI - TCL - 80 ms from HRA, 221 ms from proximal CS, 275 ms from distal CS    Detailed activation mapping done with Grid catheter    Ablation done with the irrigated catheter  Power varied 20-30 w   Temperature varied between 30 and 35 degree centigrade  Impedance drop of 10 Ohms, impedance never allowed to jump more than 20 Ohms  All near field signals ablated  Tachycardia terminated by ablation      3D electroanatomic map of atrial tachycardia 2          Automated lesion sets for atrial tachycardia 2              Step 4 - Details of the electrophysiologic study          Different types of stimulation  Electrical - decremental and extrastimuli wih S4  Pharmacologic - isoprenaline       Different times of testing  After each ablation  After EP study  At end of study      Ventricular stimulation  VDEC and VEST   Done from RV apex    Atrial stimulation  ADEC and AEST  Done from HRA and CS- proximal and distal    Stimulation with Isuprel   Isuprel 1 microgram/minute, 2 microgram/minute, Isuprel in wean          End of procedure  All catheters removed  All sheaths removed  Patient awake and doing well  Groin hemostasis established        Summary of Procedure :  Successful RF ablation of 2 atrial tachycardias and AVNRT  Patient doing well post procedure         Recommendations  Patient to be started on sotalol 80 mg 2 times daily  He was in incessant tachycardia throughout the procedure and this may be responsible for his nonischemic cardiomyopathy, LVEF 40%  Aggressive management of heart failure

## 2021-04-13 NOTE — PLAN OF CARE
Problem: PAIN - ADULT  Goal: Verbalizes/displays adequate comfort level or baseline comfort level  Description: Interventions:  - Encourage patient to monitor pain and request assistance  - Assess pain using appropriate pain scale  - Administer analgesics based on type and severity of pain and evaluate response  - Implement non-pharmacological measures as appropriate and evaluate response  - Consider cultural and social influences on pain and pain management  - Notify physician/advanced practitioner if interventions unsuccessful or patient reports new pain  Outcome: Progressing     Problem: INFECTION - ADULT  Goal: Absence or prevention of progression during hospitalization  Description: INTERVENTIONS:  - Assess and monitor for signs and symptoms of infection  - Monitor lab/diagnostic results  - Monitor all insertion sites, i e  indwelling lines, tubes, and drains  - Monitor endotracheal if appropriate and nasal secretions for changes in amount and color  - [place] appropriate cooling/warming therapies per order  - Administer medications as ordered  - Instruct and encourage patient and family to use good hand hygiene technique  - Identify and instruct in appropriate isolation precautions for identified infection/condition  Outcome: Progressing  Goal: Absence of fever/infection during neutropenic period  Description: INTERVENTIONS:  - Monitor WBC    Outcome: Progressing     Problem: SAFETY ADULT  Goal: Patient will remain free of falls  Description: INTERVENTIONS:  - Assess patient frequently for physical needs  -  Identify cognitive and physical deficits and behaviors that affect risk of falls    -  [place] fall precautions as indicated by assessment   - Educate patient/family on patient safety including physical limitations  - Instruct patient to call for assistance with activity based on assessment  - Modify environment to reduce risk of injury  - Consider OT/PT consult to assist with strengthening/mobility  Outcome: Progressing  Goal: Maintain or return to baseline ADL function  Description: INTERVENTIONS:  -  Assess patient's ability to carry out ADLs; assess patient's baseline for ADL function and identify physical deficits which impact ability to perform ADLs (bathing, care of mouth/teeth, toileting, grooming, dressing, etc )  - Assess/evaluate cause of self-care deficits   - Assess range of motion  - Assess patient's mobility; develop plan if impaired  - Assess patient's need for assistive devices and provide as appropriate  - Encourage maximum independence but intervene and supervise when necessary  - Involve family in performance of ADLs  - Assess for home care needs following discharge   - Consider OT consult to assist with ADL evaluation and planning for discharge  - Provide patient education as appropriate  Outcome: Progressing  Goal: Maintain or return mobility status to optimal level  Description: INTERVENTIONS:  - Assess patient's baseline mobility status (ambulation, transfers, stairs, etc )    - Identify cognitive and physical deficits and behaviors that affect mobility  - Identify mobility aids required to assist with transfers and/or ambulation (gait belt, sit-to-stand, lift, walker, cane, etc )  - [place] fall precautions as indicated by assessment  - Record patient progress and toleration of activity level on Mobility SBAR; progress patient to next Phase/Stage  - Instruct patient to call for assistance with activity based on assessment  - Consider rehabilitation consult to assist with strengthening/weightbearing, etc   Outcome: Progressing     Problem: DISCHARGE PLANNING  Goal: Discharge to home or other facility with appropriate resources  Description: INTERVENTIONS:  - Identify barriers to discharge w/patient and caregiver  - Arrange for needed discharge resources and transportation as appropriate  - Identify discharge learning needs (meds, wound care, etc )  - Arrange for interpretive services to assist at discharge as needed  - Refer to Case Management Department for coordinating discharge planning if the patient needs post-hospital services based on physician/advanced practitioner order or complex needs related to functional status, cognitive ability, or social support system  Outcome: Progressing     Problem: Knowledge Deficit  Goal: Patient/family/caregiver demonstrates understanding of disease process, treatment plan, medications, and discharge instructions  Description: Complete learning assessment and assess knowledge base    Interventions:  - Provide teaching at level of understanding  - Provide teaching via preferred learning methods  Outcome: Progressing

## 2021-04-13 NOTE — UTILIZATION REVIEW
Initial Clinical Review    WAS OutPatient/ No Charge Bed 04/12/2021 @ 0224, CONVERTED TO INPATIENT ADMISSION 04/13/2021, DUE TO CONTINUED STAY REQUIRED TO CARE FOR PATIENT WITH  Richelle Perez Post ablation  Initiation of Sotalol Therapy    04/12/2021  Procedure:  Cardiac EPS/SVT Ablation    Admission: Date/Time/Statement:   Admission Orders (From admission, onward)     Ordered        04/13/21 1510  Inpatient Admission  Once                   Orders Placed This Encounter   Procedures    Inpatient Admission     Standing Status:   Standing     Number of Occurrences:   1     Order Specific Question:   Level of Care     Answer:   Med Surg [16]     Order Specific Question:   Bed request comments     Answer:   telemetry     Order Specific Question:   Estimated length of stay     Answer:   More than 2 Midnights     Order Specific Question:   Certification     Answer:   I certify that inpatient services are medically necessary for this patient for a duration of greater than two midnights  See H&P and MD Progress Notes for additional information about the patient's course of treatment  04/13/2021  Assessment: 76year old male, presented to [facility] for OP Procedure  Admitted as OP due to  Ablation  1  Recurrent SVT, status post ablation of AVNRT with slow pathway modification 4/12/2021              A ) several different atrial tachycardias noted, thus currently undergoing sotalol initiation              B ) stable QTC, telemetry showed brief run of PAT but no sustained episodes  2  Nonischemic cardiomyopathy with LVEF 50-55% per echo 10/2020 however 39% per recent nuclear stress test 02/2021              A ) no obstructive CAD per cardiac catheterization 03/2021  3  Recurrent pulmonary embolism, maintained on Eliquis  Plan:  Telemetry showed brief burst of PAT, no sustained arrhythmias and currently in sinus rhythm     Groin sutures were removed - right groin has slight superficial bleed from suture removal, otherwise no issues, hematoma noted  Nurse to place water seal bandages and will monitor for ongoing bleeding  ECG shows stable QTc, no prolongation on sotalol  He only received one dose thus far, as he refused last night due to not being aware of needing a new medication  Will continue current dosing  K+ today 3 3, will replete and recheck in the morning        Triage Vitals   Temperature Pulse Respirations Blood Pressure SpO2   21 0720 21 0720 21 0720 21 0720 21 07   98 °F (36 7 °C) 84 20 152/98 99 %      Temp Source Heart Rate Source Patient Position - Orthostatic VS BP Location FiO2 (%)   21 0720 21 0720 -- -- --   Temporal Monitor         Pain Score       21 1514       8          Wt Readings from Last 1 Encounters:   21 125 kg (275 lb 9 2 oz)     Additional Vital Signs:   Date/Time  Temp  Pulse  Resp  BP  MAP (mmHg)  SpO2  O2 Device   21 15:11:50  98 9 °F (37 2 °C)  69  --  146/83  104  96 %  --   21 15:11:33  98 9 °F (37 2 °C)  --  --  146/83  104  --  --   21 06:55:11  --  86  18  140/84  103  90 %  --   21 02:20:15  99 3 °F (37 4 °C)  83  18  136/82  100  96 %  --   21 0012  --  --  --  --  --  --  None (Room air)   21 23:40:58  99 2 °F (37 3 °C)  84  18  130/80  97  92 %  --   21 21:25:25  --  83  --  139/83  102  94 %  --   21 19:15:15  98 8 °F (37 1 °C)  95  --  125/78  94  93 %  --   21 1700  --  88  --  --  --  96 %  --   21 1514  --  --  --  --  --  --  None (Room air)   21 15:02:09  98 4 °F (36 9 °C)  94  --  132/80  97  94 %  --   21 1500  98 4 °F (36 9 °C)  96  --  --  --  96 %  --   21 14:19:22  --  100  18  133/79  97  96 %  None (Room air)     2021 @ 0719  EC, Sinus rhythm with occasional Premature ventricular complexes, QTc 429    2021 @ 1327  EC, NSR, ST & T wave abnormality, consider anterolateral ischemia QTc 432    Pertinent Labs/Diagnostic Test Results: Results from last 7 days   Lab Units 04/13/21  0539 04/12/21  0651   WBC Thousand/uL 6 03 4 46   HEMOGLOBIN g/dL 11 8* 12 9   HEMATOCRIT % 35 8* 39 8   PLATELETS Thousands/uL 124* 141*   NEUTROS ABS Thousands/µL  --  2 65     Results from last 7 days   Lab Units 04/13/21  0539 04/12/21  0651   SODIUM mmol/L 142 143   POTASSIUM mmol/L 3 3* 3 7   CHLORIDE mmol/L 110* 114*   CO2 mmol/L 25 25   ANION GAP mmol/L 7 4   BUN mg/dL 9 13   CREATININE mg/dL 1 04 1 23   EGFR ml/min/1 73sq m 81 66   CALCIUM mg/dL 8 6 8 9     Results from last 7 days   Lab Units 04/13/21  0539 04/12/21  0651   GLUCOSE RANDOM mg/dL 104 111     Results from last 7 days   Lab Units 04/12/21  0651   PROTIME seconds 13 4   INR  1 02     Past Medical History:   Diagnosis Date    Hyperlipidemia     Post-nasal drip     Pulmonary embolism (HCC)      Present on Admission:   SVT (supraventricular tachycardia) (HCC)      Admitting Diagnosis: SVT (supraventricular tachycardia) (HCC) [I47 1]  Age/Sex: 76 y o  male  Admission Orders:  Scheduled Medications:  apixaban, 5 mg, Oral, BID  fluticasone, 1 spray, Nasal, Daily  potassium chloride, 40 mEq, Oral, Once  sotalol AF, 120 mg, Oral, Q12H FRANCIA  tamsulosin, 0 4 mg, Oral, Daily With Dinner      Continuous IV Infusions:     PRN Meds:  acetaminophen, 650 mg, Oral, Q4H PRN  oxyCODONE, 5 mg, Oral, Q4H PRN      telemetry      Network Utilization Review Department  ATTENTION: Please call with any questions or concerns to [phone] and carefully listen to the prompts so that you are directed to the right person  All voicemails are confidential   Reina Sriram all requests for admission clinical reviews, approved or denied determinations and any other requests to dedicated fax number below belonging to the campus where the patient is receiving treatment   List of dedicated fax numbers for the Facilities:  FACILITY NAME UR FAX NUMBER   ADMISSION DENIALS (Administrative/Medical Necessity) [phone]   PARENT CHILD HEALTH (Maternity/NICU/Pediatrics) [address] [facility] [facility] [address] (  Lorie Renteria "Laurie" [address]) [address] [address] Yesika Reyes [facility] [phone]   [address] [address] [phone]

## 2021-04-13 NOTE — PROGRESS NOTES
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   A ) several different atrial tachycardias noted, thus currently undergoing sotalol initiation   B ) stable QTC, telemetry showed brief run of PAT but no sustained episodes  2  Nonischemic cardiomyopathy with LVEF 50-55% per echo 10/2020 however 39% per recent nuclear stress test 02/2021   A ) no obstructive CAD per cardiac catheterization 03/2021  3  Recurrent pulmonary embolism, maintained on Eliquis  4  Hyperlipidemia  5  Obesity with BMI 34    Plan:  Patient is feeling well today, no issues overnight  Telemetry showed brief burst of PAT (see below), no sustained arrhythmias and currently in sinus rhythm  Groin sutures were removed - right groin has slight superficial bleed from suture removal, otherwise no issues, hematoma noted  Nurse to place water seal bandages and will monitor for ongoing bleeding  ECG shows stable QTc, no prolongation on sotalol  He only received one dose thus far, as he refused last night due to not being aware of needing a new medication  Will continue current dosing, anticipate discharge on Thursday if things remain stable  K+ today 3 3, will replete and recheck in the morning  Subjective/Objective   Chief Complaint: no acute complaints    Subjective: No issues reported overnight  Patient did not receive dose of sotalol last night and thus received first dose this morning  Denies chest pain, palpitations, dizziness, lightheadedness, or dyspnea       Objective:     Vitals: /84   Pulse 86   Temp 99 3 °F (37 4 °C)   Resp 18   Ht 6' 3" (1 905 m)   Wt 125 kg (275 lb 9 2 oz)   SpO2 90%   BMI 34 44 kg/m²   Vitals:    04/12/21 0720   Weight: 125 kg (275 lb 9 2 oz)     Orthostatic Blood Pressures      Most Recent Value Blood Pressure  140/84 filed at 04/13/2021 9555            Intake/Output Summary (Last 24 hours) at 4/13/2021 1144  Last data filed at 4/13/2021 0230  Gross per 24 hour   Intake 1829 31 ml   Output 1750 ml   Net 79 31 ml       Invasive Devices     Peripheral Intravenous Line            Peripheral IV 04/12/21 Left Wrist 1 day    Peripheral IV 04/12/21 Right Wrist 1 day                            Scheduled Meds:  Current Facility-Administered Medications   Medication Dose Route Frequency Provider Last Rate    acetaminophen  650 mg Oral Q4H PRN [facility], PA-C      apixaban  5 mg Oral BID [facility], PA-C      fluticasone  1 spray Nasal Daily [facility], [place]      oxyCODONE  5 mg Oral Q4H PRN [facility], PA-C      sotalol AF  120 mg Oral Q12H [address] [facility], [place]      tamsulosin  0 4 mg Oral Daily With Dinner [facility], PA-C       Continuous Infusions:   PRN Meds:   acetaminophen    oxyCODONE    Review of Systems   Constitution: Negative for fever and malaise/fatigue  Cardiovascular: Negative for chest pain, dyspnea on exertion, irregular heartbeat, leg swelling, near-syncope, orthopnea, palpitations, paroxysmal nocturnal dyspnea and syncope  All other systems reviewed and are negative  Physical Exam:   GEN: NAD, alert and oriented x 3, well appearing  SKIN: dry without significant lesions or rashes  HEENT: NCAT, PERRL, EOMs intact  NECK: No JVD appreciated  CARDIOVASCULAR: RRR  LUNGS: good overall effort, no audible wheezing noted, no respiratory distress  ABDOMEN: Soft, nontender, nondistended  EXTREMITIES/VASCULAR: perfused without clubbing, cyanosis, or LE edema b/l  PSYCH: Normal mood and affect  NEURO: CN ll-Xll grossly intact                Lab Results: I have personally reviewed pertinent lab results      Results from last 7 days   Lab Units 04/13/21  0539 04/12/21  0651   WBC Thousand/uL 6 03 4 46   HEMOGLOBIN g/dL 11 8* 12 9   HEMATOCRIT % 35 8* 39 8 PLATELETS Thousands/uL 124* 141*     Results from last 7 days   Lab Units 21  0539 21  0651   POTASSIUM mmol/L 3 3* 3 7   CHLORIDE mmol/L 110* 114*   CO2 mmol/L 25 25   BUN mg/dL 9 13   CREATININE mg/dL 1 04 1 23   CALCIUM mg/dL 8 6 8 9     Results from last 7 days   Lab Units 21  0651   INR  1 02             Imaging: I have personally reviewed pertinent reports  Results for orders placed during the hospital encounter of 10/13/20   Echo complete with contrast if indicated    Narrative [facility], [facility]  [phone]    Transthoracic Echocardiogram  2D, M-mode, Doppler, and Color Doppler    Study date:  13-Oct-2020    Patient: Abbey Meyers  MR number: [id number]  Account number: [de-identified]  : 1945  Age: 76 years  Gender: Male  Status: Outpatient  Location: [facility]  Height: 75 in  Weight: 254 5 lb  BP: 124/ 72 mmHg    Indications: Non-ischemic cardiomyopathy  Diagnoses: I42 9 - Cardiomyopathy, unspecified    Sonographer:  HILL Hernandez  Primary Physician:  Scott Cervantes MD  Referring Physician:  Oscar Farley MD  Group:  [facility]  Interpreting Physician:  Stefani Garvey MD    SUMMARY    LEFT VENTRICLE:  Systolic function was at the lower limits of normal  Ejection fraction was estimated in the range of 50 % to 55 %  There were no regional wall motion abnormalities  Doppler parameters were consistent with abnormal left ventricular relaxation (grade 1 diastolic dysfunction)  RIGHT VENTRICLE:  The size was normal   Systolic function was normal     LEFT ATRIUM:  The atrium was mildly dilated  RIGHT ATRIUM:  The atrium was mildly dilated  MITRAL VALVE:  There was trace to mild regurgitation  TRICUSPID VALVE:  There was trace regurgitation  Estimated peak PA pressure was 38 mmHg      HISTORY: PRIOR HISTORY: NICM, ARLENE, B/L pulmonary embolism, SVT, CKD II, Shortness of breath,    PROCEDURE: The study was performed in the [address] [place] [address]  This was a routine study  The transthoracic approach was used  The study included complete 2D imaging, M-mode, complete spectral Doppler, and color Doppler  The  heart rate was 69 bpm, at the start of the study  Images were obtained from the parasternal, apical, subcostal, and suprasternal notch acoustic windows  Image quality was adequate  LEFT VENTRICLE: Size was normal  Systolic function was at the lower limits of normal  Ejection fraction was estimated in the range of 50 % to 55 %  There were no regional wall motion abnormalities  Wall thickness was normal  No evidence of  apical thrombus  DOPPLER: Doppler parameters were consistent with abnormal left ventricular relaxation (grade 1 diastolic dysfunction)  RIGHT VENTRICLE: The size was normal  Systolic function was normal  Wall thickness was normal     LEFT ATRIUM: The atrium was mildly dilated  RIGHT ATRIUM: The atrium was mildly dilated  MITRAL VALVE: Valve structure was normal  There was normal leaflet separation  DOPPLER: The transmitral velocity was within the normal range  There was no evidence for stenosis  There was trace to mild regurgitation  AORTIC VALVE: The valve was trileaflet  Leaflets exhibited normal thickness and normal cuspal separation  DOPPLER: Transaortic velocity was within the normal range  There was no evidence for stenosis  There was no significant  regurgitation  TRICUSPID VALVE: The valve structure was normal  There was normal leaflet separation  DOPPLER: The transtricuspid velocity was within the normal range  There was no evidence for stenosis  There was trace regurgitation  Estimated peak PA  pressure was 38 mmHg  PULMONIC VALVE: Leaflets exhibited normal thickness, no calcification, and normal cuspal separation  DOPPLER: The transpulmonic velocity was within the normal range   There was no significant regurgitation  PERICARDIUM: There was no pericardial effusion  The pericardium was normal in appearance  AORTA: The root exhibited normal size  SYSTEMIC VEINS: IVC: The inferior vena cava was normal in size      SYSTEM MEASUREMENT TABLES    2D  %FS: 34 03 %  Ao Diam: 3 62 cm  EDV(Teich): 175 09 ml  EF(Teich): 62 11 %  ESV(Teich): 66 34 ml  IVSd: 1 1 cm  LA Area: 27 97 cm2  LA Diam: 5 41 cm  LVEDV MOD A4C: 165 87 ml  LVEF MOD A4C: 55 55 %  LVESV MOD A4C: 73 73 ml  LVIDd: 5 93 cm  LVIDs: 3 91 cm  LVLd A4C: 9 23 cm  LVLs A4C: 8 03 cm  LVPWd: 0 91 cm  RA Area: 20 75 cm2  RVIDd: 4 77 cm  SV MOD A4C: 92 15 ml  SV(Teich): 108 75 ml    CW  AV MaxPG: 10 mmHg  AV Vmax: 1 58 m/s  MR Vmax: 4 55 m/s  MR maxP 83 mmHg  TR MaxP 98 mmHg  TR Vmax: 2 96 m/s    MM  TAPSE: 2 38 cm    PW  MV A [place]: 1 02 m/s  MV Dec [place]: 3 21 m/s2  MV DecT: 190 81 ms  MV E [place]: 0 61 m/s  MV E/A Ratio: 0 6  MV PHT: 55 34 ms  MVA By PHT: 3 98 cm2    Intersocietal Commission Accredited Echocardiography Laboratory    Prepared and electronically signed by    Sofi Johnson MD  Signed 13-Oct-2020 16:09:08         EKG:         TELEMETRY: brief run of PAT            VTE Pharmacologic Prophylaxis: Eliquis

## 2021-04-13 NOTE — CASE MANAGEMENT
LOS: Day 1  Bundle: pt is not a bundle  Readmission risk: pt is not a 30 day readmit    Cm reviewed role with pt  Pt reported his wife Brady Eubanks as his emergency contact [phone]  Pt reported that he and Brady Eubanks live together in a 2 level home with [address] and [address] between floors  Pt reported that he was IPTA with ADLs  Pt drives and works  No reported hx of DME, no hx of VNA, STR  Pt confirmed hx of OPPT with [facility]  PCP is Dr Jez Little; pharmacy of choice is [facility] [place]  No hx of MH or substance abuse  Wife to take home once medically stable  No POA/LW  CM reviewed d/c planning process including the following: identifying help at home, patient preference for d/c planning needs, Discharge Lounge, [facility] Meds to Bed program, availability of treatment team to discuss questions or concerns patient and/or family may have regarding understanding medications and recognizing signs and symptoms once discharged  CM also encouraged patient to follow up with all recommended appointments after discharge  Patient advised of importance for patient and family to participate in managing patients medical well being  Patient/caregiver received discharge checklist  Content reviewed  Patient/caregiver encouraged to participate in discharge plan of care prior to discharge home

## 2021-04-14 LAB
ANION GAP SERPL CALCULATED.3IONS-SCNC: 4 MMOL/L (ref 4–13)
ATRIAL RATE: 66 BPM
ATRIAL RATE: 72 BPM
BUN SERPL-MCNC: 12 MG/DL (ref 5–25)
CALCIUM SERPL-MCNC: 8.8 MG/DL (ref 8.3–10.1)
CHLORIDE SERPL-SCNC: 114 MMOL/L (ref 100–108)
CO2 SERPL-SCNC: 27 MMOL/L (ref 21–32)
CREAT SERPL-MCNC: 1.19 MG/DL (ref 0.6–1.3)
GFR SERPL CREATININE-BSD FRML MDRD: 69 ML/MIN/1.73SQ M
GLUCOSE SERPL-MCNC: 94 MG/DL (ref 65–140)
P AXIS: 47 DEGREES
P AXIS: 47 DEGREES
POTASSIUM SERPL-SCNC: 4 MMOL/L (ref 3.5–5.3)
PR INTERVAL: 164 MS
PR INTERVAL: 168 MS
QRS AXIS: -31 DEGREES
QRS AXIS: -32 DEGREES
QRSD INTERVAL: 98 MS
QRSD INTERVAL: 98 MS
QT INTERVAL: 382 MS
QT INTERVAL: 406 MS
QTC INTERVAL: 418 MS
QTC INTERVAL: 425 MS
SODIUM SERPL-SCNC: 145 MMOL/L (ref 136–145)
T WAVE AXIS: -25 DEGREES
T WAVE AXIS: -55 DEGREES
VENTRICULAR RATE: 66 BPM
VENTRICULAR RATE: 72 BPM

## 2021-04-14 PROCEDURE — 80048 BASIC METABOLIC PNL TOTAL CA: CPT | Performed by: PHYSICIAN ASSISTANT

## 2021-04-14 PROCEDURE — 93010 ELECTROCARDIOGRAM REPORT: CPT | Performed by: INTERNAL MEDICINE

## 2021-04-14 PROCEDURE — 93005 ELECTROCARDIOGRAM TRACING: CPT

## 2021-04-14 PROCEDURE — 99232 SBSQ HOSP IP/OBS MODERATE 35: CPT | Performed by: INTERNAL MEDICINE

## 2021-04-14 RX ADMIN — SOTALOL HYDROCHLORIDE TABLES AF 120 MG: 80 TABLET ORAL at 21:12

## 2021-04-14 RX ADMIN — TAMSULOSIN HYDROCHLORIDE 0.4 MG: 0.4 CAPSULE ORAL at 17:14

## 2021-04-14 RX ADMIN — APIXABAN 5 MG: 5 TABLET, FILM COATED ORAL at 17:14

## 2021-04-14 RX ADMIN — SOTALOL HYDROCHLORIDE TABLES AF 120 MG: 80 TABLET ORAL at 08:16

## 2021-04-14 RX ADMIN — APIXABAN 5 MG: 5 TABLET, FILM COATED ORAL at 08:16

## 2021-04-14 NOTE — PROGRESS NOTES
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 4/12/2021              A ) several different atrial tachycardias noted, thus currently undergoing sotalol initiation              B ) stable QTC, telemetry showed brief run of PAT but no sustained episodes  2  Nonischemic cardiomyopathy with LVEF 50-55% per echo 10/2020 however 39% per recent nuclear stress test 02/2021              A ) no obstructive CAD per cardiac catheterization 03/2021  3  Recurrent pulmonary embolism, maintained on Eliquis  4  Hyperlipidemia  5  Obesity with BMI 34      Plan:  He continues to feel well today, and fortunately has not experienced any palpitations since his ablation  His potassium is improved today (4 0), and QTc was stable on ECG last evening  No arrhythmias on telemetry  Continue to monitor telemetry and ECGs, anticipate discharge tomorrow following morning dose and ECG  Subjective/Objective   Chief Complaint: no cardiac complaints    Subjective: Patient denies chest pain, dyspnea, palpitations, dizziness, lightheadedness  Has been ambulating without issues  No acute events reported overnight       Objective:     Vitals: /72   Pulse 64   Temp 98 1 °F (36 7 °C)   Resp 18   Ht 6' 3" (1 905 m)   Wt 125 kg (275 lb 9 2 oz)   SpO2 94%   BMI 34 44 kg/m²   Vitals:    04/12/21 0720   Weight: 125 kg (275 lb 9 2 oz)     Orthostatic Blood Pressures      Most Recent Value   Blood Pressure  124/72 filed at 04/14/2021 0644   Patient Position - Orthostatic VS  Lying filed at 04/13/2021 2300            Intake/Output Summary (Last 24 hours) at 4/14/2021 0917  Last data filed at 4/14/2021 0754  Gross per 24 hour   Intake 1370 ml   Output 1300 ml   Net 70 ml       Invasive Devices     Peripheral Intravenous Line            Peripheral IV 04/12/21 Right Wrist 2 days                            Scheduled Meds:  Current Facility-Administered Medications   Medication Dose Route Frequency Provider Last Rate    acetaminophen  650 mg Oral Q4H PRN Iggy Forrester PA-C      apixaban  5 mg Oral BID Iggy Forrester PA-C      fluticasone  1 spray Nasal Daily Kamron Villarreal      oxyCODONE  5 mg Oral Q4H PRN Iggy Forrester PA-C      sotalol AF  120 mg Oral Q12H [address] Kamron Villareral      tamsulosin  0 4 mg Oral Daily With Dinner Iggy Forrester PA-C       Continuous Infusions:   PRN Meds:   acetaminophen    oxyCODONE    Review of Systems   Constitution: Negative for fever and malaise/fatigue  Cardiovascular: Negative for chest pain, cyanosis, dyspnea on exertion, irregular heartbeat, leg swelling, near-syncope, orthopnea, palpitations, paroxysmal nocturnal dyspnea and syncope  All other systems reviewed and are negative  Physical Exam:   GEN: NAD, alert and oriented x 3, well appearing  SKIN: dry without significant lesions or rashes  HEENT: NCAT, PERRL, EOMs intact  NECK: No JVD appreciated  CARDIOVASCULAR: RRR, normal S1, S2 without murmurs, rubs, or gallops appreciated  LUNGS: Clear to auscultation bilaterally without wheezes, rhonchi, or rales  ABDOMEN: Soft, nontender, nondistended  EXTREMITIES/VASCULAR: perfused without clubbing, cyanosis, or LE edema b/l  PSYCH: Normal mood and affect  NEURO: CN ll-Xll grossly intact                Lab Results: I have personally reviewed pertinent lab results      Results from last 7 days   Lab Units 04/13/21  0539 04/12/21  0651   WBC Thousand/uL 6 03 4 46   HEMOGLOBIN g/dL 11 8* 12 9   HEMATOCRIT % 35 8* 39 8   PLATELETS Thousands/uL 124* 141*     Results from last 7 days   Lab Units 04/14/21  0641 04/13/21  0539 04/12/21  0651   POTASSIUM mmol/L 4 0 3 3* 3 7   CHLORIDE mmol/L 114* 110* 114*   CO2 mmol/L 27 25 25   BUN mg/dL 12 9 13   CREATININE mg/dL 1 19 1 04 1 23   CALCIUM mg/dL 8 8 8 6 8 9     Results from last 7 days   Lab Units 21  0651   INR  1 02             Imaging: I have personally reviewed pertinent reports  Results for orders placed during the hospital encounter of 10/13/20   Echo complete with contrast if indicated    Narrative [address], [address] [facility]  [phone]    Transthoracic Echocardiogram  2D, M-mode, Doppler, and Color Doppler    Study date:  13-Oct-2020    Patient: Prasanna Rizo  MR number: [id number]  Account number: [de-identified]  : 1945  Age: 76 years  Gender: Male  Status: Outpatient  Location: [facility]  Height: 75 in  Weight: 254 5 lb  BP: 124/ 72 mmHg    Indications: Non-ischemic cardiomyopathy  Diagnoses: I42 9 - Cardiomyopathy, unspecified    Sonographer:  HILL Verde  Primary Physician:  Aries Gross MD  Referring Physician:  Yesi Antonio MD  [facility]  [facility]  Interpreting Physician:  Willy Cross MD    SUMMARY    LEFT VENTRICLE:  Systolic function was at the lower limits of normal  Ejection fraction was estimated in the range of 50 % to 55 %  There were no regional wall motion abnormalities  Doppler parameters were consistent with abnormal left ventricular relaxation (grade 1 diastolic dysfunction)  RIGHT VENTRICLE:  The size was normal   Systolic function was normal     LEFT ATRIUM:  The atrium was mildly dilated  RIGHT ATRIUM:  The atrium was mildly dilated  MITRAL VALVE:  There was trace to mild regurgitation  TRICUSPID VALVE:  There was trace regurgitation  Estimated peak PA pressure was 38 mmHg  HISTORY: PRIOR HISTORY: NICM, ARLENE, B/L pulmonary embolism, SVT, CKD II, Shortness of breath,    PROCEDURE: The study was performed in the [address] [place] [address]  This was a routine study  The transthoracic approach was used  The study included complete 2D imaging, M-mode, complete spectral Doppler, and color Doppler  The  heart rate was 69 bpm, at the start of the study  Images were obtained from the parasternal, apical, subcostal, and suprasternal notch acoustic windows  Image quality was adequate  LEFT VENTRICLE: Size was normal  Systolic function was at the lower limits of normal  Ejection fraction was estimated in the range of 50 % to 55 %  There were no regional wall motion abnormalities  Wall thickness was normal  No evidence of  apical thrombus  DOPPLER: Doppler parameters were consistent with abnormal left ventricular relaxation (grade 1 diastolic dysfunction)  RIGHT VENTRICLE: The size was normal  Systolic function was normal  Wall thickness was normal     LEFT ATRIUM: The atrium was mildly dilated  RIGHT ATRIUM: The atrium was mildly dilated  MITRAL VALVE: Valve structure was normal  There was normal leaflet separation  DOPPLER: The transmitral velocity was within the normal range  There was no evidence for stenosis  There was trace to mild regurgitation  AORTIC VALVE: The valve was trileaflet  Leaflets exhibited normal thickness and normal cuspal separation  DOPPLER: Transaortic velocity was within the normal range  There was no evidence for stenosis  There was no significant  regurgitation  TRICUSPID VALVE: The valve structure was normal  There was normal leaflet separation  DOPPLER: The transtricuspid velocity was within the normal range  There was no evidence for stenosis  There was trace regurgitation  Estimated peak PA  pressure was 38 mmHg  PULMONIC VALVE: Leaflets exhibited normal thickness, no calcification, and normal cuspal separation  DOPPLER: The transpulmonic velocity was within the normal range  There was no significant regurgitation  PERICARDIUM: There was no pericardial effusion  The pericardium was normal in appearance  AORTA: The root exhibited normal size  SYSTEMIC VEINS: IVC: The inferior vena cava was normal in size      SYSTEM MEASUREMENT TABLES    2D  %FS: 34 03 %  Ao Diam: 3 62 cm  EDV(Teich): 175 09 ml  EF(Teich): 62 11 %  ESV(Teich): 66 34 ml  IVSd: 1 1 cm  LA Area: 27 97 cm2  LA Diam: 5 41 cm  LVEDV MOD A4C: 165 87 ml  LVEF MOD A4C: 55 55 %  LVESV MOD A4C: 73 73 ml  LVIDd: 5 93 cm  LVIDs: 3 91 cm  LVLd A4C: 9 23 cm  LVLs A4C: 8 03 cm  LVPWd: 0 91 cm  RA Area: 20 75 cm2  RVIDd: 4 77 cm  SV MOD A4C: 92 15 ml  SV(Teich): 108 75 ml    CW  AV MaxPG: 10 mmHg  AV Vmax: 1 58 m/s  MR Vmax: 4 55 m/s  MR maxP 83 mmHg  TR MaxP 98 mmHg  TR Vmax: 2 96 m/s    MM  TAPSE: 2 38 cm    PW  MV A [place]: 1 02 m/s  MV Dec [place]: 3 21 m/s2  MV DecT: 190 81 ms  MV E [place]: 0 61 m/s  MV E/A Ratio: 0 6  MV PHT: 55 34 ms  MVA By PHT: 3 98 cm2    [employer] Commission Accredited Echocardiography Laboratory    Prepared and electronically signed by    Deepti Luna MD  Signed 13-Oct-2020 16:09:08         EKG: from last evening -         TELEMETRY: normal sinus rhythm, no recurrent arrhythmias noted      VTE Pharmacologic Prophylaxis: Eliquis

## 2021-04-15 VITALS
OXYGEN SATURATION: 97 % | BODY MASS INDEX: 32.92 KG/M2 | SYSTOLIC BLOOD PRESSURE: 126 MMHG | DIASTOLIC BLOOD PRESSURE: 69 MMHG | HEART RATE: 55 BPM | TEMPERATURE: 98.7 F | HEIGHT: 75 IN | WEIGHT: 264.77 LBS | RESPIRATION RATE: 18 BRPM

## 2021-04-15 LAB
ATRIAL RATE: 56 BPM
ATRIAL RATE: 58 BPM
ATRIAL RATE: 59 BPM
ATRIAL RATE: 61 BPM
ATRIAL RATE: 62 BPM
P AXIS: 57 DEGREES
P AXIS: 58 DEGREES
P AXIS: 59 DEGREES
P AXIS: 59 DEGREES
P AXIS: 61 DEGREES
PR INTERVAL: 168 MS
PR INTERVAL: 170 MS
PR INTERVAL: 172 MS
QRS AXIS: -20 DEGREES
QRS AXIS: -21 DEGREES
QRS AXIS: -29 DEGREES
QRS AXIS: -29 DEGREES
QRS AXIS: -30 DEGREES
QRSD INTERVAL: 100 MS
QRSD INTERVAL: 106 MS
QRSD INTERVAL: 116 MS
QRSD INTERVAL: 118 MS
QRSD INTERVAL: 96 MS
QT INTERVAL: 438 MS
QT INTERVAL: 440 MS
QT INTERVAL: 444 MS
QT INTERVAL: 452 MS
QT INTERVAL: 456 MS
QTC INTERVAL: 428 MS
QTC INTERVAL: 442 MS
QTC INTERVAL: 443 MS
QTC INTERVAL: 444 MS
QTC INTERVAL: 451 MS
T WAVE AXIS: -21 DEGREES
T WAVE AXIS: -26 DEGREES
T WAVE AXIS: -29 DEGREES
T WAVE AXIS: -30 DEGREES
T WAVE AXIS: -31 DEGREES
VENTRICULAR RATE: 56 BPM
VENTRICULAR RATE: 58 BPM
VENTRICULAR RATE: 59 BPM
VENTRICULAR RATE: 61 BPM
VENTRICULAR RATE: 62 BPM

## 2021-04-15 PROCEDURE — 99239 HOSP IP/OBS DSCHRG MGMT >30: CPT | Performed by: INTERNAL MEDICINE

## 2021-04-15 PROCEDURE — 93010 ELECTROCARDIOGRAM REPORT: CPT | Performed by: INTERNAL MEDICINE

## 2021-04-15 PROCEDURE — 93005 ELECTROCARDIOGRAM TRACING: CPT

## 2021-04-15 RX ORDER — SOTALOL HYDROCHLORIDE 120 MG/1
120 TABLET ORAL EVERY 12 HOURS SCHEDULED
Qty: 60 EACH | Refills: 3 | Status: SHIPPED | OUTPATIENT
Start: 2021-04-15 | End: 2021-08-09

## 2021-04-15 RX ADMIN — APIXABAN 5 MG: 5 TABLET, FILM COATED ORAL at 09:11

## 2021-04-15 RX ADMIN — SOTALOL HYDROCHLORIDE TABLES AF 120 MG: 80 TABLET ORAL at 09:11

## 2021-04-15 NOTE — DISCHARGE INSTRUCTIONS
NEW MEDICATIONS:  Please START sotalol 120 mg twice daily  Please DO NOT take metoprolol as needed at home  Continue Eliquis as previously recommended  FOLLOW UP:  You can follow with Dr Laya Gupta in a month's time after ablation  Will obtain echo in about 2 months to assess your heart muscle function - June 8, 2021 at 9:00AM at [facility]  Will then have our office call you to set a follow-up appoint with Dr Konstantin Wheeler in 3 months to discuss your echo results and how you feel after ablation and sotalol start  Please ask about Sotalol refill being sent to [facility] at your one week ECG follow up! RESTRICTIONS:   No heavy lifting or strenuous activity for one week  No soaking in a bath tub/hot tub/swimming pool for one week or until groin heals  You may shower - please let soap and water run over the groins, no scrubbing, and pat the area dry  Please place band-aid on groins daily for up to five days, but you may remove sooner if no issues are noted  If you notice ongoing bleeding, swelling, or firm lumps in groin near ablation incision, please contact Dr Harmony Bowles office - [phone]        Please refer to this medication list and contact our office prior to beginning any new medications while on Sotalol:

## 2021-04-15 NOTE — DISCHARGE SUMMARY
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 and 2 distinct atrial tachycardia ablations 4/12/2021              A ) due to several different atrial tachycardias being noted, also underwent sotalol initiation this admission              B ) stable QTC, telemetry showed brief run of PAT but no sustained episodes  2  Nonischemic cardiomyopathy with LVEF 50-55% per echo 10/2020 however 39% per recent nuclear stress test 02/2021              A ) no obstructive CAD per cardiac catheterization 03/2021  3  Recurrent pulmonary embolism, maintained on Eliquis  4  Hyperlipidemia  5  Obesity with BMI 34    Procedures Performed:   Orders Placed This Encounter   Procedures    Cardiac eps/svt ablation   1  Radiofrequency ablation (RFA)  of Atrial tachycardia 1  2  RFA of AVNRT -slow pathway is in the right inferior extension   3  RFA of Atrial tachycardia 2  4  Comprehensive EP study with stimulation also from HRA, proximal and distal CS   5  Program stimulation with drug infusion - isoprenaline  6  3 D electro-anatomic mapping with NAVX   7  All access under ultrasound guidance using Seldinger technique      Consultants: none      HPI: Please refer to the initial history and physical as well as procedure notes for full details  Briefly, Surinder Dumas  is a 76y o  year old male with LVEF 50-55% per echo 10/2020 however 39% per recent nuclear stress test 02/2021, no obstructive CAD per cardiac catheterization 03/2021, recurrent SVT, recurrent pulmonary embolism maintained on Eliquis, hyperlipidemia, and obesity with BMI 34  He has a history of palpitations, and prior monitoring showed frequent bursts of SVT    He was previously seen by Dr Steven Bower, an ablation versus ongoing medical therapy was discussed  At that time, it was decided to continue Toprol-XL, and to pursue ablation if his episodes continued  Per that note, pulmonary vein tachycardia was suspected  Earlier this year, he underwent a nuclear stress test as part of a yearly physical for [employer]  This showed LVEF 39%, but subsequent cardiac catheterization showed no obstructive CAD  However, during the procedure he was noted to have ongoing runs of SVT  Thus, an EP study/SVT ablation was recommended and he presented this hospital admission to undergo this procedure  Hospital Course: Michelle Kirkland  presented at his baseline state of health  After the procedure was explained in detail and consent was obtained, he underwent the above procedures without complications  Please see operative notes by Dr Mary Stinson for full details  He tolerated the procedure well  As he had several different atrial tachycardias noted throughout the procedure, it was decided that he would be started on sotalol antiarrhythmic therapy in the post ablation setting and thus require a several-day hospitalization for monitoring  There were no acute issues or events the first night following his procedure, or at any other point throughout his stay  He denied all cardiac complaints, including chest pain/pressure, dyspnea, palpitations, dizziness, lightheadedness, or syncope  His vital signs were reviewed and remained stable  Labs were monitored, which showed low potassium on POD #1 which was repleted and improved  Telemetry showed normal sinus rhythm, only two brief runs of PAT noted  His groins were soft without significant hematoma or recurrent bleeding, and groin sutures were removed with incident  EKGs were reviewed 2 hours after each dose of sotalol, and he had no evidence of QTC prolongation  He also had no ventricular arrhythmias or ectopy noted on telemetry  Review of Systems   Constitution: Negative for fever and malaise/fatigue     Cardiovascular: Negative for chest pain, claudication, dyspnea on exertion, irregular heartbeat, leg swelling, near-syncope, orthopnea, palpitations, paroxysmal nocturnal dyspnea and syncope  Physical exam at the time of discharge:  GEN: NAD, alert and oriented x 3, well appearing  SKIN: dry without significant lesions or rashes  HEENT: NCAT, PERRL, EOMs intact  NECK: No JVD appreciated  CARDIOVASCULAR: RRR, normal S1, S2 without murmurs, rubs, or gallops appreciated  LUNGS: Clear to auscultation bilaterally without wheezes, rhonchi, or rales  ABDOMEN: Soft, nontender, nondistended  EXTREMITIES/VASCULAR: perfused without clubbing, cyanosis, or LE edema b/l  PSYCH: Normal mood and affect  NEURO: CN ll-Xll grossly intact    He was given routine post ablation discharge instructions and restrictions, and these were explained in detail  He was also given education regarding ongoing sotalol use, including the importance of not missing any doses, and certain medication year or masses to avoid  He knows to contact our office or pharmacist before starting new medications  He was given a one-week follow-up for EKG and ongoing sotalol monitoring, a 1 month follow up appointment with Dr Curt Lopez, and he will then be seen by Dr Kayla Garrett in 2-3 months  We also asked him to undergo a repeat echocardiogram in 2 months for re-evaluation of his LVEF following the ablation  In terms of his medications, we asked him to stop taking on as needed metoprolol  He will instead take sotalol 120 mg every 12 hours, which he tolerated throughout hospital stay  He will be continued on Eliquis anticoagulation as previously recommended  He is stable for discharge at this time with all questions answered  He was discussed in detail with Dr Kayla Garrett who is in agreement with this discharge summary  Discharge Medications:  See after visit summary for reconciled discharge medications provided to patient and family      Medications Prior to Admission   Medication    apixaban (ELIQUIS) 5 mg    Ascorbic Acid (VITAMIN C) 100 MG tablet    fluticasone (FLONASE) 50 mcg/act nasal spray    rosuvastatin (CRESTOR) 20 MG tablet    metoprolol succinate (TOPROL-XL) 25 mg 24 hr tablet    tamsulosin (FLOMAX) 0 4 mg         Pert[facility]inet Labs/diagnostics:  CBC with diff:   Results from last 7 days   Lab Units 04/13/21  0539 04/12/21  0651   WBC Thousand/uL 6 03 4 46   HEMOGLOBIN g/dL 11 8* 12 9   HEMATOCRIT % 35 8* 39 8   MCV fL 95 95   PLATELETS Thousands/uL 124* 141*   MCH pg 31 2 30 6   MCHC g/dL 33 0 32 4   RDW % 14 6 14 4   MPV fL 12 1 12 2   NRBC AUTO /100 WBCs  --  0       BMP:  Results from last 7 days   Lab Units 04/14/21  0641 04/13/21  0539 04/12/21  0651   POTASSIUM mmol/L 4 0 3 3* 3 7   CHLORIDE mmol/L 114* 110* 114*   CO2 mmol/L 27 25 25   BUN mg/dL 12 9 13   CREATININE mg/dL 1 19 1 04 1 23   CALCIUM mg/dL 8 8 8 6 8 9       Magnesium:       Coags:   Results from last 7 days   Lab Units 04/12/21  0651   INR  2 27         Complications: none    Condition at Discharge: good     Discharge instructions/Information to patient and family:   See after visit summary for information provided to patient and family  Provisions for Follow-Up Care:  See after visit summary for information related to follow-up care and any pertinent home health orders  Disposition: Home    Planned Readmission: No    Discharge Statement   I spent 45 minutes minutes discharging the patient  This time was spent on the day of discharge  I had direct contact with the patient on the day of discharge  Additional documentation is required if more than 30 minutes were spent on discharge   Evaluating the incision, discussing discharge instructions and restrictions, arranging follow up appointments, discussing medications

## 2021-04-21 ENCOUNTER — CLINICAL SUPPORT (OUTPATIENT)
Dept: CARDIOLOGY CLINIC | Facility: CLINIC | Age: 76
End: 2021-04-21
Payer: COMMERCIAL

## 2021-04-21 DIAGNOSIS — I47.1 SVT (SUPRAVENTRICULAR TACHYCARDIA) (HCC): Primary | ICD-10-CM

## 2021-04-21 PROCEDURE — 99211 OFF/OP EST MAY X REQ PHY/QHP: CPT

## 2021-04-21 PROCEDURE — 93000 ELECTROCARDIOGRAM COMPLETE: CPT | Performed by: INTERNAL MEDICINE

## 2021-04-21 NOTE — PROGRESS NOTES
Pt seen today for an ekg as per   Dr Mary Stinson   pt recent started sotalol 120mg bid  Pt states he has had no issue with new medication  Results reviewed by Dr Zita Martinez has been scanned to the chart

## 2021-04-23 ENCOUNTER — APPOINTMENT (EMERGENCY)
Dept: CT IMAGING | Facility: HOSPITAL | Age: 76
End: 2021-04-23
Payer: COMMERCIAL

## 2021-04-23 ENCOUNTER — APPOINTMENT (OUTPATIENT)
Dept: NON INVASIVE DIAGNOSTICS | Facility: HOSPITAL | Age: 76
End: 2021-04-23
Payer: COMMERCIAL

## 2021-04-23 ENCOUNTER — HOSPITAL ENCOUNTER (OUTPATIENT)
Facility: HOSPITAL | Age: 76
Setting detail: OBSERVATION
Discharge: HOME | End: 2021-04-23
Attending: EMERGENCY MEDICINE | Admitting: HOSPITALIST
Payer: COMMERCIAL

## 2021-04-23 ENCOUNTER — APPOINTMENT (EMERGENCY)
Dept: RADIOLOGY | Facility: HOSPITAL | Age: 76
End: 2021-04-23
Payer: COMMERCIAL

## 2021-04-23 VITALS
HEART RATE: 59 BPM | SYSTOLIC BLOOD PRESSURE: 125 MMHG | OXYGEN SATURATION: 98 % | HEIGHT: 75 IN | WEIGHT: 260 LBS | BODY MASS INDEX: 32.33 KG/M2 | TEMPERATURE: 98 F | RESPIRATION RATE: 20 BRPM | DIASTOLIC BLOOD PRESSURE: 66 MMHG

## 2021-04-23 DIAGNOSIS — R06.00 DYSPNEA: ICD-10-CM

## 2021-04-23 DIAGNOSIS — M51.16 LUMBAR DISC DISEASE WITH RADICULOPATHY: ICD-10-CM

## 2021-04-23 DIAGNOSIS — R07.9 CHEST PAIN: Primary | ICD-10-CM

## 2021-04-23 DIAGNOSIS — R10.9 FLANK PAIN: ICD-10-CM

## 2021-04-23 PROBLEM — Z98.890 HISTORY OF ATRIOVENTRICULAR NODAL ABLATION: Status: ACTIVE | Noted: 2021-04-23

## 2021-04-23 PROBLEM — N17.9 ACUTE KIDNEY INJURY SUPERIMPOSED ON CKD (HCC): Status: ACTIVE | Noted: 2021-04-23

## 2021-04-23 PROBLEM — N40.0 BPH (BENIGN PROSTATIC HYPERPLASIA): Status: ACTIVE | Noted: 2020-06-12

## 2021-04-23 PROBLEM — N18.9 ACUTE KIDNEY INJURY SUPERIMPOSED ON CKD (HCC): Status: ACTIVE | Noted: 2021-04-23

## 2021-04-23 LAB
ALBUMIN SERPL BCP-MCNC: 3.1 G/DL (ref 3.5–5)
ALBUMIN SERPL BCP-MCNC: 3.1 G/DL (ref 3.5–5)
ALP SERPL-CCNC: 56 U/L (ref 46–116)
ALP SERPL-CCNC: 59 U/L (ref 46–116)
ALT SERPL W P-5'-P-CCNC: 22 U/L (ref 12–78)
ALT SERPL W P-5'-P-CCNC: 25 U/L (ref 12–78)
ANION GAP SERPL CALCULATED.3IONS-SCNC: 8 MMOL/L (ref 4–13)
ANION GAP SERPL CALCULATED.3IONS-SCNC: 8 MMOL/L (ref 4–13)
AST SERPL W P-5'-P-CCNC: 19 U/L (ref 5–45)
AST SERPL W P-5'-P-CCNC: 26 U/L (ref 5–45)
BASOPHILS # BLD AUTO: 0.01 THOUSANDS/ΜL (ref 0–0.1)
BASOPHILS NFR BLD AUTO: 0 % (ref 0–1)
BILIRUB DIRECT SERPL-MCNC: 0.07 MG/DL (ref 0–0.2)
BILIRUB DIRECT SERPL-MCNC: 0.13 MG/DL (ref 0–0.2)
BILIRUB SERPL-MCNC: 0.34 MG/DL (ref 0.2–1)
BILIRUB SERPL-MCNC: 0.41 MG/DL (ref 0.2–1)
BUN SERPL-MCNC: 14 MG/DL (ref 5–25)
BUN SERPL-MCNC: 15 MG/DL (ref 5–25)
CALCIUM SERPL-MCNC: 7.8 MG/DL (ref 8.3–10.1)
CALCIUM SERPL-MCNC: 7.9 MG/DL (ref 8.3–10.1)
CHLORIDE SERPL-SCNC: 106 MMOL/L (ref 100–108)
CHLORIDE SERPL-SCNC: 107 MMOL/L (ref 100–108)
CO2 SERPL-SCNC: 26 MMOL/L (ref 21–32)
CO2 SERPL-SCNC: 27 MMOL/L (ref 21–32)
CREAT SERPL-MCNC: 1.27 MG/DL (ref 0.6–1.3)
CREAT SERPL-MCNC: 1.38 MG/DL (ref 0.6–1.3)
EOSINOPHIL # BLD AUTO: 0.07 THOUSAND/ΜL (ref 0–0.61)
EOSINOPHIL NFR BLD AUTO: 2 % (ref 0–6)
ERYTHROCYTE [DISTWIDTH] IN BLOOD BY AUTOMATED COUNT: 13.8 % (ref 11.6–15.1)
ERYTHROCYTE [DISTWIDTH] IN BLOOD BY AUTOMATED COUNT: 13.9 % (ref 11.6–15.1)
GFR SERPL CREATININE-BSD FRML MDRD: 57 ML/MIN/1.73SQ M
GFR SERPL CREATININE-BSD FRML MDRD: 63 ML/MIN/1.73SQ M
GLUCOSE P FAST SERPL-MCNC: 101 MG/DL (ref 65–99)
GLUCOSE SERPL-MCNC: 101 MG/DL (ref 65–140)
GLUCOSE SERPL-MCNC: 109 MG/DL (ref 65–140)
HCT VFR BLD AUTO: 33.6 % (ref 36.5–49.3)
HCT VFR BLD AUTO: 34.5 % (ref 36.5–49.3)
HGB BLD-MCNC: 11.2 G/DL (ref 12–17)
HGB BLD-MCNC: 11.2 G/DL (ref 12–17)
IMM GRANULOCYTES # BLD AUTO: 0.02 THOUSAND/UL (ref 0–0.2)
IMM GRANULOCYTES NFR BLD AUTO: 1 % (ref 0–2)
LIPASE SERPL-CCNC: 58 U/L (ref 73–393)
LYMPHOCYTES # BLD AUTO: 0.97 THOUSANDS/ΜL (ref 0.6–4.47)
LYMPHOCYTES NFR BLD AUTO: 22 % (ref 14–44)
MAGNESIUM SERPL-MCNC: 2 MG/DL (ref 1.6–2.6)
MCH RBC QN AUTO: 30.9 PG (ref 26.8–34.3)
MCH RBC QN AUTO: 31.5 PG (ref 26.8–34.3)
MCHC RBC AUTO-ENTMCNC: 32.5 G/DL (ref 31.4–37.4)
MCHC RBC AUTO-ENTMCNC: 33.3 G/DL (ref 31.4–37.4)
MCV RBC AUTO: 94 FL (ref 82–98)
MCV RBC AUTO: 95 FL (ref 82–98)
MONOCYTES # BLD AUTO: 0.56 THOUSAND/ΜL (ref 0.17–1.22)
MONOCYTES NFR BLD AUTO: 13 % (ref 4–12)
NEUTROPHILS # BLD AUTO: 2.74 THOUSANDS/ΜL (ref 1.85–7.62)
NEUTS SEG NFR BLD AUTO: 62 % (ref 43–75)
NRBC BLD AUTO-RTO: 0 /100 WBCS
PLATELET # BLD AUTO: 130 THOUSANDS/UL (ref 149–390)
PLATELET # BLD AUTO: 141 THOUSANDS/UL (ref 149–390)
PMV BLD AUTO: 11.7 FL (ref 8.9–12.7)
PMV BLD AUTO: 12.2 FL (ref 8.9–12.7)
POTASSIUM SERPL-SCNC: 4.2 MMOL/L (ref 3.5–5.3)
POTASSIUM SERPL-SCNC: 4.3 MMOL/L (ref 3.5–5.3)
PROT SERPL-MCNC: 6.4 G/DL (ref 6.4–8.2)
PROT SERPL-MCNC: 6.4 G/DL (ref 6.4–8.2)
RBC # BLD AUTO: 3.56 MILLION/UL (ref 3.88–5.62)
RBC # BLD AUTO: 3.63 MILLION/UL (ref 3.88–5.62)
SODIUM SERPL-SCNC: 141 MMOL/L (ref 136–145)
SODIUM SERPL-SCNC: 141 MMOL/L (ref 136–145)
TROPONIN I SERPL-MCNC: 0.02 NG/ML
TSH SERPL DL<=0.05 MIU/L-ACNC: 1.72 UIU/ML (ref 0.36–3.74)
WBC # BLD AUTO: 4.37 THOUSAND/UL (ref 4.31–10.16)
WBC # BLD AUTO: 4.4 THOUSAND/UL (ref 4.31–10.16)

## 2021-04-23 PROCEDURE — G1004 CDSM NDSC: HCPCS

## 2021-04-23 PROCEDURE — 84443 ASSAY THYROID STIM HORMONE: CPT | Performed by: HOSPITALIST

## 2021-04-23 PROCEDURE — 80048 BASIC METABOLIC PNL TOTAL CA: CPT | Performed by: HOSPITALIST

## 2021-04-23 PROCEDURE — 85027 COMPLETE CBC AUTOMATED: CPT | Performed by: HOSPITALIST

## 2021-04-23 PROCEDURE — 71260 CT THORAX DX C+: CPT

## 2021-04-23 PROCEDURE — 93308 TTE F-UP OR LMTD: CPT | Performed by: INTERNAL MEDICINE

## 2021-04-23 PROCEDURE — 83690 ASSAY OF LIPASE: CPT | Performed by: FAMILY MEDICINE

## 2021-04-23 PROCEDURE — 93321 DOPPLER ECHO F-UP/LMTD STD: CPT | Performed by: INTERNAL MEDICINE

## 2021-04-23 PROCEDURE — 93005 ELECTROCARDIOGRAM TRACING: CPT

## 2021-04-23 PROCEDURE — 85025 COMPLETE CBC W/AUTO DIFF WBC: CPT | Performed by: PHYSICIAN ASSISTANT

## 2021-04-23 PROCEDURE — 93308 TTE F-UP OR LMTD: CPT

## 2021-04-23 PROCEDURE — 99219 PR INITIAL OBSERVATION CARE/DAY 50 MINUTES: CPT | Performed by: HOSPITALIST

## 2021-04-23 PROCEDURE — 99217 PR OBSERVATION CARE DISCHARGE MANAGEMENT: CPT | Performed by: FAMILY MEDICINE

## 2021-04-23 PROCEDURE — 99215 OFFICE O/P EST HI 40 MIN: CPT | Performed by: INTERNAL MEDICINE

## 2021-04-23 PROCEDURE — 71046 X-RAY EXAM CHEST 2 VIEWS: CPT

## 2021-04-23 PROCEDURE — 80076 HEPATIC FUNCTION PANEL: CPT | Performed by: HOSPITALIST

## 2021-04-23 PROCEDURE — 83735 ASSAY OF MAGNESIUM: CPT | Performed by: HOSPITALIST

## 2021-04-23 PROCEDURE — 74177 CT ABD & PELVIS W/CONTRAST: CPT

## 2021-04-23 PROCEDURE — 36415 COLL VENOUS BLD VENIPUNCTURE: CPT | Performed by: PHYSICIAN ASSISTANT

## 2021-04-23 PROCEDURE — 80048 BASIC METABOLIC PNL TOTAL CA: CPT | Performed by: PHYSICIAN ASSISTANT

## 2021-04-23 PROCEDURE — 99285 EMERGENCY DEPT VISIT HI MDM: CPT

## 2021-04-23 PROCEDURE — 96360 HYDRATION IV INFUSION INIT: CPT

## 2021-04-23 PROCEDURE — 93325 DOPPLER ECHO COLOR FLOW MAPG: CPT | Performed by: INTERNAL MEDICINE

## 2021-04-23 PROCEDURE — 99285 EMERGENCY DEPT VISIT HI MDM: CPT | Performed by: PHYSICIAN ASSISTANT

## 2021-04-23 PROCEDURE — 96361 HYDRATE IV INFUSION ADD-ON: CPT

## 2021-04-23 PROCEDURE — 84484 ASSAY OF TROPONIN QUANT: CPT | Performed by: PHYSICIAN ASSISTANT

## 2021-04-23 RX ORDER — NITROGLYCERIN 0.4 MG/1
0.4 TABLET SUBLINGUAL
Status: DISCONTINUED | OUTPATIENT
Start: 2021-04-23 | End: 2021-04-23 | Stop reason: HOSPADM

## 2021-04-23 RX ORDER — FLUTICASONE PROPIONATE 50 MCG
1 SPRAY, SUSPENSION (ML) NASAL DAILY
Status: DISCONTINUED | OUTPATIENT
Start: 2021-04-23 | End: 2021-04-23 | Stop reason: HOSPADM

## 2021-04-23 RX ORDER — SODIUM CHLORIDE 9 MG/ML
75 INJECTION, SOLUTION INTRAVENOUS CONTINUOUS
Status: DISCONTINUED | OUTPATIENT
Start: 2021-04-23 | End: 2021-04-23 | Stop reason: HOSPADM

## 2021-04-23 RX ORDER — LIDOCAINE 50 MG/G
1 PATCH TOPICAL DAILY
Status: DISCONTINUED | OUTPATIENT
Start: 2021-04-23 | End: 2021-04-23 | Stop reason: HOSPADM

## 2021-04-23 RX ORDER — TAMSULOSIN HYDROCHLORIDE 0.4 MG/1
0.4 CAPSULE ORAL
Status: DISCONTINUED | OUTPATIENT
Start: 2021-04-23 | End: 2021-04-23 | Stop reason: HOSPADM

## 2021-04-23 RX ORDER — ACETAMINOPHEN 325 MG/1
650 TABLET ORAL EVERY 4 HOURS PRN
Status: DISCONTINUED | OUTPATIENT
Start: 2021-04-23 | End: 2021-04-23 | Stop reason: HOSPADM

## 2021-04-23 RX ORDER — RIBOFLAVIN (VITAMIN B2) 100 MG
100 TABLET ORAL DAILY
Status: DISCONTINUED | OUTPATIENT
Start: 2021-04-23 | End: 2021-04-23

## 2021-04-23 RX ORDER — ATORVASTATIN CALCIUM 40 MG/1
40 TABLET, FILM COATED ORAL
Status: DISCONTINUED | OUTPATIENT
Start: 2021-04-23 | End: 2021-04-23 | Stop reason: HOSPADM

## 2021-04-23 RX ORDER — ACETAMINOPHEN 325 MG/1
650 TABLET ORAL EVERY 6 HOURS PRN
Qty: 20 TABLET | Refills: 0 | Status: SHIPPED | OUTPATIENT
Start: 2021-04-23 | End: 2021-04-27

## 2021-04-23 RX ORDER — SOTALOL HYDROCHLORIDE 80 MG/1
120 TABLET ORAL EVERY 12 HOURS SCHEDULED
Status: DISCONTINUED | OUTPATIENT
Start: 2021-04-23 | End: 2021-04-23 | Stop reason: HOSPADM

## 2021-04-23 RX ADMIN — IOHEXOL 100 ML: 350 INJECTION, SOLUTION INTRAVENOUS at 04:24

## 2021-04-23 RX ADMIN — APIXABAN 5 MG: 5 TABLET, FILM COATED ORAL at 08:17

## 2021-04-23 RX ADMIN — MORPHINE SULFATE 1 MG: 2 INJECTION, SOLUTION INTRAMUSCULAR; INTRAVENOUS at 06:59

## 2021-04-23 RX ADMIN — SODIUM CHLORIDE 1000 ML: 0.9 INJECTION, SOLUTION INTRAVENOUS at 03:56

## 2021-04-23 RX ADMIN — SOTALOL HYDROCHLORIDE 120 MG: 80 TABLET ORAL at 08:17

## 2021-04-23 RX ADMIN — LIDOCAINE 5% 1 PATCH: 700 PATCH TOPICAL at 07:00

## 2021-04-23 RX ADMIN — FLUTICASONE PROPIONATE 1 SPRAY: 50 SPRAY, METERED NASAL at 08:17

## 2021-04-23 RX ADMIN — SODIUM CHLORIDE 75 ML/HR: 0.9 INJECTION, SOLUTION INTRAVENOUS at 05:53

## 2021-04-23 NOTE — CASE MANAGEMENT
RECENT READMISSION: 4/12/2021 - 4/15/2021 (3 days)  [address] [facility]    AGE:75  SEX: male  DX:SVT  OUTCOME: home with OP follow up  RESOURCES ON D/C (previous admission):   Has a PCP  [facility] plan primary/  secondary  Supportive family and a plan for transportation to follow up appointments  CURRENT F/U APPTS:   Folllow up with Dr Justin Lemons  Wife states that it is for 4/27  REASON FOR READMISSION:  Flank pain L side  Painful to touch and states he is getting a rash from L torso to left flank  Chest pain and dyspnea     INTERVENTIONS:    none patient admitted for OBV  Discharged in 11 hours

## 2021-04-23 NOTE — CONSULTS
Consultation - Cardiology   Deep Murcia  76 y o  male MRN: [id number]  Unit/Bed#: ED 26 Encounter: [id number]  04/23/21  11:06 AM    Assessment/ Plan:  1-Chest pain/flank pain; atypical   Likely noncardiac  Troponins negative  CT scan shows no pericardial effusion  Limited echocardiogram ordered and pending  Continue Eliquis, Lipitor, sotalol    2-SVT status post ablation about 2 weeks ago  Continue with Eliquis, sotalol  Follow with electrophysiology    3-hyperlipidemia on Lipitor    4-history of PE on Eliquis    5-anemia, stable, H&H on admission 11 2/34 5 now 11 2/33  6  Continue to monitor  Pending echocardiogram patient is stable from a cardiac standpoint for discharge  Advised to follow up with primary cardiologist       History of Present Illness   Physician Requesting Consult: Clemente Olmedo MD    Reason for Consult / Principal Problem:     HPI: Deep Murcia  is a 76y o  year old male who presents with sided chest and flank pain that started the night prior  Patient describes it as a stabbing sensation and feels like it is behind his heart  He also notes mild shortness of breath  As well as some flank pain  Patient states he attributed to the food that he ate the night before  Upon evaluation emergency room patient had CT chest/abdomen/pelvis done which revealed a small nonobstructing left-sided kidney stone  Patient had SVT ablation about 2 weeks ago, 4/12/2021, done by Dr Carli Whitlock done at [facility]  Past medical history:  SVT status post ablation about 2 weeks ago, CKD, nonischemic cardiomyopathy, hyperlipidemia, history of PE    Inpatient consult to Cardiology  Consult performed by: Paula Cornejo PA-C  Consult ordered by: Yumiko Huggins MD          EKG: NSR      Review of Systems   Constitutional: Negative  Respiratory: Positive for shortness of breath  Cardiovascular: Positive for chest pain  Gastrointestinal:        +flank pain   Neurological: Negative      Hematological: Negative  Psychiatric/Behavioral: Negative  All other systems reviewed and are negative  Historical Information   Past Medical History:   Diagnosis Date    Hyperlipidemia     Post-nasal drip     Pulmonary embolism (HCC)      Past Surgical History:   Procedure Laterality Date    ARTHROSCOPY KNEE Left     30 years ago     Social History     Substance and Sexual Activity   Alcohol Use Yes    Alcohol/week: 0 0 standard drinks    Comment: socially     Social History     Substance and Sexual Activity   Drug Use No     Social History     Tobacco Use   Smoking Status Former Smoker   Smokeless Tobacco Never Used       Family History: History reviewed  No pertinent family history  Meds/Allergies   all current active meds have been reviewed and current meds:   Current Facility-Administered Medications   Medication Dose Route Frequency    acetaminophen (TYLENOL) tablet 650 mg  650 mg Oral Q4H PRN    apixaban (ELIQUIS) tablet 5 mg  5 mg Oral BID    atorvastatin (LIPITOR) tablet 40 mg  40 mg Oral Daily With Dinner    fluticasone (FLONASE) 50 mcg/act nasal spray 1 spray  1 spray Nasal Daily    lidocaine (LIDODERM) 5 % patch 1 patch  1 patch Topical Daily    nitroglycerin (NITROSTAT) SL tablet 0 4 mg  0 4 mg Sublingual Q5 Min PRN    sodium chloride 0 9 % infusion  75 mL/hr Intravenous Continuous    sotalol (BETAPACE) tablet 120 mg  120 mg Oral Q12H [address]    tamsulosin (FLOMAX) capsule 0 4 mg  0 4 mg Oral Daily With Dinner     No Known Allergies    Objective   Vitals: Blood pressure 134/69, pulse 60, temperature 98 °F (36 7 °C), temperature source Oral, resp  rate 18, height 6' 3" (1 905 m), weight 118 kg (260 lb), SpO2 99 %  , Body mass index is 32 5 kg/m² ,   Orthostatic Blood Pressures      Most Recent Value   Blood Pressure  134/69 filed at 04/23/2021 1000   Patient Position - Orthostatic VS  Lying filed at 04/23/2021 6188          Systolic (92PFM), BDD:741 , Min:134 , QUQ:238     Diastolic (32JNI), Av, Min:65, Max:76        Intake/Output Summary (Last 24 hours) at 2021 1106  Last data filed at 2021 0536  Gross per 24 hour   Intake 1000 ml   Output --   Net 1000 ml       Invasive Devices     Peripheral Intravenous Line            Peripheral IV 21 Left less than 1 day                    Physical Exam:  GEN: Alert and oriented x 3, in no acute distress  Well appearing and well nourished  HEENT: Sclera anicteric, conjunctivae pink, mucous membranes moist  Oropharynx clear  NECK: Supple, no carotid bruits, no significant JVD  Trachea midline, no thyromegaly  HEART: Regular rhythm, normal S1 and S2, no murmurs, clicks, gallops or rubs  PMI nondisplaced, no thrills  LUNGS: Clear to auscultation bilaterally; no wheezes, rales, or rhonchi  No increased work of breathing or signs of respiratory distress  ABDOMEN: Soft, nontender, nondistended, normoactive bowel sounds  EXTREMITIES: Skin warm and well perfused, no clubbing, cyanosis, or edema  NEURO: No focal findings  Normal speech  Mood and affect normal    SKIN: Normal without suspicious lesions on exposed skin        Lab Results:     Troponins:   Results from last 7 days   Lab Units 21  0357   TROPONIN I ng/mL 0 02       CBC with diff:   Results from last 7 days   Lab Units 21  0550 21  0357   WBC Thousand/uL 4 40 4 37   HEMOGLOBIN g/dL 11 2* 11 2*   HEMATOCRIT % 33 6* 34 5*   MCV fL 94 95   PLATELETS Thousands/uL 141* 130*   MCH pg 31 5 30 9   MCHC g/dL 33 3 32 5   RDW % 13 9 13 8   MPV fL 12 2 11 7   NRBC AUTO /100 WBCs  --  0         CMP:   Results from last 7 days   Lab Units 21  0550 21  0357   POTASSIUM mmol/L 4 3 4 2   CHLORIDE mmol/L 107 106   CO2 mmol/L 26 27   BUN mg/dL 14 15   CREATININE mg/dL 1 27 1 38*   CALCIUM mg/dL 7 9* 7 8*   AST U/L 26 19   ALT U/L 25 22   ALK PHOS U/L 56 59   EGFR ml/min/1 73sq m 63 57

## 2021-04-23 NOTE — H&P
[address] [facility]  1945, 76 y o  male MRN: [id number]  Unit/Bed#: ED 26 Encounter: [id number]  Primary Care Provider: Eric Hopper MD   Date and time admitted to hospital: 4/23/2021  3:05 AM         [facility] - History and Physical:       Chelle Johnson  76 y o  male MRN: [id number]  Unit/Bed#: ED 26 Encounter: [id number]  Admitting Physician: Jose Enrique Thibodeaux MD  PCP: Eric Hopper MD  Date of Admission:  04/23/21        Assessment and Plan:     * Chest pain  Assessment & Plan  Probably atypical  · Draw serial troponins  · Repeat echocardiogram  · Consult to Cardiology placed  · Telemetry monitoring    SVT (supraventricular tachycardia) [facility])  Assessment & Plan  Status post catheter ablation  · Noted  · Continue sotalol    Acute kidney injury superimposed on CKD [facility])  Assessment & Plan  Lab Results   Component Value Date    EGFR 57 04/23/2021    EGFR 69 04/14/2021    EGFR 81 04/13/2021    CREATININE 1 38 (H) 04/23/2021    CREATININE 1 19 04/14/2021    CREATININE 1 04 04/13/2021     · IV fluid hydration  · Repeat labs in a m  Nephrolithiasis  Assessment & Plan  · CT scan shows a nonobstructing stone on the left side, unchanged    BPH (benign prostatic hyperplasia)  Assessment & Plan  · Continue Flomax    Dyslipidemia  Assessment & Plan  · Continue statin    Cardiomyopathy, nonischemic (HCC)  Assessment & Plan  Noted  · Follow-up echocardiogram results    Chronic low back pain  Assessment & Plan  · Will order Tylenol as needed for pain and Lidoderm patch    Bilateral pulmonary embolism (HCC)  Assessment & Plan  · Continue Eliquis            VTE Prophylaxis: Apixaban (Eliquis)  / sequential compression device   Code Status: full      Anticipated Length of Stay:  Patient will be admitted on an Observation basis with an anticipated length of stay of  2 midnights     Justification for Hospital Stay:  Chest pain    Total Time for Visit, including Counseling / Coordination of Care: 30 minutes  Greater than 50% of this total time spent on direct patient counseling and coordination of care  Chief Complaint:   Chest pain    History of Present Illness:    Tushar Hazel  is a 76 y o  male who had an ablation done for SVT approximately 2 weeks ago today presented to the ED complaining of left sided chest/flank pain that started last night  Patient described the chest pain pain as a stabbing sensation and he felt it "behind his heart " He also reported some mild shortness of breath  He also reported some left-sided flank pain  He attributed his symptoms to the food that he had last night  In the ED he had a CT chest/abdomen/pelvis which were unremarkable and essentially showed a nonobstructing left-sided kidney stone  The patient currently denies fevers/chills/nausea/vomiting/diarrhea    Review of Systems:    Review of Systems   Constitutional: Negative  HENT: Negative  Eyes: Negative  Respiratory: Positive for shortness of breath  Cardiovascular: Positive for chest pain  Gastrointestinal: Negative  Genitourinary: Positive for flank pain  Musculoskeletal: Positive for back pain  Skin: Negative  Neurological: Negative  Hematological: Negative  Psychiatric/Behavioral: Negative  Past Medical and Surgical History:     Past Medical History:   Diagnosis Date    Hyperlipidemia     Post-nasal drip     Pulmonary embolism (HCC)        Past Surgical History:   Procedure Laterality Date    ARTHROSCOPY KNEE Left     30 years ago       Meds/Allergies:    Prior to Admission medications    Medication Sig Start Date End Date Taking?  Authorizing Provider   apixaban (ELIQUIS) 5 mg Take 1 tablet (5 mg total) by mouth 2 (two) times a day 12/10/18   Ly Morgan MD   Ascorbic Acid (VITAMIN C) 100 MG tablet Take 100 mg by mouth daily    Historical Provider, MD   fluticasone (FLONASE) 50 mcg/act nasal spray 1 spray into each nostril daily 11/25/18 Susana Elizabeth PA-C   rosuvastatin (CRESTOR) 20 MG tablet Take 20 mg by mouth daily    Historical Provider, MD   sotalol AF (BETAPACE AF) 120 MG TABS Take 1 tablet (120 mg total) by mouth every 12 (twelve) hours 4/15/21   Rigo Mena PA-C   tamsulosin (FLOMAX) 0 4 mg Take 0 4 mg by mouth daily with dinner    Historical Provider, MD     I have reviewed home medications with patient personally  Allergies: No Known Allergies    Social History:     Marital Status: /Civil Union     Social History     Substance and Sexual Activity   Alcohol Use Yes    Alcohol/week: 0 0 standard drinks    Comment: socially     Social History     Tobacco Use   Smoking Status Former Smoker   Smokeless Tobacco Never Used     Social History     Substance and Sexual Activity   Drug Use No       Family History:    non-contributory    Physical Exam:     Vitals:   Blood Pressure: 149/65 (04/23/21 0317)  Pulse: 60 (04/23/21 0530)  Temperature: 98 °F (36 7 °C) (04/23/21 0317)  Temp Source: Oral (04/23/21 0317)  Respirations: 17 (04/23/21 0530)  Height: 6' 3" (190 5 cm) (04/23/21 0317)  Weight - Scale: 118 kg (260 lb) (04/23/21 0317)  SpO2: 100 % (04/23/21 0317)    Physical Exam  Constitutional:       Appearance: Normal appearance  HENT:      Head: Normocephalic and atraumatic  Eyes:      Extraocular Movements: Extraocular movements intact  Pupils: Pupils are equal, round, and reactive to light  Neck:      Musculoskeletal: Neck supple  Cardiovascular:      Rate and Rhythm: Normal rate and regular rhythm  Pulmonary:      Effort: Pulmonary effort is normal       Breath sounds: Normal breath sounds  Abdominal:      General: Bowel sounds are normal       Palpations: Abdomen is soft  Musculoskeletal: Normal range of motion  Skin:     General: Skin is warm and dry  Neurological:      Mental Status: He is alert and oriented to person, place, and time     Psychiatric:         Mood and Affect: Mood normal  Additional Data:     Lab Results: I have personally reviewed pertinent reports  Results from last 7 days   Lab Units 04/23/21  0550 04/23/21  0357   WBC Thousand/uL 4 40 4 37   HEMOGLOBIN g/dL 11 2* 11 2*   HEMATOCRIT % 33 6* 34 5*   PLATELETS Thousands/uL 141* 130*   NEUTROS PCT %  --  62   LYMPHS PCT %  --  22   MONOS PCT %  --  13*   EOS PCT %  --  2     Results from last 7 days   Lab Units 04/23/21  0357   SODIUM mmol/L 141   POTASSIUM mmol/L 4 2   CHLORIDE mmol/L 106   CO2 mmol/L 27   BUN mg/dL 15   CREATININE mg/dL 1 38*   ANION GAP mmol/L 8   CALCIUM mg/dL 7 8*   ALBUMIN g/dL 3 1*   TOTAL BILIRUBIN mg/dL 0 34   ALK PHOS U/L 59   ALT U/L 22   AST U/L 19   GLUCOSE RANDOM mg/dL 109                       Imaging: I have personally reviewed pertinent reports  CT chest abdomen pelvis w contrast   Final Result by Ashish Irene MD (04/23 0446)      3 mm nonobstructing stone the left kidney again seen unchanged  Unchanged calcification within the anterior wall the bladder  Otherwise unremarkable CT of the chest, abdomen and pelvis with IV without oral contrast       Workstation performed: [id number]         XR chest 2 views    (Results Pending)       EKG, Pathology, and Other Studies Reviewed on Admission:   · EKG:  Sinus rhythm  No ST elevations/depressions noted  Nonspecific T-wave inversions    Allscripts / Epic Records Reviewed: Yes     ** Please Note: This note has been constructed using a voice recognition system   **

## 2021-04-23 NOTE — ASSESSMENT & PLAN NOTE
Lab Results   Component Value Date    EGFR 63 04/23/2021    EGFR 57 04/23/2021    EGFR 69 04/14/2021    CREATININE 1 27 04/23/2021    CREATININE 1 38 (H) 04/23/2021    CREATININE 1 19 04/14/2021     · IV fluid hydration  · Repeat creatinine improved to 1 2

## 2021-04-23 NOTE — ASSESSMENT & PLAN NOTE
· History of AV ramon reentrant tachycardia status post ablation on 04/12/2021  · Continue sotalol  · Continue Eliquis

## 2021-04-23 NOTE — ASSESSMENT & PLAN NOTE
Lab Results   Component Value Date    EGFR 57 04/23/2021    EGFR 69 04/14/2021    EGFR 81 04/13/2021    CREATININE 1 38 (H) 04/23/2021    CREATININE 1 19 04/14/2021    CREATININE 1 04 04/13/2021     · IV fluid hydration  · Repeat labs in a m

## 2021-04-23 NOTE — ASSESSMENT & PLAN NOTE
Probably atypical  · Draw serial troponins  · Repeat echocardiogram  · Consult to Cardiology placed  · Telemetry monitoring

## 2021-04-23 NOTE — ED PROVIDER NOTES
History  Chief Complaint   Patient presents with    Flank Pain     left sided  painful to touch and states he is getting a rash the goes from left mid torso to left flank   had cardiac ablastion 2 weeks ago  no other s/s     Patient is a 66-year-old male with past medical history significant for hyperlipidemia, recent cardiac ablation 2 weeks ago who presents to the emergency department complaining of left sided chest/flank pain that started last night  Patient describes the pain as a stabbing sensation  Patient states that he had a rash from his left rib that wraps around into the left side of his abdomen  Patient is also reporting some difficulty breathing  He has not noticed any fevers or chills  He is not having any abdominal pain, the, vomiting, diarrhea, headaches, dizziness, weakness, numbness, tingling, cough, congestion, sore throat  Patient denies all other symptoms at this time  Prior to Admission Medications   Prescriptions Last Dose Informant Patient Reported? Taking? Ascorbic Acid (VITAMIN C) 100 MG tablet  Self Yes No   Sig: Take 100 mg by mouth daily   apixaban (ELIQUIS) 5 mg  Self No No   Sig: Take 1 tablet (5 mg total) by mouth 2 (two) times a day   fluticasone (FLONASE) 50 mcg/act nasal spray  Self No No   Si spray into each nostril daily   rosuvastatin (CRESTOR) 20 MG tablet  Self Yes No   Sig: Take 20 mg by mouth daily   sotalol AF (BETAPACE AF) 120 MG TABS   No No   Sig: Take 1 tablet (120 mg total) by mouth every 12 (twelve) hours   tamsulosin (FLOMAX) 0 4 mg  Self Yes No   Sig: Take 0 4 mg by mouth daily with dinner      Facility-Administered Medications: None       Past Medical History:   Diagnosis Date    Hyperlipidemia     Post-nasal drip     Pulmonary embolism (HCC)        Past Surgical History:   Procedure Laterality Date    ARTHROSCOPY KNEE Left     30 years ago       History reviewed  No pertinent family history    I have reviewed and agree with the history as documented  E-Cigarette/Vaping    E-Cigarette Use Never User      E-Cigarette/Vaping Substances    Nicotine No     THC No     CBD No     Flavoring No     Other No     Unknown No      Social History     Tobacco Use    Smoking status: Former Smoker    Smokeless tobacco: Never Used   Substance Use Topics    Alcohol use: Yes     Alcohol/week: 0 0 standard drinks     Comment: socially    Drug use: No       Review of Systems   Constitutional: Negative for chills, diaphoresis and fever  HENT: Negative for congestion, drooling, facial swelling, nosebleeds, sore throat and voice change  Eyes: Negative for discharge and redness  Respiratory: Positive for shortness of breath  Negative for cough, choking, chest tightness and stridor  Cardiovascular: Negative for chest pain and palpitations  Gastrointestinal: Negative for abdominal pain, diarrhea, nausea and vomiting  Genitourinary: Positive for flank pain (Left-sided)  Negative for decreased urine volume, difficulty urinating, dysuria, frequency and urgency  Musculoskeletal: Negative for arthralgias, back pain, neck pain and neck stiffness  Skin: Positive for rash ( left rib/flank)  Negative for color change and wound  Neurological: Negative for dizziness, syncope, facial asymmetry, weakness, light-headedness, numbness and headaches  Psychiatric/Behavioral: Negative for confusion and suicidal ideas  The patient is not nervous/anxious  Physical Exam  Physical Exam  Vitals signs reviewed  Constitutional:       General: He is not in acute distress  Appearance: Normal appearance  He is obese  He is not ill-appearing, toxic-appearing or diaphoretic  HENT:      Head: Normocephalic and atraumatic  Right Ear: External ear normal       Left Ear: External ear normal       Mouth/Throat:      Mouth: Mucous membranes are moist       Pharynx: Oropharynx is clear  No oropharyngeal exudate or posterior oropharyngeal erythema     Eyes: General: No scleral icterus  Right eye: No discharge  Left eye: No discharge  Extraocular Movements: Extraocular movements intact  Conjunctiva/sclera: Conjunctivae normal       Pupils: Pupils are equal, round, and reactive to light  Neck:      Musculoskeletal: Normal range of motion and neck supple  Cardiovascular:      Rate and Rhythm: Normal rate and regular rhythm  Pulses: Normal pulses  Heart sounds: Normal heart sounds  No murmur  No friction rub  No gallop  Pulmonary:      [place]: Pulmonary effort is normal  No respiratory distress  Breath sounds: Normal breath sounds  No stridor  No wheezing, rhonchi or rales  Abdominal:      General: Abdomen is flat  Palpations: Abdomen is soft  Tenderness: There is no abdominal tenderness  There is no right CVA tenderness, left CVA tenderness, guarding or rebound  Musculoskeletal: Normal range of motion  Right lower leg: No edema  Left lower leg: No edema  Skin:     General: Skin is warm and dry  Capillary Refill: Capillary refill takes less than 2 seconds  Findings: No rash (No rash noted despite patient reporting rash to his left flank/ribs  )  Neurological:      General: No focal deficit present  Mental Status: He is alert and oriented to person, place, and time     Psychiatric:         Mood and Affect: Mood normal          Behavior: Behavior normal          Vital Signs  ED Triage Vitals [04/23/21 0317]   Temperature Pulse Respirations Blood Pressure SpO2   98 °F (36 7 °C) 75 19 149/65 100 %      Temp Source Heart Rate Source Patient Position - Orthostatic VS BP Location FiO2 (%)   Oral Monitor Sitting Left arm --      Pain Score       9           Vitals:    04/23/21 0317 04/23/21 0530   BP: 149/65    Pulse: 75 60   Patient Position - Orthostatic VS: Sitting          Visual Acuity      ED Medications  Medications   sodium chloride 0 9 % infusion (75 mL/hr Intravenous New Bag 4/23/21 8408)   nitroglycerin (NITROSTAT) SL tablet 0 4 mg (has no administration in time range)   sodium chloride 0 9 % bolus 1,000 mL (0 mL Intravenous Stopped 4/23/21 0536)   iohexol (OMNIPAQUE) 350 MG/ML injection (SINGLE-DOSE) 100 mL (100 mL Intravenous Given 4/23/21 0424)       Diagnostic Studies  Results Reviewed     Procedure Component Value Units Date/Time    CBC (With Platelets) [[id number]]  (Abnormal) Collected: 04/23/21 0550    Lab Status: Final result Specimen: Blood from Arm, Left Updated: 04/23/21 0605     WBC 4 40 Thousand/uL      RBC 3 56 Million/uL      Hemoglobin 11 2 g/dL      Hematocrit 33 6 %      MCV 94 fL      MCH 31 5 pg      MCHC 33 3 g/dL      RDW 13 9 %      Platelets 830 Thousands/uL      MPV 12 2 fL     TSH, 3rd generation [[id number]] Collected: 04/23/21 0550    Lab Status: In process Specimen: Blood from Arm, Left Updated: 04/23/21 6923    Basic metabolic panel [[id number]] Collected: 04/23/21 0550    Lab Status: In process Specimen: Blood from Arm, Left Updated: 04/23/21 0558    Hepatic function panel [[id number]] Collected: 04/23/21 0550    Lab Status: In process Specimen: Blood from Arm, Left Updated: 04/23/21 0558    Magnesium [[id number]] Collected: 04/23/21 0550    Lab Status:  In process Specimen: Blood from Arm, Left Updated: 04/23/21 0558    Troponin I [[id number]]  (Normal) Collected: 04/23/21 0357    Lab Status: Final result Specimen: Blood from Arm, Left Updated: 04/23/21 0422     Troponin I 0 02 ng/mL     Basic metabolic panel [[id number]]  (Abnormal) Collected: 04/23/21 0357    Lab Status: Final result Specimen: Blood from Arm, Left Updated: 04/23/21 0418     Sodium 141 mmol/L      Potassium 4 2 mmol/L      Chloride 106 mmol/L      CO2 27 mmol/L      ANION GAP 8 mmol/L      BUN 15 mg/dL      Creatinine 1 38 mg/dL      Glucose 109 mg/dL      Calcium 7 8 mg/dL      eGFR 57 ml/min/1 73sq m     Narrative:      [place] guidelines for Chronic Kidney Disease (CKD):   Stage 1 with normal or high GFR (GFR > 90 mL/min/1 73 square meters)    Stage 2 Mild CKD (GFR = 60-89 mL/min/1 73 square meters)    Stage 3A Moderate CKD (GFR = 45-59 mL/min/1 73 square meters)    Stage 3B Moderate CKD (GFR = 30-44 mL/min/1 73 square meters)    Stage 4 Severe CKD (GFR = 15-29 mL/min/1 73 square meters)    Stage 5 End Stage CKD (GFR <15 mL/min/1 73 square meters)  Note: GFR calculation is accurate only with a steady state creatinine    Hepatic function panel [[id number]]  (Abnormal) Collected: 04/23/21 0357    Lab Status: Final result Specimen: Blood from Arm, Left Updated: 04/23/21 0418     Total Bilirubin 0 34 mg/dL      Bilirubin, Direct 0 13 mg/dL      Alkaline Phosphatase 59 U/L      AST 19 U/L      ALT 22 U/L      Total Protein 6 4 g/dL      Albumin 3 1 g/dL     CBC and differential [[id number]]  (Abnormal) Collected: 04/23/21 0357    Lab Status: Final result Specimen: Blood from Arm, Left Updated: 04/23/21 0405     WBC 4 37 Thousand/uL      RBC 3 63 Million/uL      Hemoglobin 11 2 g/dL      Hematocrit 34 5 %      MCV 95 fL      MCH 30 9 pg      MCHC 32 5 g/dL      RDW 13 8 %      MPV 11 7 fL      Platelets 585 Thousands/uL      nRBC 0 /100 WBCs      Neutrophils Relative 62 %      Immat GRANS % 1 %      Lymphocytes Relative 22 %      Monocytes Relative 13 %      Eosinophils Relative 2 %      Basophils Relative 0 %      Neutrophils Absolute 2 74 Thousands/µL      Immature Grans Absolute 0 02 Thousand/uL      Lymphocytes Absolute 0 97 Thousands/µL      Monocytes Absolute 0 56 Thousand/µL      Eosinophils Absolute 0 07 Thousand/µL      Basophils Absolute 0 01 Thousands/µL                  CT chest abdomen pelvis w contrast   Final Result by Jean Carlos Hampton MD (04/23 0446)      3 mm nonobstructing stone the left kidney again seen unchanged  Unchanged calcification within the anterior wall the bladder        Otherwise unremarkable CT of the chest, abdomen and pelvis with IV without oral contrast       Workstation performed: [id number]         XR chest 2 views    (Results Pending)              Procedures  ECG 12 Lead Documentation Only    Date/Time: 4/23/2021 4:32 AM  Performed by: Garland Melendrez PA-C  Authorized by: Garland Melendrez PA-C     Indications / Diagnosis:  Dyspnea  ECG reviewed by me, the ED Provider: yes    Patient location:  ED  Previous ECG:     Previous ECG:  Compared to current    Similarity:  No change  Interpretation:     Interpretation: non-specific    Rate:     ECG rate:  65    ECG rate assessment: normal    Rhythm:     Rhythm: sinus rhythm    Ectopy:     Ectopy: none    QRS:     QRS axis:  Normal    QRS intervals:  Normal  Conduction:     Conduction: normal    ST segments:     ST segments:  Normal  T waves:     T waves: inverted      Inverted:  V3, V4 and V5             ED Course             HEART Risk Score      Most Recent Value   Heart Score Risk Calculator   History  1 Filed at: 04/23/2021 0505   ECG  1 Filed at: 04/23/2021 0505   Age  2 Filed at: 04/23/2021 0505   Risk Factors  1 Filed at: 04/23/2021 0505   Troponin  0 Filed at: 04/23/2021 0505   HEART Score  5 Filed at: 04/23/2021 0505                                    MDM  Number of Diagnoses or Management Options  Chest pain:   Dyspnea:   Flank pain:   Diagnosis management comments: Patient is a 26-year-old male with past medical history significant for hyperlipidemia, recent cardiac ablation 2 weeks ago who presents to the emergency department complaining of left sided chest/flank pain that started last night  Patient describes the pain as a stabbing sensation  Patient states that he had a rash from his left rib that wraps around into the left side of his abdomen  Patient is also reporting some difficulty breathing  He has not noticed any fevers or chills  He is not having any abdominal pain, the, vomiting, diarrhea, headaches, dizziness, weakness, numbness, tingling, cough, congestion, sore throat    Patient denies all other symptoms at this time  Patient appears comfortable in bed, he is not in any acute distress  Patient is not in respiratory distress  Patient's oxygen saturation and vital signs are normal   Patient does not have a rash despite reporting a rash to the left rib/flank area  patient abdominal pain or tenderness  Troponin was normal   EKG was unchanged from his previous EKG  Labs unremarkable besides creatinine of 1 38   CT chest abdomen pelvis revealed a 3 mm nonobstructing kidney stone, otherwise unremarkable exam   I spoke with Dr Justyna Winston who agreed to admit the patient for cardiac observation  Amount and/or Complexity of Data Reviewed  Clinical lab tests: ordered and reviewed  Tests in the radiology section of CPT®: ordered and reviewed    Patient Progress  Patient progress: stable      Disposition  Final diagnoses:   Chest pain   Flank pain   Dyspnea     Time reflects when diagnosis was documented in both MDM as applicable and the Disposition within this note     Time User Action Codes Description Comment    4/23/2021  5:37 AM Aisha Redhead Add [R07 9] Chest pain     4/23/2021  5:43 AM Clarene Anthony Add [R10 9] Flank pain     4/23/2021  5:44 AM Clarene Anthony Add [R06 00] Dyspnea     4/23/2021  5:44 AM Clarene Anthony Modify [R07 9] Chest pain       ED Disposition     ED Disposition Condition Date/Time Comment    Admit Stable Fri Apr 23, 2021  5:43 AM Case was discussed with Dr Justyna Winston and the patient's admission status was agreed to be Admission Status: observation status to the service of Dr Justyna Winston  Follow-up Information    None         Patient's Medications   Discharge Prescriptions    No medications on file     No discharge procedures on file      PDMP Review     None          ED Provider  Electronically Signed by           Christian Coelho PA-C  04/23/21 3193

## 2021-04-23 NOTE — CASE MANAGEMENT
LOS 1 DAY  PATIENT CLASS OBV  PATIENT IS A 30 DAY READMISSION  PATIENT IS NOT A BUNDLE    CM met with patient and wife at bedside to complete cm open and discuss discharge planning  Patient lives in [place] with his wife in a 2 SH with 0STE  He is independent for adls and functional mobility and has no DME  Patient is employed and drives  He has no HHC or IP rehab  He has used [facility] for OP rehab  He has no  admissions or MH dx  No D&A hx   Former tobacco smoker  His PCP is Eric Hopper, and his pharmacy is [facility] in [place]  CM reviewed discharge planning process including the following: identifying caregivers at home, preference for d/c planning needs,   availability of treatment team to discuss questions or concerns patient and/or family may have regarding diagnosis, plan of care, old or new medications and discharge planning   CM will continue to follow for care coordination and update assessment as appropriate

## 2021-04-23 NOTE — ASSESSMENT & PLAN NOTE
· Troponin negative  · 2D echocardiogram within normal limits  · Outpatient follow-up with Cardiology scheduled 5/6/21

## 2021-04-23 NOTE — DISCHARGE SUMMARY
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  · Continue sotalol  · Continue Eliquis    Cardiomyopathy, nonischemic ([address] )  Assessment & Plan  · 2D echocardiogram within normal limits  Bilateral pulmonary embolism (HCC)  Assessment & Plan  · Continue Eliquis    Nephrolithiasis  Assessment & Plan  · CT scan shows a nonobstructing stone on the left side, unchanged  · Creatinine improved to 1 2   · Normal white blood cell count/patient afebrile    Acute kidney injury superimposed on CKD [facility])  Assessment & Plan  Lab Results   Component Value Date    EGFR 63 04/23/2021    EGFR 57 04/23/2021    EGFR 69 04/14/2021    CREATININE 1 27 04/23/2021    CREATININE 1 38 (H) 04/23/2021    CREATININE 1 19 04/14/2021     · IV fluid hydration  · Repeat creatinine improved to 1 2  BPH (benign prostatic hyperplasia)  Assessment & Plan  · Continue Flomax    Dyslipidemia  Assessment & Plan  · Continue statin    Chronic low back pain  Assessment & Plan  · Tylenol as needed      Discharging Physician / Practitioner: Rosie Egan MD  PCP: Yoli Marin MD  Admission Date:   Admission Orders (From admission, onward)     Ordered        04/23/21 0541  Place in Observation  Once                   Discharge Date: 04/23/21    Disposition:      home  Reason for Admission: chest/back pain    Discharge Diagnoses:     Please see assessment and plan section above for further details regarding discharge diagnoses       Resolved Problems  Date Reviewed: 4/15/2021 None          Consultations During Hospital Stay:  · cardiology    Procedures Performed:   · echo     Medication Adjustments and Discharge Medications:  · Summary of Medication Adjustments made as a result of this hospitalization: see med rec  · Medication Dosing Tapers - Please refer to Discharge Medication List for details on any medication dosing tapers (if applicable to patient)  · Medications being temporarily held (include recommended restart time): see med rec  · Discharge Medication List: See after visit summary for reconciled discharge medications  Diet Recommendations at Discharge:  Diet -        Diet Orders   (From admission, onward)             Start     Ordered    04/23/21 0541  Diet Cardiovascular; Cardiac  Diet effective now     Question Answer Comment   Diet Type Cardiovascular    Cardiac Cardiac    RD to adjust diet per protocol? Yes        04/23/21 0541              Hospital Course:     Doretha Soares  is a 76 y o  male patient who originally presented to the hospital on 4/23/2021 due to complaints of left-sided chest/flank pain which began 1 night prior to presentation  Symptoms had been coming and going for the last 2 weeks  He reported the sensation to be a stabbing sensation mostly in his left flank region associated with some mild shortness of breath  No diaphoresis  Patient had troponins which were negative  CT scan showed no pericardial effusion  Lipase negative/LFTs negative/creatinine was slightly elevated at 1 38 which improved to 1 27  No elevated white blood cell count/patient afebrile/there was a 3 mm left kidney stone but that was nonobstructing  Discussed with Cardiology, 2D echocardiogram within normal limits  Recommended outpatient follow-up which is already scheduled on 05/06/2021  Above plan of care discussed with patient and family, patient being discharged home      Condition at Discharge: good     Discharge Day Visit / Exam:     Vitals: Blood Pressure: 125/66 (04/23/21 1300)  Pulse: 59 (04/23/21 1300)  Temperature: 98 °F (36 7 °C) (04/23/21 0317)  Temp Source: Oral (04/23/21 0317)  Respirations: 20 (04/23/21 1300)  Height: 6' 3" (190 5 cm) (04/23/21 0317)  Weight - Scale: 118 kg (260 lb) (04/23/21 0317)  SpO2: 98 % (04/23/21 1300)  Exam:   Physical Exam  Constitutional:       General: He is not in acute distress  Appearance: He is not ill-appearing  HENT:      Mouth/Throat:      Mouth: Mucous membranes are moist       Pharynx: Oropharynx is clear  Cardiovascular:      Rate and Rhythm: Normal rate and regular rhythm  Pulses: Normal pulses  Pulmonary:      [place]: Pulmonary effort is normal       Breath sounds: Normal breath sounds  Neurological:      General: No focal deficit present  Mental Status: He is alert and oriented to person, place, and time  Goals of Care Discussions:  · Code Status at Discharge: Level 1 - Full Code   ·   Discharge instructions/Information to patient and family:   See after visit summary section titled Discharge Instructions for information provided to patient and family  Discharge Statement:  I spent 35 minutes discharging the patient  This time was spent on the day of discharge  I had direct contact with the patient on the day of discharge  Greater than 50% of the total time was spent examining patient, answering all patient questions, arranging and discussing plan of care with patient as well as directly providing post-discharge instructions  Additional time then spent on discharge activities      ** Please Note: This note has been constructed using a voice recognition system **

## 2021-04-23 NOTE — ASSESSMENT & PLAN NOTE
· CT scan shows a nonobstructing stone on the left side, unchanged  · Creatinine improved to 1 2   · Normal white blood cell count/patient afebrile

## 2021-04-24 LAB
ATRIAL RATE: 65 BPM
P AXIS: 50 DEGREES
PR INTERVAL: 178 MS
QRS AXIS: -18 DEGREES
QRSD INTERVAL: 96 MS
QT INTERVAL: 420 MS
QTC INTERVAL: 436 MS
T WAVE AXIS: -30 DEGREES
VENTRICULAR RATE: 65 BPM

## 2021-04-24 PROCEDURE — 93010 ELECTROCARDIOGRAM REPORT: CPT | Performed by: INTERNAL MEDICINE

## 2021-05-06 ENCOUNTER — TELEPHONE (OUTPATIENT)
Dept: CARDIOLOGY CLINIC | Facility: CLINIC | Age: 76
End: 2021-05-06

## 2021-05-06 ENCOUNTER — OFFICE VISIT (OUTPATIENT)
Dept: CARDIOLOGY CLINIC | Facility: CLINIC | Age: 76
End: 2021-05-06
Payer: COMMERCIAL

## 2021-05-06 VITALS
OXYGEN SATURATION: 97 % | HEART RATE: 52 BPM | BODY MASS INDEX: 32.83 KG/M2 | WEIGHT: 264 LBS | HEIGHT: 75 IN | SYSTOLIC BLOOD PRESSURE: 122 MMHG | DIASTOLIC BLOOD PRESSURE: 64 MMHG

## 2021-05-06 DIAGNOSIS — I47.1 SVT (SUPRAVENTRICULAR TACHYCARDIA) (HCC): Primary | ICD-10-CM

## 2021-05-06 DIAGNOSIS — Z79.01 CHRONIC ANTICOAGULATION: ICD-10-CM

## 2021-05-06 DIAGNOSIS — E78.2 MIXED HYPERLIPIDEMIA: ICD-10-CM

## 2021-05-06 DIAGNOSIS — I26.99 BILATERAL PULMONARY EMBOLISM (HCC): ICD-10-CM

## 2021-05-06 PROCEDURE — 93000 ELECTROCARDIOGRAM COMPLETE: CPT | Performed by: INTERNAL MEDICINE

## 2021-05-06 PROCEDURE — 99214 OFFICE O/P EST MOD 30 MIN: CPT | Performed by: INTERNAL MEDICINE

## 2021-05-06 NOTE — PROGRESS NOTES
[facility] [place] [id number] [facility] [place] [address]  Cardiology Follow Up    Austin Hendrickson   1945  [id number]      1  SVT (supraventricular tachycardia) ([facility] [address] )     2  Chronic anticoagulation     3  Mixed hyperlipidemia     4  Bilateral pulmonary embolism [facility])         Chief Complaint   Patient presents with    Follow-up     abalation       Interval History:  Patient presents for follow-up visit  Patient denies any history of chest pain shortness of breath  Patient denies any history of leg edema or orthopnea PND  No history of presyncope syncope  Patient states compliance with the present list of medications  Patient had recent SVT and atrial tachycardia ablation  Patient scheduled for prostate surgery next month  Patient denies any bleeding issues      Patient Active Problem List   Diagnosis    Bilateral pulmonary embolism (HCC)    SOB (shortness of breath)    Hyperlipidemia    Lung nodule    Obesity    History of sickle cell trait    Leg edema, left    Thrombocytopenia (HCC)    Chronic kidney disease (CKD), stage II (mild)    Chronic low back pain    SVT (supraventricular tachycardia) (HCC)    Chronic anticoagulation    ARLENE (obstructive sleep apnea)    Cardiomyopathy, nonischemic (HCC)    Dyslipidemia    BPH (benign prostatic hyperplasia)    Nephrolithiasis    Lumbar disc disease with radiculopathy - Left    Spinal stenosis of lumbar region without neurogenic claudication - Left    Chest pain    Acute kidney injury superimposed on CKD ([facility] [address] )     Past Medical History:   Diagnosis Date    Hyperlipidemia     Post-nasal drip     Pulmonary embolism (HCC)      Social History     Socioeconomic History    Marital status: /Civil Union     Spouse name: Not on file    Number of children: 9    Years of education: Not on file    Highest education level: Not on file   Occupational History    Occupation: employed   Social Needs    Financial resource strain: Not on file    Food insecurity     Worry: Not on file     Inability: Not on file    Transportation needs     Medical: Not on file     Non-medical: Not on file   Tobacco Use    Smoking status: Former Smoker    Smokeless tobacco: Never Used   Substance and Sexual Activity    Alcohol use: Yes     Alcohol/week: 0 0 standard drinks     Comment: socially    Drug use: No    Sexual activity: Not on file   Lifestyle    Physical activity     Days per week: Not on file     Minutes per session: Not on file    Stress: Not on file   Relationships    Social connections     Talks on phone: Not on file     Gets together: Not on file     Attends Rastafarian service: Not on file     Active member of club or organization: Not on file     Attends meetings of clubs or organizations: Not on file     Relationship status: Not on file    Intimate partner violence     Fear of current or ex partner: Not on file     Emotionally abused: Not on file     Physically abused: Not on file     Forced sexual activity: Not on file   Other Topics Concern    Not on file   Social History Narrative    Not on file      No family history on file    Past Surgical History:   Procedure Laterality Date    ARTHROSCOPY KNEE Left     30 years ago       Current Outpatient Medications:     apixaban (ELIQUIS) 5 mg, Take 1 tablet (5 mg total) by mouth 2 (two) times a day, Disp: 60 tablet, Rfl: 11    Ascorbic Acid (VITAMIN C) 100 MG tablet, Take 100 mg by mouth daily, Disp: , Rfl:     fluticasone (FLONASE) 50 mcg/act nasal spray, 1 spray into each nostril daily, Disp: 16 g, Rfl: 0    rosuvastatin (CRESTOR) 20 MG tablet, Take 20 mg by mouth daily, Disp: , Rfl:     sotalol AF (BETAPACE AF) 120 MG TABS, Take 1 tablet (120 mg total) by mouth every 12 (twelve) hours, Disp: 60 each, Rfl: 3    tamsulosin (FLOMAX) 0 4 mg, Take 0 4 mg by mouth daily with dinner, Disp: , Rfl:   No Known Allergies    Labs:  Admission on 04/23/2021, Discharged on 04/23/2021 Component Date Value    Troponin I 04/23/2021 0 02     WBC 04/23/2021 4 37     RBC 04/23/2021 3 63*    Hemoglobin 04/23/2021 11 2*    Hematocrit 04/23/2021 34 5*    MCV 04/23/2021 95     MCH 04/23/2021 30 9     MCHC 04/23/2021 32 5     RDW 04/23/2021 13 8     MPV 04/23/2021 11 7     Platelets 59/48/6658 130*    nRBC 04/23/2021 0     Neutrophils Relative 04/23/2021 62     Immat GRANS % 04/23/2021 1     Lymphocytes Relative 04/23/2021 22     Monocytes Relative 04/23/2021 13*    Eosinophils Relative 04/23/2021 2     Basophils Relative 04/23/2021 0     Neutrophils Absolute 04/23/2021 2 74     Immature Grans Absolute 04/23/2021 0 02     Lymphocytes Absolute 04/23/2021 0 97     Monocytes Absolute 04/23/2021 0 56     Eosinophils Absolute 04/23/2021 0 07     Basophils Absolute 04/23/2021 0 01     Sodium 04/23/2021 141     Potassium 04/23/2021 4 2     Chloride 04/23/2021 106     CO2 04/23/2021 27     ANION GAP 04/23/2021 8     BUN 04/23/2021 15     Creatinine 04/23/2021 1 38*    Glucose 04/23/2021 109     Calcium 04/23/2021 7 8*    eGFR 04/23/2021 57     Total Bilirubin 04/23/2021 0 34     Bilirubin, Direct 04/23/2021 0 13     Alkaline Phosphatase 04/23/2021 59     AST 04/23/2021 19     ALT 04/23/2021 22     Total Protein 04/23/2021 6 4     Albumin 04/23/2021 3 1*    TSH 3RD GENERATON 04/23/2021 1 717     Sodium 04/23/2021 141     Potassium 04/23/2021 4 3     Chloride 04/23/2021 107     CO2 04/23/2021 26     ANION GAP 04/23/2021 8     BUN 04/23/2021 14     Creatinine 04/23/2021 1 27     Glucose 04/23/2021 101     Glucose, Fasting 04/23/2021 101*    Calcium 04/23/2021 7 9*    eGFR 04/23/2021 63     Total Bilirubin 04/23/2021 0 41     Bilirubin, Direct 04/23/2021 0 07     Alkaline Phosphatase 04/23/2021 56     AST 04/23/2021 26     ALT 04/23/2021 25     Total Protein 04/23/2021 6 4     Albumin 04/23/2021 3 1*    Magnesium 04/23/2021 2 0     WBC 04/23/2021 4 40  RBC 04/23/2021 3 56*    Hemoglobin 04/23/2021 11 2*    Hematocrit 04/23/2021 33 6*    MCV 04/23/2021 94     MCH 04/23/2021 31 5     MCHC 04/23/2021 33 3     RDW 04/23/2021 13 9     Platelets 11/95/3214 141*    MPV 04/23/2021 12 2     Lipase 04/23/2021 58*    Ventricular Rate 04/23/2021 65     Atrial Rate 04/23/2021 65     IN Interval 04/23/2021 178     QRSD Interval 04/23/2021 96     QT Interval 04/23/2021 420     QTC Interval 04/23/2021 436     P Axis 04/23/2021 50     QRS Axis 04/23/2021 -18     T Wave Axis 04/23/2021 -30    Admission on 04/12/2021, Discharged on 04/15/2021   Component Date Value    Sodium 04/12/2021 143     Potassium 04/12/2021 3 7     Chloride 04/12/2021 114*    CO2 04/12/2021 25     ANION GAP 04/12/2021 4     BUN 04/12/2021 13     Creatinine 04/12/2021 1 23     Glucose 04/12/2021 111     Glucose, Fasting 04/12/2021 111*    Calcium 04/12/2021 8 9     eGFR 04/12/2021 66     WBC 04/12/2021 4 46     RBC 04/12/2021 4 21     Hemoglobin 04/12/2021 12 9     Hematocrit 04/12/2021 39 8     MCV 04/12/2021 95     MCH 04/12/2021 30 6     MCHC 04/12/2021 32 4     RDW 04/12/2021 14 4     MPV 04/12/2021 12 2     Platelets 58/24/7004 141*    nRBC 04/12/2021 0     Neutrophils Relative 04/12/2021 60     Immat GRANS % 04/12/2021 0     Lymphocytes Relative 04/12/2021 28     Monocytes Relative 04/12/2021 11     Eosinophils Relative 04/12/2021 1     Basophils Relative 04/12/2021 0     Neutrophils Absolute 04/12/2021 2 65     Immature Grans Absolute 04/12/2021 0 02     Lymphocytes Absolute 04/12/2021 1 25     Monocytes Absolute 04/12/2021 0 47     Eosinophils Absolute 04/12/2021 0 06     Basophils Absolute 04/12/2021 0 01     Protime 04/12/2021 13 4     INR 04/12/2021 1 02     Ventricular Rate 04/12/2021 81     Atrial Rate 04/12/2021 81     IN Interval 04/12/2021 164     QRSD Interval 04/12/2021 100     QT Interval 04/12/2021 392     QTC Interval 04/12/2021 455     P Axis 04/12/2021 54     QRS Axis 04/12/2021 -28     T Wave Axis 04/12/2021 26     Ventricular Rate 04/12/2021 80     Atrial Rate 04/12/2021 80     AK Interval 04/12/2021 170     QRSD Interval 04/12/2021 100     QT Interval 04/12/2021 372     QTC Interval 04/12/2021 429     P Axis 04/12/2021 51     QRS Axis 04/12/2021 -30     T Wave Axis 04/12/2021 13     Ventricular Rate 04/12/2021 127     Atrial Rate 04/12/2021 127     AK Interval 04/12/2021 120     QRSD Interval 04/12/2021 94     QT Interval 04/12/2021 372     QTC Interval 04/12/2021 540     QRS Axis 04/12/2021 -31     T Wave Axis 04/12/2021 61     Activated Clotting Time,* 04/12/2021 344*    Specimen Type 04/12/2021 VENOUS     Activated Clotting Time,* 04/12/2021 325*    Specimen Type 04/12/2021 VENOUS     Activated Clotting Time,* 04/12/2021 312*    Specimen Type 04/12/2021 VENOUS     Activated Clotting Time,* 04/12/2021 318*    Specimen Type 04/12/2021 VENOUS     Activated Clotting Time,* 04/12/2021 237*    Specimen Type 04/12/2021 VENOUS     Activated Clotting Time,* 04/12/2021 225*    Specimen Type 04/12/2021 VENOUS     Activated Clotting Time,* 04/12/2021 219*    Specimen Type 04/12/2021 VENOUS     Sodium 04/13/2021 142     Potassium 04/13/2021 3 3*    Chloride 04/13/2021 110*    CO2 04/13/2021 25     ANION GAP 04/13/2021 7     BUN 04/13/2021 9     Creatinine 04/13/2021 1 04     Glucose 04/13/2021 104     Glucose, Fasting 04/13/2021 104*    Calcium 04/13/2021 8 6     eGFR 04/13/2021 81     WBC 04/13/2021 6 03     RBC 04/13/2021 3 78*    Hemoglobin 04/13/2021 11 8*    Hematocrit 04/13/2021 35 8*    MCV 04/13/2021 95     MCH 04/13/2021 31 2     MCHC 04/13/2021 33 0     RDW 04/13/2021 14 6     Platelets 77/74/9160 124*    MPV 04/13/2021 12 1     Ventricular Rate 04/12/2021 101     Atrial Rate 04/12/2021 101     AK Interval 04/12/2021 172     QRSD Interval 04/12/2021 94     QT Interval 04/12/2021 340     QTC Interval 04/12/2021 440     P Axis 04/12/2021 57     QRS Axis 04/12/2021 -35     T Wave Axis 04/12/2021 26     Ventricular Rate 04/13/2021 65     Atrial Rate 04/13/2021 65     GA Interval 04/13/2021 168     QRSD Interval 04/13/2021 96     QT Interval 04/13/2021 416     QTC Interval 04/13/2021 432     P Axis 04/13/2021 51     QRS Axis 04/13/2021 -23     T Wave Axis 04/13/2021 -38     Sodium 04/14/2021 145     Potassium 04/14/2021 4 0     Chloride 04/14/2021 114*    CO2 04/14/2021 27     ANION GAP 04/14/2021 4     BUN 04/14/2021 12     Creatinine 04/14/2021 1 19     Glucose 04/14/2021 94     Calcium 04/14/2021 8 8     eGFR 04/14/2021 69     Ventricular Rate 04/13/2021 66     Atrial Rate 04/13/2021 66     GA Interval 04/13/2021 168     QRSD Interval 04/13/2021 98     QT Interval 04/13/2021 406     QTC Interval 04/13/2021 425     P Axis 04/13/2021 47     QRS Axis 04/13/2021 -31     T Wave Axis 04/13/2021 -25     Ventricular Rate 04/12/2021 72     Atrial Rate 04/12/2021 72     GA Interval 04/12/2021 164     QRSD Interval 04/12/2021 98     QT Interval 04/12/2021 382     QTC Interval 04/12/2021 418     P Axis 04/12/2021 47     QRS Axis 04/12/2021 -32     T Wave Axis 04/12/2021 -55     Ventricular Rate 04/14/2021 62     Atrial Rate 04/14/2021 62     GA Interval 04/14/2021 170     QRSD Interval 04/14/2021 116     QT Interval 04/14/2021 438     QTC Interval 04/14/2021 444     P Axis 04/14/2021 57     QRS Axis 04/14/2021 -20     T Wave Axis 04/14/2021 -21     Ventricular Rate 04/14/2021 61     Atrial Rate 04/14/2021 61     GA Interval 04/14/2021 168     QRSD Interval 04/14/2021 118     QT Interval 04/14/2021 440     QTC Interval 04/14/2021 442     P Axis 04/14/2021 59     QRS Axis 04/14/2021 -21     T Wave Axis 04/14/2021 -26     Ventricular Rate 04/14/2021 58     Atrial Rate 04/14/2021 58     GA Interval 04/14/2021 170     QRSD Interval 04/14/2021 106     QT Interval 04/14/2021 452     QTC Interval 04/14/2021 443     P Axis 04/14/2021 58     QRS Axis 04/14/2021 -30     T Wave Axis 04/14/2021 -31     Ventricular Rate 04/14/2021 59     Atrial Rate 04/14/2021 59     PA Interval 04/14/2021 172     QRSD Interval 04/14/2021 100     QT Interval 04/14/2021 456     QTC Interval 04/14/2021 451     P Axis 04/14/2021 61     QRS Axis 04/14/2021 -29     T Wave Axis 04/14/2021 -29     Ventricular Rate 04/15/2021 56     Atrial Rate 04/15/2021 56     PA Interval 04/15/2021 170     QRSD Interval 04/15/2021 96     QT Interval 04/15/2021 444     QTC Interval 04/15/2021 428     P Axis 04/15/2021 59     QRS Axis 04/15/2021 -34     T Wave Axis 04/15/2021 -30    Appointment on 03/31/2021   Component Date Value    Cholesterol 03/31/2021 201*    Triglycerides 03/31/2021 143     HDL, Direct 03/31/2021 46     LDL Calculated 03/31/2021 126*    Non-HDL-Chol (CHOL-HDL) 03/31/2021 155      Imaging: Xr Chest 2 Views    Result Date: 4/23/2021  Narrative: CHEST INDICATION:   dyspnea, CP  COMPARISON:  Chest radiograph from 9/8/2020 and chest CT from 4/23/2021  EXAM PERFORMED/VIEWS:  XR CHEST PA & LATERAL  DUAL ENERGY SUBTRACTION  FINDINGS: Cardiomediastinal silhouette normal  Lungs clear  No effusion or pneumothorax  Mild degenerative disease in the spine  Impression: No acute cardiopulmonary disease  Workstation performed: [id number]     Mri Cervical Spine Wo Contrast    Result Date: 4/9/2021  Narrative: MRI CERVICAL SPINE WITHOUT CONTRAST INDICATION: M54 10: Radiculopathy, site unspecified  COMPARISON:  None  TECHNIQUE:  Sagittal T1, sagittal T2, sagittal inversion recovery, axial T2, axial  2D merge IMAGE QUALITY:  Diagnostic FINDINGS: ALIGNMENT:  Normal alignment of the cervical spine  No compression fracture  No subluxation  No scoliosis  MARROW SIGNAL:  Mild endplate marrow degenerative change  No focal marrow edema   CERVICAL AND VISUALIZED THORACIC CORD:  Normal signal within the visualized cord  PREVERTEBRAL AND PARASPINAL SOFT TISSUES:  Normal  VISUALIZED POSTERIOR FOSSA:  The visualized posterior fossa demonstrates no abnormal signal  CERVICAL DISC SPACES: C2-C3:  Normal  C3-C4:  Normal disc height and signal   There is left facet hypertrophic degenerative change with minor uncinate joint hypertrophic change  No canal stenosis or right foraminal narrowing  There is mild to moderate left foraminal narrowing  C4-C5:  Disc desiccation and loss of disc height  Mild annular bulging and bilateral uncinate joint hypertrophic degenerative change  There is mild canal stenosis with partial effacement of the ventral CSF space  Moderate bilateral foraminal narrowing  C5-C6:  Slight loss of disc height  Mild annular bulging and uncinate joint hypertrophic degenerative change  Mild canal stenosis and bilateral foraminal narrowing  C6-C7:  Loss of disc height with mild annular bulging  No focal disc herniation  Minimal foraminal narrowing  C7-T1:  Slight anterior subluxation of C7 upon T1 on the basis of facet degenerative change  No canal stenosis  No foraminal nerve impingement  UPPER THORACIC DISC SPACES:  Minor upper thoracic degenerative change without canal stenosis or discrete nerve impingement  Impression: Cervical degenerative disc disease with annular bulging, uncinate joint and facet joint hypertrophic change  Mild canal stenosis without cord compression  Multilevel mild to moderate foraminal narrowing C3-4 through C6-7  Workstation performed: [id number]     Ct Chest Abdomen Pelvis W Contrast    Result Date: 4/23/2021  Narrative: CT CHEST, ABDOMEN AND PELVIS WITH IV CONTRAST INDICATION:   L rib/flank pain  COMPARISON:  CT chest abdomen pelvis dated 2/3/2020  TECHNIQUE: CT examination of the chest, abdomen and pelvis was performed   Axial, sagittal, and coronal 2D reformatted images were created from the source data and submitted for interpretation  Radiation dose length product (DLP) for this visit:  1292 mGy-cm   This examination, like all CT scans performed in the [facility], was performed utilizing techniques to minimize radiation dose exposure, including the use of iterative reconstruction and automated exposure control  IV Contrast:  100 mL of iohexol (OMNIPAQUE) Enteric Contrast: Enteric contrast was administered  FINDINGS: CHEST LUNGS: Basilar dependent parenchymal changes  Lungs are otherwise clear  There is no tracheal or endobronchial lesion  PLEURA:  Unremarkable  HEART/GREAT VESSELS:  Unremarkable for patient's age  MEDIASTINUM AND SE:  Unremarkable  CHEST WALL AND LOWER NECK:   Unremarkable  ABDOMEN LIVER/BILIARY TREE:  Unremarkable  GALLBLADDER:  No calcified gallstones  No pericholecystic inflammatory change  SPLEEN:  Unremarkable  PANCREAS:  Unremarkable  ADRENAL GLANDS:  Unremarkable  KIDNEYS/URETERS:  One or more simple renal cyst(s) is noted  3 mm nonobstructing stone the left kidney unchanged  Otherwise unremarkable kidneys  No hydronephrosis  STOMACH AND BOWEL:  There is colonic diverticulosis without evidence of acute diverticulitis  APPENDIX:  A normal appendix was visualized  ABDOMINOPELVIC CAVITY:  No ascites  No pneumoperitoneum  No lymphadenopathy  VESSELS:  Unremarkable for patient's age  PELVIS REPRODUCTIVE ORGANS:  Unremarkable for patient's age  URINARY BLADDER:  Unchanged calcification noted anteriorly in the bladder wall is  ABDOMINAL WALL/INGUINAL REGIONS:  Unremarkable  OSSEOUS STRUCTURES:  No acute fracture or destructive osseous lesion  Impression: 3 mm nonobstructing stone the left kidney again seen unchanged  Unchanged calcification within the anterior wall the bladder   Otherwise unremarkable CT of the chest, abdomen and pelvis with IV without oral contrast  Workstation performed: [id number]       Review of Systems:  Review of Systems     REVIEW OF SYSTEMS:  Constitutional:  Denies fever or chills   Eyes:  Denies change in visual acuity   HENT:  Denies nasal congestion or sore throat   Respiratory:  Denies cough or shortness of breath   Cardiovascular:  Denies chest pain or edema   GI:  Denies abdominal pain, nausea, vomiting, bloody stools or diarrhea   :    For prostate surgery next month  Musculoskeletal:  Denies back pain or joint pain   Neurologic:  Denies headache, focal weakness or sensory changes   Endocrine:  Denies polyuria or polydipsia   Lymphatic:  Denies swollen glands   Psychiatric:  Denies depression or anxiety     Physical Exam:    /64   Pulse (!) 52   Ht 6' 3" (1 905 m)   Wt 120 kg (264 lb)   SpO2 97%   BMI 33 00 kg/m²     Physical Exam    PHYSICAL EXAM:  General:  Patient is not in acute distress   Head: Normocephalic, Atraumatic  HEENT:  Both pupils normal-size atraumatic, normocephalic, nonicteric  Neck:  JVP not raised  Trachea central  No carotid bruit  Respiratory:  normal breath sounds no crackles  no rhonchi  Cardiovascular:  Regular rate and rhythm no S3 no murmurs  GI:  Abdomen soft nontender  No organomegaly  Lymphatic:  No cervical or inguinal lymphadenopathy  Neurologic:  Patient is awake alert, oriented   Grossly nonfocal   extremities no edema    Discussion/Summary:   Patient with multiple medical problems who seems to be doing reasonably well from cardiac standpoint  Previous studies reviewed with patient  Medications reviewed and possible side effects discussed  concepts of cardiovascular disease , signs and symptoms of heart disease  Dietary and risk factor modification reinforced  All questions answered  Safety measures reviewed  Patient advised to report any problems prompting medical attention  events of recent hospitalization and ablation for SVT and atrial tachycardia discussed with patient and family  Patient does have history of recurrent pulmonary embolism on anticoagulation        Recent echocardiogram showed normal ejection fraction with no significant valvular abnormalities  Patient has no specific contraindication from cardiac standpoint to proceed with the planned prostate surgery  Patient presents moderate risk based on age and comorbidities  Patient can stop Eliquis at least 2 days prior to surgery and restart when okay from surgical standpoint  Plan of care discussed with patient and family  Follow-up in a few months or earlier as needed  Follow-up with primary care physician

## 2021-05-06 NOTE — LETTER
May 6, 2021     Verner Squires, [address]  [id number]    Patient: Neetu Velasquez  YOB: 1945   Date of Visit: 5/6/2021       Dear Dr Alex Gibson: Thank you for referring Tram Martínez to me for evaluation  Below are my notes for this consultation  If you have questions, please do not hesitate to call me  I look forward to following your patient along with you  Sincerely,        Oksana Szymanski MD        CC: No Recipients  Oksana Szymanski MD  5/6/2021  9:23 PM  Sign when Signing Visit  [address] [place] [address]  Cardiology Follow Up    Neetu Velasquez   1945  [id number]      1  SVT (supraventricular tachycardia) ([facility] [address] )     2  Chronic anticoagulation     3  Mixed hyperlipidemia     4  Bilateral pulmonary embolism [facility])         Chief Complaint   Patient presents with    Follow-up     abalation       Interval History:  Patient presents for follow-up visit  Patient denies any history of chest pain shortness of breath  Patient denies any history of leg edema or orthopnea PND  No history of presyncope syncope  Patient states compliance with the present list of medications  Patient had recent SVT and atrial tachycardia ablation  Patient scheduled for prostate surgery next month  Patient denies any bleeding issues      Patient Active Problem List   Diagnosis    Bilateral pulmonary embolism (HCC)    SOB (shortness of breath)    Hyperlipidemia    Lung nodule    Obesity    History of sickle cell trait    Leg edema, left    Thrombocytopenia (HCC)    Chronic kidney disease (CKD), stage II (mild)    Chronic low back pain    SVT (supraventricular tachycardia) (HCC)    Chronic anticoagulation    ARLENE (obstructive sleep apnea)    Cardiomyopathy, nonischemic (HCC)    Dyslipidemia    BPH (benign prostatic hyperplasia)    Nephrolithiasis    Lumbar disc disease with radiculopathy - Left    Spinal stenosis of lumbar region without neurogenic claudication - Left    Chest pain    Acute kidney injury superimposed on CKD [facility])     Past Medical History:   Diagnosis Date    Hyperlipidemia     Post-nasal drip     Pulmonary embolism (HCC)      Social History     Socioeconomic History    Marital status: /Civil Union     Spouse name: Not on file    Number of children: 9    Years of education: Not on file    Highest education level: Not on file   Occupational History    Occupation: employed   Social Needs    Financial resource strain: Not on file    Food insecurity     Worry: Not on file     Inability: Not on file   [employer] needs     Medical: Not on file     Non-medical: Not on file   Tobacco Use    Smoking status: Former Smoker    Smokeless tobacco: Never Used   Substance and Sexual Activity    Alcohol use: Yes     Alcohol/week: 0 0 standard drinks     Comment: socially    Drug use: No    Sexual activity: Not on file   Lifestyle    Physical activity     Days per week: Not on file     Minutes per session: Not on file    Stress: Not on file   Relationships    Social connections     Talks on phone: Not on file     Gets together: Not on file     Attends Faith service: Not on file     Active member of club or organization: Not on file     Attends meetings of clubs or organizations: Not on file     Relationship status: Not on file    Intimate partner violence     Fear of current or ex partner: Not on file     Emotionally abused: Not on file     Physically abused: Not on file     Forced sexual activity: Not on file   Other Topics Concern    Not on file   Social History Narrative    Not on file      No family history on file    Past Surgical History:   Procedure Laterality Date    ARTHROSCOPY KNEE Left     30 years ago       Current Outpatient Medications:     apixaban (ELIQUIS) 5 mg, Take 1 tablet (5 mg total) by mouth 2 (two) times a day, Disp: 60 tablet, Rfl: 11   Ascorbic Acid (VITAMIN C) 100 MG tablet, Take 100 mg by mouth daily, Disp: , Rfl:     fluticasone (FLONASE) 50 mcg/act nasal spray, 1 spray into each nostril daily, Disp: 16 g, Rfl: 0    rosuvastatin (CRESTOR) 20 MG tablet, Take 20 mg by mouth daily, Disp: , Rfl:     sotalol AF (BETAPACE AF) 120 MG TABS, Take 1 tablet (120 mg total) by mouth every 12 (twelve) hours, Disp: 60 each, Rfl: 3    tamsulosin (FLOMAX) 0 4 mg, Take 0 4 mg by mouth daily with dinner, Disp: , Rfl:   No Known Allergies    Labs:  Admission on 04/23/2021, Discharged on 04/23/2021   Component Date Value    Troponin I 04/23/2021 0 02     WBC 04/23/2021 4 37     RBC 04/23/2021 3 63*    Hemoglobin 04/23/2021 11 2*    Hematocrit 04/23/2021 34 5*    MCV 04/23/2021 95     MCH 04/23/2021 30 9     MCHC 04/23/2021 32 5     RDW 04/23/2021 13 8     MPV 04/23/2021 11 7     Platelets 81/96/4551 130*    nRBC 04/23/2021 0     Neutrophils Relative 04/23/2021 62     Immat GRANS % 04/23/2021 1     Lymphocytes Relative 04/23/2021 22     Monocytes Relative 04/23/2021 13*    Eosinophils Relative 04/23/2021 2     Basophils Relative 04/23/2021 0     Neutrophils Absolute 04/23/2021 2 74     Immature Grans Absolute 04/23/2021 0 02     Lymphocytes Absolute 04/23/2021 0 97     Monocytes Absolute 04/23/2021 0 56     Eosinophils Absolute 04/23/2021 0 07     Basophils Absolute 04/23/2021 0 01     Sodium 04/23/2021 141     Potassium 04/23/2021 4 2     Chloride 04/23/2021 106     CO2 04/23/2021 27     ANION GAP 04/23/2021 8     BUN 04/23/2021 15     Creatinine 04/23/2021 1 38*    Glucose 04/23/2021 109     Calcium 04/23/2021 7 8*    eGFR 04/23/2021 57     Total Bilirubin 04/23/2021 0 34     Bilirubin, Direct 04/23/2021 0 13     Alkaline Phosphatase 04/23/2021 59     AST 04/23/2021 19     ALT 04/23/2021 22     Total Protein 04/23/2021 6 4     Albumin 04/23/2021 3 1*    TSH 3RD GENERATON 04/23/2021 1 717     Sodium 04/23/2021 141     Potassium 04/23/2021 4 3     Chloride 04/23/2021 107     CO2 04/23/2021 26     ANION GAP 04/23/2021 8     BUN 04/23/2021 14     Creatinine 04/23/2021 1 27     Glucose 04/23/2021 101     Glucose, Fasting 04/23/2021 101*    Calcium 04/23/2021 7 9*    eGFR 04/23/2021 63     Total Bilirubin 04/23/2021 0 41     Bilirubin, Direct 04/23/2021 0 07     Alkaline Phosphatase 04/23/2021 56     AST 04/23/2021 26     ALT 04/23/2021 25     Total Protein 04/23/2021 6 4     Albumin 04/23/2021 3 1*    Magnesium 04/23/2021 2 0     WBC 04/23/2021 4 40     RBC 04/23/2021 3 56*    Hemoglobin 04/23/2021 11 2*    Hematocrit 04/23/2021 33 6*    MCV 04/23/2021 94     MCH 04/23/2021 31 5     MCHC 04/23/2021 33 3     RDW 04/23/2021 13 9     Platelets 55/53/6789 141*    MPV 04/23/2021 12 2     Lipase 04/23/2021 58*    Ventricular Rate 04/23/2021 65     Atrial Rate 04/23/2021 65     AR Interval 04/23/2021 178     QRSD Interval 04/23/2021 96     QT Interval 04/23/2021 420     QTC Interval 04/23/2021 436     P Axis 04/23/2021 50     QRS Axis 04/23/2021 -18     T Wave Axis 04/23/2021 -30    Admission on 04/12/2021, Discharged on 04/15/2021   Component Date Value    Sodium 04/12/2021 143     Potassium 04/12/2021 3 7     Chloride 04/12/2021 114*    CO2 04/12/2021 25     ANION GAP 04/12/2021 4     BUN 04/12/2021 13     Creatinine 04/12/2021 1 23     Glucose 04/12/2021 111     Glucose, Fasting 04/12/2021 111*    Calcium 04/12/2021 8 9     eGFR 04/12/2021 66     WBC 04/12/2021 4 46     RBC 04/12/2021 4 21     Hemoglobin 04/12/2021 12 9     Hematocrit 04/12/2021 39 8     MCV 04/12/2021 95     MCH 04/12/2021 30 6     MCHC 04/12/2021 32 4     RDW 04/12/2021 14 4     MPV 04/12/2021 12 2     Platelets 31/79/8235 141*    nRBC 04/12/2021 0     Neutrophils Relative 04/12/2021 60     Immat GRANS % 04/12/2021 0     Lymphocytes Relative 04/12/2021 28     Monocytes Relative 04/12/2021 11     Eosinophils Relative 04/12/2021 1     Basophils Relative 04/12/2021 0     Neutrophils Absolute 04/12/2021 2 65     Immature Grans Absolute 04/12/2021 0 02     Lymphocytes Absolute 04/12/2021 1 25     Monocytes Absolute 04/12/2021 0 47     Eosinophils Absolute 04/12/2021 0 06     Basophils Absolute 04/12/2021 0 01     Protime 04/12/2021 13 4     INR 04/12/2021 1 02     Ventricular Rate 04/12/2021 81     Atrial Rate 04/12/2021 81     AR Interval 04/12/2021 164     QRSD Interval 04/12/2021 100     QT Interval 04/12/2021 392     QTC Interval 04/12/2021 455     P Axis 04/12/2021 54     QRS Axis 04/12/2021 -28     T Wave Axis 04/12/2021 26     Ventricular Rate 04/12/2021 80     Atrial Rate 04/12/2021 80     AR Interval 04/12/2021 170     QRSD Interval 04/12/2021 100     QT Interval 04/12/2021 372     QTC Interval 04/12/2021 429     P Axis 04/12/2021 51     QRS Axis 04/12/2021 -30     T Wave Axis 04/12/2021 13     Ventricular Rate 04/12/2021 127     Atrial Rate 04/12/2021 127     AR Interval 04/12/2021 120     QRSD Interval 04/12/2021 94     QT Interval 04/12/2021 372     QTC Interval 04/12/2021 540     QRS Axis 04/12/2021 -31     T Wave Axis 04/12/2021 61     Activated Clotting Time,* 04/12/2021 344*    Specimen Type 04/12/2021 VENOUS     Activated Clotting Time,* 04/12/2021 325*    Specimen Type 04/12/2021 VENOUS     Activated Clotting Time,* 04/12/2021 312*    Specimen Type 04/12/2021 VENOUS     Activated Clotting Time,* 04/12/2021 318*    Specimen Type 04/12/2021 VENOUS     Activated Clotting Time,* 04/12/2021 237*    Specimen Type 04/12/2021 VENOUS     Activated Clotting Time,* 04/12/2021 225*    Specimen Type 04/12/2021 VENOUS     Activated Clotting Time,* 04/12/2021 219*    Specimen Type 04/12/2021 VENOUS     Sodium 04/13/2021 142     Potassium 04/13/2021 3 3*    Chloride 04/13/2021 110*    CO2 04/13/2021 25     ANION GAP 04/13/2021 7     BUN 04/13/2021 9     Creatinine 04/13/2021 1 04     Glucose 04/13/2021 104     Glucose, Fasting 04/13/2021 104*    Calcium 04/13/2021 8 6     eGFR 04/13/2021 81     WBC 04/13/2021 6 03     RBC 04/13/2021 3 78*    Hemoglobin 04/13/2021 11 8*    Hematocrit 04/13/2021 35 8*    MCV 04/13/2021 95     MCH 04/13/2021 31 2     MCHC 04/13/2021 33 0     RDW 04/13/2021 14 6     Platelets 32/84/5781 124*    MPV 04/13/2021 12 1     Ventricular Rate 04/12/2021 101     Atrial Rate 04/12/2021 101     VA Interval 04/12/2021 172     QRSD Interval 04/12/2021 94     QT Interval 04/12/2021 340     QTC Interval 04/12/2021 440     P Axis 04/12/2021 57     QRS Axis 04/12/2021 -35     T Wave Axis 04/12/2021 26     Ventricular Rate 04/13/2021 65     Atrial Rate 04/13/2021 65     VA Interval 04/13/2021 168     QRSD Interval 04/13/2021 96     QT Interval 04/13/2021 416     QTC Interval 04/13/2021 432     P Axis 04/13/2021 51     QRS Axis 04/13/2021 -23     T Wave Axis 04/13/2021 -38     Sodium 04/14/2021 145     Potassium 04/14/2021 4 0     Chloride 04/14/2021 114*    CO2 04/14/2021 27     ANION GAP 04/14/2021 4     BUN 04/14/2021 12     Creatinine 04/14/2021 1 19     Glucose 04/14/2021 94     Calcium 04/14/2021 8 8     eGFR 04/14/2021 69     Ventricular Rate 04/13/2021 66     Atrial Rate 04/13/2021 66     VA Interval 04/13/2021 168     QRSD Interval 04/13/2021 98     QT Interval 04/13/2021 406     QTC Interval 04/13/2021 425     P Axis 04/13/2021 47     QRS Axis 04/13/2021 -31     T Wave Axis 04/13/2021 -25     Ventricular Rate 04/12/2021 72     Atrial Rate 04/12/2021 72     VA Interval 04/12/2021 164     QRSD Interval 04/12/2021 98     QT Interval 04/12/2021 382     QTC Interval 04/12/2021 418     P Axis 04/12/2021 47     QRS Axis 04/12/2021 -32     T Wave Axis 04/12/2021 -55     Ventricular Rate 04/14/2021 62     Atrial Rate 04/14/2021 62     VA Interval 04/14/2021 170     QRSD Interval 04/14/2021 116     QT Interval 04/14/2021 438     QTC Interval 04/14/2021 444     P Axis 04/14/2021 57     QRS Axis 04/14/2021 -20     T Wave Axis 04/14/2021 -21     Ventricular Rate 04/14/2021 61     Atrial Rate 04/14/2021 61     AR Interval 04/14/2021 168     QRSD Interval 04/14/2021 118     QT Interval 04/14/2021 440     QTC Interval 04/14/2021 442     P Axis 04/14/2021 59     QRS Axis 04/14/2021 -21     T Wave Axis 04/14/2021 -26     Ventricular Rate 04/14/2021 58     Atrial Rate 04/14/2021 58     AR Interval 04/14/2021 170     QRSD Interval 04/14/2021 106     QT Interval 04/14/2021 452     QTC Interval 04/14/2021 443     P Axis 04/14/2021 58     QRS Axis 04/14/2021 -30     T Wave Axis 04/14/2021 -31     Ventricular Rate 04/14/2021 59     Atrial Rate 04/14/2021 59     AR Interval 04/14/2021 172     QRSD Interval 04/14/2021 100     QT Interval 04/14/2021 456     QTC Interval 04/14/2021 451     P Axis 04/14/2021 61     QRS Axis 04/14/2021 -29     T Wave Axis 04/14/2021 -29     Ventricular Rate 04/15/2021 56     Atrial Rate 04/15/2021 56     AR Interval 04/15/2021 170     QRSD Interval 04/15/2021 96     QT Interval 04/15/2021 444     QTC Interval 04/15/2021 428     P Axis 04/15/2021 59     QRS Axis 04/15/2021 -34     T Wave Axis 04/15/2021 -30    Appointment on 03/31/2021   Component Date Value    Cholesterol 03/31/2021 201*    Triglycerides 03/31/2021 143     HDL, Direct 03/31/2021 46     LDL Calculated 03/31/2021 126*    Non-HDL-Chol (CHOL-HDL) 03/31/2021 155      Imaging: Xr Chest 2 Views    Result Date: 4/23/2021  Narrative: CHEST INDICATION:   dyspnea, CP  COMPARISON:  Chest radiograph from 9/8/2020 and chest CT from 4/23/2021  EXAM PERFORMED/VIEWS:  XR CHEST PA & LATERAL  DUAL ENERGY SUBTRACTION  FINDINGS: Cardiomediastinal silhouette normal  Lungs clear  No effusion or pneumothorax  Mild degenerative disease in the spine  Impression: No acute cardiopulmonary disease  Workstation performed: [id number]     Mri Cervical Spine Wo Contrast    Result Date: 4/9/2021  Narrative: MRI CERVICAL SPINE WITHOUT CONTRAST INDICATION: M54 10: Radiculopathy, site unspecified  COMPARISON:  None  TECHNIQUE:  Sagittal T1, sagittal T2, sagittal inversion recovery, axial T2, axial  2D merge IMAGE QUALITY:  Diagnostic FINDINGS: ALIGNMENT:  Normal alignment of the cervical spine  No compression fracture  No subluxation  No scoliosis  MARROW SIGNAL:  Mild endplate marrow degenerative change  No focal marrow edema  CERVICAL AND VISUALIZED THORACIC CORD:  Normal signal within the visualized cord  PREVERTEBRAL AND PARASPINAL SOFT TISSUES:  Normal  VISUALIZED POSTERIOR FOSSA:  The visualized posterior fossa demonstrates no abnormal signal  CERVICAL DISC SPACES: C2-C3:  Normal  C3-C4:  Normal disc height and signal   There is left facet hypertrophic degenerative change with minor uncinate joint hypertrophic change  No canal stenosis or right foraminal narrowing  There is mild to moderate left foraminal narrowing  C4-C5:  Disc desiccation and loss of disc height  Mild annular bulging and bilateral uncinate joint hypertrophic degenerative change  There is mild canal stenosis with partial effacement of the ventral CSF space  Moderate bilateral foraminal narrowing  C5-C6:  Slight loss of disc height  Mild annular bulging and uncinate joint hypertrophic degenerative change  Mild canal stenosis and bilateral foraminal narrowing  C6-C7:  Loss of disc height with mild annular bulging  No focal disc herniation  Minimal foraminal narrowing  C7-T1:  Slight anterior subluxation of C7 upon T1 on the basis of facet degenerative change  No canal stenosis  No foraminal nerve impingement  UPPER THORACIC DISC SPACES:  Minor upper thoracic degenerative change without canal stenosis or discrete nerve impingement       Impression: Cervical degenerative disc disease with annular bulging, uncinate joint and facet joint hypertrophic change  Mild canal stenosis without cord compression  Multilevel mild to moderate foraminal narrowing C3-4 through C6-7  Workstation performed: [id number]     Ct Chest Abdomen Pelvis W Contrast    Result Date: 4/23/2021  Narrative: CT CHEST, ABDOMEN AND PELVIS WITH IV CONTRAST INDICATION:   L rib/flank pain  COMPARISON:  CT chest abdomen pelvis dated 2/3/2020  TECHNIQUE: CT examination of the chest, abdomen and pelvis was performed  Axial, sagittal, and coronal 2D reformatted images were created from the source data and submitted for interpretation  Radiation dose length product (DLP) for this visit:  1292 mGy-cm   This examination, like all CT scans performed in the [facility], was performed utilizing techniques to minimize radiation dose exposure, including the use of iterative reconstruction and automated exposure control  IV Contrast:  100 mL of iohexol (OMNIPAQUE) Enteric Contrast: Enteric contrast was administered  FINDINGS: CHEST LUNGS: Basilar dependent parenchymal changes  Lungs are otherwise clear  There is no tracheal or endobronchial lesion  PLEURA:  Unremarkable  HEART/GREAT VESSELS:  Unremarkable for patient's age  MEDIASTINUM AND SE:  Unremarkable  CHEST WALL AND LOWER NECK:   Unremarkable  ABDOMEN LIVER/BILIARY TREE:  Unremarkable  GALLBLADDER:  No calcified gallstones  No pericholecystic inflammatory change  SPLEEN:  Unremarkable  PANCREAS:  Unremarkable  ADRENAL GLANDS:  Unremarkable  KIDNEYS/URETERS:  One or more simple renal cyst(s) is noted  3 mm nonobstructing stone the left kidney unchanged  Otherwise unremarkable kidneys  No hydronephrosis  STOMACH AND BOWEL:  There is colonic diverticulosis without evidence of acute diverticulitis  APPENDIX:  A normal appendix was visualized  ABDOMINOPELVIC CAVITY:  No ascites  No pneumoperitoneum  No lymphadenopathy  VESSELS:  Unremarkable for patient's age   PELVIS REPRODUCTIVE ORGANS:  Unremarkable for patient's age  URINARY BLADDER:  Unchanged calcification noted anteriorly in the bladder wall is  ABDOMINAL WALL/INGUINAL REGIONS:  Unremarkable  OSSEOUS STRUCTURES:  No acute fracture or destructive osseous lesion  Impression: 3 mm nonobstructing stone the left kidney again seen unchanged  Unchanged calcification within the anterior wall the bladder  Otherwise unremarkable CT of the chest, abdomen and pelvis with IV without oral contrast  Workstation performed: [id number]       Review of Systems:  Review of Systems     REVIEW OF SYSTEMS:  Constitutional:  Denies fever or chills   Eyes:  Denies change in visual acuity   HENT:  Denies nasal congestion or sore throat   Respiratory:  Denies cough or shortness of breath   Cardiovascular:  Denies chest pain or edema   GI:  Denies abdominal pain, nausea, vomiting, bloody stools or diarrhea   :    For prostate surgery next month  Musculoskeletal:  Denies back pain or joint pain   Neurologic:  Denies headache, focal weakness or sensory changes   Endocrine:  Denies polyuria or polydipsia   Lymphatic:  Denies swollen glands   Psychiatric:  Denies depression or anxiety     Physical Exam:    /64   Pulse (!) 52   Ht 6' 3" (1 905 m)   Wt 120 kg (264 lb)   SpO2 97%   BMI 33 00 kg/m²     Physical Exam    PHYSICAL EXAM:  General:  Patient is not in acute distress   Head: Normocephalic, Atraumatic  HEENT:  Both pupils normal-size atraumatic, normocephalic, nonicteric  Neck:  JVP not raised  Trachea central  No carotid bruit  Respiratory:  normal breath sounds no crackles  no rhonchi  Cardiovascular:  Regular rate and rhythm no S3 no murmurs  GI:  Abdomen soft nontender  No organomegaly  Lymphatic:  No cervical or inguinal lymphadenopathy  Neurologic:  Patient is awake alert, oriented   Grossly nonfocal   extremities no edema    Discussion/Summary:   Patient with multiple medical problems who seems to be doing reasonably well from cardiac standpoint   Previous studies reviewed with patient  Medications reviewed and possible side effects discussed  concepts of cardiovascular disease , signs and symptoms of heart disease  Dietary and risk factor modification reinforced  All questions answered  Safety measures reviewed  Patient advised to report any problems prompting medical attention  events of recent hospitalization and ablation for SVT and atrial tachycardia discussed with patient and family  Patient does have history of recurrent pulmonary embolism on anticoagulation  Recent echocardiogram showed normal ejection fraction with no significant valvular abnormalities  Patient has no specific contraindication from cardiac standpoint to proceed with the planned prostate surgery  Patient presents moderate risk based on age and comorbidities  Patient can stop Eliquis at least 2 days prior to surgery and restart when okay from surgical standpoint  Plan of care discussed with patient and family  Follow-up in a few months or earlier as needed  Follow-up with primary care physician

## 2021-05-06 NOTE — TELEPHONE ENCOUNTER
Patient was in the office today for a follow-up appointment  [facility] Bus cardiovascular form completed     Patient notified aware at  Kevin Graff office  Will scan copy into chart

## 2021-05-12 ENCOUNTER — PROCEDURE VISIT (OUTPATIENT)
Dept: NEUROLOGY | Facility: CLINIC | Age: 76
End: 2021-05-12
Payer: COMMERCIAL

## 2021-05-12 DIAGNOSIS — G58.9 PINCHED NERVE: ICD-10-CM

## 2021-05-12 PROCEDURE — 95886 MUSC TEST DONE W/N TEST COMP: CPT | Performed by: PHYSICAL MEDICINE & REHABILITATION

## 2021-05-12 PROCEDURE — 95911 NRV CNDJ TEST 9-10 STUDIES: CPT | Performed by: PHYSICAL MEDICINE & REHABILITATION

## 2021-05-12 NOTE — PROGRESS NOTES
EMG 2 limb lower extremity     Date/Time 5/12/2021 11:30 AM     Performed by  Pb Pendleton MD     Authorized by Sharon Beck MD      Universal Protocol   Consent: Verbal consent obtained  Risks and benefits: risks, benefits and alternatives were discussed  Consent given by: patient  Patient understanding: patient states understanding of the procedure being performed  Patient consent: the patient's understanding of the procedure matches consent given               EMG BILATERAL LOWER EXTREMITY  72-year-old male presents with complaints of  low back pain associated with numbness in both feet for the last several months  Patient is being evaluated for a lumbosacral radiculopathy  On physical examination, motor strength is 5/5 throughout  Sensations are diminished to light touch and pinprick in the bilateral stocking distribution  Motor and sensory conduction studies were performed on the bilateral peroneal, tibial and sural nerves  The  right peroneal motor terminal latency was normal with a low compound motor action potential amplitude of 0 7 mV and  a slow conduction velocity of 41 m/sec across the ankle but a normal conduction velocity across the fibular head  The left peroneal motor terminal latency was normal with a  normal compound motor action potential amplitude and slow conduction velocity of 42 m/sec across the ankle but a normal conduction velocity across the fibular head  The left tibial motor terminal latency was prolonged at 5 9 milliseconds with a low compound motor action potential amplitude of 1 9 mV and a slow conduction velocity of 32 m/sec across the ankle  Bilateral peroneal and tibial F waves were absent  Bilateral sural and superficial peroneal sensory action potentials were absent  The right H soleus response was normal   The left H soleus response was prolonged at 39 2 milliseconds      Concentric needle EMG was performed on various distal and proximal muscles of the lower extremities bilaterally including EDB,AH, tibialis anterior, gastrocnemius medius, vastus lateralis, gluteus medius and the low lumbar paraspinal regions  There was no evidence of spontaneous activity seen  Moderate decreased recruitment of giant motor units was noted in the bilateral gluteus medius and vastus lateralis  Mild decreased recruitment of giant and polyphasic motor units was noted in the bilateral tibialis anterior,AH and EDB  The compound motor unit potentials were of normal configuration and interference patterns were full or full for effort  in the remaining muscles tested  IMPRESSION: There is  electrophysiologic evidence of a:    1  Bilateral chronic L4-5 radiculopathy as evidenced by the decreased recruitment and chronic denervation changes in the above-mentioned muscles  2   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

## 2021-06-02 ENCOUNTER — CONSULT (OUTPATIENT)
Dept: PAIN MEDICINE | Facility: CLINIC | Age: 76
End: 2021-06-02
Payer: COMMERCIAL

## 2021-06-02 VITALS
HEIGHT: 75 IN | WEIGHT: 264 LBS | DIASTOLIC BLOOD PRESSURE: 68 MMHG | BODY MASS INDEX: 32.83 KG/M2 | HEART RATE: 56 BPM | SYSTOLIC BLOOD PRESSURE: 108 MMHG

## 2021-06-02 DIAGNOSIS — G62.9 SENSORIMOTOR NEUROPATHY: ICD-10-CM

## 2021-06-02 DIAGNOSIS — M47.816 LUMBAR FACET ARTHROPATHY: ICD-10-CM

## 2021-06-02 DIAGNOSIS — M54.16 LUMBAR RADICULOPATHY: Primary | ICD-10-CM

## 2021-06-02 PROCEDURE — 99214 OFFICE O/P EST MOD 30 MIN: CPT | Performed by: STUDENT IN AN ORGANIZED HEALTH CARE EDUCATION/TRAINING PROGRAM

## 2021-06-02 NOTE — PROGRESS NOTES
Pain Medicine Follow-Up Note    Assessment:  1  Lumbar radiculopathy    2  Sensorimotor neuropathy    3  Lumbar facet arthropathy        Plan:  Orders Placed This Encounter   Procedures    FL spine and pain procedure     Standing Status:   Future     Standing Expiration Date:   6/2/2025     Order Specific Question:   Reason for Exam:     Answer:   Left parasaggital L4-5     Order Specific Question:   Anticoagulant hold needed? Answer:   Eliquis       No orders of the defined types were placed in this encounter  My impressions and treatment recommendations were discussed in detail with the patient who verbalized understanding and had no further questions  This is a 66-year-old male who presents to our office with chief complaint of bilateral low back pain and predominantly left lower extremity radiculopathy  He has EMG evidence of chronic L4-5 lumbar radiculopathy and peripheral sensorimotor neuropathy of the bilateral lower extremities  On 2017 MRI he had moderate canal stenosis at the L4-5 level  He is neurologically intact on exam today with some hypo reflexia of the right patellar region  Strength is 5/5 bilaterally  Will hold off on repeat MRI for now  Discussed that we can attempt L4-5 lumbar epidural steroid injection to help with his radiculopathy symptoms  Patient is agreeable to the procedure  Discussed possible need to repeat procedure in future or from different approach  Patient also with axial back pain in setting of multilevel facet disease  Would also be candidate for MBB/RFA in the future  [place] Prescription Drug Monitoring Program report was reviewed and was appropriate     Complete risks and benefits including bleeding, infection, tissue reaction, nerve injury and allergic reaction were discussed  The approach was demonstrated using models and literature was provided  Verbal and written consent was obtained  Discharge instructions were provided   I personally saw and examined the patient and I agree with the above discussed plan of care  History of Present Illness:    Kael Frost  is a 76 y o  male who presents to [facility] for interval re-evaluation of the above stated pain complaints  The patient has a past medical and chronic pain history as outlined in the assessment section  He was last seen on 06/19/20 FOR spinal stenosis and left lower extremity radiculopathy  Was referred to PT and was considered possible candidate for left L4 and L5 TFESI    Since that time has followed with Neurology and has had EMG of the bilateral lower extremities notable for bilateral chronic L4-5 radiculopathy and coexsiting underlying mixed sensory motor peripheral neuropathy  Pain starts across the low back and radiates down predominately the outer left leg, past the knee into posterolateral leg into the foot  Pain is intermittent in naturre and associated with throbbing and numbness  Other than as stated above, the patient denies any interval changes in medications, medical condition, mental condition, symptoms, or allergies since the last office visit  Review of Systems:    Review of Systems   Respiratory: Negative for shortness of breath  Cardiovascular: Negative for chest pain  Gastrointestinal: Positive for constipation  Negative for diarrhea, nausea and vomiting  Musculoskeletal: Positive for joint swelling  Negative for arthralgias, gait problem and myalgias  Joint Stiffness   Skin: Negative for rash  Neurological: Negative for dizziness, seizures and weakness  All other systems reviewed and are negative          Patient Active Problem List   Diagnosis    Bilateral pulmonary embolism (HCC)    SOB (shortness of breath)    Hyperlipidemia    Lung nodule    Obesity    History of sickle cell trait    Leg edema, left    Thrombocytopenia (HCC)    Chronic kidney disease (CKD), stage II (mild)    Chronic low back pain    SVT (supraventricular tachycardia) (HCC)    Chronic anticoagulation    ARLENE (obstructive sleep apnea)    Cardiomyopathy, nonischemic (HCC)    Dyslipidemia    BPH (benign prostatic hyperplasia)    Nephrolithiasis    Lumbar disc disease with radiculopathy - Left    Spinal stenosis of lumbar region without neurogenic claudication - Left    Chest pain    Acute kidney injury superimposed on CKD [facility])       Past Medical History:   Diagnosis Date    Hyperlipidemia     Post-nasal drip     Pulmonary embolism (HCC)        Past Surgical History:   Procedure Laterality Date    ARTHROSCOPY KNEE Left     30 years ago       History reviewed  No pertinent family history  Social History     Occupational History    Occupation: employed   Tobacco Use    Smoking status: Former Smoker    Smokeless tobacco: Never Used   Substance and Sexual Activity    Alcohol use: Yes     Alcohol/week: 0 0 standard drinks     Comment: socially    Drug use: No    Sexual activity: Not on file         Current Outpatient Medications:     apixaban (ELIQUIS) 5 mg, Take 1 tablet (5 mg total) by mouth 2 (two) times a day, Disp: 60 tablet, Rfl: 11    Ascorbic Acid (VITAMIN C) 100 MG tablet, Take 100 mg by mouth daily, Disp: , Rfl:     fluticasone (FLONASE) 50 mcg/act nasal spray, 1 spray into each nostril daily, Disp: 16 g, Rfl: 0    rosuvastatin (CRESTOR) 20 MG tablet, Take 20 mg by mouth daily, Disp: , Rfl:     sotalol AF (BETAPACE AF) 120 MG TABS, Take 1 tablet (120 mg total) by mouth every 12 (twelve) hours, Disp: 60 each, Rfl: 3    tamsulosin (FLOMAX) 0 4 mg, Take 0 4 mg by mouth daily with dinner, Disp: , Rfl:     No Known Allergies    Physical Exam:    /68   Pulse 56   Ht 6' 3" (1 905 m)   Wt 120 kg (264 lb)   BMI 33 00 kg/m²     Constitutional:normal, well developed, well nourished, alert, in no distress and non-toxic and no overt pain behavior    Eyes:anicteric  HEENT:grossly intact  Neck:supple, symmetric, trachea midline and no masses   Pulmonary:even and unlabored  Cardiovascular:No edema or pitting edema present  Skin:Normal without rashes or lesions and well hydrated  Psychiatric:Mood and affect appropriate  Neurologic:Cranial Nerves II-XII grossly intact  Musculoskeletal:normal     Lumbar Spine Exam    Appearance:  Normal lordosis  Palpation/Tenderness:  left lumbar paraspinal tenderness  right lumbar paraspinal tenderness  Sensory:  no sensory deficits noted  Motor Strength:  Left hip flexion:  5/5  Left hip extension:  5/5  Right hip flexion:  5/5  Right hip extension:  5/5  Left knee flexion:  5/5  Left knee extension:  5/5  Right knee flexion:  5/5  Right knee extension:  5/5  Left foot dorsiflexion:  5/5  Left foot plantar flexion:  5/5  Right foot dorsiflexion:  5/5  Right foot plantar flexion:  5/5  Reflexes:  Left Patellar:  2+   Right Patellar:  1+   Left Achilles:  1+   Right Achilles:  1+     Imaging  FL spine and pain procedure    (Results Pending)     MRI LUMBAR SPINE WITHOUT CONTRAST     INDICATION:  Low back pain with sciatica     COMPARISON:  Previous CT from July 6, 2017 and the previous MRI from December 24, 2014     TECHNIQUE:  Sagittal T1, sagittal T2, sagittal inversion recovery, axial T1 and axial T2, coronal T2        IMAGE QUALITY:  Diagnostic     FINDINGS:     ALIGNMENT:  Normal alignment of the lumbar spine  No compression fracture  No spondylolysis or spondylolisthesis  No scoliosis      MARROW SIGNAL:  Normal marrow signal is identified within the visualized bony structures  A vertebral hemangioma is seen in the L2 vertebra and L1 vertebra DISTAL CORD AND CONUS:  Normal size and signal within the distal cord and conus    The conus ends   at the L1 level      PARASPINAL SOFT TISSUES:  There is a T2 hyperintensity noted in the right kidney which measures about 1 6 cm and the smaller T2 hyperintensity with about 1 cm suggest cyst   Additional T2 hyperintensity seen in the upper pole of the left kidney suggest cyst     SACRUM:  Normal signal within the sacrum  No evidence of insufficiency or stress fracture      LOWER THORACIC DISC SPACES:  Normal disc height and signal   No disc herniation, canal stenosis or foraminal narrowing      LUMBAR DISC SPACES:          L1-L2:  Demonstrates no significant central canal narrowing of foraminal narrowing     L2-L3:  Demonstrates mild disc bulge with no significant central canal narrowing of foraminal narrowing     L3-L4:  Demonstrates facet proliferative changes with no significant central canal narrowing  There is mild disc bulge with minimal left paracentral protrusion which is better seen on the sagittal image 6 of series 3 and does not result in any nerve   root impingement  There is minimal right foraminal narrowing  There is mild left foraminal narrowing     L4-L5: Marked the fissure proliferative changes are seen at L4-5 level with ligamentous hypertrophy with diffuse disc bulge with moderate  central canal narrowing  There is mild right and mild left foraminal narrowing  The facet proliferative changes and ligamentous hypertrophy indents the thecal sac from its lateral aspect      L5-S1: The disc space between L5-S1 is already obliterated and there is osseous fusion across the disc space     There is no significant central canal narrowing or foraminal narrowing     IMPRESSION:     Facet proliferative changes and ligamentous hypertrophy at L4-5 level with the moderate central canal narrowing, stable since the previous study of foot December 24, 2014     Fissure proliferative changes at L3-4 level with minimal left paracentral protrusion with minimal left foraminal narrowing and minimal right foraminal narrowing, stable    Orders Placed This Encounter   Procedures    FL spine and pain procedure

## 2021-06-08 ENCOUNTER — HOSPITAL ENCOUNTER (OUTPATIENT)
Dept: NON INVASIVE DIAGNOSTICS | Facility: HOSPITAL | Age: 76
Discharge: HOME | End: 2021-06-08
Payer: COMMERCIAL

## 2021-06-08 DIAGNOSIS — I47.1 SVT (SUPRAVENTRICULAR TACHYCARDIA) (HCC): ICD-10-CM

## 2021-06-08 PROCEDURE — 93321 DOPPLER ECHO F-UP/LMTD STD: CPT | Performed by: INTERNAL MEDICINE

## 2021-06-08 PROCEDURE — 93308 TTE F-UP OR LMTD: CPT

## 2021-06-08 PROCEDURE — 93308 TTE F-UP OR LMTD: CPT | Performed by: INTERNAL MEDICINE

## 2021-06-08 PROCEDURE — 93325 DOPPLER ECHO COLOR FLOW MAPG: CPT | Performed by: INTERNAL MEDICINE

## 2021-06-08 RX ORDER — KETOCONAZOLE 20 MG/G
CREAM TOPICAL
COMMUNITY
Start: 2021-06-03 | End: 2021-12-10 | Stop reason: ALTCHOICE

## 2021-06-08 RX ORDER — TRIPROLIDINE/PSEUDOEPHEDRINE 2.5MG-60MG
TABLET ORAL
COMMUNITY
Start: 2021-06-03

## 2021-06-09 ENCOUNTER — OFFICE VISIT (OUTPATIENT)
Dept: UROLOGY | Facility: CLINIC | Age: 76
End: 2021-06-09
Payer: COMMERCIAL

## 2021-06-09 VITALS
SYSTOLIC BLOOD PRESSURE: 122 MMHG | HEIGHT: 75 IN | WEIGHT: 267 LBS | BODY MASS INDEX: 33.2 KG/M2 | DIASTOLIC BLOOD PRESSURE: 80 MMHG

## 2021-06-09 DIAGNOSIS — R35.0 BENIGN PROSTATIC HYPERPLASIA WITH URINARY FREQUENCY: Primary | ICD-10-CM

## 2021-06-09 DIAGNOSIS — N40.1 BENIGN PROSTATIC HYPERPLASIA WITH URINARY FREQUENCY: Primary | ICD-10-CM

## 2021-06-09 PROCEDURE — 87086 URINE CULTURE/COLONY COUNT: CPT | Performed by: UROLOGY

## 2021-06-09 PROCEDURE — 99214 OFFICE O/P EST MOD 30 MIN: CPT | Performed by: UROLOGY

## 2021-06-09 NOTE — H&P (VIEW-ONLY)
Referring Physician: Yehuda Michel MD  A copy of this note was sent to the referring physician  Diagnoses and all orders for this visit:    Benign prostatic hyperplasia with urinary frequency  -     Cancel: Urine culture  -     Urine culture    Other orders  -     ketoconazole (NIZORAL) 2 % cream  -     Durezol 0 05 % EMUL            Assessment and plan: 1  Medically refractory lower urinary tract symptoms      We reviewed the options for treating BPH/LUTS which include but are not limited to expectant management, medical therapy, transurethral resection of prostate (TURP)  We also discussed minimally invasive options  At this point, the patient wishes to proceed with Urolift  The risks of Urolift include but are not limited to bleeding, infection, reaction to anesthesia such as heart attack, stroke, DVT/PE, hyponatremia, bladder neck contracture, urethral stricture, injury to surrounding structures (ureters, rectum, etc)  We discussed that additional risks of trans urethral resection procedures such as incontinence, retrograde ejaculation, and erectile dysfunction have been only rarely reported with the Uro lift procedure  We did discuss that this is a new were procedure and a permanent implant  We discussed that there may be some long-term implications that her on for seen with this newer technology, such as perhaps complicating treatment for prostate cancer if indicated down the line  Finally, I told him that he may require additional procedures secondary to some of these complications  Informed consent was obtained for the Uro lift procedure  This will be scheduled in the near future to be performed under IV sedation  Eva Valerio MD      Chief Complaint      preop      History of Present Illness     Michelle Kirkland  is a 76 y o  Male returns in follow-up    He has previously evaluated by my colleague Dr Daya White for lower urinary tract symptoms which have become refractory To tamsulosin  He was previously tried on finasteride as well  Prior cystoscopy demonstrated bilobar hyperplasia and a normal bladder neck, no median lobe  Transrectal ultrasound was performed as well  Patient has been counseled on surgical options and has elected to schedule a Uro lift which is currently scheduled with me in a few weeks  He presents accompanied by his gerard wife  Together they have a number of well informed questions regarding the procedure, durability, postoperative and perioperative course which I answered to the best of my abilities  Detailed Urologic History     - please refer to HPI    Review of Systems     Review of Systems   Constitutional: Negative for activity change and fatigue  HENT: Negative for congestion  Eyes: Negative for visual disturbance  Respiratory: Negative for shortness of breath and wheezing  Cardiovascular: Negative for chest pain and leg swelling  Gastrointestinal: Negative for abdominal pain  Endocrine: Negative for polyuria  Genitourinary: Negative for dysuria, flank pain, hematuria and urgency  Musculoskeletal: Negative for back pain  Allergic/Immunologic: Negative for immunocompromised state  Neurological: Negative for dizziness and numbness  Psychiatric/Behavioral: Negative for dysphoric mood  All other systems reviewed and are negative  Allergies     No Known Allergies    Physical Exam     Physical Exam  Constitutional:       General: He is not in acute distress  Appearance: He is well-developed  HENT:      Head: Normocephalic and atraumatic  Neck:      Musculoskeletal: Normal range of motion  Cardiovascular:      Comments: Negative lower extremity  Pulmonary:      Effort: Pulmonary effort is normal       Breath sounds: Normal breath sounds  Abdominal:      Palpations: Abdomen is soft  Musculoskeletal: Normal range of motion  Skin:     General: Skin is warm     Neurological:      Mental Status: He is alert and oriented to person, place, and time     Psychiatric:         Behavior: Behavior normal              Vital Signs  Vitals:    06/09/21 1617   BP: 122/80   Weight: 121 kg (267 lb)   Height: 6' 3" (1 905 m)         Current Medications       Current Outpatient Medications:     apixaban (ELIQUIS) 5 mg, Take 1 tablet (5 mg total) by mouth 2 (two) times a day, Disp: 60 tablet, Rfl: 11    Durezol 0 05 % EMUL, , Disp: , Rfl:     fluticasone (FLONASE) 50 mcg/act nasal spray, 1 spray into each nostril daily, Disp: 16 g, Rfl: 0    ketoconazole (NIZORAL) 2 % cream, , Disp: , Rfl:     rosuvastatin (CRESTOR) 20 MG tablet, Take 20 mg by mouth daily, Disp: , Rfl:     sotalol AF (BETAPACE AF) 120 MG TABS, Take 1 tablet (120 mg total) by mouth every 12 (twelve) hours, Disp: 60 each, Rfl: 3    tamsulosin (FLOMAX) 0 4 mg, Take 0 4 mg by mouth daily with dinner, Disp: , Rfl:     Ascorbic Acid (VITAMIN C) 100 MG tablet, Take 100 mg by mouth daily, Disp: , Rfl:       Active Problems     Patient Active Problem List   Diagnosis    Bilateral pulmonary embolism (HCC)    SOB (shortness of breath)    Hyperlipidemia    Lung nodule    Obesity    History of sickle cell trait    Leg edema, left    Thrombocytopenia (HCC)    Chronic kidney disease (CKD), stage II (mild)    Chronic low back pain    SVT (supraventricular tachycardia) (HCC)    Chronic anticoagulation    ARLENE (obstructive sleep apnea)    Cardiomyopathy, nonischemic (HCC)    Dyslipidemia    BPH (benign prostatic hyperplasia)    Nephrolithiasis    Lumbar disc disease with radiculopathy - Left    Spinal stenosis of lumbar region without neurogenic claudication - Left    Chest pain    Acute kidney injury superimposed on CKD [facility])         Past Medical History     Past Medical History:   Diagnosis Date    Hyperlipidemia     Post-nasal drip     Pulmonary embolism (HCC)          Surgical History     Past Surgical History:   Procedure Laterality Date    ARTHROSCOPY KNEE Left     30 years ago         Family History     No family history on file  Social History     Social History     Social History     Tobacco Use   Smoking Status Former Smoker   Smokeless Tobacco Never Used         Pertinent Lab Values     Lab Results   Component Value Date    CREATININE 1 27 04/23/2021       Lab Results   Component Value Date    PSA 0 8 08/21/2017       @RESULTRCNT(1H])@      Pertinent Imaging      - n/a    Portions of the record may have been created with voice recognition software   Occasional wrong word or "sound a like" substitutions may have occurred due to the inherent limitations of voice recognition software   Read the chart carefully and recognize, using context, where substitutions have occurred

## 2021-06-09 NOTE — PROGRESS NOTES
Referring Physician: Rocio Bustos MD  A copy of this note was sent to the referring physician  Diagnoses and all orders for this visit:    Benign prostatic hyperplasia with urinary frequency  -     Cancel: Urine culture  -     Urine culture    Other orders  -     ketoconazole (NIZORAL) 2 % cream  -     Durezol 0 05 % EMUL            Assessment and plan: 1  Medically refractory lower urinary tract symptoms      We reviewed the options for treating BPH/LUTS which include but are not limited to expectant management, medical therapy, transurethral resection of prostate (TURP)  We also discussed minimally invasive options  At this point, the patient wishes to proceed with Urolift  The risks of Urolift include but are not limited to bleeding, infection, reaction to anesthesia such as heart attack, stroke, DVT/PE, hyponatremia, bladder neck contracture, urethral stricture, injury to surrounding structures (ureters, rectum, etc)  We discussed that additional risks of trans urethral resection procedures such as incontinence, retrograde ejaculation, and erectile dysfunction have been only rarely reported with the Uro lift procedure  We did discuss that this is a new were procedure and a permanent implant  We discussed that there may be some long-term implications that her on for seen with this newer technology, such as perhaps complicating treatment for prostate cancer if indicated down the line  Finally, I told him that he may require additional procedures secondary to some of these complications  Informed consent was obtained for the Uro lift procedure  This will be scheduled in the near future to be performed under IV sedation  Lis Casiano MD      Chief Complaint      preop      History of Present Illness     Doretha Soares  is a 76 y o  Male returns in follow-up    He has previously evaluated by my colleague Dr Dione Portillo for lower urinary tract symptoms which have become refractory To tamsulosin  He was previously tried on finasteride as well  Prior cystoscopy demonstrated bilobar hyperplasia and a normal bladder neck, no median lobe  Transrectal ultrasound was performed as well  Patient has been counseled on surgical options and has elected to schedule a Uro lift which is currently scheduled with me in a few weeks  He presents accompanied by his gerard wife  Together they have a number of well informed questions regarding the procedure, durability, postoperative and perioperative course which I answered to the best of my abilities  Detailed Urologic History     - please refer to HPI    Review of Systems     Review of Systems   Constitutional: Negative for activity change and fatigue  HENT: Negative for congestion  Eyes: Negative for visual disturbance  Respiratory: Negative for shortness of breath and wheezing  Cardiovascular: Negative for chest pain and leg swelling  Gastrointestinal: Negative for abdominal pain  Endocrine: Negative for polyuria  Genitourinary: Negative for dysuria, flank pain, hematuria and urgency  Musculoskeletal: Negative for back pain  Allergic/Immunologic: Negative for immunocompromised state  Neurological: Negative for dizziness and numbness  Psychiatric/Behavioral: Negative for dysphoric mood  All other systems reviewed and are negative  Allergies     No Known Allergies    Physical Exam     Physical Exam  Constitutional:       General: He is not in acute distress  Appearance: He is well-developed  HENT:      Head: Normocephalic and atraumatic  Neck:      Musculoskeletal: Normal range of motion  Cardiovascular:      Comments: Negative lower extremity  Pulmonary:      Effort: Pulmonary effort is normal       Breath sounds: Normal breath sounds  Abdominal:      Palpations: Abdomen is soft  Musculoskeletal: Normal range of motion  Skin:     General: Skin is warm     Neurological:      Mental Status: He is alert and oriented to person, place, and time     Psychiatric:         Behavior: Behavior normal              Vital Signs  Vitals:    06/09/21 1617   BP: 122/80   Weight: 121 kg (267 lb)   Height: 6' 3" (1 905 m)         Current Medications       Current Outpatient Medications:     apixaban (ELIQUIS) 5 mg, Take 1 tablet (5 mg total) by mouth 2 (two) times a day, Disp: 60 tablet, Rfl: 11    Durezol 0 05 % EMUL, , Disp: , Rfl:     fluticasone (FLONASE) 50 mcg/act nasal spray, 1 spray into each nostril daily, Disp: 16 g, Rfl: 0    ketoconazole (NIZORAL) 2 % cream, , Disp: , Rfl:     rosuvastatin (CRESTOR) 20 MG tablet, Take 20 mg by mouth daily, Disp: , Rfl:     sotalol AF (BETAPACE AF) 120 MG TABS, Take 1 tablet (120 mg total) by mouth every 12 (twelve) hours, Disp: 60 each, Rfl: 3    tamsulosin (FLOMAX) 0 4 mg, Take 0 4 mg by mouth daily with dinner, Disp: , Rfl:     Ascorbic Acid (VITAMIN C) 100 MG tablet, Take 100 mg by mouth daily, Disp: , Rfl:       Active Problems     Patient Active Problem List   Diagnosis    Bilateral pulmonary embolism (HCC)    SOB (shortness of breath)    Hyperlipidemia    Lung nodule    Obesity    History of sickle cell trait    Leg edema, left    Thrombocytopenia (HCC)    Chronic kidney disease (CKD), stage II (mild)    Chronic low back pain    SVT (supraventricular tachycardia) (HCC)    Chronic anticoagulation    ARLENE (obstructive sleep apnea)    Cardiomyopathy, nonischemic (HCC)    Dyslipidemia    BPH (benign prostatic hyperplasia)    Nephrolithiasis    Lumbar disc disease with radiculopathy - Left    Spinal stenosis of lumbar region without neurogenic claudication - Left    Chest pain    Acute kidney injury superimposed on CKD [facility])         Past Medical History     Past Medical History:   Diagnosis Date    Hyperlipidemia     Post-nasal drip     Pulmonary embolism (HCC)          Surgical History     Past Surgical History:   Procedure Laterality Date    ARTHROSCOPY KNEE Left     30 years ago         Family History     No family history on file  Social History     Social History     Social History     Tobacco Use   Smoking Status Former Smoker   Smokeless Tobacco Never Used         Pertinent Lab Values     Lab Results   Component Value Date    CREATININE 1 27 04/23/2021       Lab Results   Component Value Date    PSA 0 8 08/21/2017       @RESULTRCNT(1H])@      Pertinent Imaging      - n/a    Portions of the record may have been created with voice recognition software   Occasional wrong word or "sound a like" substitutions may have occurred due to the inherent limitations of voice recognition software   Read the chart carefully and recognize, using context, where substitutions have occurred

## 2021-06-10 ENCOUNTER — TELEPHONE (OUTPATIENT)
Dept: RADIOLOGY | Facility: CLINIC | Age: 76
End: 2021-06-10

## 2021-06-10 LAB — BACTERIA UR CULT: NORMAL

## 2021-06-10 NOTE — TELEPHONE ENCOUNTER
Dr Jordi Valente,     This mutual patient is going to undergo LESI with Jasmyne Mas and will need to hold her Eliquis for 3 days prior to the procedure  Do you give permission for this hold?

## 2021-06-15 NOTE — PRE-PROCEDURE INSTRUCTIONS
Pre-Surgery Instructions:   Medication Instructions    apixaban (ELIQUIS) 5 mg Pt instructed to hold for 3 days prior to procedure by PCP    Durezol 0 05 % EMUL prn    fluticasone (FLONASE) 50 mcg/act nasal spray PRN    ketoconazole (NIZORAL) 2 % cream Hold morning of surgery    rosuvastatin (CRESTOR) 20 MG tablet Take as directed    sotalol AF (BETAPACE AF) 120 MG TABS Take as directed    tamsulosin (FLOMAX) 0 4 mg Take as directed      My Surgical Experience    The following information was developed to assist you to prepare for your operation  What do I need to do before coming to the hospital?   Arrange for a responsible person to drive you to and from the hospital    Arrange care for your children at home  Children are not allowed in the recovery areas of the hospital   Plan to wear clothing that is easy to put on and take off  If you are having shoulder surgery, wear a shirt that buttons or zippers in the front  Bathing  o Shower the evening before and the morning of your surgery with an antibacterial soap  Please refer to the Pre Op Showering Instructions for Surgery Patients Sheet   o Remove nail polish and all body piercing jewelry  o Do not shave any body part for at least 24 hours before surgery-this includes face, arms, legs and upper body  Food  o Nothing to eat or drink after midnight the night before your surgery  This includes candy and chewing gum  o Exception: If your surgery is after 12:00pm (noon), you may have clear liquids such as 7-Up®, ginger ale, apple or cranberry juice, Jell-O®, water, or clear broth until 8:00 am  o Do not drink milk or juice with pulp on the morning before surgery  o Do not drink alcohol 24 hours before surgery  Medicine  o Follow instructions you received from your surgeon about which medicines you may take on the day of surgery  o If instructed to take medicine on the morning of surgery, take pills with just a small sip of water   Call your prescribing doctor for specific infroamtion on what to do if you take insulin    What should I bring to the hospital?    Bring:  Pippa Griffin or a walker, if you have them, for foot or knee surgery   A list of the daily medicines, vitamins, minerals, herbals and nutritional supplements you take  Include the dosages of medicines and the time you take them each day   Glasses, dentures or hearing aids   Minimal clothing; you will be wearing hospital sleepwear   Photo ID; required to verify your identity   If you have a Living Will or Power of , bring a copy of the documents   If you have an ostomy, bring an extra pouch and any supplies you use    Do not bring   Medicines or inhalers   Money, valuables or jewelry    What other information should I know about the day of surgery?  Notify your surgeons if you develop a cold, sore throat, cough, fever, rash or any other illness   Report to the Ambulatory Surgical/Same Day Surgery Unit   You will be instructed to stop at Registration only if you have not been pre-registered   Inform your  fi they do not stay that they will be asked by the staff to leave a phone number where they can be reached   Be available to be reached before surgery  In the event the operating room schedule changes, you may be asked to come in earlier or later than expected    *It is important to tell your doctor and others involved in your health care if you are taking or have been taking any non-prescription drugs, vitamins, minerals, herbals or other nutritional supplements   Any of these may interact with some food or medicines and cause a reaction

## 2021-06-16 ENCOUNTER — ANESTHESIA EVENT (OUTPATIENT)
Dept: PERIOP | Facility: HOSPITAL | Age: 76
End: 2021-06-16
Payer: COMMERCIAL

## 2021-06-16 NOTE — TELEPHONE ENCOUNTER
S/W pt and wife  Pt is scheduled for Urolift tomorrow and has been holding Eliquis for this procedure  Advised pt that he would have to be back on the Eliquis post-op and have his post-op re-eval before we could schedule for LESI  Pt understood and will call our office back to schedule after post-op visit on 7/20/21    Will need RN schedule to go over hold instructions for Eliquis  Hold permission good until 8/10/21

## 2021-06-17 ENCOUNTER — HOSPITAL ENCOUNTER (OUTPATIENT)
Facility: HOSPITAL | Age: 76
Setting detail: OUTPATIENT SURGERY
Discharge: HOME | End: 2021-06-17
Attending: UROLOGY | Admitting: UROLOGY
Payer: COMMERCIAL

## 2021-06-17 ENCOUNTER — ANESTHESIA (OUTPATIENT)
Dept: PERIOP | Facility: HOSPITAL | Age: 76
End: 2021-06-17
Payer: COMMERCIAL

## 2021-06-17 VITALS
DIASTOLIC BLOOD PRESSURE: 81 MMHG | HEIGHT: 75 IN | WEIGHT: 266.1 LBS | SYSTOLIC BLOOD PRESSURE: 143 MMHG | RESPIRATION RATE: 20 BRPM | TEMPERATURE: 97.6 F | HEART RATE: 50 BPM | BODY MASS INDEX: 33.09 KG/M2 | OXYGEN SATURATION: 99 %

## 2021-06-17 DIAGNOSIS — R39.12 BENIGN PROSTATIC HYPERPLASIA WITH WEAK URINARY STREAM: Primary | ICD-10-CM

## 2021-06-17 DIAGNOSIS — N40.1 BENIGN PROSTATIC HYPERPLASIA WITH WEAK URINARY STREAM: Primary | ICD-10-CM

## 2021-06-17 PROCEDURE — L8699 PROSTHETIC IMPLANT NOS: HCPCS | Performed by: UROLOGY

## 2021-06-17 PROCEDURE — 52442 CYSTO INS TRNSPRSTC IMPLT EA: CPT | Performed by: UROLOGY

## 2021-06-17 PROCEDURE — 52441 CYSTO INSJ TRNSPRSTC 1 IMPLT: CPT | Performed by: UROLOGY

## 2021-06-17 DEVICE — IMPLANTABLE DEVICE
Type: IMPLANTABLE DEVICE | Site: URETHRA | Status: FUNCTIONAL
Brand: UROLIFT

## 2021-06-17 RX ORDER — SODIUM CHLORIDE, SODIUM LACTATE, POTASSIUM CHLORIDE, CALCIUM CHLORIDE 600; 310; 30; 20 MG/100ML; MG/100ML; MG/100ML; MG/100ML
125 INJECTION, SOLUTION INTRAVENOUS CONTINUOUS
Status: DISCONTINUED | OUTPATIENT
Start: 2021-06-17 | End: 2021-06-17 | Stop reason: HOSPADM

## 2021-06-17 RX ORDER — DOCUSATE SODIUM 100 MG/1
100 CAPSULE, LIQUID FILLED ORAL 2 TIMES DAILY
Qty: 30 CAPSULE | Refills: 0 | Status: SHIPPED | OUTPATIENT
Start: 2021-06-17 | End: 2022-06-01

## 2021-06-17 RX ORDER — PHENAZOPYRIDINE HYDROCHLORIDE 200 MG/1
200 TABLET, FILM COATED ORAL 3 TIMES DAILY PRN
Qty: 10 TABLET | Refills: 0 | Status: SHIPPED | OUTPATIENT
Start: 2021-06-17 | End: 2021-06-20

## 2021-06-17 RX ORDER — ACETAMINOPHEN 325 MG/1
650 TABLET ORAL EVERY 4 HOURS PRN
Qty: 30 TABLET | Refills: 0
Start: 2021-06-17

## 2021-06-17 RX ORDER — KETAMINE HYDROCHLORIDE 50 MG/ML
INJECTION, SOLUTION, CONCENTRATE INTRAMUSCULAR; INTRAVENOUS AS NEEDED
Status: DISCONTINUED | OUTPATIENT
Start: 2021-06-17 | End: 2021-06-17

## 2021-06-17 RX ORDER — SODIUM CHLORIDE, SODIUM LACTATE, POTASSIUM CHLORIDE, CALCIUM CHLORIDE 600; 310; 30; 20 MG/100ML; MG/100ML; MG/100ML; MG/100ML
125 INJECTION, SOLUTION INTRAVENOUS CONTINUOUS
Status: DISCONTINUED | OUTPATIENT
Start: 2021-06-17 | End: 2021-06-17

## 2021-06-17 RX ORDER — CEPHALEXIN 500 MG/1
500 CAPSULE ORAL EVERY 6 HOURS SCHEDULED
Qty: 3 CAPSULE | Refills: 0 | Status: SHIPPED | OUTPATIENT
Start: 2021-06-17 | End: 2021-06-18

## 2021-06-17 RX ORDER — NAPROXEN 500 MG/1
500 TABLET ORAL 2 TIMES DAILY WITH MEALS
Qty: 10 TABLET | Refills: 0 | OUTPATIENT
Start: 2021-06-17 | End: 2021-11-23

## 2021-06-17 RX ORDER — FUROSEMIDE 10 MG/ML
INJECTION INTRAMUSCULAR; INTRAVENOUS AS NEEDED
Status: DISCONTINUED | OUTPATIENT
Start: 2021-06-17 | End: 2021-06-17

## 2021-06-17 RX ORDER — ONDANSETRON 2 MG/ML
4 INJECTION INTRAMUSCULAR; INTRAVENOUS ONCE AS NEEDED
Status: DISCONTINUED | OUTPATIENT
Start: 2021-06-17 | End: 2021-06-17 | Stop reason: HOSPADM

## 2021-06-17 RX ORDER — OXYCODONE HYDROCHLORIDE 5 MG/1
5 TABLET ORAL EVERY 4 HOURS PRN
Status: DISCONTINUED | OUTPATIENT
Start: 2021-06-17 | End: 2021-06-17 | Stop reason: HOSPADM

## 2021-06-17 RX ORDER — FENTANYL CITRATE/PF 50 MCG/ML
25 SYRINGE (ML) INJECTION
Status: DISCONTINUED | OUTPATIENT
Start: 2021-06-17 | End: 2021-06-17 | Stop reason: HOSPADM

## 2021-06-17 RX ORDER — FENTANYL CITRATE 50 UG/ML
INJECTION, SOLUTION INTRAMUSCULAR; INTRAVENOUS AS NEEDED
Status: DISCONTINUED | OUTPATIENT
Start: 2021-06-17 | End: 2021-06-17

## 2021-06-17 RX ORDER — MORPHINE SULFATE 4 MG/ML
2 INJECTION, SOLUTION INTRAMUSCULAR; INTRAVENOUS
Status: DISCONTINUED | OUTPATIENT
Start: 2021-06-17 | End: 2021-06-17 | Stop reason: HOSPADM

## 2021-06-17 RX ORDER — CEFAZOLIN SODIUM 2 G/50ML
2000 SOLUTION INTRAVENOUS
Status: COMPLETED | OUTPATIENT
Start: 2021-06-17 | End: 2021-06-17

## 2021-06-17 RX ORDER — MIDAZOLAM HYDROCHLORIDE 2 MG/2ML
INJECTION, SOLUTION INTRAMUSCULAR; INTRAVENOUS AS NEEDED
Status: DISCONTINUED | OUTPATIENT
Start: 2021-06-17 | End: 2021-06-17

## 2021-06-17 RX ORDER — PROPOFOL 10 MG/ML
INJECTION, EMULSION INTRAVENOUS CONTINUOUS PRN
Status: DISCONTINUED | OUTPATIENT
Start: 2021-06-17 | End: 2021-06-17

## 2021-06-17 RX ORDER — PROPOFOL 10 MG/ML
INJECTION, EMULSION INTRAVENOUS AS NEEDED
Status: DISCONTINUED | OUTPATIENT
Start: 2021-06-17 | End: 2021-06-17

## 2021-06-17 RX ADMIN — PROPOFOL 100 MCG/KG/MIN: 10 INJECTION, EMULSION INTRAVENOUS at 07:31

## 2021-06-17 RX ADMIN — FENTANYL CITRATE 50 MCG: 50 INJECTION, SOLUTION INTRAMUSCULAR; INTRAVENOUS at 07:31

## 2021-06-17 RX ADMIN — CEFAZOLIN SODIUM 2000 MG: 2 SOLUTION INTRAVENOUS at 07:14

## 2021-06-17 RX ADMIN — FENTANYL CITRATE 25 MCG: 50 INJECTION INTRAMUSCULAR; INTRAVENOUS at 08:04

## 2021-06-17 RX ADMIN — MIDAZOLAM HYDROCHLORIDE 1 MG: 1 INJECTION, SOLUTION INTRAMUSCULAR; INTRAVENOUS at 07:28

## 2021-06-17 RX ADMIN — FUROSEMIDE 20 MG: 10 INJECTION, SOLUTION INTRAMUSCULAR; INTRAVENOUS at 07:35

## 2021-06-17 RX ADMIN — FENTANYL CITRATE 25 MCG: 50 INJECTION, SOLUTION INTRAMUSCULAR; INTRAVENOUS at 07:37

## 2021-06-17 RX ADMIN — SODIUM CHLORIDE, SODIUM LACTATE, POTASSIUM CHLORIDE, AND CALCIUM CHLORIDE 125 ML/HR: .6; .31; .03; .02 INJECTION, SOLUTION INTRAVENOUS at 06:56

## 2021-06-17 RX ADMIN — FENTANYL CITRATE 25 MCG: 50 INJECTION, SOLUTION INTRAMUSCULAR; INTRAVENOUS at 07:45

## 2021-06-17 RX ADMIN — PROPOFOL 20 MG: 10 INJECTION, EMULSION INTRAVENOUS at 07:31

## 2021-06-17 RX ADMIN — KETAMINE HYDROCHLORIDE 20 MG: 50 INJECTION INTRAMUSCULAR; INTRAVENOUS at 07:31

## 2021-06-17 NOTE — ANESTHESIA PREPROCEDURE EVALUATION
Procedure:  CYSTOSCOPY WITH INSERTION UROLIFT (N/A Urethra)    Relevant Problems   CARDIO   (+) Chest pain   (+) Hyperlipidemia   (+) SVT (supraventricular tachycardia) (HCC)      /RENAL   (+) Acute kidney injury superimposed on CKD (HCC)   (+) BPH (benign prostatic hyperplasia)   (+) Chronic kidney disease (CKD), stage II (mild)   (+) Nephrolithiasis      HEMATOLOGY   (+) Thrombocytopenia (HCC)      MUSCULOSKELETAL   (+) Chronic low back pain      PULMONARY   (+) ARLENE (obstructive sleep apnea)   (+) SOB (shortness of breath)        Physical Exam    Airway    Mallampati score: II  TM Distance: >3 FB  Neck ROM: full     Dental   implants,     Cardiovascular  Rhythm: regular, Rate: normal, Cardiovascular exam normal    Pulmonary  Pulmonary exam normal Breath sounds clear to auscultation,     Other Findings        Anesthesia Plan  ASA Score- 3     Anesthesia Type- general with ASA Monitors  Additional Monitors:   Airway Plan: LMA  Plan Factors-Exercise tolerance (METS): >4 METS  Chart reviewed  Patient is not a current smoker  Patient instructed to abstain from smoking on day of procedure  Patient did not smoke on day of surgery  There is medical exclusion for perioperative obstructive sleep apnea risk education  Induction- intravenous  Postoperative Plan- Plan for postoperative opioid use  Planned trial extubation    Informed Consent- Anesthetic plan and risks discussed with patient  I personally reviewed this patient with the CRNA  Discussed and agreed on the Anesthesia Plan with the CRNA  David Rodney

## 2021-06-17 NOTE — ANESTHESIA POSTPROCEDURE EVALUATION
Post-Op Assessment Note    CV Status:  Stable  Pain Score: 0    Pain management: adequate     Mental Status:  Alert and awake   Hydration Status:  Stable   PONV Controlled:  None   Airway Patency:  Patent and adequate      Post Op Vitals Reviewed: Yes      Staff: Anesthesiologist, CRNA         No complications documented      BP   133/78   Temp   99 6   Pulse  58   Resp   18   SpO2   100%

## 2021-06-17 NOTE — INTERVAL H&P NOTE
H&P reviewed  After examining the patient I find no changes in the patients condition since the H&P had been written      Vitals:    06/17/21 0643   BP: 151/74   Pulse: 56   Resp: 20   Temp: 98 5 °F (36 9 °C)   SpO2: 99%

## 2021-06-17 NOTE — OP NOTE
Operative Note     PATIENT:  Dimple Wilson (MRN [id number])    DATE OF PROCEDURE:   6/17/2021    PRE-OP DIAGNOSES:   1) BPH with obstruction     POST-OP DIAGNOSES AND OPERATIVE FINDINGS:   1) BPH with obstruction    PROCEDURES:  1) UroLift implantation    SURGEON:   Vasu Howard MD    No qualified teaching residents available to assist    ANESTHESIA TYPE:  IV Sedation    ESTIMATED BLOOD LOSS:   Minimal    COMPLICATIONS:   None    ANTIBIOTICS:  Cefazolin     INTRAOPERATIVE THROMBOEMBOLISM PROPHYLAXIS:  Pneumatic compression stockings    PROCEDURE SUMMARY:    The patient was identified, brought to the operating room, and placed on the table in supine position  After induction of general anesthesia, the patient was placed in dorsal lithotomy position and prepped and draped in the usual sterile fashion  A complete formal timeout was performed  A 20F cystoscope was inserted into the bladder  The cystoscopy bridge was replaced with a UroLift delivery device  The first treatment site was the patient's left side approximately 1 5 cm distal to the bladder neck  The distal tip of the delivery device was then angled laterally approximately 20 degrees at this position to compress the lateral lobe  The trigger was pulled, thereby deploying a needle containing the implant through the prostate  The needle was then retracted, allowing one end of the implant to be delivered to the capsular surface of the prostate  The implant was then tensioned to assure capsular seating and removal of slack monofilament  The device was then angled back toward midline and slowly advanced proximally until cystoscopic verification of the monofilament being centered in the delivery bay  The urethral end piece was then affixed to the monofilament thereby tailoring the size of the implant  Excess filament was then severed  The delivery device was then re-advanced into the bladder   The delivery device was then replaced with cystoscope and bridge and the implant location and opening effect was confirmed cystoscopically  The same procedure was then repeated on the right side, and two additional implants were delivered just proximal to the veru montanum, again one on left and one on right side of the prostate, following the same technique  A total of 8 implants were fired and 5 were successfully deployed  Three implants misfired due to capsular pull-through  This is felt to be inherent to the patient's large gland size particularly its with on the right side  Implants were deployed in the following locations:    1  Right bladder neck  2  Left bladder neck  3  Right apex  4  Left apex  5  Left mid gland      A final cystoscopy was conducted first to inspect the location and state of each implant and second, to confirm the presence of a continuous anterior channel was present through the prostatic urethra with irrigation flow turned off  A solitario catheter was then placed  The patient tolerated the procedure well and was transferred to the recovery room awake alert and in stable condition  IMPLANTS:   Implant Name Type Inv  Item Serial No   Lot No  LRB No  Used Action   Urolift System      [id number] N/A 6 Implanted   IMPLANT URO PROSTATE UROLIFT - [id number]  IMPLANT URO PROSTATE UROLIFT  [employer] [id number] N/A 2 Implanted        PLAN:    Patient will be discharged home with Solitario catheter 24 hours  Medications changes:  Patient will continue on his tamsulosin into the time of follow-up    He was advised to resume his Eliquis 48 hours postop

## 2021-06-17 NOTE — DISCHARGE INSTRUCTIONS
Mr Dallas Mcgill :    Your surgery went very well  I was able to open a nice, wide, more natural channel within your prostate by placing 5 Uro lift implants  Please take your medications as needed for discomfort over the next few days  Most importantly please drink 6-8 glasses water per day  Please remove your [place]avia catheter at home tomorrow morning following the directions below and as instructed by our nursing team   If you are unable to urinate by lunchtime, please call my office number  If any questions about your catheter at all please call the number listed below    You may resume your Eliquis on Saturday evening    Please call with any questions or concerns  Joseph Love MD,PhD  [facility]  [phone]                  UROLIFT      WHAT YOU NEED TO KNOW:   A UroLift is procedure that is done to open the natural channel within your prostate gland  DISCHARGE INSTRUCTIONS:   Medicines:   · Pain medicine: You may be given a prescription medicine to decrease pain  Do not wait until the pain is severe before you take these medicines:  · Tylenol (over the counter) - please take this as directed on the bottle for routine pain  · Naproxen (prescription-strength NSAID/Ibuprofen type medicine) - for moderate pain  This can be substituted with over the counter Ibuprofen if more convenient, or it this is less expensive  · Pyridium - to minimize pain and discomfort when passing your urine  Will turn your urine orange  This can be substituted with over the counter "AZO" if more convenient, or it this is less expensive  · Colace - to prevent constipation    This is also an over the counter medicine - you can purchase it without a prescription, if more convenient or less expensive  · If your pain is not well controlled using Tylenol, Naprosyn/ibuprofen please do not hesitate to call our office, you will be connected to our care team day or night and you can be issued a prescription for a narcotic pain medication    · Antibiotics:  You may be provided with antibiotics at discharge, particularly if you are being sent home with a solitario catheter     This medicine is given to fight or prevent an infection caused by bacteria  Always take your antibiotics exactly as ordered by your healthcare provider  Do not stop taking your medicine unless directed by your healthcare provider  Never save antibiotics or take leftover antibiotics that were given to you for another illness  · Take your medicine as directed  Contact your healthcare provider if you think your medicine is not helping or if you have side effects  Tell him or her if you are allergic to any medicine  Keep a list of the medicines, vitamins, and herbs you take  Include the amounts, and when and why you take them  Bring the list or the pill bottles to follow-up visits  Carry your medicine list with you in case of an emergency  Follow up with your healthcare provider or urologist as directed: You may need to return to make sure you do not have an infection, or to have your Solitario catheter removed  Write down your questions so you remember to ask them during your visits  Solitario catheter care: You may be sent home with a Solitario catheter  If so you will be provided with instructions to remove it    · Please drink plenty of fluids  Blood in your urine is normal for few days following the procedure  Keeping well-hydrated will prevent any trouble from this    Contact your healthcare provider or urologist if:   · You have a fever  · You have new or more blood in your urine  · You have trouble starting to urinate, or have a weak stream of urine when you urinate  · You feel like you have a full bladder, even after you urinate  You may also leak urine  · You often wake up during the night to urinate  You may also feel the need to urinate right away  · You feel pain and burning when you urinate      · You feel pain or pressure in your lower abdomen  · Your urine looks cloudy, and smells bad  · You have trouble getting an erection or ejaculating  · You have questions or concerns about your condition or care  Seek care immediately or call 911 if:   · You urinate little or not at all  · You have severe abdominal or back pain  · You are dizzy or confused  · You have abdominal pain, nausea, and vomiting  · Your heartbeat is slower than usual   © 2017 [facility] Information is for End User's use only and may not be sold, redistributed or otherwise used for commercial purposes  All illustrations and images included in CareNotes® are the copyrighted property of A D A M , Inc  or Ghulam Manju  The above information is an  only  It is not intended as medical advice for individual conditions or treatments  Talk to your doctor, nurse or pharmacist before following any medical regimen to see if it is safe and effective for you  Solitario Catheter Removal after [address] [place] [address] Urology [phone]    A Solitario catheter is a sterile tube that is inserted into your bladder to drain urine  It is also called an indwelling urinary catheter  The tip of the catheter has a small balloon filled with solution that holds the catheter inside your bladder  This can be removed at home if you prefer; we provide necessary supplies and ensure proper teaching  Or this can be removed in the office by a nurse or healthcare worker, whichever you prefer  Antibiotics may be given to be taken before and after catheter removal to prevent infection  Directions for Solitario Catheter removal at home:   Empty any urine out of drainage bag  [facility] your hands with soap and water  You may then wear gloves for the procedure if preferred   Put the syringe into the balloon port of the solitario catheter   (This is the part not attached to the drainage bag and generally is smaller with a colored portion around it ) Push and twist to make sure the syringe is in proper position  Pull back on plunger to draw water out of the balloon  Detach syringe, empty water into cup or emesis basin  Repeat process of using syringe to empty water from balloon a few more times to ensure balloon is completely empty   You may want to stand or sit in shower/bathtub to remove catheter as urine may drip out when you remove it  Slowly but steadily pull catheter out  Catheter may need to be turned in a [place] if it at first feels 'stuck ' If catheter does not come out when you pull gently, stop pulling and call the urology office  After solitario catheter removal:   You may be asked to drink plenty of water to ensure hydration and to flush out lower urinary tract   Sometimes we have a second visit in the afternoon scheduled to ensure you are able to empty your bladder appropriately  This will be scheduled with solitario catheter removal visit if necessary   Normal symptoms after solitario catheter removal include urinary urgency, urinary frequency, burning with urination and some blood in urine  These can be normal for 24-48 hours after removal  If these symptoms persist longer than two days, please call the urology office   If you are unable to urinate 8 hours after removal and are uncomfortable or if you develop a fever please call urology office   Follow up as directed by urology office  This follow up will usually be scheduled prior to the surgery or during nurse post operative phone call  How to Change a Catheter Drainage Bag   WHAT YOU NEED TO KNOW:   The catheter is attached to a drainage bag that holds the urine  A large drainage bag is usually used during the night  A leg bag can be worn under your clothes during the day  The leg bag is worn around your calf or thigh  It allows you to be in public without anyone knowing you have a catheter   A leg bag will have to be emptied frequently because it is not as big as the large bag  DISCHARGE INSTRUCTIONS:   Call your doctor if:   · Your catheter comes out      · You have a fever      · You have material that looks like sand in the tubing or drainage bag      · You see blood in the tubing or drainage bag      · You see a layer of crystals inside the tubing      · You have questions or concerns about your condition or care      Attach or remove a drainage bag: You use the same steps to attach or remove a large drainage bag and a leg bag  · Gather supplies:      ? Large drainage bag     ? Clean leg bag     ? A clean towel     ? Alcohol pads     ·            · Wash your hands with soap and water for at least 20 seconds           · Empty the drainage bag  Do not touch the tip against the container or the toilet as you empty the urine      · Place the clean towel under the connection between the catheter tube and the drainage bag tubing  The towel will catch urine that may leak out of the tubing      · Pinch and hold the catheter tubing  Disconnect the tubing from the bag by using a twisting motion  Be careful not to pull on the catheter  Place the disconnected bag on the towel      · Clean the tip of the tubing on the bag to be connected  Use an alcohol pad  Start at the tip and clean towards the bag  You may have to hold the bag tubing in the same hand as the pinched catheter tubing while you are cleaning the tip  Do not allow the tip to touch the catheter tubing      · Connect the bag tubing to the catheter tubing  Place the tip into the catheter tubing with a twisting motion until it is securely connected      · Use straps to fasten the bag to your calf if you are attaching a leg bag  You may need to tape the catheter tubing to your thigh  Be careful not to pull the catheter tubing tight   Damage can be done to your urethra and bladder      · Wash your hands with soap and water for at least 20 seconds      What you need to know about drainage bags:   · Wash your hands with soap and water before and after emptying the drainage bag      · Empty your drainage bag when it is half full throughout the day  Also empty the large drainage bag when you wake in the morning or from a nap      · Make sure to clamp or twist the drainage bag closed after emptying      · Put a cap on the end of the connection tubing to prevent germs in the tubing      Prevent problems with your catheter and drainage bag:   · Drink liquids as directed  Ask your healthcare provider how much liquid to drink each day and which liquids are best for you  Liquids will help flush your kidneys and bladder to help prevent infection      · Check for kinks in the tubing and straighten them out      · Check the tape or strap used to secure the catheter tube to your skin  Make sure it is not blocking the tube      · Make sure you are not sitting or lying on the tubing      · Make sure the urine bag is hanging below the level of your waist      Follow up with your doctor as directed: Write down your questions so you remember to ask them during your visits  © Copyright [address] Information is for End User's use only and may not be sold, redistributed or otherwise used for commercial purposes  All illustrations and images included in CareNotes® are the copyrighted property of A D A M , Inc  or [address] [place], [place] [address]   The above information is an  only  It is not intended as medical advice for individual conditions or treatments   Talk to your doctor, nurse or pharmacist before following any medical regimen to see if it is safe and effective for you

## 2021-07-19 ENCOUNTER — HOSPITAL ENCOUNTER (OUTPATIENT)
Dept: CT IMAGING | Facility: HOSPITAL | Age: 76
Discharge: HOME | End: 2021-07-19
Payer: COMMERCIAL

## 2021-07-19 DIAGNOSIS — R20.2 PARESTHESIA OF SKIN: ICD-10-CM

## 2021-07-19 PROCEDURE — G1004 CDSM NDSC: HCPCS

## 2021-07-19 PROCEDURE — 70450 CT HEAD/BRAIN W/O DYE: CPT

## 2021-07-20 ENCOUNTER — CLINICAL SUPPORT (OUTPATIENT)
Dept: CARDIOLOGY CLINIC | Facility: CLINIC | Age: 76
End: 2021-07-20
Payer: COMMERCIAL

## 2021-07-20 ENCOUNTER — OFFICE VISIT (OUTPATIENT)
Dept: UROLOGY | Facility: CLINIC | Age: 76
End: 2021-07-20
Payer: COMMERCIAL

## 2021-07-20 ENCOUNTER — TELEPHONE (OUTPATIENT)
Dept: CARDIOLOGY CLINIC | Facility: CLINIC | Age: 76
End: 2021-07-20

## 2021-07-20 VITALS
HEART RATE: 125 BPM | BODY MASS INDEX: 32.95 KG/M2 | HEIGHT: 75 IN | SYSTOLIC BLOOD PRESSURE: 144 MMHG | WEIGHT: 265 LBS | DIASTOLIC BLOOD PRESSURE: 84 MMHG

## 2021-07-20 DIAGNOSIS — Z01.810 PRE-OPERATIVE CARDIOVASCULAR EXAMINATION: Primary | ICD-10-CM

## 2021-07-20 DIAGNOSIS — N40.1 BPH WITH OBSTRUCTION/LOWER URINARY TRACT SYMPTOMS: Primary | ICD-10-CM

## 2021-07-20 DIAGNOSIS — N13.8 BPH WITH OBSTRUCTION/LOWER URINARY TRACT SYMPTOMS: Primary | ICD-10-CM

## 2021-07-20 LAB — POST-VOID RESIDUAL VOLUME, ML POC: 0 ML

## 2021-07-20 PROCEDURE — 99211 OFF/OP EST MAY X REQ PHY/QHP: CPT

## 2021-07-20 PROCEDURE — 93000 ELECTROCARDIOGRAM COMPLETE: CPT | Performed by: INTERNAL MEDICINE

## 2021-07-20 PROCEDURE — 51798 US URINE CAPACITY MEASURE: CPT | Performed by: PHYSICIAN ASSISTANT

## 2021-07-20 PROCEDURE — 99213 OFFICE O/P EST LOW 20 MIN: CPT | Performed by: PHYSICIAN ASSISTANT

## 2021-07-20 RX ORDER — DIMENHYDRINATE 50 MG
TABLET ORAL DAILY
COMMUNITY
End: 2021-12-10 | Stop reason: ALTCHOICE

## 2021-07-20 NOTE — PROGRESS NOTES
1  BPH with obstruction/lower urinary tract symptoms  POCT Measure PVR         Assessment and plan: 1  Medically refractory lower urinary tract symptoms - managed by Dr Regina Bonilla  - s/p Urolift (6/17/21)    Patient having some residual urinary symptoms after Uro lift  He is off of his alpha blocker at this time  We discussed conservative measures in order to minimize irritative voiding symptoms as well as continued monitoring over the next few months  He will follow up in 4-5 months for reassessment  If he is having ongoing symptoms will discuss potential daily Cialis Caralyn Ganser, PA-C      Chief Complaint       Status post Uro lift    History of Present Illness     Christopher Hendrickson  is a 76 y o  Male patient of Dr Regina Bonilla with a history of BPH with LUTS s/p urolift (6/17/21) presenting for follow up  He has had lower urinary tract symptoms which have become refractory  To tamsulosin  He was previously tried on finasteride as well  Prior cystoscopy demonstrated bilobar hyperplasia and a normal bladder neck, no median lobe  Transrectal ultrasound was performed as well  Ultimately elected for Urolift which was performed 6/17/2021  patient does report some ongoing weakened stream with some frequency and urgency  He has noticed some mild improvement from his Uro lift  He feels like his urinary stream is the strongest after he takes sildenafil   For sexual function  Uroflow  Reveals a voided volume of 98 mL, bell-shaped curve with mild straining at the end  Peak flow 16 mL/sec with an average flow 6 mL/sec  PVR 0mL   AUA SYMPTOM SCORE      Most Recent Value   AUA SYMPTOM SCORE   How often have you had a sensation of not emptying your bladder completely after you finished urinating? 3   How often have you had to urinate again less than two hours after you finished urinating? 3   How often have you found you stopped and started again several times when you urinate? 3   How often have you found it difficult to postpone urination? 2   How often have you had a weak urinary stream?  4   How often have you had to push or strain to begin urination? 1   How many times did you most typically get up to urinate from the time you went to bed at night until the time you got up in the morning? 2   Quality of Life: If you were to spend the rest of your life with your urinary condition just the way it is now, how would you feel about that?  4   AUA SYMPTOM SCORE  18          Review of Systems     Review of Systems   Constitutional: Negative for activity change and fatigue  HENT: Negative for congestion  Eyes: Negative for visual disturbance  Respiratory: Negative for shortness of breath and wheezing  Cardiovascular: Negative for chest pain and leg swelling  Gastrointestinal: Negative for abdominal pain  Endocrine: Negative for polyuria  Genitourinary: Negative for dysuria, flank pain, hematuria and urgency  Musculoskeletal: Negative for back pain  Allergic/Immunologic: Negative for immunocompromised state  Neurological: Negative for dizziness and numbness  Psychiatric/Behavioral: Negative for dysphoric mood  All other systems reviewed and are negative  Allergies     No Known Allergies    Physical Exam     Physical Exam  Constitutional:       General: He is not in acute distress  Appearance: Normal appearance  He is well-developed  He is obese  He is not ill-appearing, toxic-appearing or diaphoretic  HENT:      Head: Normocephalic and atraumatic  Mouth/Throat:      Mouth: Mucous membranes are moist    Eyes:      General:         Right eye: No discharge  Left eye: No discharge  Conjunctiva/sclera: Conjunctivae normal    Cardiovascular:      Comments: Negative lower extremity  Pulmonary:      Effort: Pulmonary effort is normal    Musculoskeletal:         General: Normal range of motion  Cervical back: Normal range of motion  Skin:     General: Skin is warm and dry  Neurological:      General: No focal deficit present  Mental Status: He is alert and oriented to person, place, and time     Psychiatric:         Mood and Affect: Mood normal          Behavior: Behavior normal              Vital Signs  Vitals:    07/20/21 1027   BP: 144/84   Pulse: (!) 125   Weight: 120 kg (265 lb)   Height: 6' 3" (1 905 m)         Current Medications       Current Outpatient Medications:     acetaminophen (TYLENOL) 325 mg tablet, Take 2 tablets (650 mg total) by mouth every 4 (four) hours as needed for mild pain, Disp: 30 tablet, Rfl: 0    apixaban (Eliquis) 5 mg, Take 5 mg by mouth 2 (two) times a day, Disp: , Rfl:     Ascorbic Acid (VITAMIN C) 100 MG tablet, Take 100 mg by mouth daily, Disp: , Rfl:     Durezol 0 05 % EMUL, , Disp: , Rfl:     Flaxseed, Linseed, (Flax Seed Oil) 1000 MG CAPS, Daily, Disp: , Rfl:     fluticasone (FLONASE) 50 mcg/act nasal spray, 1 spray into each nostril daily, Disp: 16 g, Rfl: 0    ketoconazole (NIZORAL) 2 % cream, , Disp: , Rfl:     rosuvastatin (CRESTOR) 20 MG tablet, Take 20 mg by mouth daily, Disp: , Rfl:     sotalol AF (BETAPACE AF) 120 MG TABS, Take 1 tablet (120 mg total) by mouth every 12 (twelve) hours, Disp: 60 each, Rfl: 3    tamsulosin (FLOMAX) 0 4 mg, Take 0 4 mg by mouth daily with dinner, Disp: , Rfl:     docusate sodium (COLACE) 100 mg capsule, Take 1 capsule (100 mg total) by mouth 2 (two) times a day for 15 days, Disp: 30 capsule, Rfl: 0    naproxen (NAPROSYN) 500 mg tablet, Take 1 tablet (500 mg total) by mouth 2 (two) times a day with meals for 5 days For pain, Disp: 10 tablet, Rfl: 0      Active Problems     Patient Active Problem List   Diagnosis    Bilateral pulmonary embolism (HCC)    SOB (shortness of breath)    Hyperlipidemia    Lung nodule    Obesity    History of sickle cell trait    Leg edema, left    Thrombocytopenia (HCC)    Chronic kidney disease (CKD), stage II (mild)    Chronic low back pain    SVT (supraventricular tachycardia) (HCC)    Chronic anticoagulation    ARLENE (obstructive sleep apnea)    Cardiomyopathy, nonischemic (HCC)    Dyslipidemia    BPH (benign prostatic hyperplasia)    Nephrolithiasis    Lumbar disc disease with radiculopathy - Left    Spinal stenosis of lumbar region without neurogenic claudication - Left    Chest pain    Acute kidney injury superimposed on CKD [facility])         Past Medical History     Past Medical History:   Diagnosis Date    Hyperlipidemia     Post-nasal drip     Pulmonary embolism ([address] )          Surgical History     Past Surgical History:   Procedure Laterality Date    ARTHROSCOPY KNEE Left     30 years ago    ND CYSTOURETHRO W/IMPLANT N/A 2021    Procedure: CYSTOSCOPY WITH INSERTION UROLIFT;  Surgeon: Silverio Singleton MD;  Location: [facility];  Service: Urology         Family History     History reviewed  No pertinent family history  Social History     Social History     Social History     Tobacco Use   Smoking Status Former Smoker    Quit date:     Years since quittin 5   Smokeless Tobacco Never Used         Pertinent Lab Values     Lab Results   Component Value Date    CREATININE 1 27 2021       Lab Results   Component Value Date    PSA 0 8 2017       @RESULTRCNT(1H])@      Pertinent Imaging      - n/a    Portions of the record may have been created with voice recognition software   Occasional wrong word or "sound a like" substitutions may have occurred due to the inherent limitations of voice recognition software   Read the chart carefully and recognize, using context, where substitutions have occurred

## 2021-07-28 ENCOUNTER — TELEPHONE (OUTPATIENT)
Dept: CARDIOLOGY CLINIC | Facility: CLINIC | Age: 76
End: 2021-07-28

## 2021-07-28 NOTE — TELEPHONE ENCOUNTER
Pt called & stated that the letter dated 7/20/21 in chart clearing pt for surgery needs to be faxed to [phone]    Attention Dr Jerrell Brown

## 2021-08-09 DIAGNOSIS — I47.1 SVT (SUPRAVENTRICULAR TACHYCARDIA) (HCC): ICD-10-CM

## 2021-08-09 RX ORDER — SOTALOL HYDROCHLORIDE 120 MG/1
TABLET ORAL
Qty: 180 TABLET | Refills: 2 | Status: SHIPPED | OUTPATIENT
Start: 2021-08-09 | End: 2022-02-03 | Stop reason: ALTCHOICE

## 2021-09-23 ENCOUNTER — OFFICE VISIT (OUTPATIENT)
Dept: CARDIOLOGY CLINIC | Facility: CLINIC | Age: 76
End: 2021-09-23
Payer: COMMERCIAL

## 2021-09-23 VITALS
HEIGHT: 75 IN | WEIGHT: 260 LBS | SYSTOLIC BLOOD PRESSURE: 134 MMHG | OXYGEN SATURATION: 96 % | HEART RATE: 89 BPM | RESPIRATION RATE: 16 BRPM | DIASTOLIC BLOOD PRESSURE: 80 MMHG | BODY MASS INDEX: 32.33 KG/M2

## 2021-09-23 DIAGNOSIS — Z79.01 CHRONIC ANTICOAGULATION: ICD-10-CM

## 2021-09-23 DIAGNOSIS — I26.99 BILATERAL PULMONARY EMBOLISM (HCC): ICD-10-CM

## 2021-09-23 DIAGNOSIS — I47.1 SVT (SUPRAVENTRICULAR TACHYCARDIA) (HCC): Primary | ICD-10-CM

## 2021-09-23 PROCEDURE — 99214 OFFICE O/P EST MOD 30 MIN: CPT | Performed by: INTERNAL MEDICINE

## 2021-09-23 NOTE — PROGRESS NOTES
[facility] [place] [id number] [place] [address]  Cardiology Follow Up    [facility]   1945  [id number]      1  [facility]supraventricular tachycardia) ([facility] )     2  Chronic anticoagulation     3  Bilateral pulmonary embolism [facility])         Chief Complaint   Patient presents with    Follow-up       Interval History:  Patient presents for follow-up visit  Patient denies any history of chest pain shortness of breath  Patient denies any history of leg edema or orthopnea PND  No history of presyncope syncope  Patient states compliance with the present list of medications  Patient has history of recurrent pulmonary embolism and has been on anticoagulation long-term      Patient Active Problem List   Diagnosis    Bilateral pulmonary embolism (HCC)    SOB (shortness of breath)    Hyperlipidemia    Lung nodule    Obesity    History of sickle cell trait    Leg edema, left    Thrombocytopenia (HCC)    Chronic kidney disease (CKD), stage II (mild)    Chronic low back pain    SVT (supraventricular tachycardia) (HCC)    Chronic anticoagulation    ARLENE (obstructive sleep apnea)    Cardiomyopathy, nonischemic (HCC)    Dyslipidemia    BPH (benign prostatic hyperplasia)    Nephrolithiasis    Lumbar disc disease with radiculopathy - Left    Spinal stenosis of lumbar region without neurogenic claudication - Left    Chest pain    Acute kidney injury superimposed on CKD ([facility] )     Past Medical History:   Diagnosis Date    Hyperlipidemia     Post-nasal drip     Pulmonary embolism (HCC)      Social History     Socioeconomic History    Marital status: /Civil Union     Spouse name: Not on file    Number of children: 9    Years of education: Not on file    Highest education level: Not on file   Occupational History    Occupation: employed   Tobacco Use    Smoking status: Former Smoker     Quit date:      Years since quittin 7    Smokeless tobacco: Never Used Vaping Use    Vaping Use: Never used   Substance and Sexual Activity    Alcohol use: Yes     Alcohol/week: 0 0 standard drinks     Comment: socially    Drug use: No    Sexual activity: Not on file   Other Topics Concern    Not on file   Social History Narrative    Not on file     Social Determinants of Health     Financial Resource Strain:     Difficulty of Paying Living Expenses:    Food Insecurity:     Worried About Running Out of Food in the Last Year:     [address] in the Last Year:    Transportation Needs:     Lack of Transportation (Medical):  Lack of Transportation (Non-Medical):    Physical Activity:     Days of Exercise per Week:     Minutes of Exercise per Session:    Stress:     Feeling of Stress :    Social Connections:     Frequency of Communication with Friends and Family:     Frequency of Social Gatherings with Friends and Family:     Attends [employer] Services:     Active Member of Clubs or Organizations:     Attends Club or Organization Meetings:     Marital Status:    Intimate Partner Violence:     Fear of Current or Ex-Partner:     Emotionally Abused:     Physically Abused:     Sexually Abused:       History reviewed  No pertinent family history    Past Surgical History:   Procedure Laterality Date    ARTHROSCOPY KNEE Left     30 years ago    RI CYSTOURETHRO W/IMPLANT N/A 6/17/2021    Procedure: CYSTOSCOPY WITH INSERTION UROLIFT;  Surgeon: Basilio Roque MD;  Location: [facility] OR;  Service: Urology       Current Outpatient Medications:     acetaminophen (TYLENOL) 325 mg tablet, Take 2 tablets (650 mg total) by mouth every 4 (four) hours as needed for mild pain, Disp: 30 tablet, Rfl: 0    apixaban (Eliquis) 5 mg, Take 5 mg by mouth 2 (two) times a day, Disp: , Rfl:     Ascorbic Acid (VITAMIN C) 100 MG tablet, Take 100 mg by mouth daily, Disp: , Rfl:     docusate sodium (COLACE) 100 mg capsule, Take 1 capsule (100 mg total) by mouth 2 (two) times a day for 15 days, Disp: 30 capsule, Rfl: 0    Durezol 0 05 % EMUL, , Disp: , Rfl:     Flaxseed, Linseed, (Flax Seed Oil) 1000 MG CAPS, Daily, Disp: , Rfl:     fluticasone (FLONASE) 50 mcg/act nasal spray, 1 spray into each nostril daily, Disp: 16 g, Rfl: 0    ketoconazole (NIZORAL) 2 % cream, , Disp: , Rfl:     naproxen (NAPROSYN) 500 mg tablet, Take 1 tablet (500 mg total) by mouth 2 (two) times a day with meals for 5 days For pain, Disp: 10 tablet, Rfl: 0    rosuvastatin (CRESTOR) 20 MG tablet, Take 20 mg by mouth daily, Disp: , Rfl:     sotalol AF (BETAPACE AF) 120 MG TABS, TAKE 1 TABLET BY MOUTH EVERY 12 HOURS, Disp: 180 tablet, Rfl: 2    tamsulosin (FLOMAX) 0 4 mg, Take 0 4 mg by mouth daily with dinner (Patient not taking: Reported on 9/23/2021), Disp: , Rfl:   No Known Allergies    Labs:  No visits with results within 2 Month(s) from this visit  Latest known visit with results is:   Office Visit on 07/20/2021   Component Date Value    POST-VOID RESIDUAL VOLUM* 07/20/2021 0      Imaging: No results found      Review of Systems:  Review of Systems   REVIEW OF SYSTEMS:  Constitutional:  Denies fever or chills   Eyes:  Denies change in visual acuity   HENT:  Denies nasal congestion or sore throat   Respiratory:  Denies cough or shortness of breath   Cardiovascular:  Denies chest pain or edema   GI:  Denies abdominal pain, nausea, vomiting, bloody stools or diarrhea   :  Denies dysuria, frequency, difficulty in micturition and nocturia  Musculoskeletal:  Denies back pain or joint pain   Neurologic:  Denies headache, focal weakness or sensory changes   Endocrine:  Denies polyuria or polydipsia   Lymphatic:  Denies swollen glands   Psychiatric:  Denies depression or anxiety     Physical Exam:    /80 (BP Location: Left arm, Patient Position: Sitting, Cuff Size: Large)   Pulse 89   Resp 16   Ht 6' 3" (1 905 m)   Wt 118 kg (260 lb)   SpO2 96%   BMI 32 50 kg/m²     Physical Exam   PHYSICAL EXAM:  General:  Patient is not in acute distress   Head: Normocephalic, Atraumatic  HEENT:  Both pupils normal-size atraumatic, normocephalic, nonicteric  Neck:  JVP not raised  Trachea central  No carotid bruit  Respiratory:  normal breath sounds no crackles  no rhonchi  Cardiovascular:  Regular rate and rhythm no S3 no murmurs  GI:  Abdomen soft nontender  No organomegaly  Lymphatic:  No cervical or inguinal lymphadenopathy  Neurologic:  Patient is awake alert, oriented   Grossly nonfocal  Extremities no edema        Discussion/Summary:  Patient with multiple medical problems who seems to be doing reasonably well from cardiac standpoint  Previous studies reviewed with patient  Medications reviewed and possible side effects discussed  concepts of cardiovascular disease , signs and symptoms of heart disease  Dietary and risk factor modification reinforced  All questions answered  Safety measures reviewed  Patient advised to report any problems prompting medical attention  Patient understands the risks and benefits of anticoagulation to prevent venous thromboembolism and pulmonary embolism  Patient to report any bleeding issues  Patient counseled to lose weight to reduce cardiovascular morbidity and mortality  Follow-up in 6 months or earlier as needed  Patient is agreeable with the plan of care

## 2021-10-08 ENCOUNTER — TELEPHONE (OUTPATIENT)
Dept: CARDIOLOGY CLINIC | Facility: CLINIC | Age: 76
End: 2021-10-08

## 2021-10-21 ENCOUNTER — OFFICE VISIT (OUTPATIENT)
Dept: UROLOGY | Facility: CLINIC | Age: 76
End: 2021-10-21
Payer: COMMERCIAL

## 2021-10-21 VITALS
WEIGHT: 262 LBS | DIASTOLIC BLOOD PRESSURE: 82 MMHG | HEART RATE: 64 BPM | HEIGHT: 75 IN | SYSTOLIC BLOOD PRESSURE: 138 MMHG | BODY MASS INDEX: 32.58 KG/M2

## 2021-10-21 DIAGNOSIS — N13.8 BPH WITH OBSTRUCTION/LOWER URINARY TRACT SYMPTOMS: Primary | ICD-10-CM

## 2021-10-21 DIAGNOSIS — N40.1 BPH WITH OBSTRUCTION/LOWER URINARY TRACT SYMPTOMS: Primary | ICD-10-CM

## 2021-10-21 LAB
POST-VOID RESIDUAL VOLUME, ML POC: 18 ML
SL AMB  POCT GLUCOSE, UA: NORMAL
SL AMB LEUKOCYTE ESTERASE,UA: NORMAL
SL AMB POCT BILIRUBIN,UA: NORMAL
SL AMB POCT BLOOD,UA: NORMAL
SL AMB POCT CLARITY,UA: CLEAR
SL AMB POCT COLOR,UA: YELLOW
SL AMB POCT KETONES,UA: NORMAL
SL AMB POCT NITRITE,UA: NORMAL
SL AMB POCT PH,UA: 5
SL AMB POCT SPECIFIC GRAVITY,UA: 1.01
SL AMB POCT URINE PROTEIN: NORMAL
SL AMB POCT UROBILINOGEN: 0.2

## 2021-10-21 PROCEDURE — 81002 URINALYSIS NONAUTO W/O SCOPE: CPT | Performed by: PHYSICIAN ASSISTANT

## 2021-10-21 PROCEDURE — 51798 US URINE CAPACITY MEASURE: CPT | Performed by: PHYSICIAN ASSISTANT

## 2021-10-21 PROCEDURE — 99214 OFFICE O/P EST MOD 30 MIN: CPT | Performed by: PHYSICIAN ASSISTANT

## 2021-10-26 ENCOUNTER — TELEPHONE (OUTPATIENT)
Dept: UROLOGY | Facility: AMBULATORY SURGERY CENTER | Age: 76
End: 2021-10-26

## 2021-11-13 DIAGNOSIS — I48.91 ATRIAL FIBRILLATION, UNSPECIFIED TYPE (HCC): Primary | ICD-10-CM

## 2021-11-14 ENCOUNTER — HOSPITAL ENCOUNTER (EMERGENCY)
Facility: HOSPITAL | Age: 76
Discharge: HOME | End: 2021-11-14
Attending: EMERGENCY MEDICINE | Admitting: EMERGENCY MEDICINE
Payer: COMMERCIAL

## 2021-11-14 ENCOUNTER — APPOINTMENT (EMERGENCY)
Dept: RADIOLOGY | Facility: HOSPITAL | Age: 76
End: 2021-11-14
Payer: COMMERCIAL

## 2021-11-14 VITALS
WEIGHT: 235 LBS | OXYGEN SATURATION: 99 % | HEART RATE: 119 BPM | DIASTOLIC BLOOD PRESSURE: 100 MMHG | HEIGHT: 75 IN | TEMPERATURE: 99.4 F | BODY MASS INDEX: 29.22 KG/M2 | SYSTOLIC BLOOD PRESSURE: 170 MMHG | RESPIRATION RATE: 18 BRPM

## 2021-11-14 DIAGNOSIS — B34.9 VIRAL SYNDROME: ICD-10-CM

## 2021-11-14 DIAGNOSIS — U07.1 COVID-19: Primary | ICD-10-CM

## 2021-11-14 LAB
ALBUMIN SERPL BCP-MCNC: 3.7 G/DL (ref 3.5–5)
ALP SERPL-CCNC: 74 U/L (ref 46–116)
ALT SERPL W P-5'-P-CCNC: 38 U/L (ref 12–78)
ANION GAP SERPL CALCULATED.3IONS-SCNC: 11 MMOL/L (ref 4–13)
AST SERPL W P-5'-P-CCNC: 33 U/L (ref 5–45)
ATRIAL RATE: 240 BPM
BASOPHILS # BLD AUTO: 0.01 THOUSANDS/ΜL (ref 0–0.1)
BASOPHILS NFR BLD AUTO: 0 % (ref 0–1)
BILIRUB SERPL-MCNC: 0.66 MG/DL (ref 0.2–1)
BILIRUB UR QL STRIP: NEGATIVE
BUN SERPL-MCNC: 8 MG/DL (ref 5–25)
CALCIUM SERPL-MCNC: 8.7 MG/DL (ref 8.3–10.1)
CHLORIDE SERPL-SCNC: 108 MMOL/L (ref 100–108)
CLARITY UR: CLEAR
CO2 SERPL-SCNC: 25 MMOL/L (ref 21–32)
COLOR UR: YELLOW
CREAT SERPL-MCNC: 1.17 MG/DL (ref 0.6–1.3)
EOSINOPHIL # BLD AUTO: 0.02 THOUSAND/ΜL (ref 0–0.61)
EOSINOPHIL NFR BLD AUTO: 1 % (ref 0–6)
ERYTHROCYTE [DISTWIDTH] IN BLOOD BY AUTOMATED COUNT: 14.5 % (ref 11.6–15.1)
FLUAV RNA RESP QL NAA+PROBE: NEGATIVE
FLUBV RNA RESP QL NAA+PROBE: NEGATIVE
GFR SERPL CREATININE-BSD FRML MDRD: 70 ML/MIN/1.73SQ M
GLUCOSE SERPL-MCNC: 100 MG/DL (ref 65–140)
GLUCOSE UR STRIP-MCNC: NEGATIVE MG/DL
HCT VFR BLD AUTO: 38.7 % (ref 36.5–49.3)
HGB BLD-MCNC: 12.8 G/DL (ref 12–17)
HGB UR QL STRIP.AUTO: NEGATIVE
IMM GRANULOCYTES # BLD AUTO: 0.02 THOUSAND/UL (ref 0–0.2)
IMM GRANULOCYTES NFR BLD AUTO: 1 % (ref 0–2)
KETONES UR STRIP-MCNC: NEGATIVE MG/DL
LEUKOCYTE ESTERASE UR QL STRIP: NEGATIVE
LIPASE SERPL-CCNC: 44 U/L (ref 73–393)
LYMPHOCYTES # BLD AUTO: 0.44 THOUSANDS/ΜL (ref 0.6–4.47)
LYMPHOCYTES NFR BLD AUTO: 11 % (ref 14–44)
MCH RBC QN AUTO: 31.2 PG (ref 26.8–34.3)
MCHC RBC AUTO-ENTMCNC: 33.1 G/DL (ref 31.4–37.4)
MCV RBC AUTO: 94 FL (ref 82–98)
MONOCYTES # BLD AUTO: 0.7 THOUSAND/ΜL (ref 0.17–1.22)
MONOCYTES NFR BLD AUTO: 18 % (ref 4–12)
NEUTROPHILS # BLD AUTO: 2.75 THOUSANDS/ΜL (ref 1.85–7.62)
NEUTS SEG NFR BLD AUTO: 69 % (ref 43–75)
NITRITE UR QL STRIP: NEGATIVE
NRBC BLD AUTO-RTO: 0 /100 WBCS
P AXIS: 209 DEGREES
PH UR STRIP.AUTO: 6.5 [PH]
PLATELET # BLD AUTO: 123 THOUSANDS/UL (ref 149–390)
PMV BLD AUTO: 11.4 FL (ref 8.9–12.7)
POTASSIUM SERPL-SCNC: 3.6 MMOL/L (ref 3.5–5.3)
PROT SERPL-MCNC: 7.3 G/DL (ref 6.4–8.2)
PROT UR STRIP-MCNC: NEGATIVE MG/DL
QRS AXIS: -30 DEGREES
QRSD INTERVAL: 88 MS
QT INTERVAL: 276 MS
QTC INTERVAL: 390 MS
RBC # BLD AUTO: 4.1 MILLION/UL (ref 3.88–5.62)
RSV RNA RESP QL NAA+PROBE: NEGATIVE
SARS-COV-2 RNA RESP QL NAA+PROBE: POSITIVE
SODIUM SERPL-SCNC: 144 MMOL/L (ref 136–145)
SP GR UR STRIP.AUTO: 1.02 (ref 1–1.03)
T WAVE AXIS: -88 DEGREES
UROBILINOGEN UR QL STRIP.AUTO: 1 E.U./DL
VENTRICULAR RATE: 120 BPM
WBC # BLD AUTO: 3.94 THOUSAND/UL (ref 4.31–10.16)

## 2021-11-14 PROCEDURE — 99284 EMERGENCY DEPT VISIT MOD MDM: CPT | Performed by: EMERGENCY MEDICINE

## 2021-11-14 PROCEDURE — 36415 COLL VENOUS BLD VENIPUNCTURE: CPT

## 2021-11-14 PROCEDURE — 99284 EMERGENCY DEPT VISIT MOD MDM: CPT

## 2021-11-14 PROCEDURE — 0241U HB NFCT DS VIR RESP RNA 4 TRGT: CPT | Performed by: EMERGENCY MEDICINE

## 2021-11-14 PROCEDURE — 93005 ELECTROCARDIOGRAM TRACING: CPT

## 2021-11-14 PROCEDURE — 85025 COMPLETE CBC W/AUTO DIFF WBC: CPT | Performed by: EMERGENCY MEDICINE

## 2021-11-14 PROCEDURE — 71046 X-RAY EXAM CHEST 2 VIEWS: CPT

## 2021-11-14 PROCEDURE — 83690 ASSAY OF LIPASE: CPT | Performed by: EMERGENCY MEDICINE

## 2021-11-14 PROCEDURE — 80053 COMPREHEN METABOLIC PANEL: CPT | Performed by: EMERGENCY MEDICINE

## 2021-11-14 PROCEDURE — 93010 ELECTROCARDIOGRAM REPORT: CPT | Performed by: INTERNAL MEDICINE

## 2021-11-14 PROCEDURE — 81003 URINALYSIS AUTO W/O SCOPE: CPT | Performed by: EMERGENCY MEDICINE

## 2021-11-21 ENCOUNTER — HOSPITAL ENCOUNTER (INPATIENT)
Facility: HOSPITAL | Age: 76
LOS: 1 days | Discharge: HOME | DRG: 177 | End: 2021-11-23
Attending: EMERGENCY MEDICINE | Admitting: INTERNAL MEDICINE
Payer: COMMERCIAL

## 2021-11-21 ENCOUNTER — APPOINTMENT (EMERGENCY)
Dept: RADIOLOGY | Facility: HOSPITAL | Age: 76
DRG: 177 | End: 2021-11-21
Payer: COMMERCIAL

## 2021-11-21 DIAGNOSIS — R00.0 SINUS TACHYCARDIA: Primary | ICD-10-CM

## 2021-11-21 DIAGNOSIS — R00.0 TACHYCARDIA: ICD-10-CM

## 2021-11-21 DIAGNOSIS — R11.0 NAUSEA: ICD-10-CM

## 2021-11-21 DIAGNOSIS — U07.1 COVID-19 VIRUS INFECTION: ICD-10-CM

## 2021-11-21 PROBLEM — R79.89 ELEVATED SERUM CREATININE: Status: ACTIVE | Noted: 2021-11-21

## 2021-11-21 LAB
2HR DELTA HS TROPONIN: -1 NG/L
4HR DELTA HS TROPONIN: 0 NG/L
ALBUMIN SERPL BCP-MCNC: 3.3 G/DL (ref 3.5–5)
ALP SERPL-CCNC: 60 U/L (ref 46–116)
ALT SERPL W P-5'-P-CCNC: 31 U/L (ref 12–78)
ANION GAP SERPL CALCULATED.3IONS-SCNC: 5 MMOL/L (ref 4–13)
APTT PPP: 36 SECONDS (ref 23–37)
AST SERPL W P-5'-P-CCNC: 36 U/L (ref 5–45)
ATRIAL RATE: 136 BPM
BACTERIA UR QL AUTO: NORMAL /HPF
BASOPHILS # BLD AUTO: 0 THOUSANDS/ΜL (ref 0–0.1)
BASOPHILS NFR BLD AUTO: 0 % (ref 0–1)
BILIRUB DIRECT SERPL-MCNC: 0.18 MG/DL (ref 0–0.2)
BILIRUB SERPL-MCNC: 0.52 MG/DL (ref 0.2–1)
BILIRUB UR QL STRIP: NEGATIVE
BUN SERPL-MCNC: 10 MG/DL (ref 5–25)
CA-I BLD-SCNC: 1.08 MMOL/L (ref 1.12–1.32)
CALCIUM SERPL-MCNC: 8.2 MG/DL (ref 8.3–10.1)
CARDIAC TROPONIN I PNL SERPL HS: 6 NG/L
CARDIAC TROPONIN I PNL SERPL HS: 7 NG/L
CARDIAC TROPONIN I PNL SERPL HS: 7 NG/L
CHLORIDE SERPL-SCNC: 102 MMOL/L (ref 100–108)
CK MB SERPL-MCNC: 1.3 NG/ML (ref 0–5)
CK MB SERPL-MCNC: <1 % (ref 0–2.5)
CK SERPL-CCNC: 730 U/L (ref 39–308)
CLARITY UR: CLEAR
CO2 SERPL-SCNC: 30 MMOL/L (ref 21–32)
COLOR UR: YELLOW
CREAT SERPL-MCNC: 1.45 MG/DL (ref 0.6–1.3)
CRP SERPL QL: 79.8 MG/L
D DIMER PPP FEU-MCNC: 0.55 UG/ML FEU
EOSINOPHIL # BLD AUTO: 0.01 THOUSAND/ΜL (ref 0–0.61)
EOSINOPHIL NFR BLD AUTO: 0 % (ref 0–6)
ERYTHROCYTE [DISTWIDTH] IN BLOOD BY AUTOMATED COUNT: 14.2 % (ref 11.6–15.1)
GFR SERPL CREATININE-BSD FRML MDRD: 54 ML/MIN/1.73SQ M
GLUCOSE SERPL-MCNC: 111 MG/DL (ref 65–140)
GLUCOSE UR STRIP-MCNC: NEGATIVE MG/DL
HCT VFR BLD AUTO: 39.1 % (ref 36.5–49.3)
HGB BLD-MCNC: 12.9 G/DL (ref 12–17)
HGB UR QL STRIP.AUTO: ABNORMAL
IMM GRANULOCYTES # BLD AUTO: 0.02 THOUSAND/UL (ref 0–0.2)
IMM GRANULOCYTES NFR BLD AUTO: 0 % (ref 0–2)
INR PPP: 1.3 (ref 0.84–1.19)
KETONES UR STRIP-MCNC: NEGATIVE MG/DL
LACTATE SERPL-SCNC: 1.2 MMOL/L (ref 0.5–2)
LEUKOCYTE ESTERASE UR QL STRIP: NEGATIVE
LYMPHOCYTES # BLD AUTO: 0.66 THOUSANDS/ΜL (ref 0.6–4.47)
LYMPHOCYTES NFR BLD AUTO: 15 % (ref 14–44)
MAGNESIUM SERPL-MCNC: 2.4 MG/DL (ref 1.6–2.6)
MCH RBC QN AUTO: 30.8 PG (ref 26.8–34.3)
MCHC RBC AUTO-ENTMCNC: 33 G/DL (ref 31.4–37.4)
MCV RBC AUTO: 93 FL (ref 82–98)
MONOCYTES # BLD AUTO: 0.3 THOUSAND/ΜL (ref 0.17–1.22)
MONOCYTES NFR BLD AUTO: 7 % (ref 4–12)
NEUTROPHILS # BLD AUTO: 3.57 THOUSANDS/ΜL (ref 1.85–7.62)
NEUTS SEG NFR BLD AUTO: 78 % (ref 43–75)
NITRITE UR QL STRIP: NEGATIVE
NON-SQ EPI CELLS URNS QL MICRO: NORMAL /HPF
NRBC BLD AUTO-RTO: 0 /100 WBCS
NT-PROBNP SERPL-MCNC: 36 PG/ML
P AXIS: 6 DEGREES
PH UR STRIP.AUTO: 6.5 [PH]
PLATELET # BLD AUTO: 124 THOUSANDS/UL (ref 149–390)
PMV BLD AUTO: 11.5 FL (ref 8.9–12.7)
POTASSIUM SERPL-SCNC: 4.5 MMOL/L (ref 3.5–5.3)
PR INTERVAL: 112 MS
PROT SERPL-MCNC: 7 G/DL (ref 6.4–8.2)
PROT UR STRIP-MCNC: NEGATIVE MG/DL
PROTHROMBIN TIME: 15.7 SECONDS (ref 11.6–14.5)
QRS AXIS: -28 DEGREES
QRSD INTERVAL: 88 MS
QT INTERVAL: 334 MS
QTC INTERVAL: 502 MS
RBC # BLD AUTO: 4.19 MILLION/UL (ref 3.88–5.62)
RBC #/AREA URNS AUTO: NORMAL /HPF
SODIUM SERPL-SCNC: 137 MMOL/L (ref 136–145)
SP GR UR STRIP.AUTO: <=1.005 (ref 1–1.03)
T WAVE AXIS: -63 DEGREES
TRIGL SERPL-MCNC: 113 MG/DL
TSH SERPL DL<=0.05 MIU/L-ACNC: 2.13 UIU/ML (ref 0.36–3.74)
UROBILINOGEN UR QL STRIP.AUTO: 1 E.U./DL
VENTRICULAR RATE: 136 BPM
WBC # BLD AUTO: 4.56 THOUSAND/UL (ref 4.31–10.16)
WBC #/AREA URNS AUTO: NORMAL /HPF

## 2021-11-21 PROCEDURE — 85730 THROMBOPLASTIN TIME PARTIAL: CPT | Performed by: EMERGENCY MEDICINE

## 2021-11-21 PROCEDURE — 82955 ASSAY OF G6PD ENZYME: CPT | Performed by: EMERGENCY MEDICINE

## 2021-11-21 PROCEDURE — 84145 PROCALCITONIN (PCT): CPT | Performed by: EMERGENCY MEDICINE

## 2021-11-21 PROCEDURE — 82553 CREATINE MB FRACTION: CPT | Performed by: EMERGENCY MEDICINE

## 2021-11-21 PROCEDURE — 81001 URINALYSIS AUTO W/SCOPE: CPT | Performed by: INTERNAL MEDICINE

## 2021-11-21 PROCEDURE — 85610 PROTHROMBIN TIME: CPT | Performed by: EMERGENCY MEDICINE

## 2021-11-21 PROCEDURE — 84484 ASSAY OF TROPONIN QUANT: CPT | Performed by: EMERGENCY MEDICINE

## 2021-11-21 PROCEDURE — 93010 ELECTROCARDIOGRAM REPORT: CPT | Performed by: INTERNAL MEDICINE

## 2021-11-21 PROCEDURE — 83735 ASSAY OF MAGNESIUM: CPT | Performed by: EMERGENCY MEDICINE

## 2021-11-21 PROCEDURE — 84443 ASSAY THYROID STIM HORMONE: CPT | Performed by: INTERNAL MEDICINE

## 2021-11-21 PROCEDURE — 84478 ASSAY OF TRIGLYCERIDES: CPT | Performed by: EMERGENCY MEDICINE

## 2021-11-21 PROCEDURE — 85379 FIBRIN DEGRADATION QUANT: CPT | Performed by: EMERGENCY MEDICINE

## 2021-11-21 PROCEDURE — 86140 C-REACTIVE PROTEIN: CPT | Performed by: EMERGENCY MEDICINE

## 2021-11-21 PROCEDURE — 36415 COLL VENOUS BLD VENIPUNCTURE: CPT | Performed by: EMERGENCY MEDICINE

## 2021-11-21 PROCEDURE — 96365 THER/PROPH/DIAG IV INF INIT: CPT

## 2021-11-21 PROCEDURE — 82550 ASSAY OF CK (CPK): CPT | Performed by: EMERGENCY MEDICINE

## 2021-11-21 PROCEDURE — 96375 TX/PRO/DX INJ NEW DRUG ADDON: CPT

## 2021-11-21 PROCEDURE — 93005 ELECTROCARDIOGRAM TRACING: CPT

## 2021-11-21 PROCEDURE — 80076 HEPATIC FUNCTION PANEL: CPT | Performed by: EMERGENCY MEDICINE

## 2021-11-21 PROCEDURE — 83880 ASSAY OF NATRIURETIC PEPTIDE: CPT | Performed by: EMERGENCY MEDICINE

## 2021-11-21 PROCEDURE — 82330 ASSAY OF CALCIUM: CPT | Performed by: EMERGENCY MEDICINE

## 2021-11-21 PROCEDURE — 85025 COMPLETE CBC W/AUTO DIFF WBC: CPT | Performed by: EMERGENCY MEDICINE

## 2021-11-21 PROCEDURE — 96366 THER/PROPH/DIAG IV INF ADDON: CPT

## 2021-11-21 PROCEDURE — 99285 EMERGENCY DEPT VISIT HI MDM: CPT | Performed by: EMERGENCY MEDICINE

## 2021-11-21 PROCEDURE — 82728 ASSAY OF FERRITIN: CPT | Performed by: EMERGENCY MEDICINE

## 2021-11-21 PROCEDURE — 87040 BLOOD CULTURE FOR BACTERIA: CPT | Performed by: EMERGENCY MEDICINE

## 2021-11-21 PROCEDURE — 80048 BASIC METABOLIC PNL TOTAL CA: CPT | Performed by: EMERGENCY MEDICINE

## 2021-11-21 PROCEDURE — 99220 PR INITIAL OBSERVATION CARE/DAY 70 MINUTES: CPT | Performed by: INTERNAL MEDICINE

## 2021-11-21 PROCEDURE — 99285 EMERGENCY DEPT VISIT HI MDM: CPT

## 2021-11-21 PROCEDURE — 83605 ASSAY OF LACTIC ACID: CPT | Performed by: EMERGENCY MEDICINE

## 2021-11-21 PROCEDURE — 71045 X-RAY EXAM CHEST 1 VIEW: CPT

## 2021-11-21 RX ORDER — TAMSULOSIN HYDROCHLORIDE 0.4 MG/1
0.4 CAPSULE ORAL
Status: DISCONTINUED | OUTPATIENT
Start: 2021-11-21 | End: 2021-11-23 | Stop reason: HOSPADM

## 2021-11-21 RX ORDER — DOCUSATE SODIUM 100 MG/1
100 CAPSULE, LIQUID FILLED ORAL 2 TIMES DAILY
Status: DISCONTINUED | OUTPATIENT
Start: 2021-11-21 | End: 2021-11-23 | Stop reason: HOSPADM

## 2021-11-21 RX ORDER — ATORVASTATIN CALCIUM 40 MG/1
40 TABLET, FILM COATED ORAL
Status: DISCONTINUED | OUTPATIENT
Start: 2021-11-21 | End: 2021-11-23 | Stop reason: HOSPADM

## 2021-11-21 RX ORDER — RIBOFLAVIN (VITAMIN B2) 100 MG
100 TABLET ORAL DAILY
Status: DISCONTINUED | OUTPATIENT
Start: 2021-11-22 | End: 2021-11-21

## 2021-11-21 RX ORDER — ASCORBIC ACID 500 MG
500 TABLET ORAL DAILY
Status: DISCONTINUED | OUTPATIENT
Start: 2021-11-21 | End: 2021-11-23 | Stop reason: HOSPADM

## 2021-11-21 RX ORDER — SODIUM CHLORIDE, SODIUM GLUCONATE, SODIUM ACETATE, POTASSIUM CHLORIDE, MAGNESIUM CHLORIDE, SODIUM PHOSPHATE, DIBASIC, AND POTASSIUM PHOSPHATE .53; .5; .37; .037; .03; .012; .00082 G/100ML; G/100ML; G/100ML; G/100ML; G/100ML; G/100ML; G/100ML
75 INJECTION, SOLUTION INTRAVENOUS CONTINUOUS
Status: DISCONTINUED | OUTPATIENT
Start: 2021-11-21 | End: 2021-11-22

## 2021-11-21 RX ORDER — SODIUM CHLORIDE, SODIUM GLUCONATE, SODIUM ACETATE, POTASSIUM CHLORIDE, MAGNESIUM CHLORIDE, SODIUM PHOSPHATE, DIBASIC, AND POTASSIUM PHOSPHATE .53; .5; .37; .037; .03; .012; .00082 G/100ML; G/100ML; G/100ML; G/100ML; G/100ML; G/100ML; G/100ML
75 INJECTION, SOLUTION INTRAVENOUS CONTINUOUS
Status: DISCONTINUED | OUTPATIENT
Start: 2021-11-21 | End: 2021-11-21

## 2021-11-21 RX ORDER — METOPROLOL TARTRATE 5 MG/5ML
2.5 INJECTION INTRAVENOUS EVERY 6 HOURS PRN
Status: DISCONTINUED | OUTPATIENT
Start: 2021-11-21 | End: 2021-11-23 | Stop reason: HOSPADM

## 2021-11-21 RX ORDER — METOPROLOL TARTRATE 5 MG/5ML
2.5 INJECTION INTRAVENOUS EVERY 8 HOURS PRN
Status: DISCONTINUED | OUTPATIENT
Start: 2021-11-21 | End: 2021-11-21

## 2021-11-21 RX ORDER — ACETAMINOPHEN 325 MG/1
650 TABLET ORAL EVERY 6 HOURS PRN
Status: DISCONTINUED | OUTPATIENT
Start: 2021-11-21 | End: 2021-11-23 | Stop reason: HOSPADM

## 2021-11-21 RX ORDER — BENZONATATE 100 MG/1
100 CAPSULE ORAL 3 TIMES DAILY PRN
Status: DISCONTINUED | OUTPATIENT
Start: 2021-11-21 | End: 2021-11-23 | Stop reason: HOSPADM

## 2021-11-21 RX ORDER — ONDANSETRON 2 MG/ML
4 INJECTION INTRAMUSCULAR; INTRAVENOUS ONCE
Status: COMPLETED | OUTPATIENT
Start: 2021-11-21 | End: 2021-11-21

## 2021-11-21 RX ADMIN — ONDANSETRON 4 MG: 2 INJECTION INTRAMUSCULAR; INTRAVENOUS at 11:25

## 2021-11-21 RX ADMIN — DOCUSATE SODIUM 100 MG: 100 CAPSULE, LIQUID FILLED ORAL at 18:04

## 2021-11-21 RX ADMIN — APIXABAN 5 MG: 5 TABLET, FILM COATED ORAL at 18:04

## 2021-11-21 RX ADMIN — METOPROLOL TARTRATE 2.5 MG: 5 INJECTION, SOLUTION INTRAVENOUS at 18:04

## 2021-11-21 RX ADMIN — SODIUM CHLORIDE, SODIUM LACTATE, POTASSIUM CHLORIDE, AND CALCIUM CHLORIDE 1000 ML: .6; .31; .03; .02 INJECTION, SOLUTION INTRAVENOUS at 11:25

## 2021-11-21 RX ADMIN — TAMSULOSIN HYDROCHLORIDE 0.4 MG: 0.4 CAPSULE ORAL at 18:04

## 2021-11-21 RX ADMIN — ATORVASTATIN CALCIUM 40 MG: 40 TABLET, FILM COATED ORAL at 18:03

## 2021-11-21 RX ADMIN — SODIUM CHLORIDE, SODIUM GLUCONATE, SODIUM ACETATE, POTASSIUM CHLORIDE, MAGNESIUM CHLORIDE, SODIUM PHOSPHATE, DIBASIC, AND POTASSIUM PHOSPHATE 75 ML/HR: .53; .5; .37; .037; .03; .012; .00082 INJECTION, SOLUTION INTRAVENOUS at 21:01

## 2021-11-21 RX ADMIN — OXYCODONE HYDROCHLORIDE AND ACETAMINOPHEN 500 MG: 500 TABLET ORAL at 18:04

## 2021-11-22 LAB
ALBUMIN SERPL BCP-MCNC: 2.9 G/DL (ref 3.5–5)
ALP SERPL-CCNC: 54 U/L (ref 46–116)
ALT SERPL W P-5'-P-CCNC: 28 U/L (ref 12–78)
ANION GAP SERPL CALCULATED.3IONS-SCNC: 6 MMOL/L (ref 4–13)
AST SERPL W P-5'-P-CCNC: 34 U/L (ref 5–45)
BASOPHILS # BLD AUTO: 0 THOUSANDS/ΜL (ref 0–0.1)
BASOPHILS NFR BLD AUTO: 0 % (ref 0–1)
BILIRUB SERPL-MCNC: 0.64 MG/DL (ref 0.2–1)
BUN SERPL-MCNC: 10 MG/DL (ref 5–25)
CALCIUM ALBUM COR SERPL-MCNC: 8.8 MG/DL (ref 8.3–10.1)
CALCIUM SERPL-MCNC: 7.9 MG/DL (ref 8.3–10.1)
CHLORIDE SERPL-SCNC: 103 MMOL/L (ref 100–108)
CO2 SERPL-SCNC: 29 MMOL/L (ref 21–32)
CREAT SERPL-MCNC: 1.27 MG/DL (ref 0.6–1.3)
CRP SERPL QL: 106.8 MG/L
EOSINOPHIL # BLD AUTO: 0.01 THOUSAND/ΜL (ref 0–0.61)
EOSINOPHIL NFR BLD AUTO: 0 % (ref 0–6)
ERYTHROCYTE [DISTWIDTH] IN BLOOD BY AUTOMATED COUNT: 14.1 % (ref 11.6–15.1)
FERRITIN SERPL-MCNC: 402 NG/ML (ref 8–388)
GFR SERPL CREATININE-BSD FRML MDRD: 63 ML/MIN/1.73SQ M
GLUCOSE SERPL-MCNC: 101 MG/DL (ref 65–140)
HCT VFR BLD AUTO: 35.8 % (ref 36.5–49.3)
HGB BLD-MCNC: 11.8 G/DL (ref 12–17)
IMM GRANULOCYTES # BLD AUTO: 0.02 THOUSAND/UL (ref 0–0.2)
IMM GRANULOCYTES NFR BLD AUTO: 1 % (ref 0–2)
LYMPHOCYTES # BLD AUTO: 0.75 THOUSANDS/ΜL (ref 0.6–4.47)
LYMPHOCYTES NFR BLD AUTO: 17 % (ref 14–44)
MCH RBC QN AUTO: 30.7 PG (ref 26.8–34.3)
MCHC RBC AUTO-ENTMCNC: 33 G/DL (ref 31.4–37.4)
MCV RBC AUTO: 93 FL (ref 82–98)
MONOCYTES # BLD AUTO: 0.34 THOUSAND/ΜL (ref 0.17–1.22)
MONOCYTES NFR BLD AUTO: 8 % (ref 4–12)
NEUTROPHILS # BLD AUTO: 3.19 THOUSANDS/ΜL (ref 1.85–7.62)
NEUTS SEG NFR BLD AUTO: 74 % (ref 43–75)
NRBC BLD AUTO-RTO: 0 /100 WBCS
PLATELET # BLD AUTO: 107 THOUSANDS/UL (ref 149–390)
PMV BLD AUTO: 10.9 FL (ref 8.9–12.7)
POTASSIUM SERPL-SCNC: 4.3 MMOL/L (ref 3.5–5.3)
PROCALCITONIN SERPL-MCNC: <0.05 NG/ML
PROT SERPL-MCNC: 6.6 G/DL (ref 6.4–8.2)
RBC # BLD AUTO: 3.84 MILLION/UL (ref 3.88–5.62)
SODIUM SERPL-SCNC: 138 MMOL/L (ref 136–145)
WBC # BLD AUTO: 4.31 THOUSAND/UL (ref 4.31–10.16)

## 2021-11-22 PROCEDURE — 84145 PROCALCITONIN (PCT): CPT | Performed by: EMERGENCY MEDICINE

## 2021-11-22 PROCEDURE — 80053 COMPREHEN METABOLIC PANEL: CPT | Performed by: INTERNAL MEDICINE

## 2021-11-22 PROCEDURE — 99215 OFFICE O/P EST HI 40 MIN: CPT | Performed by: INTERNAL MEDICINE

## 2021-11-22 PROCEDURE — 84145 PROCALCITONIN (PCT): CPT | Performed by: INTERNAL MEDICINE

## 2021-11-22 PROCEDURE — 36415 COLL VENOUS BLD VENIPUNCTURE: CPT | Performed by: INTERNAL MEDICINE

## 2021-11-22 PROCEDURE — 93005 ELECTROCARDIOGRAM TRACING: CPT

## 2021-11-22 PROCEDURE — 85025 COMPLETE CBC W/AUTO DIFF WBC: CPT | Performed by: INTERNAL MEDICINE

## 2021-11-22 PROCEDURE — 86140 C-REACTIVE PROTEIN: CPT | Performed by: INTERNAL MEDICINE

## 2021-11-22 PROCEDURE — 99225 PR SBSQ OBSERVATION CARE/DAY 25 MINUTES: CPT | Performed by: INTERNAL MEDICINE

## 2021-11-22 RX ORDER — SOTALOL HYDROCHLORIDE 120 MG/1
120 TABLET ORAL EVERY 12 HOURS
Status: DISCONTINUED | OUTPATIENT
Start: 2021-11-22 | End: 2021-11-22

## 2021-11-22 RX ORDER — SOTALOL HYDROCHLORIDE 80 MG/1
120 TABLET ORAL EVERY 12 HOURS SCHEDULED
Status: DISCONTINUED | OUTPATIENT
Start: 2021-11-22 | End: 2021-11-23

## 2021-11-22 RX ADMIN — TAMSULOSIN HYDROCHLORIDE 0.4 MG: 0.4 CAPSULE ORAL at 15:57

## 2021-11-22 RX ADMIN — METOROPROLOL TARTRATE 2.5 MG: 5 INJECTION, SOLUTION INTRAVENOUS at 07:08

## 2021-11-22 RX ADMIN — OXYCODONE HYDROCHLORIDE AND ACETAMINOPHEN 500 MG: 500 TABLET ORAL at 08:35

## 2021-11-22 RX ADMIN — METOROPROLOL TARTRATE 2.5 MG: 5 INJECTION, SOLUTION INTRAVENOUS at 20:47

## 2021-11-22 RX ADMIN — APIXABAN 5 MG: 5 TABLET, FILM COATED ORAL at 08:35

## 2021-11-22 RX ADMIN — ATORVASTATIN CALCIUM 40 MG: 40 TABLET, FILM COATED ORAL at 15:57

## 2021-11-22 RX ADMIN — DOCUSATE SODIUM 100 MG: 100 CAPSULE, LIQUID FILLED ORAL at 17:04

## 2021-11-22 RX ADMIN — APIXABAN 5 MG: 5 TABLET, FILM COATED ORAL at 17:04

## 2021-11-22 RX ADMIN — DOCUSATE SODIUM 100 MG: 100 CAPSULE, LIQUID FILLED ORAL at 08:35

## 2021-11-22 RX ADMIN — METOROPROLOL TARTRATE 2.5 MG: 5 INJECTION, SOLUTION INTRAVENOUS at 01:54

## 2021-11-23 VITALS
RESPIRATION RATE: 34 BRPM | TEMPERATURE: 99.1 F | SYSTOLIC BLOOD PRESSURE: 122 MMHG | HEART RATE: 75 BPM | DIASTOLIC BLOOD PRESSURE: 69 MMHG | OXYGEN SATURATION: 95 %

## 2021-11-23 PROBLEM — R79.89 ELEVATED SERUM CREATININE: Status: RESOLVED | Noted: 2021-11-21 | Resolved: 2021-11-23

## 2021-11-23 LAB
ALBUMIN SERPL BCP-MCNC: 3 G/DL (ref 3.5–5)
ALP SERPL-CCNC: 56 U/L (ref 46–116)
ALT SERPL W P-5'-P-CCNC: 41 U/L (ref 12–78)
ANION GAP SERPL CALCULATED.3IONS-SCNC: 8 MMOL/L (ref 4–13)
AST SERPL W P-5'-P-CCNC: 41 U/L (ref 5–45)
ATRIAL RATE: 84 BPM
BASOPHILS # BLD AUTO: 0 THOUSANDS/ΜL (ref 0–0.1)
BASOPHILS NFR BLD AUTO: 0 % (ref 0–1)
BILIRUB SERPL-MCNC: 0.57 MG/DL (ref 0.2–1)
BUN SERPL-MCNC: 11 MG/DL (ref 5–25)
CALCIUM ALBUM COR SERPL-MCNC: 9.1 MG/DL (ref 8.3–10.1)
CALCIUM SERPL-MCNC: 8.3 MG/DL (ref 8.3–10.1)
CHLORIDE SERPL-SCNC: 105 MMOL/L (ref 100–108)
CO2 SERPL-SCNC: 30 MMOL/L (ref 21–32)
CREAT SERPL-MCNC: 1.16 MG/DL (ref 0.6–1.3)
CRP SERPL QL: 66.9 MG/L
EOSINOPHIL # BLD AUTO: 0.04 THOUSAND/ΜL (ref 0–0.61)
EOSINOPHIL NFR BLD AUTO: 1 % (ref 0–6)
ERYTHROCYTE [DISTWIDTH] IN BLOOD BY AUTOMATED COUNT: 13.8 % (ref 11.6–15.1)
GFR SERPL CREATININE-BSD FRML MDRD: 70 ML/MIN/1.73SQ M
GLUCOSE SERPL-MCNC: 103 MG/DL (ref 65–140)
HCT VFR BLD AUTO: 37 % (ref 36.5–49.3)
HGB BLD-MCNC: 11.9 G/DL (ref 12–17)
IMM GRANULOCYTES # BLD AUTO: 0.02 THOUSAND/UL (ref 0–0.2)
IMM GRANULOCYTES NFR BLD AUTO: 1 % (ref 0–2)
LYMPHOCYTES # BLD AUTO: 0.87 THOUSANDS/ΜL (ref 0.6–4.47)
LYMPHOCYTES NFR BLD AUTO: 24 % (ref 14–44)
MCH RBC QN AUTO: 29.9 PG (ref 26.8–34.3)
MCHC RBC AUTO-ENTMCNC: 32.2 G/DL (ref 31.4–37.4)
MCV RBC AUTO: 93 FL (ref 82–98)
MONOCYTES # BLD AUTO: 0.37 THOUSAND/ΜL (ref 0.17–1.22)
MONOCYTES NFR BLD AUTO: 10 % (ref 4–12)
NEUTROPHILS # BLD AUTO: 2.39 THOUSANDS/ΜL (ref 1.85–7.62)
NEUTS SEG NFR BLD AUTO: 64 % (ref 43–75)
NRBC BLD AUTO-RTO: 0 /100 WBCS
P AXIS: 54 DEGREES
PLATELET # BLD AUTO: 128 THOUSANDS/UL (ref 149–390)
PMV BLD AUTO: 10.9 FL (ref 8.9–12.7)
POTASSIUM SERPL-SCNC: 4.2 MMOL/L (ref 3.5–5.3)
PR INTERVAL: 166 MS
PROCALCITONIN SERPL-MCNC: <0.05 NG/ML
PROT SERPL-MCNC: 6.9 G/DL (ref 6.4–8.2)
QRS AXIS: -19 DEGREES
QRSD INTERVAL: 92 MS
QT INTERVAL: 370 MS
QTC INTERVAL: 437 MS
RBC # BLD AUTO: 3.98 MILLION/UL (ref 3.88–5.62)
SODIUM SERPL-SCNC: 143 MMOL/L (ref 136–145)
T WAVE AXIS: -5 DEGREES
VENTRICULAR RATE: 84 BPM
WBC # BLD AUTO: 3.69 THOUSAND/UL (ref 4.31–10.16)

## 2021-11-23 PROCEDURE — 93010 ELECTROCARDIOGRAM REPORT: CPT | Performed by: INTERNAL MEDICINE

## 2021-11-23 PROCEDURE — 80053 COMPREHEN METABOLIC PANEL: CPT | Performed by: INTERNAL MEDICINE

## 2021-11-23 PROCEDURE — 93005 ELECTROCARDIOGRAM TRACING: CPT

## 2021-11-23 PROCEDURE — 84145 PROCALCITONIN (PCT): CPT | Performed by: INTERNAL MEDICINE

## 2021-11-23 PROCEDURE — 86140 C-REACTIVE PROTEIN: CPT | Performed by: INTERNAL MEDICINE

## 2021-11-23 PROCEDURE — 85025 COMPLETE CBC W/AUTO DIFF WBC: CPT | Performed by: INTERNAL MEDICINE

## 2021-11-23 PROCEDURE — 36415 COLL VENOUS BLD VENIPUNCTURE: CPT | Performed by: INTERNAL MEDICINE

## 2021-11-23 PROCEDURE — 99232 SBSQ HOSP IP/OBS MODERATE 35: CPT | Performed by: INTERNAL MEDICINE

## 2021-11-23 PROCEDURE — 99239 HOSP IP/OBS DSCHRG MGMT >30: CPT | Performed by: INTERNAL MEDICINE

## 2021-11-23 RX ORDER — SOTALOL HYDROCHLORIDE 80 MG/1
120 TABLET ORAL EVERY 12 HOURS SCHEDULED
Status: DISCONTINUED | OUTPATIENT
Start: 2021-11-23 | End: 2021-11-23 | Stop reason: HOSPADM

## 2021-11-23 RX ADMIN — OXYCODONE HYDROCHLORIDE AND ACETAMINOPHEN 500 MG: 500 TABLET ORAL at 09:45

## 2021-11-23 RX ADMIN — SOTALOL HYDROCHLORIDE 120 MG: 80 TABLET ORAL at 09:45

## 2021-11-23 RX ADMIN — APIXABAN 5 MG: 5 TABLET, FILM COATED ORAL at 09:45

## 2021-11-23 RX ADMIN — METOROPROLOL TARTRATE 2.5 MG: 5 INJECTION, SOLUTION INTRAVENOUS at 05:46

## 2021-11-23 RX ADMIN — DOCUSATE SODIUM 100 MG: 100 CAPSULE, LIQUID FILLED ORAL at 09:45

## 2021-11-24 LAB
G6PD BLD QN: 369 U/10E12 RBC (ref 127–427)
RBC # BLD AUTO: 4.18 X10E6/UL (ref 4.14–5.8)

## 2021-11-26 LAB
ATRIAL RATE: 140 BPM
ATRIAL RATE: 141 BPM
ATRIAL RATE: 77 BPM
ATRIAL RATE: 85 BPM
P AXIS: 2 DEGREES
P AXIS: 45 DEGREES
P AXIS: 76 DEGREES
P AXIS: 8 DEGREES
PR INTERVAL: 120 MS
PR INTERVAL: 166 MS
PR INTERVAL: 170 MS
PR INTERVAL: 56 MS
QRS AXIS: -11 DEGREES
QRS AXIS: -20 DEGREES
QRS AXIS: -22 DEGREES
QRS AXIS: -29 DEGREES
QRSD INTERVAL: 88 MS
QRSD INTERVAL: 92 MS
QRSD INTERVAL: 94 MS
QRSD INTERVAL: 96 MS
QT INTERVAL: 316 MS
QT INTERVAL: 362 MS
QT INTERVAL: 364 MS
QT INTERVAL: 380 MS
QTC INTERVAL: 430 MS
QTC INTERVAL: 430 MS
QTC INTERVAL: 478 MS
QTC INTERVAL: 555 MS
T WAVE AXIS: -30 DEGREES
T WAVE AXIS: -6 DEGREES
T WAVE AXIS: -7 DEGREES
T WAVE AXIS: -80 DEGREES
VENTRICULAR RATE: 138 BPM
VENTRICULAR RATE: 140 BPM
VENTRICULAR RATE: 77 BPM
VENTRICULAR RATE: 85 BPM

## 2021-11-26 PROCEDURE — 93010 ELECTROCARDIOGRAM REPORT: CPT | Performed by: INTERNAL MEDICINE

## 2021-11-27 LAB
BACTERIA BLD CULT: NORMAL
BACTERIA BLD CULT: NORMAL

## 2021-12-10 ENCOUNTER — OFFICE VISIT (OUTPATIENT)
Dept: CARDIOLOGY CLINIC | Facility: CLINIC | Age: 76
End: 2021-12-10
Payer: COMMERCIAL

## 2021-12-10 VITALS
BODY MASS INDEX: 28.85 KG/M2 | SYSTOLIC BLOOD PRESSURE: 138 MMHG | HEIGHT: 75 IN | WEIGHT: 232 LBS | DIASTOLIC BLOOD PRESSURE: 90 MMHG | HEART RATE: 110 BPM | RESPIRATION RATE: 16 BRPM | OXYGEN SATURATION: 98 %

## 2021-12-10 DIAGNOSIS — Z86.711 HISTORY OF PULMONARY EMBOLISM: ICD-10-CM

## 2021-12-10 DIAGNOSIS — E78.5 DYSLIPIDEMIA: ICD-10-CM

## 2021-12-10 DIAGNOSIS — J01.90 ACUTE SINUSITIS, RECURRENCE NOT SPECIFIED, UNSPECIFIED LOCATION: ICD-10-CM

## 2021-12-10 DIAGNOSIS — I47.1 SVT (SUPRAVENTRICULAR TACHYCARDIA) (HCC): ICD-10-CM

## 2021-12-10 DIAGNOSIS — E78.2 MIXED HYPERLIPIDEMIA: Primary | ICD-10-CM

## 2021-12-10 DIAGNOSIS — Z79.01 CHRONIC ANTICOAGULATION: ICD-10-CM

## 2021-12-10 PROCEDURE — 99213 OFFICE O/P EST LOW 20 MIN: CPT | Performed by: INTERNAL MEDICINE

## 2021-12-10 RX ORDER — FLUTICASONE PROPIONATE 50 MCG
1 SPRAY, SUSPENSION (ML) NASAL DAILY PRN
Qty: 16 G | Refills: 0
Start: 2021-12-10

## 2022-01-20 ENCOUNTER — TELEPHONE (OUTPATIENT)
Dept: CARDIOLOGY CLINIC | Facility: CLINIC | Age: 77
End: 2022-01-20

## 2022-01-20 DIAGNOSIS — I48.91 ATRIAL FIBRILLATION, UNSPECIFIED TYPE (HCC): ICD-10-CM

## 2022-01-20 NOTE — TELEPHONE ENCOUNTER
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

## 2022-01-21 NOTE — TELEPHONE ENCOUNTER
Spoke with patient and informed him that he had an ablation procedure for supraventricular tachycardia/SVT    Patient understood

## 2022-02-03 ENCOUNTER — OFFICE VISIT (OUTPATIENT)
Dept: CARDIOLOGY CLINIC | Facility: CLINIC | Age: 77
End: 2022-02-03
Payer: COMMERCIAL

## 2022-02-03 VITALS
HEART RATE: 78 BPM | OXYGEN SATURATION: 98 % | BODY MASS INDEX: 33.64 KG/M2 | HEIGHT: 75 IN | DIASTOLIC BLOOD PRESSURE: 84 MMHG | SYSTOLIC BLOOD PRESSURE: 138 MMHG | WEIGHT: 270.6 LBS

## 2022-02-03 DIAGNOSIS — R06.02 SHORTNESS OF BREATH: Primary | ICD-10-CM

## 2022-02-03 DIAGNOSIS — I42.8 CARDIOMYOPATHY, NONISCHEMIC (HCC): ICD-10-CM

## 2022-02-03 DIAGNOSIS — Z79.01 CHRONIC ANTICOAGULATION: ICD-10-CM

## 2022-02-03 DIAGNOSIS — I47.1 SVT (SUPRAVENTRICULAR TACHYCARDIA) (HCC): ICD-10-CM

## 2022-02-03 PROCEDURE — 93000 ELECTROCARDIOGRAM COMPLETE: CPT | Performed by: INTERNAL MEDICINE

## 2022-02-03 PROCEDURE — 99214 OFFICE O/P EST MOD 30 MIN: CPT | Performed by: INTERNAL MEDICINE

## 2022-02-03 RX ORDER — MECLIZINE HYDROCHLORIDE 25 MG/1
25 TABLET ORAL
COMMUNITY
Start: 2021-11-23 | End: 2022-06-01

## 2022-02-04 NOTE — PROGRESS NOTES
[facility] [place] [id number] [facility] [place] [address]  Cardiology Follow Up    Celso Bruce   1945  [id number]      1  Shortness of breath  POCT ECG    Echo stress test, exercise   2  SVT (supraventricular tachycardia) ([facility] )     3  Chronic anticoagulation     4  Cardiomyopathy, nonischemic [facility])         Chief Complaint   Patient presents with    Follow-up            Interval History:  Patient presents for follow-up visit  Patient does have history of recurrent pulmonary embolism on lifelong anticoagulation with Eliquis  Patient also has history of SVT status post ablation  Patient does have history of mild nonischemic cardiomyopathy with some recovery of ejection fraction in the past   Patient does have some shortness of breath with exertion  He has been losing weight  Patient also had COVID and was sick but recovered  No bleeding issues      Patient Active Problem List   Diagnosis    Bilateral pulmonary embolism (HCC)    SOB (shortness of breath)    Hyperlipidemia    Lung nodule    Obesity    History of sickle cell trait    Leg edema, left    History of pulmonary embolism    Thrombocytopenia (HCC)    Chronic kidney disease (CKD), stage II (mild)    Tachycardia    Chronic low back pain    SVT (supraventricular tachycardia) (HCC)    Chronic anticoagulation    ARLENE (obstructive sleep apnea)    Cardiomyopathy, nonischemic (HCC)    Dyslipidemia    BPH (benign prostatic hyperplasia)    Nephrolithiasis    Lumbar disc disease with radiculopathy - Left    Spinal stenosis of lumbar region without neurogenic claudication - Left    Chest pain    Acute kidney injury superimposed on CKD ([facility] )    COVID-19     Past Medical History:   Diagnosis Date    Hyperlipidemia     Post-nasal drip     Pulmonary embolism (HCC)      Social History     Socioeconomic History    Marital status: /Civil Union     Spouse name: Not on file    Number of children: 7    Years of education: Not on file    Highest education level: Not on file   Occupational History    Occupation: employed   Tobacco Use    Smoking status: Former Smoker     Quit date:      Years since quittin 1    Smokeless tobacco: Never Used   Vaping Use    Vaping Use: Never used   Substance and Sexual Activity    Alcohol use: Yes     Alcohol/week: 0 0 standard drinks     Comment: socially    Drug use: No    Sexual activity: Not on file   Other Topics Concern    Not on file   Social History Narrative    Not on file     Social Determinants of Health     Financial Resource Strain: Not on file   Food Insecurity: No Food Insecurity    Worried About Running Out of Food in the Last Year: Never true    Anay of Food in the Last Year: Never true   Transportation Needs: No Transportation Needs    Lack of Transportation (Medical): No    Lack of Transportation (Non-Medical): No   Physical Activity: Not on file   Stress: Not on file   Social Connections: Not on file   Intimate Partner Violence: Not on file   Housing Stability: Low Risk     Unable to Pay for Housing in the Last Year: No    Number of Places Lived in the Last Year: 1    Unstable Housing in the Last Year: No      History reviewed  No pertinent family history    Past Surgical History:   Procedure Laterality Date    ARTHROSCOPY KNEE Left     30 years ago    AL CYSTOURETHRO W/IMPLANT N/A 2021    Procedure: CYSTOSCOPY WITH INSERTION UROLIFT;  Surgeon: Jalen Laura MD;  Location: [facility] OR;  Service: Urology       Current Outpatient Medications:     acetaminophen (TYLENOL) 325 mg tablet, Take 2 tablets (650 mg total) by mouth every 4 (four) hours as needed for mild pain, Disp: 30 tablet, Rfl: 0    apixaban (Eliquis) 5 mg, Take 1 tablet (5 mg total) by mouth 2 (two) times a day, Disp: 180 tablet, Rfl: 3    Ascorbic Acid (VITAMIN C) 100 MG tablet, Take 100 mg by mouth daily, Disp: , Rfl:     docusate sodium (COLACE) 100 mg capsule, Take 1 capsule (100 mg total) by mouth 2 (two) times a day for 15 days (Patient taking differently: Take 100 mg by mouth 2 (two) times a day as needed  ), Disp: 30 capsule, Rfl: 0    Durezol 0 05 % EMUL, , Disp: , Rfl:     fluticasone (FLONASE) 50 mcg/act nasal spray, 1 spray into each nostril daily as needed for rhinitis, Disp: 16 g, Rfl: 0    meclizine (ANTIVERT) 25 mg tablet, Take 25 mg by mouth, Disp: , Rfl:     rosuvastatin (CRESTOR) 20 MG tablet, Take 20 mg by mouth daily, Disp: , Rfl:     tamsulosin (FLOMAX) 0 4 mg, Take 0 4 mg by mouth daily with dinner  , Disp: , Rfl:   No Known Allergies    Labs:  No visits with results within 2 Month(s) from this visit  Latest known visit with results is:   No results displayed because visit has over 200 results  Imaging: No results found  Review of Systems:  Review of Systems   REVIEW OF SYSTEMS:  Constitutional:  Denies fever or chills   Eyes:  Denies change in visual acuity   HENT:  Denies nasal congestion or sore throat   Respiratory:  Denies cough or shortness of breath   Cardiovascular:  Denies chest pain or edema   GI:  Denies abdominal pain, nausea, vomiting, bloody stools or diarrhea   :  Denies dysuria, frequency, difficulty in micturition and nocturia  Musculoskeletal:  Denies back pain or joint pain   Neurologic:  Denies headache, focal weakness or sensory changes   Endocrine:  Denies polyuria or polydipsia   Lymphatic:  Denies swollen glands   Psychiatric:  Denies depression or anxiety     Physical Exam:    /84 (BP Location: Left arm, Patient Position: Sitting, Cuff Size: Large)   Pulse 78   Ht 6' 3" (1 905 m)   Wt 123 kg (270 lb 9 6 oz)   SpO2 98%   BMI 33 82 kg/m²     Physical Exam   PHYSICAL EXAM:  General:  Patient is not in acute distress   Head: Normocephalic, Atraumatic  HEENT:  Both pupils normal-size atraumatic, normocephalic, nonicteric  Neck:  JVP not raised   Trachea central  No carotid bruit  Respiratory:  normal breath sounds no crackles  no rhonchi  Cardiovascular:  Regular rate and rhythm no S3 no murmurs  GI:  Abdomen soft nontender  No organomegaly  Lymphatic:  No cervical or inguinal lymphadenopathy  Neurologic:  Patient is awake alert, oriented   Grossly nonfocal  Extremities no edema    Discussion/Summary:  Patient with symptoms of shortness of breath as well as history of nonischemic cardiomyopathy  EKG was done today which showed sinus rhythm with LVH and nonspecific ST-T abnormalities  Patient will be scheduled for a stress echocardiogram to assess for exercise capacity as well as ischemia  Sensitivity and specificity of stress test discussed with patient  Symptoms to watch out from cardiac standpoint which would indicate the need for further cardiac evaluation also discussed  Patient had SVT ablation and has maintained sinus rhythm since then  Patient will continue lifelong anticoagulation for history of recurrent pulmonary embolism  Patient understands the risks and benefits of anticoagulation and he will report any bleeding issues  Follow-up in 3 months or earlier as needed  Patient is agreeable with the plan of care

## 2022-02-08 ENCOUNTER — TELEPHONE (OUTPATIENT)
Dept: CARDIOLOGY CLINIC | Facility: CLINIC | Age: 77
End: 2022-02-08

## 2022-02-17 ENCOUNTER — HOSPITAL ENCOUNTER (OUTPATIENT)
Dept: NON INVASIVE DIAGNOSTICS | Facility: HOSPITAL | Age: 77
Discharge: HOME | End: 2022-02-17
Payer: COMMERCIAL

## 2022-02-17 VITALS — HEIGHT: 75 IN | BODY MASS INDEX: 33.57 KG/M2 | WEIGHT: 270 LBS

## 2022-02-17 DIAGNOSIS — R06.02 SHORTNESS OF BREATH: ICD-10-CM

## 2022-02-17 LAB
BASELINE ST DEPRESSION: 0 MM
CHEST PAIN STATEMENT: NORMAL
E WAVE DECELERATION TIME: 338 MS
MAX DIASTOLIC BP: 64 MMHG
MAX HEART RATE: 179 BPM
MAX HR PERCENT: 124 %
MAX HR: 144 BPM
MAX PREDICTED HEART RATE: 144 BPM
MAX. SYSTOLIC BP: 194 MMHG
MV E'TISSUE VEL-SEP: 5 CM/S
MV PEAK A VEL: 0.78 M/S
MV PEAK E VEL: 55 CM/S
MV STENOSIS PRESSURE HALF TIME: 0 MS
PROTOCOL NAME: NORMAL
RATE PRESSURE PRODUCT: NORMAL
REASON FOR TERMINATION: NORMAL
SL CV LV EF: 55
SL CV STRESS RECOVERY BP: NORMAL MMHG
SL CV STRESS RECOVERY HR: 95 BPM
SL CV STRESS RECOVERY O2 SAT: 94 %
SL CV STRESS STAGE REACHED: 2
STRESS ANGINA INDEX: 0
STRESS BASELINE BP: NORMAL MMHG
STRESS BASELINE HR: 68 BPM
STRESS DUKE TREADMILL SCORE: 4
STRESS O2 SAT REST: 98 %
STRESS PEAK HR: 179 BPM
STRESS PERCENT HR: 124 %
STRESS POST ESTIMATED WORKLOAD: 6.3 METS
STRESS POST EXERCISE DUR MIN: 4 MIN
STRESS POST EXERCISE DUR SEC: 29 SEC
STRESS POST O2 SAT PEAK: 98 %
STRESS POST PEAK BP: 194 MMHG
STRESS ST DEPRESSION: 0 MM
TARGET HR FORMULA: NORMAL
TEST INDICATION: NORMAL
TIME IN EXERCISE PHASE: NORMAL
TR MAX PG: 20 MMHG
TR PEAK VELOCITY: 2.3 M/S
TRICUSPID VALVE PEAK REGURGITATION VELOCITY: 2.26 M/S

## 2022-02-17 PROCEDURE — 93351 STRESS TTE COMPLETE: CPT

## 2022-02-17 PROCEDURE — 93350 STRESS TTE ONLY: CPT

## 2022-02-21 ENCOUNTER — TELEPHONE (OUTPATIENT)
Dept: CARDIOLOGY CLINIC | Facility: CLINIC | Age: 77
End: 2022-02-21

## 2022-02-21 NOTE — TELEPHONE ENCOUNTER
----- Message from Lexii Zeng MD sent at 2/21/2022 11:50 AM EST -----  Please call the patient  Stress test overall good  Ejection fraction 55%  He can drop off his form to be filled out

## 2022-02-21 NOTE — TELEPHONE ENCOUNTER
Spoke to pt and relayed Dr Saulo Bazzi response, pt verbally understood  Pt stated he will drop off forms in the [place] location tomorrow

## 2022-02-22 NOTE — TELEPHONE ENCOUNTER
Spoke with patient and informed him that the form is completed but no fax number is provided  Patient will get fax number for [employer] and call office back  Patient asked if the original can be mailed to him after it is faxed

## 2022-02-22 NOTE — TELEPHONE ENCOUNTER
Pt dropped off the school bus waiver form at [facility] [place] to be filled out & I scanned it into [employer]  Pt req that the completed form be faxed & he would also like a phone call

## 2022-03-02 NOTE — TELEPHONE ENCOUNTER
As per Seda Gomes from [facility] patient need a nuclear stress test   Patient didn't pass the Met part in test he need a 7 and he has 6 3 on application   Seda Gannr number is [phone]

## 2022-03-02 NOTE — TELEPHONE ENCOUNTER
Deborah Garcia from [facility] called regarding the Waiver form & she would like a call back w/ the Healthcare Provider number & she also needs to know if pt got a Lexiscan done  Melissa's direct # is: [phone]

## 2022-03-02 NOTE — TELEPHONE ENCOUNTER
Please let the patient know that patient did not achieve the METS ( amount of time on the treadmill for stress test) to pass  Patient will need to have a pharmacological/chemical stress test in view of this  Order is in the chart  Please schedule  No caffeine on the day of the stress test   Thank you

## 2022-03-03 NOTE — TELEPHONE ENCOUNTER
Spoke to Tj Fatima from testing and faxed over requisition  She will contact patient  Sirisha Medina from testing called and stated that pt is scheduled for 3/11/2022  Do you need pt to have testing sooner than scheduled appt?

## 2022-03-03 NOTE — TELEPHONE ENCOUNTER
LM relaying Dr Murray Class response  Advised on message to contact office if pt has any further questions  Also provided central scheduling phone number to schedule nuclear

## 2022-03-08 ENCOUNTER — TELEPHONE (OUTPATIENT)
Dept: OBGYN CLINIC | Facility: MEDICAL CENTER | Age: 77
End: 2022-03-08

## 2022-03-11 ENCOUNTER — HOSPITAL ENCOUNTER (OUTPATIENT)
Dept: NON INVASIVE DIAGNOSTICS | Facility: CLINIC | Age: 77
Discharge: HOME | End: 2022-03-11
Payer: COMMERCIAL

## 2022-03-11 VITALS
HEIGHT: 75 IN | BODY MASS INDEX: 33.57 KG/M2 | HEART RATE: 63 BPM | WEIGHT: 270 LBS | OXYGEN SATURATION: 99 % | DIASTOLIC BLOOD PRESSURE: 86 MMHG | SYSTOLIC BLOOD PRESSURE: 126 MMHG

## 2022-03-11 DIAGNOSIS — R06.02 SHORTNESS OF BREATH: ICD-10-CM

## 2022-03-11 LAB
NUC STRESS DIASTOLIC VOLUME INDEX: 135 ML/M2
NUC STRESS EJECTION FRACTION: 48 %
NUC STRESS SYSTOLIC VOLUME INDEX: 70 ML/M2
RATE PRESSURE PRODUCT: NORMAL
SL CV REST NUCLEAR ISOTOPE DOSE: 15.91 MCI
SL CV STRESS NUCLEAR ISOTOPE DOSE: 48.7 MCI
SL CV STRESS RECOVERY BP: NORMAL MMHG
SL CV STRESS RECOVERY HR: 88 BPM
STRESS ANGINA INDEX: 0
STRESS BASELINE BP: NORMAL MMHG
STRESS BASELINE HR: 63 BPM
STRESS O2 SAT REST: 99 %
STRESS PEAK HR: 130 BPM
STRESS POST O2 SAT PEAK: 96 %
STRESS POST PEAK BP: 148 MMHG
STRESS/REST PERFUSION RATIO: 1.1

## 2022-03-11 PROCEDURE — 93017 CV STRESS TEST TRACING ONLY: CPT

## 2022-03-11 PROCEDURE — 93016 CV STRESS TEST SUPVJ ONLY: CPT | Performed by: INTERNAL MEDICINE

## 2022-03-11 PROCEDURE — A9502 TC99M TETROFOSMIN: HCPCS

## 2022-03-11 PROCEDURE — G1004 CDSM NDSC: HCPCS

## 2022-03-11 PROCEDURE — 93018 CV STRESS TEST I&R ONLY: CPT | Performed by: INTERNAL MEDICINE

## 2022-03-11 PROCEDURE — 78452 HT MUSCLE IMAGE SPECT MULT: CPT | Performed by: INTERNAL MEDICINE

## 2022-03-11 PROCEDURE — 78452 HT MUSCLE IMAGE SPECT MULT: CPT

## 2022-03-11 RX ADMIN — REGADENOSON 0.4 MG: 0.08 INJECTION, SOLUTION INTRAVENOUS at 08:20

## 2022-03-11 NOTE — TELEPHONE ENCOUNTER
Pt stopped in & dropped off more forms from the [employer] that he needs filled out for the  waiver form  Pt did all his testing this morning  I scanned the forms into [employer]   Once the forms have been completed, fax them to: [phone]

## 2022-03-17 LAB
MAX DIASTOLIC BP: 86 MMHG
MAX HEART RATE: 130 BPM
MAX PREDICTED HEART RATE: 144 BPM
MAX. SYSTOLIC BP: 148 MMHG
PROTOCOL NAME: NORMAL
REASON FOR TERMINATION: NORMAL
TARGET HR FORMULA: NORMAL
TEST INDICATION: NORMAL
TIME IN EXERCISE PHASE: NORMAL

## 2022-03-21 ENCOUNTER — TELEPHONE (OUTPATIENT)
Dept: UROLOGY | Facility: AMBULATORY SURGERY CENTER | Age: 77
End: 2022-03-21

## 2022-03-21 NOTE — TELEPHONE ENCOUNTER
Pt called stated he missed a call last week regarding scheduling a surgery    Pt call back [phone]

## 2022-03-29 NOTE — PROGRESS NOTES
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)  - Continues to have symptoms despite Flomax and Urolift  - We discussed TURP as the next step for symptom relief of medical refractory BPH with LUTS  We reviewed that the hope is to have him voiding after the procedure without medication, however, there is a small percentage that he may have symptoms requiring Flomax usage after the procedure  - Patient is on Eliquis and will clear with cardiology to hold this 4 days before and 7 days after for a total of 11 days  Patient sees Dr Jeninfer Snow and I have reached out for this clearance  - Heart regular, lungs clear to ausculation bilaterally, extremities without edema, abdomen soft  - Informed consent signed  - patient requesting surgery to be scheduled after June 1st  - Call with any questions or concerns in the meantime  - All questions answered; patient understands and agrees with plan         History of Present Illness  Celso Bruce  is a 68 y o  male patient of Dr Alley Sosa with history of Medically refractory BPH s/p TURP here for follow up  Continues to have LUTS despite Flomax and Urolift  Patient here for H&P for TURP  Denies any new or worsening symptoms at this time  Patient is on Eliquis  Denies problems with anesthesia in the past      Review of Systems   Constitutional: Negative for activity change, appetite change, chills and fever  HENT: Negative for congestion and trouble swallowing  Respiratory: Negative for cough and shortness of breath  Cardiovascular: Negative for chest pain, palpitations and leg swelling  Gastrointestinal: Negative for abdominal pain, constipation, diarrhea, nausea and vomiting  Genitourinary: Negative for difficulty urinating, dysuria, flank pain, frequency, hematuria and urgency  Musculoskeletal: Negative for back pain and gait problem  Skin: Negative for wound  Allergic/Immunologic: Negative for immunocompromised state  Neurological: Negative for dizziness and syncope  Hematological: Does not bruise/bleed easily  Psychiatric/Behavioral: Negative for confusion  All other systems reviewed and are negative  Vitals  There were no vitals filed for this visit  Physical Exam  Constitutional:       General: He is not in acute distress  Appearance: Normal appearance  He is not ill-appearing, toxic-appearing or diaphoretic  HENT:      Head: Normocephalic  Nose: No congestion  Eyes:      General: No scleral icterus  Right eye: No discharge  Left eye: No discharge  Conjunctiva/sclera: Conjunctivae normal       Pupils: Pupils are equal, round, and reactive to light  Cardiovascular:      Rate and Rhythm: Normal rate and regular rhythm  Heart sounds: Normal heart sounds  No murmur heard  No friction rub  No gallop  Pulmonary:      Effort: Pulmonary effort is normal       Breath sounds: Normal breath sounds  Abdominal:      General: There is no distension  Palpations: Abdomen is soft  There is no mass  Tenderness: There is no abdominal tenderness  There is no right CVA tenderness or left CVA tenderness  Musculoskeletal:         General: No swelling  Cervical back: Normal range of motion  Right lower leg: No edema  Left lower leg: No edema  Skin:     General: Skin is warm and dry  Coloration: Skin is not jaundiced or pale  Findings: No bruising, erythema, lesion or rash  Neurological:      General: No focal deficit present  Mental Status: He is alert and oriented to person, place, and time  Mental status is at baseline  Gait: Gait normal    Psychiatric:         Mood and Affect: Mood normal          Behavior: Behavior normal          Thought Content:  Thought content normal          Judgment: Judgment normal            Past History  Past Medical History:   Diagnosis Date    Hyperlipidemia     Post-nasal drip     Pulmonary embolism (HCC)      Social History     Socioeconomic History    Marital status: /Civil Union     Spouse name: Not on file    Number of children: 9    Years of education: Not on file    Highest education level: Not on file   Occupational History    Occupation: employed   Tobacco Use    Smoking status: Former Smoker     Quit date:      Years since quittin 2    Smokeless tobacco: Never Used   Vaping Use    Vaping Use: Never used   Substance and Sexual Activity    Alcohol use: Yes     Alcohol/week: 0 0 standard drinks     Comment: socially    Drug use: No    Sexual activity: Not on file   Other Topics Concern    Not on file   Social History Narrative    Not on file     Social Determinants of Health     Financial Resource Strain: Not on file   Food Insecurity: No Food Insecurity    Worried About Running Out of Food in the Last Year: Never true    Anay of Food in the Last Year: Never true   Transportation Needs: No Transportation Needs    Lack of Transportation (Medical): No    Lack of Transportation (Non-Medical): No   Physical Activity: Not on file   Stress: Not on file   Social Connections: Not on file   Intimate Partner Violence: Not on file   Housing Stability: Low Risk     Unable to Pay for Housing in the Last Year: No    Number of Places Lived in the Last Year: 1    Unstable Housing in the Last Year: No     Social History     Tobacco Use   Smoking Status Former Smoker    Quit date:     Years since quittin 2   Smokeless Tobacco Never Used     No family history on file      The following portions of the patient's history were reviewed and updated as appropriate: allergies, current medications, past medical history, past social history, past surgical history and problem list     Results  No results found for this or any previous visit (from the past 1 hour(s)) ]  Lab Results   Component Value Date    PSA 0 8 08/21/2017     Lab Results   Component Value Date    CALCIUM 8 3 11/23/2021    K 4 2 11/23/2021    CO2 30 11/23/2021     11/23/2021    BUN 11 11/23/2021    CREATININE 1 16 11/23/2021     Lab Results   Component Value Date    WBC 3 69 (L) 11/23/2021    HGB 11 9 (L) 11/23/2021    HCT 37 0 11/23/2021    MCV 93 11/23/2021     (L) 11/23/2021       Tim Landon PA-C

## 2022-03-30 ENCOUNTER — OFFICE VISIT (OUTPATIENT)
Dept: UROLOGY | Facility: CLINIC | Age: 77
End: 2022-03-30
Payer: COMMERCIAL

## 2022-03-30 VITALS
WEIGHT: 245 LBS | HEART RATE: 70 BPM | RESPIRATION RATE: 16 BRPM | OXYGEN SATURATION: 98 % | HEIGHT: 75 IN | BODY MASS INDEX: 30.46 KG/M2 | DIASTOLIC BLOOD PRESSURE: 77 MMHG | SYSTOLIC BLOOD PRESSURE: 137 MMHG | TEMPERATURE: 98 F

## 2022-03-30 DIAGNOSIS — N13.8 BPH WITH OBSTRUCTION/LOWER URINARY TRACT SYMPTOMS: Primary | ICD-10-CM

## 2022-03-30 DIAGNOSIS — N40.1 BPH WITH OBSTRUCTION/LOWER URINARY TRACT SYMPTOMS: Primary | ICD-10-CM

## 2022-03-30 PROCEDURE — 99214 OFFICE O/P EST MOD 30 MIN: CPT | Performed by: PHYSICIAN ASSISTANT

## 2022-03-30 NOTE — Clinical Note
Good morning Dr Ced Lagunas  My name is Xander Kay  I am a urology PA that works with Dr Miah Guzman  We have this mutual patient Dominguez Abdul  Who will be having TURP in June  I was reaching out to you to see if we are able to hold Eliquis 4 days before and 7 days after for a total of 11 days  Also, would you like to see him first for clearance  Thanks so much!

## 2022-04-26 ENCOUNTER — PREP FOR PROCEDURE (OUTPATIENT)
Dept: UROLOGY | Facility: AMBULATORY SURGERY CENTER | Age: 77
End: 2022-04-26

## 2022-04-26 DIAGNOSIS — Z01.810 PREOP CARDIOVASCULAR EXAM: ICD-10-CM

## 2022-04-26 DIAGNOSIS — Z01.818 PREOP EXAMINATION: ICD-10-CM

## 2022-04-26 DIAGNOSIS — N13.8 BPH WITH OBSTRUCTION/LOWER URINARY TRACT SYMPTOMS: Primary | ICD-10-CM

## 2022-04-26 DIAGNOSIS — Z01.812 PRE-PROCEDURE LAB EXAM: ICD-10-CM

## 2022-04-26 DIAGNOSIS — N40.1 BPH WITH OBSTRUCTION/LOWER URINARY TRACT SYMPTOMS: Primary | ICD-10-CM

## 2022-04-26 DIAGNOSIS — R39.9 UTI SYMPTOMS: ICD-10-CM

## 2022-04-26 NOTE — TELEPHONE ENCOUNTER
Patient called in after missing a call regarding planned surgery on 6/3/22  Patient has a recommendation for Dr Cyn Prince to perform his surgery  Please follow up with patient

## 2022-04-27 ENCOUNTER — TELEPHONE (OUTPATIENT)
Dept: CARDIOLOGY CLINIC | Facility: CLINIC | Age: 77
End: 2022-04-27

## 2022-04-27 NOTE — TELEPHONE ENCOUNTER
Form received for Eliquis hold prior to procedure  Form scanned into patient's chart and placed in Dr Botello's bin to be completed

## 2022-04-29 ENCOUNTER — TELEPHONE (OUTPATIENT)
Dept: CARDIOLOGY CLINIC | Facility: CLINIC | Age: 77
End: 2022-04-29

## 2022-04-29 NOTE — TELEPHONE ENCOUNTER
Form faxed from [facility] Tyrell Ferraro for clearance  Dental treatment scheduled for 05/05/2022  Forms scanned in pt chart and placed in your bin

## 2022-05-10 ENCOUNTER — PROCEDURE VISIT (OUTPATIENT)
Dept: NEUROLOGY | Facility: CLINIC | Age: 77
End: 2022-05-10
Payer: COMMERCIAL

## 2022-05-10 DIAGNOSIS — G58.9 ENTRAPMENT SYNDROME: ICD-10-CM

## 2022-05-10 PROCEDURE — 95911 NRV CNDJ TEST 9-10 STUDIES: CPT | Performed by: PHYSICAL MEDICINE & REHABILITATION

## 2022-05-10 PROCEDURE — 95886 MUSC TEST DONE W/N TEST COMP: CPT | Performed by: PHYSICAL MEDICINE & REHABILITATION

## 2022-05-10 NOTE — PROGRESS NOTES
EMG 2 limb lower extremity     Date/Time 5/10/2022 11:22 AM     Performed by  Rylee Gonzalez MD     Authorized by Marcelo Gabriel MD                [facility] AT [address]  [phone]    Electromyography & Nerve Conduction Studies Report          Full Name: Sabine Larose Gender: Male  MRN: [id number] YOB: 1945      Visit Date: 5/10/2022 10:01 AM  Age: 68 Years  Examining Physician: Rylee Gonzalez MD   Referring Physician: DR LÓPEZ    Medical History:  68-year-old male with chronic low back pain presents with worsening tingling and numbness in both feet and all fingers for the last several months  Patient is being evaluated for a peripheral neuropathy  Prior EMG done in May of 2021 was reviewed  TEMP 32       Sensory Nerve Conduction Study       Nerve / Sites Rec  Site Onset Lat Peak Lat  Amp Segments Distance Velocity     ms ms µV  cm m/s   L Sural - (Antidromic)      Calf Ankle NR NR NR Calf - Ankle 14 NR      Ref  ?4 4 ? 6 0 Ref  ?40   R Sural - (Antidromic)      Calf Ankle NR NR NR Calf - Ankle 14 NR      Ref  ?4 4 ? 6 0 Ref  ?40   R Superficial peroneal - (Antidromic)      Lat leg Ankle NR NR NR Lat leg - Ankle 14 NR      Ref  ?4 4 ? 6 0 Ref  ?40   L Superficial peroneal - (Antidromic)      Lat leg Ankle NR NR NR Lat leg - Ankle 14 NR      Ref  ?4 4 ? 6 0 Ref  ?40       Motor Nerve Conduction Study       Nerve / Sites Muscle Latency Ref  Amplitude Ref  Segments Distance Lat Diff Velocity Ref  ms ms mV mV  cm ms m/s m/s   R Peroneal - EDB      Ankle EDB 5 6 ?6 5 1 1 ?2 0 Ankle - EDB 9         B  Fib Head EDB 13 8  0 8  B  Fib Head - Ankle 34 8 25 41 ?44      A  Fib Head EDB 16 2  0 7  A  Fib Head - B  Fib Head 10 2 38 42 ?44   L Peroneal - EDB      Ankle EDB 5 0 ?6 5 1 7 ?2 0 Ankle - EDB 9         B  Fib Head EDB 15 6  1 1  B  Fib Head - Ankle 35 10 58 33 ?44      A  Fib Head EDB 18 5  1 1  A  Fib Head - B   Fib Head 10 2 83 35 ?44   R Tibial - AH      Ankle AH 6 4 ?5 8 3 1 ?4 0 Ankle - AH 9         Knee AH 17 4  2 4  Knee - Ankle 41 10 92 38 ?41   R Peroneal - Tib Ant      Fib Head Tib Ant 5 0 ?6 7 2 8 ?3 0 Fib Head - Tib Ant          Pop fossa Tib Ant 7 4  2 8  Pop fossa - Fib Head 14 2 44 57 ?44   L Peroneal - Tib Ant      Fib Head Tib Ant 4 9 ?6 7 2 6 ?3 0 Fib Head - Tib Ant          Pop fossa Tib Ant 7 1  2 5  Pop fossa - Fib Head 14 2 23 63 ?44       F Waves       Nerve F Latency Ref  ms ms   R Peroneal - EDB 69 3 ?56 0   R Tibial - AH 72 7 ?56 0   L Peroneal - EDB 60 0 ?56 0       H Reflex       Nerve H Latency    ms   R Tibial - Soleus 37 7   L Tibial - Soleus 39 6       EMG Summary Table     Spontaneous MUAP Recruitment   Muscle Nerve Roots IA Fib PSW Fasc H F  Dur  Amp PPP Config Pattern   L  Tibialis anterior Deep peroneal (Fibular) L4-L5 NL None None None None NL NL None NL NL   R  Tibialis anterior Deep peroneal (Fibular) L4-L5 NL None 1+ None None Gr  Incr  Lisa Anita  Many NL Reduced   L  Extensor hallucis longus Deep peroneal (Fibular) L5-S1 NL None None None None NL Sl  Incr  Few NL Reduced   R  Extensor digitorum brevis Tibial L5-S1 NL None None None None NL Gr  Incr  None NL Reduced   L  Abductor hallucis Tibial S1-S2 NL None None None None Gr  Incr  Gr  Incr  Many NL Reduced   R  Abductor hallucis Tibial S1-S2 NL None None None None NL NL Few NL Reduced   L  Quadriceps Femoral L2-L4 NL None None None None NL NL None NL NL   R  Quadriceps Femoral L2-L4 NL None None None None NL NL None NL NL   L  Lumbar paraspinals Spinal L1-L5 NL None None None None NL NL None NL NL   R  Lumbar paraspinals Spinal L1-L5 NL None None None None NL NL None NL NL   L  Gastrocnemius (Medial head) Tibial S1-S2 NL None None None None NL NL None NL NL   R   Gastrocnemius (Medial head) Tibial S1-S2 NL None None None None NL NL None NL NL                                   Summary      Motor and sensory conduction studies were performed on the bilateral peroneal, tibial and sural nerves  The right peroneal motor terminal latency while recording from the extensor digitorum brevis was normal with a low compound motor action potential amplitude and a slow conduction velocity distally and across the fibular head  The right peroneal motor terminal latency while recording from the tibialis anterior was normal with a low compound motor action potential amplitude and normal conduction velocity across the fibular head  The left peroneal motor terminal latency while recording from the extensor digitorum brevis was normal with a low compound motor action potential amplitude and a slow conduction velocity distally and across the fibular head  The left peroneal motor terminal latency while recording from the tibialis anterior was normal with a low compound motor action potential amplitude and normal conduction velocity across the fibular head  The right tibial motor terminal latency was prolonged with a low compound motor action potential amplitude and a slow conduction velocity across the ankle  Bilateral peroneal and right tibial F waves were prolonged  Bilateral sural and superficial peroneal sensory action potentials were absent       H  reflexes were prolonged bilaterally  Concentric needle EMG was performed on various distal and proximal muscles of the lower extremities bilaterally including tibialis anterior, gastrocnemius medius, vastus lateralis,  low lumbar paraspinals, left EHL and right extensor digitorum brevis  1+ positive sharp waves were noted in the right tibialis anterior  Mild to moderate decreased recruitment of giant motor units was noted in the right tibialis anterior, extensor digitorum brevis, left extensor hallucis longus and bilateral abductor hallucis  The compound motor unit potentials were of normal configuration and interference patterns were full or full for effort in the remaining muscles tested  Impression:      Abnormal study  There is electrophysiologic evidence of a:    1  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

## 2022-05-18 ENCOUNTER — APPOINTMENT (OUTPATIENT)
Dept: LAB | Facility: HOSPITAL | Age: 77
End: 2022-05-18
Attending: UROLOGY
Payer: COMMERCIAL

## 2022-05-18 DIAGNOSIS — Z01.818 PREOP EXAMINATION: ICD-10-CM

## 2022-05-18 DIAGNOSIS — R39.9 UTI SYMPTOMS: ICD-10-CM

## 2022-05-18 DIAGNOSIS — Z01.812 PRE-PROCEDURE LAB EXAM: ICD-10-CM

## 2022-05-18 DIAGNOSIS — N40.1 BPH WITH OBSTRUCTION/LOWER URINARY TRACT SYMPTOMS: ICD-10-CM

## 2022-05-18 DIAGNOSIS — N13.8 BPH WITH OBSTRUCTION/LOWER URINARY TRACT SYMPTOMS: ICD-10-CM

## 2022-05-18 DIAGNOSIS — E78.2 MIXED HYPERLIPIDEMIA: ICD-10-CM

## 2022-05-18 LAB
ABO GROUP BLD: NORMAL
ANION GAP SERPL CALCULATED.3IONS-SCNC: 3 MMOL/L (ref 4–13)
BASOPHILS # BLD AUTO: 0.03 THOUSANDS/ΜL (ref 0–0.1)
BASOPHILS NFR BLD AUTO: 1 % (ref 0–1)
BLD GP AB SCN SERPL QL: NEGATIVE
BUN SERPL-MCNC: 16 MG/DL (ref 5–25)
CALCIUM SERPL-MCNC: 9.6 MG/DL (ref 8.3–10.1)
CHLORIDE SERPL-SCNC: 110 MMOL/L (ref 100–108)
CHOLEST SERPL-MCNC: 146 MG/DL
CO2 SERPL-SCNC: 30 MMOL/L (ref 21–32)
CREAT SERPL-MCNC: 1.28 MG/DL (ref 0.6–1.3)
EOSINOPHIL # BLD AUTO: 0.12 THOUSAND/ΜL (ref 0–0.61)
EOSINOPHIL NFR BLD AUTO: 3 % (ref 0–6)
ERYTHROCYTE [DISTWIDTH] IN BLOOD BY AUTOMATED COUNT: 15.6 % (ref 11.6–15.1)
GFR SERPL CREATININE-BSD FRML MDRD: 54 ML/MIN/1.73SQ M
GLUCOSE P FAST SERPL-MCNC: 109 MG/DL (ref 65–99)
HCT VFR BLD AUTO: 39.9 % (ref 36.5–49.3)
HDLC SERPL-MCNC: 45 MG/DL
HGB BLD-MCNC: 13.4 G/DL (ref 12–17)
IMM GRANULOCYTES # BLD AUTO: 0.01 THOUSAND/UL (ref 0–0.2)
IMM GRANULOCYTES NFR BLD AUTO: 0 % (ref 0–2)
LDLC SERPL CALC-MCNC: 81 MG/DL (ref 0–100)
LYMPHOCYTES # BLD AUTO: 1.08 THOUSANDS/ΜL (ref 0.6–4.47)
LYMPHOCYTES NFR BLD AUTO: 28 % (ref 14–44)
MCH RBC QN AUTO: 30.5 PG (ref 26.8–34.3)
MCHC RBC AUTO-ENTMCNC: 33.6 G/DL (ref 31.4–37.4)
MCV RBC AUTO: 91 FL (ref 82–98)
MONOCYTES # BLD AUTO: 0.49 THOUSAND/ΜL (ref 0.17–1.22)
MONOCYTES NFR BLD AUTO: 13 % (ref 4–12)
NEUTROPHILS # BLD AUTO: 2.19 THOUSANDS/ΜL (ref 1.85–7.62)
NEUTS SEG NFR BLD AUTO: 55 % (ref 43–75)
NRBC BLD AUTO-RTO: 0 /100 WBCS
PLATELET # BLD AUTO: 134 THOUSANDS/UL (ref 149–390)
PMV BLD AUTO: 11.7 FL (ref 8.9–12.7)
POTASSIUM SERPL-SCNC: 4 MMOL/L (ref 3.5–5.3)
RBC # BLD AUTO: 4.39 MILLION/UL (ref 3.88–5.62)
RH BLD: POSITIVE
SODIUM SERPL-SCNC: 143 MMOL/L (ref 136–145)
SPECIMEN EXPIRATION DATE: NORMAL
TRIGL SERPL-MCNC: 98 MG/DL
WBC # BLD AUTO: 3.92 THOUSAND/UL (ref 4.31–10.16)

## 2022-05-18 PROCEDURE — 80061 LIPID PANEL: CPT

## 2022-05-18 PROCEDURE — 86901 BLOOD TYPING SEROLOGIC RH(D): CPT

## 2022-05-18 PROCEDURE — 86850 RBC ANTIBODY SCREEN: CPT

## 2022-05-18 PROCEDURE — 85025 COMPLETE CBC W/AUTO DIFF WBC: CPT

## 2022-05-18 PROCEDURE — 36415 COLL VENOUS BLD VENIPUNCTURE: CPT

## 2022-05-18 PROCEDURE — 86900 BLOOD TYPING SEROLOGIC ABO: CPT

## 2022-05-18 PROCEDURE — 80048 BASIC METABOLIC PNL TOTAL CA: CPT

## 2022-05-18 PROCEDURE — 87086 URINE CULTURE/COLONY COUNT: CPT

## 2022-05-19 LAB — BACTERIA UR CULT: NORMAL

## 2022-05-26 ENCOUNTER — CONSULT (OUTPATIENT)
Dept: CARDIOLOGY CLINIC | Facility: CLINIC | Age: 77
End: 2022-05-26
Payer: COMMERCIAL

## 2022-05-26 VITALS
HEIGHT: 75 IN | DIASTOLIC BLOOD PRESSURE: 72 MMHG | WEIGHT: 263.2 LBS | BODY MASS INDEX: 32.73 KG/M2 | OXYGEN SATURATION: 98 % | HEART RATE: 64 BPM | SYSTOLIC BLOOD PRESSURE: 124 MMHG

## 2022-05-26 DIAGNOSIS — I47.1 SVT (SUPRAVENTRICULAR TACHYCARDIA) (HCC): Primary | ICD-10-CM

## 2022-05-26 DIAGNOSIS — R06.02 SHORTNESS OF BREATH: ICD-10-CM

## 2022-05-26 DIAGNOSIS — Z01.810 PREOP CARDIOVASCULAR EXAM: ICD-10-CM

## 2022-05-26 PROCEDURE — 99214 OFFICE O/P EST MOD 30 MIN: CPT | Performed by: INTERNAL MEDICINE

## 2022-05-26 PROCEDURE — 93000 ELECTROCARDIOGRAM COMPLETE: CPT | Performed by: INTERNAL MEDICINE

## 2022-05-26 RX ORDER — PENICILLIN V POTASSIUM 500 MG/1
TABLET ORAL
COMMUNITY
Start: 2022-05-05 | End: 2022-06-01

## 2022-05-26 RX ORDER — ACETAMINOPHEN AND CODEINE PHOSPHATE 300; 30 MG/1; MG/1
TABLET ORAL
COMMUNITY
Start: 2022-05-05

## 2022-05-26 NOTE — PROGRESS NOTES
[facility]  [id number] [facility] [id number]  Cardiology Follow Up    Jennifer Ren   1945  [id number]      1  SVT (supraventricular tachycardia) (HCC)  POCT ECG   2  Shortness of breath  POCT ECG   3  Preop cardiovascular exam  POCT ECG       Chief Complaint   Patient presents with    pre operative cardiac risk assessment     Transurethral resection of prostate on 6/9/22       Interval History: This patient present for preoperative cardiovascular risk assessment for prostate surgery  Patient does have history of recurrent DVT and bilateral pulmonary embolism on lifelong anticoagulation with Eliquis  Patient also had history of SVT status post ablation  Patient did have mild nonischemic cardiomyopathy which recovered  Patient denies any chest pain  No shortness of breath out of the ordinary  He has cut down alcohol to once a week on Fridays  He states that he has been compliant all his present medications  No bleeding issues      Patient Active Problem List   Diagnosis    Bilateral pulmonary embolism (HCC)    SOB (shortness of breath)    Hyperlipidemia    Lung nodule    Obesity    History of sickle cell trait    Leg edema, left    History of pulmonary embolism    Thrombocytopenia (HCC)    Chronic kidney disease (CKD), stage II (mild)    Tachycardia    Chronic low back pain    SVT (supraventricular tachycardia) (HCC)    Chronic anticoagulation    ARLENE (obstructive sleep apnea)    Cardiomyopathy, nonischemic (HCC)    Dyslipidemia    BPH (benign prostatic hyperplasia)    Nephrolithiasis    Lumbar disc disease with radiculopathy - Left    Spinal stenosis of lumbar region without neurogenic claudication - Left    Chest pain    Acute kidney injury superimposed on CKD ([facility] [address] )    COVID-19     Past Medical History:   Diagnosis Date    Hyperlipidemia     Post-nasal drip     Pulmonary embolism (HCC)      Social History     Socioeconomic History    Marital status: /Civil Union     Spouse name: Not on file    Number of children: 9    Years of education: Not on file    Highest education level: Not on file   Occupational History    Occupation: employed   Tobacco Use    Smoking status: Former Smoker     Quit date:      Years since quittin 4    Smokeless tobacco: Never Used   Vaping Use    Vaping Use: Never used   Substance and Sexual Activity    Alcohol use: Yes     Alcohol/week: 0 0 standard drinks     Comment: socially    Drug use: No    Sexual activity: Not on file   Other Topics Concern    Not on file   Social History Narrative    Not on file     Social Determinants of Health     Financial Resource Strain: Not on file   Food Insecurity: No Food Insecurity    Worried About Running Out of Food in the Last Year: Never true    Anay of Food in the Last Year: Never true   Transportation Needs: No Transportation Needs    Lack of Transportation (Medical): No    Lack of Transportation (Non-Medical): No   Physical Activity: Not on file   Stress: Not on file   Social Connections: Not on file   Intimate Partner Violence: Not on file   Housing Stability: Low Risk     Unable to Pay for Housing in the Last Year: No    Number of Places Lived in the Last Year: 1    Unstable Housing in the Last Year: No      History reviewed  No pertinent family history    Past Surgical History:   Procedure Laterality Date    ARTHROSCOPY KNEE Left     30 years ago    MS CYSTOURETHRO W/IMPLANT N/A 2021    Procedure: CYSTOSCOPY WITH INSERTION UROLIFT;  Surgeon: Jerson Estrada MD;  Location: [facility];  Service: Urology       Current Outpatient Medications:     acetaminophen (TYLENOL) 325 mg tablet, Take 2 tablets (650 mg total) by mouth every 4 (four) hours as needed for mild pain, Disp: 30 tablet, Rfl: 0    acetaminophen-codeine (TYLENOL #3) 300-30 mg per tablet, TAKE 1-2 TABS BY MOUTH EVERY 4-6 HRS AS NEEDED FOR PAIN, Disp: , Rfl:     apixaban (Eliquis) 5 mg, Take 1 tablet (5 mg total) by mouth 2 (two) times a day, Disp: 180 tablet, Rfl: 3    Ascorbic Acid (VITAMIN C) 100 MG tablet, Take 100 mg by mouth daily, Disp: , Rfl:     docusate sodium (COLACE) 100 mg capsule, Take 1 capsule (100 mg total) by mouth 2 (two) times a day for 15 days (Patient taking differently: Take 100 mg by mouth 2 (two) times a day as needed), Disp: 30 capsule, Rfl: 0    Durezol 0 05 % EMUL, , Disp: , Rfl:     fluticasone (FLONASE) 50 mcg/act nasal spray, 1 spray into each nostril daily as needed for rhinitis, Disp: 16 g, Rfl: 0    penicillin V potassium (VEETID) 500 mg tablet, TAKE TWO TABLETS NOW, THEN ONE EVERY 6 HOURS UNTIL GONE , Disp: , Rfl:     rosuvastatin (CRESTOR) 20 MG tablet, Take 20 mg by mouth daily, Disp: , Rfl:     tamsulosin (FLOMAX) 0 4 mg, Take 0 4 mg by mouth daily with dinner  , Disp: , Rfl:     meclizine (ANTIVERT) 25 mg tablet, Take 25 mg by mouth (Patient not taking: Reported on 5/26/2022), Disp: , Rfl:   No Known Allergies    Labs:  Appointment on 05/18/2022   Component Date Value    WBC 05/18/2022 3 92 (A)    RBC 05/18/2022 4 39     Hemoglobin 05/18/2022 13 4     Hematocrit 05/18/2022 39 9     MCV 05/18/2022 91     MCH 05/18/2022 30 5     MCHC 05/18/2022 33 6     RDW 05/18/2022 15 6 (A)    MPV 05/18/2022 11 7     Platelets 32/83/5550 134 (A)    nRBC 05/18/2022 0     Neutrophils Relative 05/18/2022 55     Immat GRANS % 05/18/2022 0     Lymphocytes Relative 05/18/2022 28     Monocytes Relative 05/18/2022 13 (A)    Eosinophils Relative 05/18/2022 3     Basophils Relative 05/18/2022 1     Neutrophils Absolute 05/18/2022 2 19     Immature Grans Absolute 05/18/2022 0 01     Lymphocytes Absolute 05/18/2022 1 08     Monocytes Absolute 05/18/2022 0 49     Eosinophils Absolute 05/18/2022 0 12     Basophils Absolute 05/18/2022 0 03     Sodium 05/18/2022 143     Potassium 05/18/2022 4 0     Chloride 05/18/2022 110 (A)    CO2 05/18/2022 30     ANION GAP 05/18/2022 3 (A)    BUN 05/18/2022 16     Creatinine 05/18/2022 1 28     Glucose, Fasting 05/18/2022 109 (A)    Calcium 05/18/2022 9 6     eGFR 05/18/2022 54     Urine Culture 05/18/2022 <10,000 cfu/ml      Cholesterol 05/18/2022 146     Triglycerides 05/18/2022 98     HDL, Direct 05/18/2022 45     LDL Calculated 05/18/2022 81     ABO Grouping 05/18/2022 A     Rh Factor 05/18/2022 Positive     Antibody Screen 05/18/2022 Negative     Specimen Expiration Date 05/18/2022 [id number]      Imaging: EMG 2 limb lower extremity    Result Date: 5/10/2022  Narrative: Nando Andrews MD     5/10/2022 11:23 AM   EMG 2 limb lower extremity  Date/Time 5/10/2022 11:22 AM  Performed by  Nando Andrews MD  Authorized by Brendan Johnson MD        Review of Systems:  Review of Systems   REVIEW OF SYSTEMS:  Constitutional:  Denies fever or chills   Eyes:  Denies change in visual acuity   HENT:  Denies nasal congestion or sore throat   Respiratory:  Denies cough or shortness of breath   Cardiovascular:  Denies chest pain or edema   GI:  Denies abdominal pain, nausea, vomiting, bloody stools or diarrhea   :  For prostate surgery  Musculoskeletal:  Denies back pain or joint pain   Neurologic:  Denies headache, focal weakness or sensory changes   Endocrine:  Denies polyuria or polydipsia   Lymphatic:  Denies swollen glands   Psychiatric:  Denies depression or anxiety     Physical Exam:    /72 (BP Location: Left arm, Patient Position: Sitting, Cuff Size: Standard)   Pulse 64   Ht 6' 3" (1 905 m)   Wt 119 kg (263 lb 3 2 oz)   SpO2 98%   BMI 32 90 kg/m²     Physical Exam   PHYSICAL EXAM:  General:  Patient is not in acute distress   Head: Normocephalic, Atraumatic  HEENT:  Both pupils normal-size atraumatic, normocephalic, nonicteric  Neck:  JVP not raised  Trachea central  No carotid bruit  Respiratory:  normal breath sounds no crackles   no rhonchi  Cardiovascular:  Regular rate and rhythm no S3 no murmurs  GI:  Abdomen soft nontender  No organomegaly  Lymphatic:  No cervical or inguinal lymphadenopathy  Neurologic:  Patient is awake alert, oriented   Grossly nonfocal  Extremities no edema        Discussion/Summary:  Patient with multiple medical problems who seems to be doing reasonably well from cardiac standpoint  Previous studies reviewed with patient  Medications reviewed and possible side effects discussed  concepts of cardiovascular disease , signs and symptoms of heart disease  Dietary and risk factor modification reinforced  All questions answered  Safety measures reviewed  Patient advised to report any problems prompting medical attention  Patient has no specific contraindication from cardiac standpoint to proceed with the planned prostate surgery  Patient will be considered moderate cardiac risk based on age and comorbidities  Patient can stop Eliquis 3 days prior to surgery as the risk of hematuria and bleeding issues or higher with urological procedures  To restart anticoagulation when okay from surgical standpoint  EKG done today shows sinus rhythm LVH with nonspecific ST-T abnormalities  Overall unchanged from previous EKG  Continue dietary and risk factor modification  Medications reviewed  Follow-up in 4 months or earlier as needed  Patient is agreeable with the plan of care

## 2022-05-26 NOTE — LETTER
May 26, 2022     [address]ika [address]  [facility]    Patient: Celine Salas  YOB: 1945   Date of Visit: 5/26/2022       Dear Dr Maura So: Thank you for referring Neeru Adler to me for evaluation  Below are my notes for this consultation  If you have questions, please do not hesitate to call me  I look forward to following your patient along with you           Sincerely,        Marily Oates MD        CC: Nikki Phelps MD

## 2022-05-31 ENCOUNTER — TELEPHONE (OUTPATIENT)
Dept: UROLOGY | Facility: MEDICAL CENTER | Age: 77
End: 2022-05-31

## 2022-06-08 PROBLEM — N40.1 BENIGN LOCALIZED PROSTATIC HYPERPLASIA WITH LOWER URINARY TRACT SYMPTOMS (LUTS): Status: ACTIVE | Noted: 2022-06-08

## 2022-06-13 ENCOUNTER — TELEPHONE (OUTPATIENT)
Dept: OTHER | Facility: OTHER | Age: 77
End: 2022-06-13

## 2022-06-13 NOTE — TELEPHONE ENCOUNTER
I returned pt 's phone call and there was no answer so I left a voicemail asking him to call our back to discuss

## 2022-06-13 NOTE — TELEPHONE ENCOUNTER
Patient stated that his procedure on 6/9/2022 was canceled and patient is requesting a call back to reschedule surgery

## 2022-06-20 NOTE — TELEPHONE ENCOUNTER
I called pt again to discuss rescheduling his TURP procedure  There was no answer so I did leave another voicemail asking him to call our office back to discuss

## 2022-06-23 ENCOUNTER — HOSPITAL ENCOUNTER (OUTPATIENT)
Facility: HOSPITAL | Age: 77
Setting detail: OUTPATIENT SURGERY
End: 2022-06-23
Attending: UROLOGY | Admitting: UROLOGY
Payer: COMMERCIAL

## 2022-06-23 ENCOUNTER — PREP FOR PROCEDURE (OUTPATIENT)
Dept: UROLOGY | Facility: AMBULATORY SURGERY CENTER | Age: 77
End: 2022-06-23

## 2022-06-23 ENCOUNTER — TELEPHONE (OUTPATIENT)
Dept: OTHER | Facility: OTHER | Age: 77
End: 2022-06-23

## 2022-06-23 DIAGNOSIS — R39.89 SUSPECTED UTI: ICD-10-CM

## 2022-06-23 DIAGNOSIS — N13.8 BPH WITH OBSTRUCTION/LOWER URINARY TRACT SYMPTOMS: Primary | ICD-10-CM

## 2022-06-23 DIAGNOSIS — N40.1 BPH WITH OBSTRUCTION/LOWER URINARY TRACT SYMPTOMS: Primary | ICD-10-CM

## 2022-06-23 DIAGNOSIS — Z01.812 PRE-OPERATIVE LABORATORY EXAMINATION: ICD-10-CM

## 2022-06-23 NOTE — TELEPHONE ENCOUNTER
Wife calling darwin to speak to Chaz Hong in the office regarding when her 's surgery will be rescheduled  I reviewed with her that Chaz Hong spoke to her  at 18 and reviewed everything with him  I reread it to this wife  She has not spoken to her  recently and will discuss with him when he comes home  Told her to call back if she has any questions

## 2022-06-23 NOTE — TELEPHONE ENCOUNTER
Called and spoke to patient  Explained to patient that he was scheduled with Lu Velasquez today but there is no need for the appointment as his TURP was cancelled  Patient asking when this will be rescheduled  Explained that Luis Julio has been trying to get in touch with him but he has not received any phone calls from her  Explained that it is documented that she tried to reach him  Advised I will send a message to Luis Julio and he should have his phone nearby for when she calls

## 2022-06-23 NOTE — TELEPHONE ENCOUNTER
Pt finally returned my call and I was able to speak with him  I rescheduled his TURP with Dr Pam De La Vega at the [facility] [place], operated by [facility] on 8/18/2022  I verbally went over all of pt 's pre op instructions again and he is aware that all of his pre op testing needs to be repeated 3 weeks prior to surgery  Pt will stop his Eliquis 2 days prior to surgery and I will call pt 's PCP and Cardiologist for new clearances  Per pt 's request I will be mailing him a copy of his new surgical packet and instructed him to call our office with any questions or concerns regarding this procedure

## 2022-06-23 NOTE — TELEPHONE ENCOUNTER
Brian Wolf of [facility] called inquiring if PT needs to be re-seen for clearance for surgery  Cardiac clearance was provided in May 2022, however PT canceled surgery of 06/09/22  New surgery date 08/18/22  Please advise Brian Wolf at [phone]

## 2022-06-23 NOTE — TELEPHONE ENCOUNTER
No need for the patient to be seen unless there is a status change in the patient's clinical condition  They can contact us 2 weeks prior to the surgery and we can update the preop risk assessment at that time

## 2022-06-23 NOTE — TELEPHONE ENCOUNTER
I called pt again for the 3rd time to discuss rescheduling his TURP procedure with Dr Roxanna Barker on 8/18/22  Once again the was no answer so I did leave him a 3rd voicemail asking him to call our office back to reschedule this procedure

## 2022-06-23 NOTE — TELEPHONE ENCOUNTER
I called pt to inform him that he is scheduled for a medical clearance appt with Dr Makayla Cheng on 8/8/22 at 10:00 AM  I also wanted to inform him that cardiology does not need to see him again for another clearance appt  There was no answer so I did leave the information on pt  s' voicemail  I then reached out to pt 's wife to see if I would be able to give her this information before mailing pt 's surgical packet  There was no answer when I called Taniya Ward so I did leave the information on her voicemail as well

## 2022-06-28 NOTE — TELEPHONE ENCOUNTER
I called pt again this morning to confirm that he received the information on his medical clearance appt that was left on both his voicemail and his wife's voicemail last week  There was no answer again so I did leave another voicemail asking pt to call back to confirm

## 2022-06-29 NOTE — TELEPHONE ENCOUNTER
Patient called to report to Estelita Phoenix that his appointment with his PCP was changed from 8/8/22 to 8/1/22

## 2022-06-29 NOTE — TELEPHONE ENCOUNTER
Bharathi Wright RN  You 5 minutes ago (3:29 PM)     ROXIE Palacios for the PCP appt in other encounter    Routing comment      Faye Lombard, RN routed conversation to [facility] Daisy [place] 10 minutes ago (3:23 PM)     Faye Lombard, RN 11 minutes ago (3:23 PM)     JK       Patient called to report to Darylene Drain that his appointment with his PCP was changed from 8/8/22 to 8/1/22  Documentation      Mele Lipscomba  [id number] [facility] Quinn Cortez RN 12 minutes ago (3:22 PM)     Valentine Sharma RN routed conversation to [facility] Zillow [place] 6 days ago     Valentine Sharma RN 6 days ago     DV       Wife calling wamting to speak to Darylene Drain in the office regarding when her 's surgery will be rescheduled  I reviewed with her that Darylene Drain spoke to her  at 18 and reviewed everything with him  I reread it to this wife  She has not spoken to her  recently and will discuss with him when he comes home  Told her to call back if she has any questions           Documentation      Christy Cantor RN 6 days ago

## 2022-06-29 NOTE — TELEPHONE ENCOUNTER
I called pt again this morning to confirm that he received the previous 3 voicemails that I left for him regarding his scheduled medical clearance appt on 8/8/22 at 10:00Am with Dr Cyrus Henderson  I was able to speak with pt this time and he claimed that he was not aware of the previous voicemails that I left for him and I asked if he normally checks his messages and pt stated that he does not  Pt then stated that he is not able to make that appt on 8/8/22 since he has another appt scheduled that day  Pt confirmed that he will call their office and reschedule this appt  I asked pt to call me back with new appt date ASAP so I can make sure if fits within the 30 day window for his procedure  Pt confirmed that he will call back with new date and time

## 2022-07-08 ENCOUNTER — TELEPHONE (OUTPATIENT)
Dept: OTHER | Facility: OTHER | Age: 77
End: 2022-07-08

## 2022-07-08 NOTE — TELEPHONE ENCOUNTER
Pt asking about getting COVID swab for out of network GI procedure  Explained to pt that we cannot place orders for any kind of COVID swab  Recommended pt reach out to his GI specialist  Pt verbalized understanding

## 2022-07-29 ENCOUNTER — TELEPHONE (OUTPATIENT)
Dept: UROLOGY | Facility: MEDICAL CENTER | Age: 77
End: 2022-07-29

## 2022-07-29 NOTE — TELEPHONE ENCOUNTER
DOS 8/18/22 procedure 04164 approved [facility]SMB#[id number] dates 8/18/22-9/16/22 [id number] and  no auth required secondary

## 2022-08-01 ENCOUNTER — LAB REQUISITION (OUTPATIENT)
Dept: LAB | Facility: HOSPITAL | Age: 77
End: 2022-08-01
Payer: COMMERCIAL

## 2022-08-01 DIAGNOSIS — K63.5 POLYP OF COLON: ICD-10-CM

## 2022-08-01 PROCEDURE — 88305 TISSUE EXAM BY PATHOLOGIST: CPT | Performed by: PATHOLOGY

## 2022-08-05 ENCOUNTER — VBI (OUTPATIENT)
Dept: ADMINISTRATIVE | Facility: OTHER | Age: 77
End: 2022-08-05

## 2022-08-05 NOTE — TELEPHONE ENCOUNTER
I called [facility] cardiology as requested for updated cardiac clearance information  There was no answer, I will follow up through encounter  I then called Dr Kris Mtz office to confirm if pt was seen at clearance appt on 8/1/22 because I have not received clearance at this time  I spoke with the office and they stated that pt was cleared by their office and they are sending note over now  I then called pt and his wife to find out why his pre op testing has not been completed yet  There was no answer on either line so I did leave both the pt and his wife a voicemail informing them that pt 's pre op labs and urine culture should have been completed yesterday but I do not see any results in pt 's chart  They were instructed to call our office back as soon as possible to discuss so pt 's procedure does not have to be rescheduled

## 2022-08-08 NOTE — TELEPHONE ENCOUNTER
I called pt again this morning to inquire why his pre op testing has not been completed and he still has not returned my call from Friday  There was no answer again so I did leave another voicemail asking pt to call me back to discuss because if pre op testing is not done he will need to reschedule his surgery on 8/18/22  I then called his wife Chase Brown to see if she was able to have pt go for his pre op testing  I was able to speak with Chase Brown and she stated that pt needed to reschedule his procedure because they have to much going on right now to have pt 's pre op testing done  I did inform her that since this is the second time pt is rescheduling this procedure if he does it for a third time he will not be able to reschedule until seen in the office and I can not guarantee that Dr Sandrita Preston will allow me to reschedule him on his schedule and Chase Brown verbalized understanding  Pt is now scheduled at the [facility] with Dr Sandrita Preston on 10/27/22  Once again I verbally went over all of pt 's pre op instructions and prep information with her and she relayed information to pt  I will call pt 's PCP and schedule another medical clearance appt and call them back with the appt information  They will wait to hear back from me  I then called Dr Destini Hodge office to schedule pt 's new medical clearance appt  There was no answer so I left a voicemail asking the office to call me back to schedule pt 's clearance appt

## 2022-08-29 ENCOUNTER — TELEPHONE (OUTPATIENT)
Dept: CARDIOLOGY CLINIC | Facility: CLINIC | Age: 77
End: 2022-08-29

## 2022-08-29 NOTE — TELEPHONE ENCOUNTER
Jess Men from [address] [place] [address] Urology [phone] contacting office to inquire if patient will need cardiac clearance prior to prostate resection scheduled on 10/27/22 w/Dr Liliana Roche  Patient last seen May 26th by Dr Schroeder Else  Please advise if we are able to use last o/v note or patient needs to come into the office to be seen

## 2022-08-31 NOTE — TELEPHONE ENCOUNTER
Spoke to LUPE from [facility] Urology, letter has to be updated with today's date

## 2022-08-31 NOTE — TELEPHONE ENCOUNTER
New letter dated today in the chart  We have been updating this a few times for the past many months as surgery gets postponed

## 2022-08-31 NOTE — TELEPHONE ENCOUNTER
Spoke to Anika Quintero from [address] [place] [address] Urology, she's aware updated letter  is in patient chart

## 2022-10-06 ENCOUNTER — APPOINTMENT (OUTPATIENT)
Dept: LAB | Facility: HOSPITAL | Age: 77
End: 2022-10-06
Payer: COMMERCIAL

## 2022-10-06 DIAGNOSIS — N13.8 BPH WITH OBSTRUCTION/LOWER URINARY TRACT SYMPTOMS: ICD-10-CM

## 2022-10-06 DIAGNOSIS — R39.89 SUSPECTED UTI: Primary | ICD-10-CM

## 2022-10-06 DIAGNOSIS — Z01.812 PRE-OPERATIVE LABORATORY EXAMINATION: ICD-10-CM

## 2022-10-06 DIAGNOSIS — N40.1 BPH WITH OBSTRUCTION/LOWER URINARY TRACT SYMPTOMS: ICD-10-CM

## 2022-10-06 LAB
ABO GROUP BLD: NORMAL
ANION GAP SERPL CALCULATED.3IONS-SCNC: 7 MMOL/L (ref 4–13)
BASOPHILS # BLD AUTO: 0.02 THOUSANDS/ÂΜL (ref 0–0.1)
BASOPHILS NFR BLD AUTO: 0 % (ref 0–1)
BLD GP AB SCN SERPL QL: NEGATIVE
BUN SERPL-MCNC: 12 MG/DL (ref 5–25)
CALCIUM SERPL-MCNC: 9.1 MG/DL (ref 8.3–10.1)
CHLORIDE SERPL-SCNC: 108 MMOL/L (ref 96–108)
CO2 SERPL-SCNC: 28 MMOL/L (ref 21–32)
CREAT SERPL-MCNC: 1.24 MG/DL (ref 0.6–1.3)
EOSINOPHIL # BLD AUTO: 0.06 THOUSAND/ÂΜL (ref 0–0.61)
EOSINOPHIL NFR BLD AUTO: 1 % (ref 0–6)
ERYTHROCYTE [DISTWIDTH] IN BLOOD BY AUTOMATED COUNT: 14.6 % (ref 11.6–15.1)
GFR SERPL CREATININE-BSD FRML MDRD: 56 ML/MIN/1.73SQ M
GLUCOSE P FAST SERPL-MCNC: 106 MG/DL (ref 65–99)
HCT VFR BLD AUTO: 38.8 % (ref 36.5–49.3)
HGB BLD-MCNC: 13.1 G/DL (ref 12–17)
IMM GRANULOCYTES # BLD AUTO: 0.01 THOUSAND/UL (ref 0–0.2)
IMM GRANULOCYTES NFR BLD AUTO: 0 % (ref 0–2)
LYMPHOCYTES # BLD AUTO: 1.26 THOUSANDS/ÂΜL (ref 0.6–4.47)
LYMPHOCYTES NFR BLD AUTO: 28 % (ref 14–44)
MCH RBC QN AUTO: 32.3 PG (ref 26.8–34.3)
MCHC RBC AUTO-ENTMCNC: 33.8 G/DL (ref 31.4–37.4)
MCV RBC AUTO: 96 FL (ref 82–98)
MONOCYTES # BLD AUTO: 0.52 THOUSAND/ÂΜL (ref 0.17–1.22)
MONOCYTES NFR BLD AUTO: 12 % (ref 4–12)
NEUTROPHILS # BLD AUTO: 2.61 THOUSANDS/ÂΜL (ref 1.85–7.62)
NEUTS SEG NFR BLD AUTO: 59 % (ref 43–75)
NRBC BLD AUTO-RTO: 0 /100 WBCS
PLATELET # BLD AUTO: 150 THOUSANDS/UL (ref 149–390)
PMV BLD AUTO: 11.4 FL (ref 8.9–12.7)
POTASSIUM SERPL-SCNC: 3.9 MMOL/L (ref 3.5–5.3)
RBC # BLD AUTO: 4.05 MILLION/UL (ref 3.88–5.62)
RH BLD: POSITIVE
SODIUM SERPL-SCNC: 143 MMOL/L (ref 135–147)
SPECIMEN EXPIRATION DATE: NORMAL
WBC # BLD AUTO: 4.48 THOUSAND/UL (ref 4.31–10.16)

## 2022-10-06 PROCEDURE — 86901 BLOOD TYPING SEROLOGIC RH(D): CPT

## 2022-10-06 PROCEDURE — 80048 BASIC METABOLIC PNL TOTAL CA: CPT

## 2022-10-06 PROCEDURE — 85025 COMPLETE CBC W/AUTO DIFF WBC: CPT

## 2022-10-06 PROCEDURE — 87086 URINE CULTURE/COLONY COUNT: CPT

## 2022-10-06 PROCEDURE — 86900 BLOOD TYPING SEROLOGIC ABO: CPT

## 2022-10-06 PROCEDURE — 36415 COLL VENOUS BLD VENIPUNCTURE: CPT

## 2022-10-06 PROCEDURE — 86850 RBC ANTIBODY SCREEN: CPT

## 2022-10-07 LAB — BACTERIA UR CULT: ABNORMAL

## 2022-10-20 ENCOUNTER — TELEPHONE (OUTPATIENT)
Dept: OTHER | Facility: OTHER | Age: 77
End: 2022-10-20

## 2022-10-20 NOTE — TELEPHONE ENCOUNTER
Patient Meka Rao contacted the office stating he had a procedure done last year and now needs a letter stating that he no longer needs the cardiac clearance for his job  Patient states his job will not accept the previous letter    Patient requests call when letter is ready  [phone]

## 2022-10-20 NOTE — TELEPHONE ENCOUNTER
Pt stopped in [address] office  Dropped off [place] paperwork  Forwarded to [facility]  Mirian forwarded paperwork to be completed by ARLEEN

## 2022-10-20 NOTE — TELEPHONE ENCOUNTER
Patient would like to know the best way to get [facility] paperwork for his upcoming procedure to Dr Kacey Ring  He would like a call back to advise

## 2022-10-22 ENCOUNTER — APPOINTMENT (EMERGENCY)
Dept: RADIOLOGY | Facility: HOSPITAL | Age: 77
End: 2022-10-22
Payer: COMMERCIAL

## 2022-10-22 ENCOUNTER — HOSPITAL ENCOUNTER (EMERGENCY)
Facility: HOSPITAL | Age: 77
Discharge: HOME | End: 2022-10-22
Attending: EMERGENCY MEDICINE
Payer: COMMERCIAL

## 2022-10-22 VITALS
SYSTOLIC BLOOD PRESSURE: 136 MMHG | OXYGEN SATURATION: 96 % | DIASTOLIC BLOOD PRESSURE: 83 MMHG | WEIGHT: 263 LBS | HEIGHT: 75 IN | HEART RATE: 89 BPM | TEMPERATURE: 97.5 F | RESPIRATION RATE: 18 BRPM | BODY MASS INDEX: 32.7 KG/M2

## 2022-10-22 DIAGNOSIS — J06.9 VIRAL URI WITH COUGH: Primary | ICD-10-CM

## 2022-10-22 DIAGNOSIS — I47.1 PAROXYSMAL SVT (SUPRAVENTRICULAR TACHYCARDIA) (HCC): ICD-10-CM

## 2022-10-22 LAB
2HR DELTA HS TROPONIN: 1 NG/L
ALBUMIN SERPL BCP-MCNC: 3.6 G/DL (ref 3.5–5)
ALP SERPL-CCNC: 61 U/L (ref 46–116)
ALT SERPL W P-5'-P-CCNC: 28 U/L (ref 12–78)
ANION GAP SERPL CALCULATED.3IONS-SCNC: 9 MMOL/L (ref 4–13)
AST SERPL W P-5'-P-CCNC: 25 U/L (ref 5–45)
BASOPHILS # BLD AUTO: 0.01 THOUSANDS/ÂΜL (ref 0–0.1)
BASOPHILS NFR BLD AUTO: 0 % (ref 0–1)
BILIRUB SERPL-MCNC: 0.46 MG/DL (ref 0.2–1)
BUN SERPL-MCNC: 12 MG/DL (ref 5–25)
CALCIUM SERPL-MCNC: 9 MG/DL (ref 8.3–10.1)
CARDIAC TROPONIN I PNL SERPL HS: 7 NG/L
CARDIAC TROPONIN I PNL SERPL HS: 8 NG/L
CHLORIDE SERPL-SCNC: 108 MMOL/L (ref 96–108)
CO2 SERPL-SCNC: 26 MMOL/L (ref 21–32)
CREAT SERPL-MCNC: 1.29 MG/DL (ref 0.6–1.3)
EOSINOPHIL # BLD AUTO: 0.1 THOUSAND/ÂΜL (ref 0–0.61)
EOSINOPHIL NFR BLD AUTO: 2 % (ref 0–6)
ERYTHROCYTE [DISTWIDTH] IN BLOOD BY AUTOMATED COUNT: 14.4 % (ref 11.6–15.1)
FLUAV RNA RESP QL NAA+PROBE: NEGATIVE
FLUBV RNA RESP QL NAA+PROBE: NEGATIVE
GFR SERPL CREATININE-BSD FRML MDRD: 53 ML/MIN/1.73SQ M
GLUCOSE SERPL-MCNC: 167 MG/DL (ref 65–140)
HCT VFR BLD AUTO: 38.6 % (ref 36.5–49.3)
HGB BLD-MCNC: 13 G/DL (ref 12–17)
IMM GRANULOCYTES # BLD AUTO: 0.03 THOUSAND/UL (ref 0–0.2)
IMM GRANULOCYTES NFR BLD AUTO: 1 % (ref 0–2)
LYMPHOCYTES # BLD AUTO: 1.34 THOUSANDS/ÂΜL (ref 0.6–4.47)
LYMPHOCYTES NFR BLD AUTO: 28 % (ref 14–44)
MCH RBC QN AUTO: 32.1 PG (ref 26.8–34.3)
MCHC RBC AUTO-ENTMCNC: 33.7 G/DL (ref 31.4–37.4)
MCV RBC AUTO: 95 FL (ref 82–98)
MONOCYTES # BLD AUTO: 0.47 THOUSAND/ÂΜL (ref 0.17–1.22)
MONOCYTES NFR BLD AUTO: 10 % (ref 4–12)
NEUTROPHILS # BLD AUTO: 2.78 THOUSANDS/ÂΜL (ref 1.85–7.62)
NEUTS SEG NFR BLD AUTO: 59 % (ref 43–75)
NRBC BLD AUTO-RTO: 0 /100 WBCS
PLATELET # BLD AUTO: 147 THOUSANDS/UL (ref 149–390)
PMV BLD AUTO: 11.5 FL (ref 8.9–12.7)
POTASSIUM SERPL-SCNC: 3.8 MMOL/L (ref 3.5–5.3)
PROT SERPL-MCNC: 6.9 G/DL (ref 6.4–8.4)
RBC # BLD AUTO: 4.05 MILLION/UL (ref 3.88–5.62)
RSV RNA RESP QL NAA+PROBE: NEGATIVE
SARS-COV-2 RNA RESP QL NAA+PROBE: NEGATIVE
SODIUM SERPL-SCNC: 143 MMOL/L (ref 135–147)
WBC # BLD AUTO: 4.73 THOUSAND/UL (ref 4.31–10.16)

## 2022-10-22 PROCEDURE — 36415 COLL VENOUS BLD VENIPUNCTURE: CPT | Performed by: EMERGENCY MEDICINE

## 2022-10-22 PROCEDURE — 93005 ELECTROCARDIOGRAM TRACING: CPT

## 2022-10-22 PROCEDURE — 80053 COMPREHEN METABOLIC PANEL: CPT | Performed by: EMERGENCY MEDICINE

## 2022-10-22 PROCEDURE — 99285 EMERGENCY DEPT VISIT HI MDM: CPT | Performed by: EMERGENCY MEDICINE

## 2022-10-22 PROCEDURE — 0241U HB NFCT DS VIR RESP RNA 4 TRGT: CPT | Performed by: EMERGENCY MEDICINE

## 2022-10-22 PROCEDURE — 85025 COMPLETE CBC W/AUTO DIFF WBC: CPT | Performed by: EMERGENCY MEDICINE

## 2022-10-22 PROCEDURE — 71046 X-RAY EXAM CHEST 2 VIEWS: CPT

## 2022-10-22 PROCEDURE — 84484 ASSAY OF TROPONIN QUANT: CPT | Performed by: EMERGENCY MEDICINE

## 2022-10-22 RX ORDER — METOPROLOL SUCCINATE 25 MG/1
25 TABLET, EXTENDED RELEASE ORAL DAILY
Status: DISCONTINUED | OUTPATIENT
Start: 2022-10-22 | End: 2022-10-22

## 2022-10-22 RX ORDER — METOPROLOL SUCCINATE 25 MG/1
25 TABLET, EXTENDED RELEASE ORAL DAILY
Qty: 30 TABLET | Refills: 0 | Status: SHIPPED | OUTPATIENT
Start: 2022-10-22

## 2022-10-22 RX ORDER — METOPROLOL SUCCINATE 25 MG/1
25 TABLET, EXTENDED RELEASE ORAL ONCE
Status: COMPLETED | OUTPATIENT
Start: 2022-10-22 | End: 2022-10-22

## 2022-10-22 RX ORDER — METOPROLOL SUCCINATE 25 MG/1
25 TABLET, EXTENDED RELEASE ORAL DAILY
Status: DISCONTINUED | OUTPATIENT
Start: 2022-10-23 | End: 2022-10-22

## 2022-10-22 RX ORDER — SODIUM CHLORIDE 9 MG/ML
3 INJECTION INTRAVENOUS
Status: DISCONTINUED | OUTPATIENT
Start: 2022-10-22 | End: 2022-10-22 | Stop reason: HOSPADM

## 2022-10-22 RX ADMIN — SODIUM CHLORIDE 1000 ML: 0.9 INJECTION, SOLUTION INTRAVENOUS at 14:11

## 2022-10-22 RX ADMIN — METOPROLOL SUCCINATE 25 MG: 25 TABLET, EXTENDED RELEASE ORAL at 16:56

## 2022-10-23 LAB
ATRIAL RATE: 73 BPM
ATRIAL RATE: 85 BPM
ATRIAL RATE: 89 BPM
P AXIS: 72 DEGREES
P AXIS: 72 DEGREES
PR INTERVAL: 174 MS
PR INTERVAL: 176 MS
QRS AXIS: -31 DEGREES
QRS AXIS: -33 DEGREES
QRS AXIS: -36 DEGREES
QRSD INTERVAL: 92 MS
QRSD INTERVAL: 94 MS
QRSD INTERVAL: 98 MS
QT INTERVAL: 350 MS
QT INTERVAL: 352 MS
QT INTERVAL: 370 MS
QTC INTERVAL: 407 MS
QTC INTERVAL: 425 MS
QTC INTERVAL: 531 MS
T WAVE AXIS: -8 DEGREES
T WAVE AXIS: 170 DEGREES
T WAVE AXIS: 90 DEGREES
VENTRICULAR RATE: 137 BPM
VENTRICULAR RATE: 73 BPM
VENTRICULAR RATE: 89 BPM

## 2022-10-23 PROCEDURE — 93010 ELECTROCARDIOGRAM REPORT: CPT | Performed by: INTERNAL MEDICINE

## 2022-10-25 ENCOUNTER — TELEPHONE (OUTPATIENT)
Dept: UROLOGY | Facility: CLINIC | Age: 77
End: 2022-10-25

## 2022-10-26 NOTE — TELEPHONE ENCOUNTER
I called and confirmed appt with patient date, time, and location 
I called and spoke to the patient and told him that I was reviewing my schedule for the coming days  Given that he had a larger gland at the time of his initial workup in 2020 and the fact that he takes Eliquis I recommend cancelling Thursday surgery and repeating his endoscopic workup to assess for interval growth  We now have a urologist within the network that performed holmium laser enucleation of prostate which might be a better option for him pending his repeat workup  Additionally I did  him on robotic simple prostatectomy if he has had interval growth  He is not currently taking finasteride, I would want him on this for a period of time prior to surgery to decrease his vascularity of the prostate and decrease bleeding risk  Surgery for Thursday will be canceled and we will schedule a repeat outlet workup in the coming weeks to help further guide surgical decision making 
Surgery on 10/27 has been cancelled 
stated

## 2022-11-18 ENCOUNTER — TELEPHONE (OUTPATIENT)
Dept: CARDIOLOGY CLINIC | Facility: CLINIC | Age: 77
End: 2022-11-18

## 2022-11-18 NOTE — TELEPHONE ENCOUNTER
Patient came to office to drop off forms for employer  Forms completed and scanned into chart  Spoke to patient to advise of completion  Advised will be at  of [facility] office   Patient verbalized understanding

## 2023-01-12 ENCOUNTER — OFFICE VISIT (OUTPATIENT)
Dept: CARDIOLOGY CLINIC | Facility: CLINIC | Age: 78
End: 2023-01-12

## 2023-01-12 VITALS
RESPIRATION RATE: 16 BRPM | HEIGHT: 76 IN | OXYGEN SATURATION: 97 % | SYSTOLIC BLOOD PRESSURE: 134 MMHG | DIASTOLIC BLOOD PRESSURE: 80 MMHG | BODY MASS INDEX: 32.76 KG/M2 | HEART RATE: 83 BPM | WEIGHT: 269 LBS

## 2023-01-12 DIAGNOSIS — N52.9 ERECTILE DYSFUNCTION, UNSPECIFIED ERECTILE DYSFUNCTION TYPE: ICD-10-CM

## 2023-01-12 DIAGNOSIS — Z79.01 CHRONIC ANTICOAGULATION: ICD-10-CM

## 2023-01-12 DIAGNOSIS — I47.1 SVT (SUPRAVENTRICULAR TACHYCARDIA) (HCC): Primary | ICD-10-CM

## 2023-01-12 DIAGNOSIS — I42.8 CARDIOMYOPATHY, NONISCHEMIC (HCC): ICD-10-CM

## 2023-01-12 DIAGNOSIS — E78.2 MIXED HYPERLIPIDEMIA: ICD-10-CM

## 2023-01-12 RX ORDER — SILDENAFIL 50 MG/1
50 TABLET, FILM COATED ORAL DAILY PRN
Qty: 10 TABLET | Refills: 3 | Status: SHIPPED | OUTPATIENT
Start: 2023-01-12

## 2023-01-12 NOTE — PROGRESS NOTES
[facility] [place] [id number] [facility] [place] [address]  Cardiology Follow Up    Sammy Kapoor   1945  [id number]      1  SVT (supraventricular tachycardia) ([facility] [address] )        2  Chronic anticoagulation        3  Mixed hyperlipidemia            Chief Complaint   Patient presents with   • Follow-up       Interval History: Patient presents for follow-up visit  Patient denies any history of chest pain shortness of breath  Patient denies any history of leg edema or orthopnea PND  No history of presyncope syncope  Patient states compliance with the present list of medications  Patient complains of erectile dysfunction  Patient states that he has tried sildenafil in the past with success  He understands the precautions      Patient Active Problem List   Diagnosis   • Bilateral pulmonary embolism (HCC)   • SOB (shortness of breath)   • Hyperlipidemia   • Lung nodule   • Obesity   • History of sickle cell trait   • Leg edema, left   • History of pulmonary embolism   • Thrombocytopenia (HCC)   • Chronic kidney disease (CKD), stage II (mild)   • Tachycardia   • Chronic low back pain   • SVT (supraventricular tachycardia) (HCC)   • Chronic anticoagulation   • ARLENE (obstructive sleep apnea)   • Cardiomyopathy, nonischemic (HCC)   • Dyslipidemia   • BPH (benign prostatic hyperplasia)   • Nephrolithiasis   • Lumbar disc disease with radiculopathy - Left   • Spinal stenosis of lumbar region without neurogenic claudication - Left   • Chest pain   • Acute kidney injury superimposed on CKD ([facility] [address] )   • COVID-19   • Benign localized prostatic hyperplasia with lower urinary tract symptoms (LUTS)     Past Medical History:   Diagnosis Date   • Cardiomyopathy ([facility] [address] )    • Hyperlipidemia    • Post-nasal drip    • Pulmonary embolism ([facility] [address] )      Social History     Socioeconomic History   • Marital status: /Civil Union     Spouse name: Not on file   • Number of children: 7   • Years of education: Not on file   • Highest education level: Not on file   Occupational History   • Occupation: employed   Tobacco Use   • Smoking status: Former     Types: Cigarettes     Quit date:      Years since quittin 0   • Smokeless tobacco: Never   Vaping Use   • Vaping Use: Never used   Substance and Sexual Activity   • Alcohol use: Yes     Alcohol/week: 0 0 standard drinks     Comment: socially   • Drug use: No   • Sexual activity: Not on file   Other Topics Concern   • Not on file   Social History Narrative   • Not on file     Social Determinants of Health     Financial Resource Strain: Not on file   Food Insecurity: Not on file   Transportation Needs: Not on file   Physical Activity: Not on file   Stress: Not on file   Social Connections: Not on file   Intimate Partner Violence: Not on file   Housing Stability: Not on file      History reviewed  No pertinent family history    Past Surgical History:   Procedure Laterality Date   • ARTHROSCOPY KNEE Left     30 years ago   • NV CYSTO INSERTION TRANSPROSTATIC IMPLANT SINGLE N/A 2021    Procedure: CYSTOSCOPY WITH INSERTION UROLIFT;  Surgeon: Eva Valerio MD;  Location: [facility] OR;  Service: Urology       Current Outpatient Medications:   •  acetaminophen (TYLENOL) 325 mg tablet, Take 2 tablets (650 mg total) by mouth every 4 (four) hours as needed for mild pain, Disp: 30 tablet, Rfl: 0  •  acetaminophen-codeine (TYLENOL #3) 300-30 mg per tablet, TAKE 1-2 TABS BY MOUTH EVERY 4-6 HRS AS NEEDED FOR PAIN, Disp: , Rfl:   •  apixaban (Eliquis) 5 mg, Take 1 tablet (5 mg total) by mouth 2 (two) times a day, Disp: 180 tablet, Rfl: 3  •  Durezol 0 05 % EMUL, , Disp: , Rfl:   •  fluticasone (FLONASE) 50 mcg/act nasal spray, 1 spray into each nostril daily as needed for rhinitis, Disp: 16 g, Rfl: 0  •  metoprolol succinate (TOPROL-XL) 25 mg 24 hr tablet, Take 1 tablet (25 mg total) by mouth daily, Disp: 30 tablet, Rfl: 0  •  rosuvastatin (CRESTOR) 20 MG tablet, Take 20 mg by mouth daily, Disp: , Rfl:   •  tamsulosin (FLOMAX) 0 4 mg, Take 0 4 mg by mouth daily with dinner  , Disp: , Rfl:   •  VITAMIN B COMPLEX-C PO, Take by mouth, Disp: , Rfl:   No Known Allergies    Labs:  No visits with results within 2 Month(s) from this visit     Latest known visit with results is:   Admission on 10/22/2022, Discharged on 10/22/2022   Component Date Value   • WBC 10/22/2022 4 73    • RBC 10/22/2022 4 05    • Hemoglobin 10/22/2022 13 0    • Hematocrit 10/22/2022 38 6    • MCV 10/22/2022 95    • MCH 10/22/2022 32 1    • MCHC 10/22/2022 33 7    • RDW 10/22/2022 14 4    • MPV 10/22/2022 11 5    • Platelets 92/17/9609 147 (L)    • nRBC 10/22/2022 0    • Neutrophils Relative 10/22/2022 59    • Immat GRANS % 10/22/2022 1    • Lymphocytes Relative 10/22/2022 28    • Monocytes Relative 10/22/2022 10    • Eosinophils Relative 10/22/2022 2    • Basophils Relative 10/22/2022 0    • Neutrophils Absolute 10/22/2022 2 78    • Immature Grans Absolute 10/22/2022 0 03    • Lymphocytes Absolute 10/22/2022 1 34    • Monocytes Absolute 10/22/2022 0 47    • Eosinophils Absolute 10/22/2022 0 10    • Basophils Absolute 10/22/2022 0 01    • hs TnI 0hr 10/22/2022 7    • Sodium 10/22/2022 143    • Potassium 10/22/2022 3 8    • Chloride 10/22/2022 108    • CO2 10/22/2022 26    • ANION GAP 10/22/2022 9    • BUN 10/22/2022 12    • Creatinine 10/22/2022 1 29    • Glucose 10/22/2022 167 (H)    • Calcium 10/22/2022 9 0    • AST 10/22/2022 25    • ALT 10/22/2022 28    • Alkaline Phosphatase 10/22/2022 61    • Total Protein 10/22/2022 6 9    • Albumin 10/22/2022 3 6    • Total Bilirubin 10/22/2022 0 46    • eGFR 10/22/2022 53    • SARS-CoV-2 10/22/2022 Negative    • INFLUENZA A PCR 10/22/2022 Negative    • INFLUENZA B PCR 10/22/2022 Negative    • RSV PCR 10/22/2022 Negative    • Ventricular Rate 10/22/2022 137    • Atrial Rate 10/22/2022 85    • QRSD Interval 10/22/2022 92    • QT Interval 10/22/2022 352    • QTC Interval 10/22/2022 531    • QRS Axis 10/22/2022 -36    • T Wave Axis 10/22/2022 170    • Ventricular Rate 10/22/2022 89    • Atrial Rate 10/22/2022 89    • IN Interval 10/22/2022 176    • QRSD Interval 10/22/2022 98    • QT Interval 10/22/2022 350    • QTC Interval 10/22/2022 425    • P Axis 10/22/2022 72    • QRS Axis 10/22/2022 -33    • T Wave Axis 10/22/2022 90    • hs TnI 2hr 10/22/2022 8    • Delta 2hr hsTnI 10/22/2022 1    • Ventricular Rate 10/22/2022 73    • Atrial Rate 10/22/2022 73    • IN Interval 10/22/2022 174    • QRSD Interval 10/22/2022 94    • QT Interval 10/22/2022 370    • QTC Interval 10/22/2022 407    • P Axis 10/22/2022 72    • QRS Axis 10/22/2022 -31    • T Wave Axis 10/22/2022 -8      Imaging: No results found  Review of Systems:  Review of Systems   REVIEW OF SYSTEMS:  Constitutional:  Denies fever or chills   Eyes:  Denies change in visual acuity   HENT:  Denies nasal congestion or sore throat   Respiratory:  Denies cough or shortness of breath   Cardiovascular:  Denies chest pain or edema   GI:  Denies abdominal pain, nausea, vomiting, bloody stools or diarrhea   : Erectile dysfunction  Musculoskeletal:  Denies back pain or joint pain   Neurologic:  Denies headache, focal weakness or sensory changes   Endocrine:  Denies polyuria or polydipsia   Lymphatic:  Denies swollen glands   Psychiatric:  Denies depression or anxiety    Physical Exam:    /80 (BP Location: Left arm, Patient Position: Sitting, Cuff Size: Standard)   Pulse 83   Resp 16   Ht 6' 3 5" (1 918 m)   Wt 122 kg (269 lb)   SpO2 97%   BMI 33 18 kg/m²     Physical Exam   PHYSICAL EXAM:  General:  Patient is not in acute distress   Head: Normocephalic, Atraumatic  HEENT:  Both pupils normal-size atraumatic, normocephalic, nonicteric  Neck:  JVP not raised  Trachea central  No carotid bruit  Respiratory:  normal breath sounds no crackles  no rhonchi  Cardiovascular:  Regular rate and rhythm no S3 no murmurs  GI:  Abdomen soft nontender   No organomegaly  Lymphatic:  No cervical or inguinal lymphadenopathy  Neurologic:  Patient is awake alert, oriented   Grossly nonfocal  Extremities no edema      Discussion/Summary:  Patient with multiple medical problems who seems to be doing reasonably well from cardiac standpoint  Previous studies reviewed with patient  Medications reviewed and possible side effects discussed  concepts of cardiovascular disease , signs and symptoms of heart disease  Dietary and risk factor modification reinforced  All questions answered  Safety measures reviewed  Patient advised to report any problems prompting medical attention  Patient is on long-term anticoagulation because of recurrent pulmonary embolism  Patient understands risks and benefits of anticoagulation  Patient to report any bleeding issues  Medications reviewed  Lengthy discussion with patient regarding erectile dysfunction  No specific contraindication to use sildenafil  Patient has been instructed not to take sildenafil with Flomax together  To keep himself hydrated  Rest of the precautions also discussed  Patient instructed to avoid excess alcohol use  Follow-up in 6 months or earlier as needed  Patient is agreeable with the plan of care  Patient has been using CPAP for sleep apnea with success and improvement in symptoms

## 2023-02-28 ENCOUNTER — TELEPHONE (OUTPATIENT)
Dept: CARDIOLOGY CLINIC | Facility: CLINIC | Age: 78
End: 2023-02-28

## 2023-02-28 NOTE — TELEPHONE ENCOUNTER
Shy called from [facility] and is requesting a call back regarding paperwork for pt that was faxed over.       # [phone] Sheba

## 2023-02-28 NOTE — TELEPHONE ENCOUNTER
Spoke with Shy from [facility] and she confirmed receiving completed and signed paperwork from DR. الرعاقي

## 2023-03-09 ENCOUNTER — EVALUATION (OUTPATIENT)
Dept: PHYSICAL THERAPY | Facility: CLINIC | Age: 78
End: 2023-03-09

## 2023-03-09 DIAGNOSIS — H81.12 BPPV (BENIGN PAROXYSMAL POSITIONAL VERTIGO), LEFT: Primary | ICD-10-CM

## 2023-03-09 DIAGNOSIS — R42 DIZZINESS: ICD-10-CM

## 2023-03-09 NOTE — PROGRESS NOTES
PT Evaluation     Today's date: 3/9/2023  Patient name: Danish Dempsey  : 1945  MRN: [id number]  Referring provider: Malika Bethea MD  Dx:   Encounter Diagnosis     ICD-10-CM    1  BPPV (benign paroxysmal positional vertigo), left  H81 12       2  Dizziness  R42           Start Time: 1800  Stop Time: 1900  Total time in clinic (min): 60 minutes    Assessment  Assessment details: Danish Dempsey  is a 68 y o  male who presents with acute onset of spinning dizziness that is positional and reproduced by looking up/down and rolling onto his left side  Patient's symptoms last less than 3 minutes  Examination findings demonstrate normal cervical screen and loss of balance per FGA  Positive left [place]ward [place] indicates left BPPV  Performed Epley x 2 and upon retesting, Thea Hallpike negative  Patient would benefit from skilled physical therapy to address their aforementioned impairments, improve their level of function and to improve their overall quality of life  Understanding of Dx/Px/POC: excellent  Goals  Short Term Goals: to be achieved in 2 weeks  1) Patient to be independent with basic HEP  2) Patient to report 50% reduction in intensity of dizziness  Long Term Goals: to be achieved by discharge  1) FOTO equal to or greater than TBD  2) Patient to be independent with comprehensive HEP  3) Patient to report resolution of dizziness with all a/iadls      Plan  Patient would benefit from: skilled physical therapy  Planned modality interventions: thermotherapy: hydrocollator packs and cryotherapy  Planned therapy interventions: abdominal trunk stabilization, activity modification, balance, balance/weight bearing training, behavior modification, body mechanics training, canalith repositioning, functional ROM exercises, gait training, home exercise program, transfer training, therapeutic exercise, therapeutic activities, stretching, strengthening, postural training, patient education, neuromuscular re-education, massage, manual therapy and joint mobilization  Frequency: 1-2x week  Duration in weeks: 6  Plan of Care beginning date: 3/9/2023  Plan of Care expiration date: 4/20/2023  Treatment plan discussed with: patient        Subjective Evaluation    History of Present Illness  Mechanism of injury: Patient reports that approximately 2 weeks ago he got out of bed and felt a spinning dizziness  Patient has since had dizziness that lasts less than 2-3 minutes and is reproduced by looking down, looking up and rolling onto his left side  Patient feels unsteady on his feet, but also has a h/o peripheral neuropathy with loss of sensation in his feet  Patient has diplopia when he is dizzy and at times nausea  Patient denies experiencing dysarthria, dysphagia, drop attacks or progressive weakness  Patient has been prescribed Meclizine by his PCP, but has not taken it regularly  Patient did take off 1 week of work when his symptoms were at its worst, but has since returned  Pain  No pain reported    Social Support    Employment status: working ()  Patient Goals  Patient goal: resolve dizziness        Objective     Active Range of Motion   Cervical/Thoracic Spine     Normal active range of motion    Tests   Cervical   Negative alar ligament test, Sharp-Keshia test and VBI       Functional Assessment        Comments  Functional Gait Assessment (FGA): 22/30  Neuro Exam:     Oculomotor exam   Oculomotor ROM: WNL  Resting nystagmus: not present   Gaze holding nystagmus: not present left  and not present right  Smooth pursuits: within normal limits  Vertical saccades: normal  Horizontal saccades: normal  Convergence: normal    Positional testing   Thea-Hallpike   Left posterior canal: symptomatic, torsional, upbeating and WNL             Precautions: h/o PE, cardiac      Manuals 3/9            L Epley x2                                                   Neuro Re-Ed             Patient education: pathophysiology, medication, activity modification GR                                                                                          Ther Ex                                                                                                                     Ther Activity                                       Gait Training                                       Modalities

## 2023-03-09 NOTE — LETTER
March 10, 2023    Karina Morris MD  [address] [facility] [address]    Patient: Patricia Mahmood  YOB: 1945   Date of Visit: 3/9/2023     Encounter Diagnosis     ICD-10-CM    1  BPPV (benign paroxysmal positional vertigo), left  H81 12       2  Haley Cervantes           Dear Dr Lenox Kocher: Thank you for your recent referral of Patricia Mahmood    Please review the attached evaluation summary from Nas's recent visit  Please verify that you agree with the plan of care by signing the attached order  If you have any questions or concerns, please do not hesitate to call  I sincerely appreciate the opportunity to share in the care of one of your patients and hope to have another opportunity to work with you in the near future  Sincerely,    Elvis Birmingham, PT      Referring Provider:      I certify that I have read the below Plan of Care and certify the need for these services furnished under this plan of treatment while under my care  Karina Morris MD  [address] [facility] [address]  Via Fax: [phone]          PT Evaluation     Today's date: 3/9/2023  Patient name: Patricia Mahmood  : 1945  MRN: [id number]  Referring provider: Ed Pink MD  Dx:   Encounter Diagnosis     ICD-10-CM    1  BPPV (benign paroxysmal positional vertigo), left  H81 12       2  Dizziness  R42           Start Time: 1800  Stop Time: 1900  Total time in clinic (min): 60 minutes    Assessment  Assessment details: Patricia Mahmood  is a 68 y o  male who presents with acute onset of spinning dizziness that is positional and reproduced by looking up/down and rolling onto his left side  Patient's symptoms last less than 3 minutes  Examination findings demonstrate normal cervical screen and loss of balance per FGA  Positive left Roger [place] indicates left BPPV  Performed Epley x 2 and upon retesting, Thea Hallpike negative   Patient would benefit from skilled physical therapy to address their aforementioned impairments, improve their level of function and to improve their overall quality of life  Understanding of Dx/Px/POC: excellent  Goals  Short Term Goals: to be achieved in 2 weeks  1) Patient to be independent with basic HEP  2) Patient to report 50% reduction in intensity of dizziness  Long Term Goals: to be achieved by discharge  1) FOTO equal to or greater than TBD  2) Patient to be independent with comprehensive HEP  3) Patient to report resolution of dizziness with all a/iadls  Plan  Patient would benefit from: skilled physical therapy  Planned modality interventions: thermotherapy: hydrocollator packs and cryotherapy  Planned therapy interventions: abdominal trunk stabilization, activity modification, balance, balance/weight bearing training, behavior modification, body mechanics training, canalith repositioning, functional ROM exercises, gait training, home exercise program, transfer training, therapeutic exercise, therapeutic activities, stretching, strengthening, postural training, patient education, neuromuscular re-education, massage, manual therapy and joint mobilization  Frequency: 1-2x week  Duration in weeks: 6  Plan of Care beginning date: 3/9/2023  Plan of Care expiration date: 4/20/2023  Treatment plan discussed with: patient        Subjective Evaluation    History of Present Illness  Mechanism of injury: Patient reports that approximately 2 weeks ago he got out of bed and felt a spinning dizziness  Patient has since had dizziness that lasts less than 2-3 minutes and is reproduced by looking down, looking up and rolling onto his left side  Patient feels unsteady on his feet, but also has a h/o peripheral neuropathy with loss of sensation in his feet  Patient has diplopia when he is dizzy and at times nausea  Patient denies experiencing dysarthria, dysphagia, drop attacks or progressive weakness   Patient has been prescribed Meclizine by his PCP, but has not taken it regularly  Patient did take off 1 week of work when his symptoms were at its worst, but has since returned  Pain  No pain reported    Social Support    Employment status: working ()  Patient Goals  Patient goal: resolve dizziness        Objective     Active Range of Motion   Cervical/Thoracic Spine     Normal active range of motion    Tests   Cervical   Negative alar ligament test, Sharp-Keshia test and VBI       Functional Assessment        Comments  Functional Gait Assessment (FGA): 22/30  Neuro Exam:     Oculomotor exam   Oculomotor ROM: WNL  Resting nystagmus: not present   Gaze holding nystagmus: not present left  and not present right  Smooth pursuits: within normal limits  Vertical saccades: normal  Horizontal saccades: normal  Convergence: normal    Positional testing   [place]-Hallpike   Left posterior canal: symptomatic, torsional, upbeating and WNL            Precautions: h/o PE, cardiac      Manuals 3/9            L Epley x2                                                   Neuro Re-Ed             Patient education: pathophysiology, medication, activity modification GR                                                                                          Ther Ex                                                                                                                     Ther Activity                                       Gait Training                                       Modalities

## 2023-03-20 ENCOUNTER — TELEPHONE (OUTPATIENT)
Dept: OTHER | Facility: OTHER | Age: 78
End: 2023-03-20

## 2023-03-20 NOTE — TELEPHONE ENCOUNTER
Pt would like to know why his appointment for tomorrow was canceled and if he can get an appointment sooner than 05/12

## 2023-03-23 ENCOUNTER — OFFICE VISIT (OUTPATIENT)
Dept: PHYSICAL THERAPY | Facility: CLINIC | Age: 78
End: 2023-03-23

## 2023-03-23 DIAGNOSIS — R42 DIZZINESS: ICD-10-CM

## 2023-03-23 DIAGNOSIS — H81.12 BPPV (BENIGN PAROXYSMAL POSITIONAL VERTIGO), LEFT: Primary | ICD-10-CM

## 2023-03-23 NOTE — PROGRESS NOTES
Daily Note     Today's date: 3/23/2023  Patient name: Kala Meigs  : 1945  MRN: [id number]  Referring provider: Sulaiman Spencer MD  Dx:   Encounter Diagnosis     ICD-10-CM    1  BPPV (benign paroxysmal positional vertigo), left  H81 12       2  Dizziness  R42           Start Time:   Stop Time: 2963  Total time in clinic (min): 21 minutes    Subjective: Patient reports that he has returned to work but still feels slightly dizzy or lightheaded when he looks up or rolls in bed  Objective: See treatment diary below      Assessment: Tolerated treatment well  Patient would benefit from continued PT  Patient with positive left Neita Cooler with nystagmus and reported spinning dizziness  Following repositioning maneuver Patient tested negative reporting no dizziness and no nystagmus present  Plan: Continue per plan of care  Progress treatment as tolerated         Precautions: h/o PE, cardiac      Manuals 3/9 3/16           L Epley x2 x1                                                  Neuro Re-Ed             Patient education: pathophysiology, medication, activity modification GR                                                                                          Ther Ex                                                                                                                     Ther Activity                                       Gait Training                                       Modalities

## 2023-03-27 ENCOUNTER — OFFICE VISIT (OUTPATIENT)
Dept: PHYSICAL THERAPY | Facility: CLINIC | Age: 78
End: 2023-03-27

## 2023-03-27 DIAGNOSIS — R42 DIZZINESS: ICD-10-CM

## 2023-03-27 DIAGNOSIS — H81.12 BPPV (BENIGN PAROXYSMAL POSITIONAL VERTIGO), LEFT: Primary | ICD-10-CM

## 2023-03-27 NOTE — PROGRESS NOTES
Daily Note     Today's date: 3/27/2023  Patient name: Crispin Clark  : 1945  MRN: [id number]  Referring provider: Frankey Rosella, MD  Dx:   Encounter Diagnosis     ICD-10-CM    1  BPPV (benign paroxysmal positional vertigo), left  H81 12       2  Dizziness  R42                      Subjective: Overall, feeling much better  Had some dizziness for a few minutes last week when driving, but symptoms resolved quickly  Now only has dizziness when rolling to the left in bed  Symptoms subside in a few seconds  Objective: See treatment diary below      Assessment: Tolerated treatment well  Patient would benefit from continued PT   (+) dizziness and nystagmus with left Thea-Hallpike  Performed left CRT and subsequent [place]-Hallpike was negative  No dizziness or nystagmus after CRT  Plan: Continue per plan of care        Precautions: h/o PE, cardiac      Manuals 3/9 3/16 3/27          L Epley x2 x1 x1                                                 Neuro Re-Ed             Patient education: pathophysiology, medication, activity modification GR  JF                                                                                        Ther Ex                                                                                                                     Ther Activity                                       Gait Training                                       Modalities

## 2023-03-29 ENCOUNTER — APPOINTMENT (OUTPATIENT)
Dept: PHYSICAL THERAPY | Facility: CLINIC | Age: 78
End: 2023-03-29

## 2023-03-29 DIAGNOSIS — H81.12 BPPV (BENIGN PAROXYSMAL POSITIONAL VERTIGO), LEFT: Primary | ICD-10-CM

## 2023-03-29 DIAGNOSIS — R42 DIZZINESS: ICD-10-CM

## 2023-03-29 NOTE — PROGRESS NOTES
Daily Note     Today's date: 3/29/2023  Patient name: Rebecca Barnes  : 1945  MRN: [id number]  Referring provider: Luna Ureña MD  Dx:   Encounter Diagnosis     ICD-10-CM    1  BPPV (benign paroxysmal positional vertigo), left  H81 12       2  Dizziness  R42                      Subjective: ***      Objective: See treatment diary below      Assessment: Tolerated treatment well  Patient would benefit from continued PT  Plan: Continue per plan of care  Progress treatment as tolerated         Precautions: h/o PE, cardiac      Manuals 3/9 3/16 3/27 3/29         L Epley x2 x1 x1                                                 Neuro Re-Ed             Patient education: pathophysiology, medication, activity modification GR  JF                                                                                        Ther Ex                                                                                                                     Ther Activity                                       Gait Training                                       Modalities

## 2023-03-30 ENCOUNTER — OFFICE VISIT (OUTPATIENT)
Dept: PHYSICAL THERAPY | Facility: CLINIC | Age: 78
End: 2023-03-30

## 2023-03-30 DIAGNOSIS — R42 DIZZINESS: ICD-10-CM

## 2023-03-30 DIAGNOSIS — H81.12 BPPV (BENIGN PAROXYSMAL POSITIONAL VERTIGO), LEFT: Primary | ICD-10-CM

## 2023-03-30 RX ORDER — MECLIZINE HYDROCHLORIDE 25 MG/1
25 TABLET ORAL EVERY 8 HOURS
COMMUNITY
Start: 2023-03-02 | End: 2023-07-07

## 2023-03-30 NOTE — PROGRESS NOTES
Daily Note     Today's date: 3/30/2023  Patient name: Dinesh Osei  : 1945  MRN: [id number]  Referring provider: Janay Ramsey MD  Dx:   Encounter Diagnosis     ICD-10-CM    1  BPPV (benign paroxysmal positional vertigo), left  H81 12       2  Dizziness  R42           Start Time: 8094  Stop Time: 5908  Total time in clinic (min): 25 minutes    Subjective: Patient reports that he felt more dizzy after his previous treatment session  Objective: See treatment diary below      Assessment: Tolerated treatment well  Patient would benefit from continued PT  Patient with positive L Carola Rojas, performed Epley and retested with negative result  Patient with latent onset of dizziness and nystagmus when in provocative position, possibly indicating cupulolithiasis  Plan: Continue per plan of care  Progress treatment as tolerated  Precautions: h/o PE, cardiac      Manuals 3/9 3/16 3/27 3/30         L Epley x2 x1 x1 x2                                                Neuro Re-Ed             Patient education: pathophysiology, medication, activity modification GR  JF GR         Head shake (V, H)    x10 ea                                                                            Ther Ex                                                                                                                     Ther Activity                                       Gait Training                                       Modalities

## 2023-04-04 ENCOUNTER — OFFICE VISIT (OUTPATIENT)
Dept: UROLOGY | Facility: AMBULATORY SURGERY CENTER | Age: 78
End: 2023-04-04

## 2023-04-04 VITALS
HEART RATE: 144 BPM | DIASTOLIC BLOOD PRESSURE: 98 MMHG | OXYGEN SATURATION: 98 % | HEIGHT: 76 IN | WEIGHT: 269 LBS | SYSTOLIC BLOOD PRESSURE: 138 MMHG | BODY MASS INDEX: 32.76 KG/M2

## 2023-04-04 DIAGNOSIS — N13.8 BPH WITH OBSTRUCTION/LOWER URINARY TRACT SYMPTOMS: ICD-10-CM

## 2023-04-04 DIAGNOSIS — N40.1 BPH WITH OBSTRUCTION/LOWER URINARY TRACT SYMPTOMS: ICD-10-CM

## 2023-04-04 DIAGNOSIS — Z12.5 PROSTATE CANCER SCREENING: ICD-10-CM

## 2023-04-04 DIAGNOSIS — N40.1 BENIGN LOCALIZED PROSTATIC HYPERPLASIA WITH LOWER URINARY TRACT SYMPTOMS (LUTS): Primary | ICD-10-CM

## 2023-04-04 LAB — POST-VOID RESIDUAL VOLUME, ML POC: 20 ML

## 2023-04-04 RX ORDER — FINASTERIDE 5 MG/1
5 TABLET, FILM COATED ORAL DAILY
Qty: 60 TABLET | Refills: 6 | Status: SHIPPED | OUTPATIENT
Start: 2023-04-04

## 2023-04-04 NOTE — ASSESSMENT & PLAN NOTE
It has been 6 years since the patient has had a PSA test   I think it is worth repeating before we consider additional outlet surgery

## 2023-04-04 NOTE — PROGRESS NOTES
Assessment/Plan:    Prostate cancer screening  It has been 6 years since the patient has had a PSA test   I think it is worth repeating before we consider additional outlet surgery  Benign localized prostatic hyperplasia with lower urinary tract symptoms (LUTS)  The patient has a large prostate associated lower urinary symptoms despite Flomax and UroLift 2 years ago  We discussed options which include observation with additional medication versus treatment via TURP or HoLEP or prostate artery embolization  We agreed on a plan to start finasteride and perform cystoscopy and transrectal ultrasound to reevaluate outlet and prostate size  Based on this we will make a further decision  The patient prefers not to stay on finasteride lifelong and I told him we would stop this after surgery but may be beneficial if surgery performed as it can reduce the risk for bleeding  Subjective:      Patient ID: Kendra Crews  is a 68 y o  male  HPI    Kendra Crews  is a 68 y o  male patient with a history of BPH s/p UroLift in 2021 here for follow up       The patient was found to have an enlarged prostate measuring 76 g underwent UroLift in 2019  After this he continued to have LUTS and was put on Flomax which helped his symptoms some  However he continues to be bothered by weak stream with intermittency and hesitancy and feelings of incomplete emptying  Also has some issues with urgency and frequency  He was seen in follow-up and potentially schedule for TURP but Dr Derrick Chambers recommended reevaluation and consideration for HoLEP given his gland size in 2020  Last PSA was in 2017 and was 0 8  Patient is on Eliquis       Past Surgical History:   Procedure Laterality Date   • ARTHROSCOPY KNEE Left     30 years ago   • WA CYSTO INSERTION TRANSPROSTATIC IMPLANT SINGLE N/A 06/17/2021    Procedure: CYSTOSCOPY WITH INSERTION UROLIFT;  Surgeon: Nelson Esparza MD;  Location: [facility]; "Service: Urology        Past Medical History:   Diagnosis Date   • Cardiomyopathy [facility])    • Hyperlipidemia    • Post-nasal drip    • Pulmonary embolism ([facility] [address] )              Review of Systems   Constitutional: Negative for chills and fever  HENT: Negative for ear pain and sore throat  Eyes: Negative for pain and visual disturbance  Respiratory: Negative for cough and shortness of breath  Cardiovascular: Negative for chest pain and palpitations  Gastrointestinal: Negative for abdominal pain and vomiting  Genitourinary: Positive for frequency and urgency  Negative for dysuria and hematuria  Musculoskeletal: Negative for arthralgias and back pain  Skin: Negative for color change and rash  Neurological: Negative for seizures and syncope  All other systems reviewed and are negative  Objective:      /98 (BP Location: Left arm, Patient Position: Sitting, Cuff Size: Large)   Pulse (!) 144   Ht 6' 3 5\" (1 918 m)   Wt 122 kg (269 lb)   SpO2 98%   BMI 33 18 kg/m²     Lab Results   Component Value Date    PSA 0 8 08/21/2017          Physical Exam  Constitutional:       Appearance: Normal appearance  HENT:      Head: Normocephalic and atraumatic  Eyes:      Extraocular Movements: Extraocular movements intact  Pupils: Pupils are equal, round, and reactive to light  Cardiovascular:      Rate and Rhythm: Normal rate  Abdominal:      General: Abdomen is flat  There is no distension  Palpations: There is no mass  Tenderness: There is no abdominal tenderness  There is no right CVA tenderness, left CVA tenderness or guarding  Genitourinary:     Comments: LUIS showed no nodules of the prostate but difficult to feel beyond the apex because of body habitus  Size difficult to estimate  Musculoskeletal:      Right lower leg: No edema  Left lower leg: No edema  Skin:     General: Skin is warm and dry  Coloration: Skin is not jaundiced  Findings: No bruising   " Neurological:      General: No focal deficit present  Mental Status: He is alert and oriented to person, place, and time  Mental status is at baseline  Psychiatric:         Mood and Affect: Mood normal          Thought Content:  Thought content normal          Judgment: Judgment normal            Orders  Orders Placed This Encounter   Procedures   • PSA Total, Diagnostic     Standing Status:   Future     Standing Expiration Date:   4/4/2024   • POCT Measure PVR

## 2023-04-06 ENCOUNTER — OFFICE VISIT (OUTPATIENT)
Dept: CARDIOLOGY CLINIC | Facility: CLINIC | Age: 78
End: 2023-04-06

## 2023-04-06 VITALS
DIASTOLIC BLOOD PRESSURE: 78 MMHG | WEIGHT: 268 LBS | OXYGEN SATURATION: 98 % | BODY MASS INDEX: 32.63 KG/M2 | RESPIRATION RATE: 16 BRPM | SYSTOLIC BLOOD PRESSURE: 132 MMHG | HEIGHT: 76 IN | HEART RATE: 76 BPM

## 2023-04-06 DIAGNOSIS — I47.1 SVT (SUPRAVENTRICULAR TACHYCARDIA) (HCC): Primary | ICD-10-CM

## 2023-04-06 DIAGNOSIS — R06.02 SHORTNESS OF BREATH: ICD-10-CM

## 2023-04-06 DIAGNOSIS — Z79.01 CHRONIC ANTICOAGULATION: ICD-10-CM

## 2023-04-06 DIAGNOSIS — E78.2 MIXED HYPERLIPIDEMIA: ICD-10-CM

## 2023-04-06 RX ORDER — DULOXETIN HYDROCHLORIDE 60 MG/1
CAPSULE, DELAYED RELEASE ORAL
COMMUNITY

## 2023-04-06 NOTE — PROGRESS NOTES
[facility] [place] [id number] [facility] [place] [address]  Cardiology Follow Up    Pina Lagos   1945  [id number]      1  SVT (supraventricular tachycardia) ([facility] [address] )        2  Chronic anticoagulation        3  Mixed hyperlipidemia        4  Shortness of breath            Chief Complaint   Patient presents with   • Follow-up       Interval History: Patient presents for follow-up visit  Patient denies any history of chest pain   Patient does have some shortness of breath with exertion  patient denies any history of leg edema or orthopnea PND  No history of presyncope syncope  Patient states compliance with the present list of medications  Patient denies any palpitations or dizziness  Patient denies any bleeding issues      Patient Active Problem List   Diagnosis   • Bilateral pulmonary embolism (HCC)   • SOB (shortness of breath)   • Hyperlipidemia   • Lung nodule   • Obesity   • History of sickle cell trait   • Leg edema, left   • History of pulmonary embolism   • Thrombocytopenia (HCC)   • Chronic kidney disease (CKD), stage II (mild)   • Tachycardia   • Chronic low back pain   • SVT (supraventricular tachycardia) (HCC)   • Chronic anticoagulation   • ARLENE (obstructive sleep apnea)   • Cardiomyopathy, nonischemic (HCC)   • Dyslipidemia   • BPH (benign prostatic hyperplasia)   • Nephrolithiasis   • Lumbar disc disease with radiculopathy - Left   • Spinal stenosis of lumbar region without neurogenic claudication - Left   • Chest pain   • Acute kidney injury superimposed on CKD ([facility] [address] )   • COVID-19   • Benign localized prostatic hyperplasia with lower urinary tract symptoms (LUTS)   • Prostate cancer screening     Past Medical History:   Diagnosis Date   • Cardiomyopathy ([facility] [address] )    • Hyperlipidemia    • Post-nasal drip    • Pulmonary embolism ([facility] [address] )      Social History     Socioeconomic History   • Marital status: /Civil Union     Spouse name: Not on file   • Number of children: 7   • Years of education: Not on file   • Highest education level: Not on file   Occupational History   • Occupation: employed   Tobacco Use   • Smoking status: Former     Types: Cigarettes     Quit date:      Years since quittin 2   • Smokeless tobacco: Never   Vaping Use   • Vaping Use: Never used   Substance and Sexual Activity   • Alcohol use: Yes     Alcohol/week: 0 0 standard drinks     Comment: socially   • Drug use: No   • Sexual activity: Not on file   Other Topics Concern   • Not on file   Social History Narrative   • Not on file     Social Determinants of Health     Financial Resource Strain: Not on file   Food Insecurity: Not on file   Transportation Needs: Not on file   Physical Activity: Not on file   Stress: Not on file   Social Connections: Not on file   Intimate Partner Violence: Not on file   Housing Stability: Not on file      History reviewed  No pertinent family history    Past Surgical History:   Procedure Laterality Date   • ARTHROSCOPY KNEE Left     30 years ago   • [place] CYSTO INSERTION TRANSPROSTATIC IMPLANT SINGLE N/A 2021    Procedure: CYSTOSCOPY WITH INSERTION UROLIFT;  Surgeon: Mark Babb MD;  Location: [facility] OR;  Service: Urology       Current Outpatient Medications:   •  acetaminophen (TYLENOL) 325 mg tablet, Take 2 tablets (650 mg total) by mouth every 4 (four) hours as needed for mild pain, Disp: 30 tablet, Rfl: 0  •  acetaminophen-codeine (TYLENOL #3) 300-30 mg per tablet, TAKE 1-2 TABS BY MOUTH EVERY 4-6 HRS AS NEEDED FOR PAIN, Disp: , Rfl:   •  apixaban (Eliquis) 5 mg, Take 1 tablet (5 mg total) by mouth 2 (two) times a day, Disp: 180 tablet, Rfl: 3  •  finasteride (PROSCAR) 5 mg tablet, Take 1 tablet (5 mg total) by mouth daily, Disp: 60 tablet, Rfl: 6  •  fluticasone (FLONASE) 50 mcg/act nasal spray, 1 spray into each nostril daily as needed for rhinitis, Disp: 16 g, Rfl: 0  •  meclizine (ANTIVERT) 25 mg tablet, Take 25 mg by mouth every 8 (eight) hours, Disp: ", Rfl:   •  metoprolol succinate (TOPROL-XL) 25 mg 24 hr tablet, Take 1 tablet (25 mg total) by mouth daily, Disp: 30 tablet, Rfl: 0  •  rosuvastatin (CRESTOR) 20 MG tablet, Take 20 mg by mouth daily, Disp: , Rfl:   •  sildenafil (VIAGRA) 50 MG tablet, Take 1 tablet (50 mg total) by mouth daily as needed for erectile dysfunction, Disp: 10 tablet, Rfl: 3  •  tamsulosin (FLOMAX) 0 4 mg, Take 0 4 mg by mouth daily with dinner  , Disp: , Rfl:   •  DULoxetine (Cymbalta) 60 mg delayed release capsule, Oral, Disp: , Rfl:   No Known Allergies    Labs:  Office Visit on 04/04/2023   Component Date Value   • POST-VOID RESIDUAL VOLUM* 04/04/2023 20      Imaging: No results found  Review of Systems:  Review of Systems   REVIEW OF SYSTEMS:  Constitutional:  Denies fever or chills   Eyes:  Denies change in visual acuity   HENT:  Denies nasal congestion or sore throat   Respiratory:  shortness of breath   Cardiovascular:  Denies chest pain or edema   GI:  Denies abdominal pain, nausea, vomiting, bloody stools or diarrhea   :  Denies dysuria, frequency, difficulty in micturition and nocturia  Musculoskeletal: DJD  Neurologic:  Denies headache, focal weakness or sensory changes   Endocrine:  Denies polyuria or polydipsia   Lymphatic:  Denies swollen glands   Psychiatric:  Denies depression or anxiety    Physical Exam:    /78 (BP Location: Left arm, Patient Position: Sitting, Cuff Size: Standard)   Pulse 76   Resp 16   Ht 6' 3 5\" (1 918 m)   Wt 122 kg (268 lb)   SpO2 98%   BMI 33 06 kg/m²     Physical Exam   PHYSICAL EXAM:  General:  Patient is not in acute distress   Head: Normocephalic, Atraumatic  HEENT:  Both pupils normal-size atraumatic, normocephalic, nonicteric  Neck:  JVP not raised  Trachea central  No carotid bruit  Respiratory:  normal breath sounds no crackles  no rhonchi  Cardiovascular:  Regular rate and rhythm no S3 no murmurs  GI:  Abdomen soft nontender  No organomegaly     Lymphatic:  No cervical or " inguinal lymphadenopathy  Neurologic:  Patient is awake alert, oriented   Grossly nonfocal  Extremities no edema    Discussion/Summary: Patient with multiple medical problems who seems to be doing reasonably well from cardiac standpoint  Previous studies reviewed with patient  Medications reviewed and possible side effects discussed  concepts of cardiovascular disease , signs and symptoms of heart disease  Dietary and risk factor modification reinforced  All questions answered  Safety measures reviewed  Patient advised to report any problems prompting medical attention  Patient has symptoms of shortness of breath with exertion and has history of cardiomyopathy  Patient does have risk factors for coronary artery disease  Patient will be scheduled for pharmacological nuclear stress test to assess for ischemia  Patient is unable to exercise on the treadmill because of DJD  Symptoms watch out from cardiac standpoint which would indicate the need for further cardiac evaluation including consideration for cardiac catheterization discussed with patient  Patient is on long-term anticoagulation for history of recurrent pulmonary embolism  Patient understands risks and benefits of anticoagulation  Patient to report any bleeding issues  Follow-up in 6 months or earlier as needed  Follow-up with primary care physician  Patient is agreeable with the plan of care

## 2023-04-07 DIAGNOSIS — R06.02 SHORTNESS OF BREATH: ICD-10-CM

## 2023-04-07 DIAGNOSIS — I42.8 CARDIOMYOPATHY, NONISCHEMIC (HCC): Primary | ICD-10-CM

## 2023-04-12 DIAGNOSIS — I47.1 PAROXYSMAL SVT (SUPRAVENTRICULAR TACHYCARDIA): ICD-10-CM

## 2023-04-12 DIAGNOSIS — I42.8 CARDIOMYOPATHY, NONISCHEMIC (HCC): ICD-10-CM

## 2023-04-12 DIAGNOSIS — I48.3 TYPICAL ATRIAL FLUTTER (HCC): Primary | ICD-10-CM

## 2023-04-12 RX ORDER — METOPROLOL SUCCINATE 50 MG/1
50 TABLET, EXTENDED RELEASE ORAL DAILY
Qty: 30 TABLET | Refills: 5 | Status: SHIPPED | OUTPATIENT
Start: 2023-04-12 | End: 2023-05-05

## 2023-04-28 ENCOUNTER — CLINICAL SUPPORT (OUTPATIENT)
Dept: CARDIOLOGY CLINIC | Facility: CLINIC | Age: 78
End: 2023-04-28

## 2023-04-28 ENCOUNTER — TELEPHONE (OUTPATIENT)
Dept: CARDIOLOGY CLINIC | Facility: CLINIC | Age: 78
End: 2023-04-28

## 2023-04-28 DIAGNOSIS — I48.3 TYPICAL ATRIAL FLUTTER (HCC): ICD-10-CM

## 2023-04-28 NOTE — TELEPHONE ENCOUNTER
"----- Message from Darlene Georges MD sent at 4/28/2023  1:42 PM EDT -----  Preliminary Findings  Signature Date  30 events were transmitted  13 patient triggered; 17 auto triggered  Patient monitored for 10d 18h 19m  AF occurred 22 time(s) with HR range of 56 - 102; Total AF [place] <1%  17,022 PACs with PAC burden of 2%  12,415 PVCs with PVC burden of 1%    The patient was monitored for almost 11 days  The primary rhythm was normal sinus but there were paroxysms of atrial fibrillation  Average atrial fibrillation rate was 68 but at times there were brief increases to as high as 159  Total time in atrial fibrillation was 26 minutes  There were occasional PVCs    On 1 occasion the patient noted \"skipped beat  \"  The rhythm was atrial fibrillation with a slightly rapid ventricular response  On 1 occasion the patient noted \"dizzy\" and the rhythm was sinus with a heart rate of 84 and 1 PVC  On 1 occasion the patient noted \"skipped beat\" and the rhythm was sinus bradycardia at a rate of 57  Average heart rate was 70 bpm     Conclusion:    1  Generally the rhythm is normal sinus with intermittent episodes of atrial fibrillation which was generally well controlled although briefly elevated at times as noted above  2  Symptoms as noted above unaccompanied by significant rhythm abnormality other than atrial fibrillation with a slightly rapid ventricular response on 1 occasion        "

## 2023-05-01 ENCOUNTER — TELEPHONE (OUTPATIENT)
Dept: CARDIOLOGY CLINIC | Facility: CLINIC | Age: 78
End: 2023-05-01

## 2023-05-01 NOTE — TELEPHONE ENCOUNTER
----- Message from Yenny Dolan MD sent at 5/1/2023  9:35 AM EDT -----  Please let the patient know that he needs to see Dr Lucio Weinberg, electrophysiology who did his ablation 2 years ago    Now he has paroxysmal A-fib    I have sent a message to Dr Lucio Weinberg and EP clerical for an appointment

## 2023-05-05 DIAGNOSIS — I47.1 PAROXYSMAL SVT (SUPRAVENTRICULAR TACHYCARDIA) (HCC): ICD-10-CM

## 2023-05-05 RX ORDER — METOPROLOL SUCCINATE 50 MG/1
50 TABLET, EXTENDED RELEASE ORAL DAILY
Qty: 90 TABLET | Refills: 3 | Status: SHIPPED | OUTPATIENT
Start: 2023-05-05

## 2023-05-18 ENCOUNTER — OFFICE VISIT (OUTPATIENT)
Dept: CARDIOLOGY CLINIC | Facility: CLINIC | Age: 78
End: 2023-05-18

## 2023-05-18 VITALS
DIASTOLIC BLOOD PRESSURE: 88 MMHG | HEART RATE: 89 BPM | BODY MASS INDEX: 32.27 KG/M2 | RESPIRATION RATE: 16 BRPM | HEIGHT: 76 IN | OXYGEN SATURATION: 96 % | WEIGHT: 265 LBS | SYSTOLIC BLOOD PRESSURE: 136 MMHG

## 2023-05-18 DIAGNOSIS — I48.0 PAF (PAROXYSMAL ATRIAL FIBRILLATION) (HCC): Primary | ICD-10-CM

## 2023-05-18 DIAGNOSIS — I48.91 ATRIAL FIBRILLATION, UNSPECIFIED TYPE (HCC): ICD-10-CM

## 2023-05-18 DIAGNOSIS — R00.2 PALPITATION: ICD-10-CM

## 2023-05-18 DIAGNOSIS — I42.8 CARDIOMYOPATHY, NONISCHEMIC (HCC): ICD-10-CM

## 2023-05-18 DIAGNOSIS — Z79.01 CHRONIC ANTICOAGULATION: ICD-10-CM

## 2023-05-18 RX ORDER — CHLORAL HYDRATE 500 MG
1000 CAPSULE ORAL
COMMUNITY
Start: 2023-04-10

## 2023-05-18 NOTE — PROGRESS NOTES
[facility] [place]  [address] [facility] [address]  Cardiology Follow Up    Ijeoma Sutherland   1945  [id number]      1  PAF (paroxysmal atrial fibrillation) (HCC)        2  Cardiomyopathy, nonischemic ([address] )        3  Chronic anticoagulation        4  Palpitation            Chief Complaint   Patient presents with   • Follow-up       Interval History: Patient presents for follow-up visit  Patient does have history of recurrent DVT and pulmonary embolism on anticoagulation with Eliquis  Patient also has history of SVT ablation in the past by Dr Angie Lerma  Patient recently has been having episodes of paroxysmal atrial fibrillation/flutter with rapid ventricular response  Patient was supposed to have a pharmacological nuclear stress test   During the stress test patient was found to have episodes of A-fib and flutter  Nuclear images showed small mixed apical defect  Ejection fraction was 34%  Patient scheduled for echocardiogram followed by appointment with Dr Angie Lerma next month  Cardiac catheterization in 2021 showed mild coronary artery disease  No intervention was necessary  Patient denies any bleeding issues      Patient Active Problem List   Diagnosis   • Bilateral pulmonary embolism (HCC)   • SOB (shortness of breath)   • Hyperlipidemia   • Lung nodule   • Obesity   • History of sickle cell trait   • Leg edema, left   • History of pulmonary embolism   • Thrombocytopenia (HCC)   • Chronic kidney disease (CKD), stage II (mild)   • Tachycardia   • Chronic low back pain   • SVT (supraventricular tachycardia) (HCC)   • Chronic anticoagulation   • ARLENE (obstructive sleep apnea)   • Cardiomyopathy, nonischemic (HCC)   • Dyslipidemia   • BPH (benign prostatic hyperplasia)   • Nephrolithiasis   • Lumbar disc disease with radiculopathy - Left   • Spinal stenosis of lumbar region without neurogenic claudication - Left   • Chest pain   • Acute kidney injury superimposed on CKD ([facility] )   • COVID-19   • Benign localized prostatic hyperplasia with lower urinary tract symptoms (LUTS)   • Prostate cancer screening     Past Medical History:   Diagnosis Date   • Cardiomyopathy ([facility] )    • Hyperlipidemia    • Post-nasal drip    • Pulmonary embolism (HCC)      Social History     Socioeconomic History   • Marital status: /Civil Union     Spouse name: Not on file   • Number of children: 7   • Years of education: Not on file   • Highest education level: Not on file   Occupational History   • Occupation: employed   Tobacco Use   • Smoking status: Former     Types: Cigarettes     Quit date:      Years since quittin 4   • Smokeless tobacco: Never   Vaping Use   • Vaping Use: Never used   Substance and Sexual Activity   • Alcohol use: Yes     Alcohol/week: 0 0 standard drinks     Comment: socially   • Drug use: No   • Sexual activity: Not on file   Other Topics Concern   • Not on file   Social History Narrative   • Not on file     Social Determinants of Health     Financial Resource Strain: Not on file   Food Insecurity: Not on file   Transportation Needs: Not on file   Physical Activity: Not on file   Stress: Not on file   Social Connections: Not on file   Intimate Partner Violence: Not on file   Housing Stability: Not on file      History reviewed  No pertinent family history    Past Surgical History:   Procedure Laterality Date   • ARTHROSCOPY KNEE Left     30 years ago   • [place] CYSTO INSERTION TRANSPROSTATIC IMPLANT SINGLE N/A 2021    Procedure: CYSTOSCOPY WITH INSERTION UROLIFT;  Surgeon: Zoey Arizmendi MD;  Location: [facility];  Service: Urology       Current Outpatient Medications:   •  acetaminophen (TYLENOL) 325 mg tablet, Take 2 tablets (650 mg total) by mouth every 4 (four) hours as needed for mild pain, Disp: 30 tablet, Rfl: 0  •  acetaminophen-codeine (TYLENOL #3) 300-30 mg per tablet, TAKE 1-2 TABS BY MOUTH EVERY 4-6 HRS AS NEEDED FOR PAIN, Disp: , Rfl:   • apixaban (Eliquis) 5 mg, Take 1 tablet (5 mg total) by mouth 2 (two) times a day, Disp: 180 tablet, Rfl: 3  •  DULoxetine (CYMBALTA) 60 mg delayed release capsule, Oral, Disp: , Rfl:   •  finasteride (PROSCAR) 5 mg tablet, Take 1 tablet (5 mg total) by mouth daily, Disp: 60 tablet, Rfl: 6  •  fluticasone (FLONASE) 50 mcg/act nasal spray, 1 spray into each nostril daily as needed for rhinitis, Disp: 16 g, Rfl: 0  •  meclizine (ANTIVERT) 25 mg tablet, Take 25 mg by mouth every 8 (eight) hours, Disp: , Rfl:   •  metoprolol succinate (TOPROL-XL) 50 mg 24 hr tablet, Take 1 tablet (50 mg total) by mouth daily, Disp: 90 tablet, Rfl: 3  •  Omega-3 1000 MG CAPS, 1,000 mg, Disp: , Rfl:   •  rosuvastatin (CRESTOR) 20 MG tablet, Take 20 mg by mouth daily, Disp: , Rfl:   •  sildenafil (VIAGRA) 50 MG tablet, Take 1 tablet (50 mg total) by mouth daily as needed for erectile dysfunction, Disp: 10 tablet, Rfl: 3  •  tamsulosin (FLOMAX) 0 4 mg, Take 0 4 mg by mouth daily with dinner  , Disp: , Rfl:   No Known Allergies    Labs:  Hospital Outpatient Visit on 04/12/2023   Component Date Value   • Rest Nuclear Isotope Dose 04/12/2023 15 93    • Stress Nuclear Isotope D* 04/12/2023 49 70    • Baseline HR 04/12/2023 88    • Baseline BP 04/12/2023 148/84    • O2 sat rest 04/12/2023 97    • Stress peak HR 04/12/2023 166    • Post peak BP 04/12/2023 162    • Rate Pressure Product 04/12/2023 26,892 0    • O2 sat peak 04/12/2023 99    • Recovery HR 04/12/2023 75    • Recovery BP 04/12/2023 156/86    • O2 sat recovery 04/12/2023 98    • Angina Index 04/12/2023 0    • Stress/rest perfusion ra* 04/12/2023 3 55    • End diastolic index (mL/* 38/28/6982 172 0    • EF (%) 04/12/2023 34    • End systolic index (mL/m* 13/26/5996 14 0    • Protocol Name 04/12/2023 NURIS- WALK    • Time In Exercise Phase 04/12/2023 00:03:00    • MAX   SYSTOLIC BP 55/31/5455 597    • Max Diastolic Bp 03/40/7110 92    • Max Heart Rate 04/12/2023 166    • Max Predicted Heart "Rate 04/12/2023 143    • Reason for Termination 04/12/2023 Protocol Complete    • Test Indication 04/12/2023                      Value:cardiomyopathy  SOB     • Target Hr Formular 04/12/2023 (220 - Age)*85%    • Chest Pain Statement 04/12/2023 none    Office Visit on 04/04/2023   Component Date Value   • POST-VOID RESIDUAL VOLUM* 04/04/2023 20      Imaging: No results found  Review of Systems:  Review of Systems   REVIEW OF SYSTEMS:  Constitutional:  Denies fever or chills   Eyes:  Denies change in visual acuity   HENT:  Denies nasal congestion or sore throat   Respiratory:  Denies cough or shortness of breath   Cardiovascular:  Denies chest pain or edema   GI:  Denies abdominal pain, nausea, vomiting, bloody stools or diarrhea   :  Denies dysuria, frequency, difficulty in micturition and nocturia  Musculoskeletal: DJD  Neurologic:  Denies headache, focal weakness or sensory changes   Endocrine:  Denies polyuria or polydipsia   Lymphatic:  Denies swollen glands   Psychiatric:  Denies depression or anxiety    Physical Exam:    /88 (BP Location: Left arm, Patient Position: Sitting, Cuff Size: Standard)   Pulse 89   Resp 16   Ht 6' 3 5\" (1 918 m)   Wt 120 kg (265 lb)   SpO2 96%   BMI 32 69 kg/m²     Physical Exam   PHYSICAL EXAM:  General:  Patient is not in acute distress   Head: Normocephalic, Atraumatic  HEENT:  Both pupils normal-size atraumatic, normocephalic, nonicteric  Neck:  JVP not raised  Trachea central  No carotid bruit  Respiratory:  normal breath sounds no crackles  no rhonchi  Cardiovascular:  Regular rate and rhythm no S3 no murmurs  GI:  Abdomen soft nontender  No organomegaly  Lymphatic:  No cervical or inguinal lymphadenopathy  Neurologic:  Patient is awake alert, oriented   Grossly nonfocal  Extremities no edema      Discussion/Summary:    Patient with multiple medical problems with new onset episodes of paroxysmal A-fib/flutter with likely tachycardia induced cardiomyopathy    " Ejection fraction 34% by nuclear study  No significant ischemia noted  Patient had mild CAD by cardiac cath in 2021  Patient will need electrophysiology evaluation for possible consideration for ablation  Patient does have history of SVT ablation in the past     Patient is supposed to see Dr Anamika Roger, electrophysiology as outpatient next month  Patient instructed to follow-up with echocardiogram prior to visit with electrophysiology  Risks and benefits  and alternatives of anticoagulation to prevent thromboembolic risk from atrial fibrillation/DVT PE discussed at length  Patient to report any bleeding issues  Patient had a few questions which were answered  Medications were reviewed  Follow-up in 3 to 4 months or earlier as needed  Patient is agreeable with the plan of care

## 2023-05-26 DIAGNOSIS — N40.1 BPH WITH OBSTRUCTION/LOWER URINARY TRACT SYMPTOMS: ICD-10-CM

## 2023-05-26 DIAGNOSIS — N13.8 BPH WITH OBSTRUCTION/LOWER URINARY TRACT SYMPTOMS: ICD-10-CM

## 2023-05-26 RX ORDER — FINASTERIDE 5 MG/1
5 TABLET, FILM COATED ORAL DAILY
Qty: 90 TABLET | Refills: 5 | Status: SHIPPED | OUTPATIENT
Start: 2023-05-26

## 2023-06-02 ENCOUNTER — HOSPITAL ENCOUNTER (OUTPATIENT)
Dept: NON INVASIVE DIAGNOSTICS | Facility: CLINIC | Age: 78
Discharge: HOME | End: 2023-06-02

## 2023-06-02 VITALS
SYSTOLIC BLOOD PRESSURE: 136 MMHG | BODY MASS INDEX: 32.95 KG/M2 | HEART RATE: 75 BPM | DIASTOLIC BLOOD PRESSURE: 88 MMHG | HEIGHT: 75 IN | WEIGHT: 265 LBS

## 2023-06-02 DIAGNOSIS — I42.8 CARDIOMYOPATHY, NONISCHEMIC (HCC): ICD-10-CM

## 2023-06-02 DIAGNOSIS — I48.3 TYPICAL ATRIAL FLUTTER (HCC): ICD-10-CM

## 2023-06-02 LAB
AORTIC ROOT: 3.6 CM
APICAL FOUR CHAMBER EJECTION FRACTION: 42 %
ASCENDING AORTA: 3.3 CM
E WAVE DECELERATION TIME: 206 MS
FRACTIONAL SHORTENING: 36 % (ref 28–44)
INTERVENTRICULAR SEPTUM IN DIASTOLE (PARASTERNAL SHORT AXIS VIEW): 1.2 CM
INTERVENTRICULAR SEPTUM: 1.2 CM (ref 0.6–1.1)
LAAS-AP2: 29.5 CM2
LAAS-AP4: 20.4 CM2
LEFT ATRIUM AREA SYSTOLE SINGLE PLANE A4C: 26.4 CM2
LEFT ATRIUM SIZE: 5.7 CM
LEFT INTERNAL DIMENSION IN SYSTOLE: 3.5 CM (ref 2.1–4)
LEFT VENTRICLE DIASTOLIC VOLUME (MOD BIPLANE): 142 ML
LEFT VENTRICLE SYSTOLIC VOLUME (MOD BIPLANE): 72 ML
LEFT VENTRICULAR INTERNAL DIMENSION IN DIASTOLE: 5.5 CM (ref 3.5–6)
LEFT VENTRICULAR POSTERIOR WALL IN END DIASTOLE: 1.1 CM
LEFT VENTRICULAR STROKE VOLUME: 98 ML
LV EF: 50 %
LVSV (TEICH): 98 ML
MV E'TISSUE VEL-SEP: 4 CM/S
MV PEAK A VEL: 0.79 M/S
MV PEAK E VEL: 50 CM/S
MV STENOSIS PRESSURE HALF TIME: 60 MS
MV VALVE AREA P 1/2 METHOD: 3.67 CM2
RIGHT ATRIUM AREA SYSTOLE A4C: 17.3 CM2
RIGHT VENTRICLE ID DIMENSION: 3.4 CM
SL CV LEFT ATRIUM LENGTH A2C: 6 CM
SL CV LV EF: 40
SL CV PED ECHO LEFT VENTRICLE DIASTOLIC VOLUME (MOD BIPLANE) 2D: 149 ML
SL CV PED ECHO LEFT VENTRICLE SYSTOLIC VOLUME (MOD BIPLANE) 2D: 51 ML
TR MAX PG: 25 MMHG
TR PEAK VELOCITY: 2.5 M/S
TRICUSPID ANNULAR PLANE SYSTOLIC EXCURSION: 2.1 CM
TRICUSPID VALVE PEAK REGURGITATION VELOCITY: 2.48 M/S

## 2023-06-03 PROBLEM — Z12.5 PROSTATE CANCER SCREENING: Status: RESOLVED | Noted: 2023-04-04 | Resolved: 2023-06-03

## 2023-06-05 NOTE — PROGRESS NOTES
Cardiology Consultation     Radha Moran  [id number]  1945  HEART [facility] VASCULAR [facility] [address] [address]    1  PAF (paroxysmal atrial fibrillation) ([facility] )        2  ARLENE (obstructive sleep apnea)        3  SVT (supraventricular tachycardia) ([facility])  POCT ECG    metoprolol succinate (TOPROL-XL) 25 mg 24 hr tablet      4  Bilateral pulmonary embolism (HCC)        5  Cardiomyopathy, nonischemic ([facility] )        6  Thrombocytopenia ([facility] )        7  Chronic kidney disease (CKD), stage II (mild)        8  Mixed hyperlipidemia        9  Class 1 obesity due to excess calories with serious comorbidity and body mass index (BMI) of 33 0 to 33 9 in adult        10  History of sickle cell trait        11  History of pulmonary embolism        12  Chronic anticoagulation        13   Dyslipidemia            Summary of my recommendation for the patient  Patient is on anticoagulation with Eliquis  Recent episode of PAF/flutter with RVR  Nuclear images show small mixed apical defect - LVEF 34%  Echocardiogram-LVEF 40%- 45%    Continue metoprolol succinate 25 mg daily  Price sotalol 160 mg twice daily/ dofetilide 500 mcg twice daily  Once affordability determined, admit to initiate the medication  Follow-up with me in 3 to 4 months time      Aggressive management of hypertension-beta-blocker, isosorbide, hydralazine-to be started by primary cardiologist  Avoid the beta-blocker till the event monitor is done    Aggressive management of heart failure    Get results of cardiac catheterization    Sleep medicine consult to evaluate and treat for sleep apnea                  History of present illness  Recent episode of PAF/PAT with RVR  On anticoagulation and metoprolol    The patient has been referred to me for management of palpitations, SVT - by Dr Kacy Ye    He has significant medical illnesses which include  Supraventricular tachycardia  Nonischemic dilated cardiomyopathy  Obesity  Obstructive sleep apnea  History of bilateral pulmonary embolism  Hyperlipidemia  BPH    As far as palpitations are concerned  Has been present for over 5 years  There has been a recent increase  Most episodes are brief lasting a minute  Recent increase in longer episodes  Associated symptoms of lightheadedness and presyncope    Currently he is having 2-3 episodes a week  Previous records show narrow complex tachycardia, long RP, heart rate around 140 per minute  There is record from 2017 and 2020 which show heart rate in the 140s      The patient is a retired  personal  He is relatively active though not much  His not complaining of anginal like chest pain or chest pressure  His not complaining of worsening orthopnea or PND  He does have chronic leg swelling    Palpitations and presyncope is as described above  There has been no episode of syncope  There is rarely orthostatic lightheadedness    The patient is having exertional intolerance    He does have a history of hypertension  He has diet has been a little uncontrolled lately  Both he and his wife have noted worsening hypertension    The patient does have a history of bilateral pulmonary embolism  He has been on blood thinner ever since then      The patient does have a history of cardiomyopathy  LVEF is around 45%  He informs that there has been a cardiac catheterization  However we were unable to pull up the records for the same - done at [facility]      The patient is a former smoker  He does not abuse alcohol  He does not use recreational drugs    There is family history of heart disease and hypertension    Patient Active Problem List   Diagnosis   • Bilateral pulmonary embolism (HCC)   • SOB (shortness of breath)   • Hyperlipidemia   • Lung nodule   • Obesity   • History of sickle cell trait   • Leg edema, left   • History of pulmonary embolism   • Thrombocytopenia ([facility] [address] )   • Chronic kidney disease (CKD), stage II (mild)   • Tachycardia   • Chronic low back pain   • SVT (supraventricular tachycardia) ([facility])   • Chronic anticoagulation   • ARLENE (obstructive sleep apnea)   • Cardiomyopathy, nonischemic (HCC)   • Dyslipidemia   • BPH (benign prostatic hyperplasia)   • Nephrolithiasis   • Lumbar disc disease with radiculopathy - Left   • Spinal stenosis of lumbar region without neurogenic claudication - Left   • Chest pain   • Acute kidney injury superimposed on CKD ([facility] )   • COVID-19   • Benign localized prostatic hyperplasia with lower urinary tract symptoms (LUTS)   • PAF (paroxysmal atrial fibrillation) ([facility])     Past Medical History:   Diagnosis Date   • Cardiomyopathy ([facility] )    • Hyperlipidemia    • Post-nasal drip    • Pulmonary embolism ([facility])      Social History     Socioeconomic History   • Marital status: /Civil Union     Spouse name: Not on file   • Number of children: 7   • Years of education: Not on file   • Highest education level: Not on file   Occupational History   • Occupation: employed   Tobacco Use   • Smoking status: Former     Types: Cigarettes     Quit date:      Years since quittin 4   • Smokeless tobacco: Never   Vaping Use   • Vaping Use: Never used   Substance and Sexual Activity   • Alcohol use: Yes     Alcohol/week: 0 0 standard drinks of alcohol     Comment: socially   • Drug use: No   • Sexual activity: Not on file   Other Topics Concern   • Not on file   Social History Narrative   • Not on file     Social Determinants of Health     Financial Resource Strain: Not on file   Food Insecurity: No Food Insecurity (2021)    Hunger Vital Sign    • Worried About Running Out of Food in the Last Year: Never true    • Ran Out of Food in the Last Year: Never true   Transportation Needs: No Transportation Needs (2021)    PRAPARE - Transportation    • Lack of Transportation (Medical): No    • Lack of Transportation (Non-Medical):  No   Physical Activity: Not on file   Stress: Not on file   Social Connections: Not on file   Intimate Partner Violence: Not on file   Housing Stability: Low Risk  (11/23/2021)    Housing Stability Vital Sign    • Unable to Pay for Housing in the Last Year: No    • Number of Places Lived in the Last Year: 1    • Unstable Housing in the Last Year: No      History reviewed  No pertinent family history  Past Surgical History:   Procedure Laterality Date   • ARTHROSCOPY KNEE Left     30 years ago   • DC CYSTO INSERTION TRANSPROSTATIC IMPLANT SINGLE N/A 06/17/2021    Procedure: CYSTOSCOPY WITH INSERTION UROLIFT;  Surgeon: January Jones MD;  Location: [facility];  Service: Urology       Current Outpatient Medications:   •  acetaminophen-codeine (TYLENOL #3) 300-30 mg per tablet, TAKE 1-2 TABS BY MOUTH EVERY 4-6 HRS AS NEEDED FOR PAIN, Disp: , Rfl:   •  apixaban (Eliquis) 5 mg, Take 1 tablet (5 mg total) by mouth 2 (two) times a day, Disp: 180 tablet, Rfl: 3  •  DULoxetine (CYMBALTA) 60 mg delayed release capsule, Oral, Disp: , Rfl:   •  finasteride (PROSCAR) 5 mg tablet, TAKE 1 TABLET (5 MG TOTAL) BY MOUTH DAILY  , Disp: 90 tablet, Rfl: 5  •  fluticasone (FLONASE) 50 mcg/act nasal spray, 1 spray into each nostril daily as needed for rhinitis, Disp: 16 g, Rfl: 0  •  metoprolol succinate (TOPROL-XL) 25 mg 24 hr tablet, Take 1 tablet (25 mg total) by mouth daily, Disp: 30 tablet, Rfl: 5  •  Omega-3 1000 MG CAPS, 1,000 mg, Disp: , Rfl:   •  rosuvastatin (CRESTOR) 20 MG tablet, Take 20 mg by mouth daily as needed, Disp: , Rfl:   •  sildenafil (VIAGRA) 50 MG tablet, Take 1 tablet (50 mg total) by mouth daily as needed for erectile dysfunction, Disp: 10 tablet, Rfl: 3  •  tamsulosin (FLOMAX) 0 4 mg, Take 0 4 mg by mouth daily with dinner  , Disp: , Rfl:   •  meclizine (ANTIVERT) 25 mg tablet, Take 25 mg by mouth every 8 (eight) hours (Patient not taking: Reported on 6/9/2023), Disp: , Rfl:   No Known Allergies  Vitals:    06/09/23 1318 "  BP: 142/90   BP Location: Right arm   Patient Position: Sitting   Cuff Size: Large   Pulse: (!) 108   Weight: 123 kg (272 lb 3 2 oz)   Height: 6' 3\" (1 905 m)       Labs:  Lab Results   Component Value Date    K 3 8 10/22/2022     10/22/2022    CO2 26 10/22/2022    BUN 12 10/22/2022    CREATININE 1 29 10/22/2022    CALCIUM 9 0 10/22/2022     Lab Results   Component Value Date    CKTOTAL 730 (H) 11/21/2021    CKMB 1 3 11/21/2021    CKMBINDEX <1 0 11/21/2021    TROPONINI 0 02 04/23/2021     Lab Results   Component Value Date    WBC 4 73 10/22/2022    HGB 13 0 10/22/2022    HCT 38 6 10/22/2022    MCV 95 10/22/2022     (L) 10/22/2022     Lab Results   Component Value Date    TRIG 98 05/18/2022    HDL 45 05/18/2022       Lipid panel  5/18/2022          Component Ref Range & Units 5/18/22  8:30 AM 11/21/21 11:23 AM 3/31/21  9:43 AM 8/22/17  6:11 AM   Cholesterol See Comment mg/dL 146   201 High  R, CM  207 High  R         Triglycerides See Comment mg/dL 98  113 CM  143 R, CM  164 High  R, CM      HDL, Direct >=40 mg/dL 45   46 CM  45 R, CM    Comment: Specimen collection should occur prior to Metamizole administration due to the potential for falsley depressed results  LDL Calculated 0 - 100 mg/dL 81   126 High  CM  129 High           Imaging:   Xr Chest 2 Views  Result Date: 9/8/2020  Narrative: CHEST INDICATION:   shortness of breath COMPARISON:  Chest radiograph from 8/24/2017 and chest CT from 2/3/2020  EXAM PERFORMED/VIEWS:  XR CHEST PA & LATERAL WITH DUAL ENERGY SUBTRACTION  FINDINGS: Cardiomediastinal silhouette is normal  Lungs are clear  No effusion or pneumothorax  Osseous structures are within normal limits for age  Impression: No acute cardiopulmonary disease  Workstation performed: [id number]     Nuclear stress test  4/12/2023    Interpretation Summary    1    The patient presented with intermittent supraventricular tachycardia which appeared to be primarily regular at a rate of 130 to 140 " bpm   It is suggested that this may be a 2-1 flutter  2   At times the rhythm was briefly irregular and rapid suggesting possible fibrillation  3  No discomfort with or without the rapid heart rate or with Lexiscan  4   The patient was given a total dose of 5 mg Lopressor IV to control the tachycardia and he left with a normal heart rate and rhythm  5   There is a moderate sized inferior wall defect which predominantly normalizes with prone imaging and most likely represents diaphragmatic shadowing  6  There is a small apical mixed defect of moderate intensity suggesting apical infarct and ischemia  7  Generalized left ventricular hypokinesis  Calculated ejection fraction is 34%  8  Resting EKG showed sinus rhythm and nonspecific ST-T changes    Nuclear Stress Findings    Isotope Administration The isotope used for nuclear imaging was Tc 99m tetrofosmin  The isotope was administered intravenously via the left antecubital fossa  Imaging was performed at rest 30 minutes after an injection on 4/12/2023 at 07:59 EDT of 15 93 mCi  Imaging was performed at peak stress 30 minutes after an injection on 4/12/2023 at 09:04 EDT of 49 70 mCi  Nuclear Study Quality A perfusion 1-day rest/stress protocol was performed  Images were viewed in the short axis, vertical long axis and horizontal long axis  Prone imaging acquired after stress imaging  Stress Findings A pharmacological stress test was performed using regadenoson  The patient was given Metoprolol for symptoms  The patient experienced no angina during the test  The patient reached the end of the protocol  Low level exercise was used during the pharmacological stress test  The patient reported dyspnea during the stress test  Symptoms began during stress and ended during recovery  The patient was in SVT/afib/flutter for entire stress period  The patient went in and out of SVT/afib/flutter during recovery        Stress Function Post-stress ejection fraction is 34 %  General Findings The stress/rest perfusion ratio is 1 09   Stress end diastolic index: 862 3 mL/m2                          Echocardiogram  6/2/2023    Interpretation Summary       •  Left Ventricle: Left ventricular cavity size is normal  Wall thickness is normal  The left ventricular ejection fraction is 45%  Systolic function is mildly reduced  Diastolic function is mildly abnormal, consistent with grade I (abnormal) relaxation  Patient noted to have intermittent runs of tachycardia during study  •  Left Ventricle: Left ventricular cavity size is normal  The left ventricular ejection fraction is 40%  Systolic function is mildly reduced  Diastolic function is mildly abnormal, consistent with grade I (abnormal) relaxation  •  The following segments are hypokinetic: apical anterior, apical septal, apical inferior, apical lateral and apex  •  Right Ventricle: Right ventricular cavity size is normal  Systolic function is normal   •  Left Atrium: The atrium is mildly dilated  •  Mitral Valve: There is mild regurgitation      Findings    Left Ventricle Left ventricular cavity size is normal  Wall thickness is normal  The left ventricular ejection fraction is 40%  Systolic function is mildly reduced  Diastolic function is mildly abnormal, consistent with grade I (abnormal) relaxation  Wall Scoring Baseline     The following segments are hypokinetic: apical anterior, apical septal, apical inferior, apical lateral and apex  All other segments are normal                Right Ventricle Right ventricular cavity size is normal  Systolic function is normal  Wall thickness is normal       Left Atrium The atrium is mildly dilated  Right Atrium The atrium is normal in size  Aortic Valve The aortic valve is trileaflet  The leaflets are not thickened  The leaflets are not calcified  The leaflets exhibit normal mobility  There is no evidence of regurgitation   The aortic valve has no significant stenosis  Mitral Valve Mitral valve structure is normal  There is mild regurgitation  There is no evidence of stenosis  Tricuspid Valve Tricuspid valve structure is normal  There is trace regurgitation  There is no evidence of stenosis  Pulmonic Valve Pulmonic valve structure is normal  There is trace regurgitation  There is no evidence of stenosis  Ascending Aorta The aortic root is normal in size  IVC/SVC The inferior vena cava is not well visualized  Pericardium There is no pericardial effusion  Pulmonary Artery The pulmonary artery systolic pressure is normal             Echocardiogram  July 2018  LEFT VENTRICLE:  Ejection fraction was estimated to be 45 % to 50%  There was mild diffuse hypokinesis          EKG of tachycardia  The patient does get recurrent tachycardia, narrow complex, long RP  The maximum rate is almost always in the 140s              Ambulatory event recorder-[facility]  4/13/2023- 4/26/2023 4/25/2023        Review of Systems:  Review of Systems   All other systems reviewed and are negative  As described in my history of present illness      Physical Exam:  Physical Exam  Constitutional:       Appearance: Normal appearance  He is well-developed  He is obese  Comments: Not in any distress at the current time   HENT:      Head: Normocephalic and atraumatic  Right Ear: External ear normal       Left Ear: External ear normal       Nose: Nose normal       Mouth/Throat:      Pharynx: Uvula midline  Eyes:      General: Lids are normal       Extraocular Movements: Extraocular movements intact  Conjunctiva/sclera: Conjunctivae normal       Pupils: Pupils are equal, round, and reactive to light  Comments: No pallor  No cyanosis  No icterus   Neck:      Thyroid: No thyromegaly  Vascular: No carotid bruit or JVD        Trachea: Trachea normal       Comments: No jugular lymphadenopathy   thick neck  Cardiovascular:      Rate and Rhythm: Normal rate and regular rhythm  Chest Wall: PMI is not displaced  Pulses: Normal pulses  Heart sounds: Normal heart sounds, S1 normal and S2 normal  No murmur heard  No friction rub  No gallop  No S3 or S4 sounds  Pulmonary:      Effort: Pulmonary effort is normal  No accessory muscle usage or respiratory distress  Breath sounds: Examination of the right-lower field reveals decreased breath sounds  Examination of the left-lower field reveals decreased breath sounds  Decreased breath sounds present  No wheezing, rhonchi or rales  Chest:      Chest wall: No tenderness  Abdominal:      General: Bowel sounds are normal  There is no distension  Palpations: Abdomen is soft  There is no hepatomegaly, splenomegaly or mass  Tenderness: There is no abdominal tenderness  Comments: Central obesity present   Musculoskeletal:         General: Swelling present  No tenderness or deformity  Normal range of motion  Cervical back: Normal range of motion  Right lower leg: Edema present  Left lower leg: Edema present  Lymphadenopathy:      Cervical: No cervical adenopathy  Skin:     Findings: No abrasion, erythema, lesion or rash  Nails: There is no clubbing  Neurological:      Mental Status: He is alert and oriented to person, place, and time  Comments: Facial symmetry is retained  Extraocular movements are retained  Head neck tongue and palate movement are retained and symmetric   Psychiatric:         Speech: Speech normal          Behavior: Behavior normal          Thought Content: Thought content normal          Discussion/Summary:    PAF  Patient is on anticoagulation with Eliquis  Recent episode of PAF/flutter with RVR  Nuclear images show small mixed apical defect  LVEF 34%  Echocardiogram-LVEF 40%- 45%    Continue metoprolol succinate 25 mg daily  Price sotalol 160 mg twice daily/ dofetilide 500 mcg twice daily  Once affordability determined, admit to initiate the medication  Follow-up with me in 3 to 4 months time        SVT  Every time heart rate is in the 140s which raise suspicion of reentry tachycardia  It is long RP tachycardia  Differentials are - atrial tachycardia, atypical AVNRT, AVRT with decremental pathway    Most of the episodes are brief lasting less than a minute  These are happening 2 to 3 times a week  The patient does inform of episodes which have resulted in hospital visits and lightheadedness    My recommendation for the patient:  - use event monitor for 30 days to document the tachycardia  Initiation, cycle length and termination will be helpful to plan ablation  - start metoprolol tartrate on an as-needed basis at this time        Congestive heart failure  LVEF around 40-45%  Patient does have leg swelling    Cardiac catheterization, confirmed by the patient, was not available in the patient's chart  Need to obtain these records    Blood pressure is not well controlled  He has symptoms suggestive of obstructive sleep apnea  High dietary salt intake    Patient needs to be started on full therapy for the same  Will need beta-blocker, ACE-inhibitor/ ARB or isosorbide/hydralazine  Referred back to primary cardiologist for management of the same        Hypertension  Blood pressure is 142/90  This is not well controlled  Discussed need to stop salt  Patient will need to be started on beta-blocker, isosorbide, hydralazine  Patient prefers to discuss with primary cardiologist as he will have more regular follow-up with then      Hyperlipidemia  Patient is currently on rosuvastatin  No myositis or myalgia  Continue with the same      Bilateral pulmonary embolism  As per patient this was not provoked  He is going to be on a blood thinner which is to be continued indefinitely      long-term anticoagulation  On Eliquis for bilateral pulmonary embolism      Obesity  Discussed role of diet and exercise  Diet is responsible for 80% of obesity  BMI is 33      Sickle cell trait   Remain hydrated   Continue anticoagulation      Obstructive sleep apnea  Patient has a history of snoring, morning fatigue and daytime sleepiness  He has a  large thick neck and crowded posterior pharynx  He needs to be evaluated and treated for sleep apnea

## 2023-06-06 ENCOUNTER — TELEPHONE (OUTPATIENT)
Dept: CARDIOLOGY CLINIC | Facility: CLINIC | Age: 78
End: 2023-06-06

## 2023-06-06 NOTE — TELEPHONE ENCOUNTER
----- Message from Morgan Antoine MD sent at 6/6/2023 12:14 PM EDT -----  Please call the patient  Echocardiogram shows ejection fraction of 45%  Patient known to have reduced ejection fraction  Patient supposed to see Dr Neal Anders, electrophysiology in the next few days  To make sure he keeps the appointment for atrial fibrillation

## 2023-06-06 NOTE — TELEPHONE ENCOUNTER
Spoke with patient and he verbally understood result   Patient has an appt with Kortney Conley on 6/9/2023

## 2023-06-09 ENCOUNTER — OFFICE VISIT (OUTPATIENT)
Dept: CARDIOLOGY CLINIC | Facility: CLINIC | Age: 78
End: 2023-06-09
Payer: COMMERCIAL

## 2023-06-09 VITALS
DIASTOLIC BLOOD PRESSURE: 90 MMHG | HEART RATE: 108 BPM | WEIGHT: 272.2 LBS | SYSTOLIC BLOOD PRESSURE: 142 MMHG | HEIGHT: 75 IN | BODY MASS INDEX: 33.85 KG/M2

## 2023-06-09 DIAGNOSIS — I42.8 CARDIOMYOPATHY, NONISCHEMIC (HCC): ICD-10-CM

## 2023-06-09 DIAGNOSIS — Z86.2 HISTORY OF SICKLE CELL TRAIT: ICD-10-CM

## 2023-06-09 DIAGNOSIS — I26.99 BILATERAL PULMONARY EMBOLISM (HCC): ICD-10-CM

## 2023-06-09 DIAGNOSIS — Z86.711 HISTORY OF PULMONARY EMBOLISM: ICD-10-CM

## 2023-06-09 DIAGNOSIS — E78.2 MIXED HYPERLIPIDEMIA: ICD-10-CM

## 2023-06-09 DIAGNOSIS — G47.33 OSA (OBSTRUCTIVE SLEEP APNEA): ICD-10-CM

## 2023-06-09 DIAGNOSIS — D69.6 THROMBOCYTOPENIA (HCC): ICD-10-CM

## 2023-06-09 DIAGNOSIS — E66.09 CLASS 1 OBESITY DUE TO EXCESS CALORIES WITH SERIOUS COMORBIDITY AND BODY MASS INDEX (BMI) OF 33.0 TO 33.9 IN ADULT: ICD-10-CM

## 2023-06-09 DIAGNOSIS — I48.0 PAF (PAROXYSMAL ATRIAL FIBRILLATION) (HCC): ICD-10-CM

## 2023-06-09 DIAGNOSIS — I47.1 SVT (SUPRAVENTRICULAR TACHYCARDIA) (HCC): ICD-10-CM

## 2023-06-09 DIAGNOSIS — N18.2 CHRONIC KIDNEY DISEASE (CKD), STAGE II (MILD): ICD-10-CM

## 2023-06-09 DIAGNOSIS — E78.5 DYSLIPIDEMIA: ICD-10-CM

## 2023-06-09 DIAGNOSIS — Z79.01 CHRONIC ANTICOAGULATION: ICD-10-CM

## 2023-06-09 PROCEDURE — 93000 ELECTROCARDIOGRAM COMPLETE: CPT | Performed by: INTERNAL MEDICINE

## 2023-06-09 PROCEDURE — 99215 OFFICE O/P EST HI 40 MIN: CPT | Performed by: INTERNAL MEDICINE

## 2023-06-09 RX ORDER — METOPROLOL SUCCINATE 25 MG/1
25 TABLET, EXTENDED RELEASE ORAL DAILY
Qty: 30 TABLET | Refills: 5 | Status: SHIPPED | OUTPATIENT
Start: 2023-06-09

## 2023-06-09 NOTE — LETTER
June 17, 2023     Isabel Peters MD  [facility]  [address] [id number]    Patient: Aminta Yañez  YOB: 1945   Date of Visit: 6/9/2023       Dear Dr Mera Sarabia: Thank you for referring Pa Moore to me for evaluation  Below are my notes for this consultation  If you have questions, please do not hesitate to call me  I look forward to following your patient along with you  Sincerely,        Solitario Tesfaye MD        CC: Aminta MD Solitario Underwood MD  6/17/2023  6:43 PM  Sign when Signing Visit                                             Cardiology Consultation     Aminta Yañez  [id number]  1945  HEART & VASCULAR [facility] [address] [address]    1  PAF (paroxysmal atrial fibrillation) ([address] )        2  ARLENE (obstructive sleep apnea)        3  SVT (supraventricular tachycardia) (HCC)  POCT ECG    metoprolol succinate (TOPROL-XL) 25 mg 24 hr tablet      4  Bilateral pulmonary embolism (HCC)        5  Cardiomyopathy, nonischemic ([address] )        6  Thrombocytopenia ([address] )        7  Chronic kidney disease (CKD), stage II (mild)        8  Mixed hyperlipidemia        9  Class 1 obesity due to excess calories with serious comorbidity and body mass index (BMI) of 33 0 to 33 9 in adult        10  History of sickle cell trait        11  History of pulmonary embolism        12  Chronic anticoagulation        13   Dyslipidemia            Summary of my recommendation for the patient  Patient is on anticoagulation with Eliquis  Recent episode of PAF/flutter with RVR  Nuclear images show small mixed apical defect - LVEF 34%  Echocardiogram-LVEF 40%- 45%    Continue metoprolol succinate 25 mg daily  Price sotalol 160 mg twice daily/ dofetilide 500 mcg twice daily  Once affordability determined, admit to initiate the medication  Follow-up with me in 3 to 4 months time      Aggressive management of hypertension-beta-blocker, isosorbide, hydralazine-to be started by primary cardiologist  Avoid the beta-blocker till the event monitor is done    Aggressive management of heart failure    Get results of cardiac catheterization    Sleep medicine consult to evaluate and treat for sleep apnea                  History of present illness  Recent episode of PAF/PAT with RVR  On anticoagulation and metoprolol    The patient has been referred to me for management of palpitations, SVT - by Dr Noelle Mayen    He has significant medical illnesses which include  Supraventricular tachycardia  Nonischemic dilated cardiomyopathy  Obesity  Obstructive sleep apnea  History of bilateral pulmonary embolism  Hyperlipidemia  BPH    As far as palpitations are concerned  Has been present for over 5 years  There has been a recent increase  Most episodes are brief lasting a minute  Recent increase in longer episodes  Associated symptoms of lightheadedness and presyncope    Currently he is having 2-3 episodes a week  Previous records show narrow complex tachycardia, long RP, heart rate around 140 per minute  There is record from 2017 and 2020 which show heart rate in the 140s      The patient is a retired  personal  He is relatively active though not much  His not complaining of anginal like chest pain or chest pressure  His not complaining of worsening orthopnea or PND  He does have chronic leg swelling    Palpitations and presyncope is as described above  There has been no episode of syncope  There is rarely orthostatic lightheadedness    The patient is having exertional intolerance    He does have a history of hypertension  He has diet has been a little uncontrolled lately  Both he and his wife have noted worsening hypertension    The patient does have a history of bilateral pulmonary embolism  He has been on blood thinner ever since then      The patient does have a history of cardiomyopathy  LVEF is around 45%  He informs that there has been a cardiac catheterization  However we were unable to pull up the records for the same - done at [facility]      The patient is a former smoker  He does not abuse alcohol  He does not use recreational drugs    There is family history of heart disease and hypertension    Patient Active Problem List   Diagnosis   • Bilateral pulmonary embolism (HCC)   • SOB (shortness of breath)   • Hyperlipidemia   • Lung nodule   • Obesity   • History of sickle cell trait   • Leg edema, left   • History of pulmonary embolism   • Thrombocytopenia (HCC)   • Chronic kidney disease (CKD), stage II (mild)   • Tachycardia   • Chronic low back pain   • SVT (supraventricular tachycardia) ([facility])   • Chronic anticoagulation   • ARLENE (obstructive sleep apnea)   • Cardiomyopathy, nonischemic (HCC)   • Dyslipidemia   • BPH (benign prostatic hyperplasia)   • Nephrolithiasis   • Lumbar disc disease with radiculopathy - Left   • Spinal stenosis of lumbar region without neurogenic claudication - Left   • Chest pain   • Acute kidney injury superimposed on CKD ([facility])   • COVID-19   • Benign localized prostatic hyperplasia with lower urinary tract symptoms (LUTS)   • PAF (paroxysmal atrial fibrillation) ([facility] )     Past Medical History:   Diagnosis Date   • Cardiomyopathy ([facility] )    • Hyperlipidemia    • Post-nasal drip    • Pulmonary embolism (HCC)      Social History     Socioeconomic History   • Marital status: /Civil Union     Spouse name: Not on file   • Number of children: 7   • Years of education: Not on file   • Highest education level: Not on file   Occupational History   • Occupation: employed   Tobacco Use   • Smoking status: Former     Types: Cigarettes     Quit date:      Years since quittin 4   • Smokeless tobacco: Never   Vaping Use   • Vaping Use: Never used   Substance and Sexual Activity   • Alcohol use:  Yes     Alcohol/week: 0 0 standard drinks of alcohol     Comment: socially   • Drug use: No   • Sexual activity: Not on file   Other Topics Concern   • Not on file   Social History Narrative   • Not on file     Social Determinants of Health     Financial Resource Strain: Not on file   Food Insecurity: No Food Insecurity (11/23/2021)    Hunger Vital Sign    • Worried About Running Out of Food in the Last Year: Never true    • Ran Out of Food in the Last Year: Never true   Transportation Needs: No Transportation Needs (11/23/2021)    PRAPARE - Transportation    • Lack of Transportation (Medical): No    • Lack of Transportation (Non-Medical): No   Physical Activity: Not on file   Stress: Not on file   Social Connections: Not on file   Intimate Partner Violence: Not on file   Housing Stability: Low Risk  (11/23/2021)    Housing Stability Vital Sign    • Unable to Pay for Housing in the Last Year: No    • Number of Places Lived in the Last Year: 1    • Unstable Housing in the Last Year: No      History reviewed  No pertinent family history  Past Surgical History:   Procedure Laterality Date   • ARTHROSCOPY KNEE Left     30 years ago   • NC CYSTO INSERTION TRANSPROSTATIC IMPLANT SINGLE N/A 06/17/2021    Procedure: CYSTOSCOPY WITH INSERTION UROLIFT;  Surgeon: Montse Degroot MD;  Location[facility];  Service: Urology       Current Outpatient Medications:   •  acetaminophen-codeine (TYLENOL #3) 300-30 mg per tablet, TAKE 1-2 TABS BY MOUTH EVERY 4-6 HRS AS NEEDED FOR PAIN, Disp: , Rfl:   •  apixaban (Eliquis) 5 mg, Take 1 tablet (5 mg total) by mouth 2 (two) times a day, Disp: 180 tablet, Rfl: 3  •  DULoxetine (CYMBALTA) 60 mg delayed release capsule, Oral, Disp: , Rfl:   •  finasteride (PROSCAR) 5 mg tablet, TAKE 1 TABLET (5 MG TOTAL) BY MOUTH DAILY  , Disp: 90 tablet, Rfl: 5  •  fluticasone (FLONASE) 50 mcg/act nasal spray, 1 spray into each nostril daily as needed for rhinitis, Disp: 16 g, Rfl: 0  •  metoprolol succinate (TOPROL-XL) 25 mg 24 hr tablet, Take 1 "tablet (25 mg total) by mouth daily, Disp: 30 tablet, Rfl: 5  •  Omega-3 1000 MG CAPS, 1,000 mg, Disp: , Rfl:   •  rosuvastatin (CRESTOR) 20 MG tablet, Take 20 mg by mouth daily as needed, Disp: , Rfl:   •  sildenafil (VIAGRA) 50 MG tablet, Take 1 tablet (50 mg total) by mouth daily as needed for erectile dysfunction, Disp: 10 tablet, Rfl: 3  •  tamsulosin (FLOMAX) 0 4 mg, Take 0 4 mg by mouth daily with dinner  , Disp: , Rfl:   •  meclizine (ANTIVERT) 25 mg tablet, Take 25 mg by mouth every 8 (eight) hours (Patient not taking: Reported on 6/9/2023), Disp: , Rfl:   No Known Allergies  Vitals:    06/09/23 1318   BP: 142/90   BP Location: Right arm   Patient Position: Sitting   Cuff Size: Large   Pulse: (!) 108   Weight: 123 kg (272 lb 3 2 oz)   Height: 6' 3\" (1 905 m)       Labs:  Lab Results   Component Value Date    K 3 8 10/22/2022     10/22/2022    CO2 26 10/22/2022    BUN 12 10/22/2022    CREATININE 1 29 10/22/2022    CALCIUM 9 0 10/22/2022     Lab Results   Component Value Date    CKTOTAL 730 (H) 11/21/2021    CKMB 1 3 11/21/2021    CKMBINDEX <1 0 11/21/2021    TROPONINI 0 02 04/23/2021     Lab Results   Component Value Date    WBC 4 73 10/22/2022    HGB 13 0 10/22/2022    HCT 38 6 10/22/2022    MCV 95 10/22/2022     (L) 10/22/2022     Lab Results   Component Value Date    TRIG 98 05/18/2022    HDL 45 05/18/2022       Lipid panel  5/18/2022          Component Ref Range & Units 5/18/22  8:30 AM 11/21/21 11:23 AM 3/31/21  9:43 AM 8/22/17  6:11 AM   Cholesterol See Comment mg/dL 146   201 High  R, CM  207 High  R         Triglycerides See Comment mg/dL 98  113 CM  143 R, CM  164 High  R, CM      HDL, Direct >=40 mg/dL 45   46 CM  45 R, CM    Comment: Specimen collection should occur prior to Metamizole administration due to the potential for falsley depressed results     LDL Calculated 0 - 100 mg/dL 81   126 High  CM  129 High           Imaging:   Xr Chest 2 Views  Result Date: 9/8/2020  Narrative: " CHEST INDICATION:   shortness of breath COMPARISON:  Chest radiograph from 8/24/2017 and chest CT from 2/3/2020  EXAM PERFORMED/VIEWS:  XR CHEST PA & LATERAL WITH DUAL ENERGY SUBTRACTION  FINDINGS: Cardiomediastinal silhouette is normal  Lungs are clear  No effusion or pneumothorax  Osseous structures are within normal limits for age  Impression: No acute cardiopulmonary disease  Workstation performed: [id number]     Nuclear stress test  4/12/2023    Interpretation Summary    1  The patient presented with intermittent supraventricular tachycardia which appeared to be primarily regular at a rate of 130 to 140 bpm   It is suggested that this may be a 2-1 flutter  2   At times the rhythm was briefly irregular and rapid suggesting possible fibrillation  3  No discomfort with or without the rapid heart rate or with Lexiscan  4   The patient was given a total dose of 5 mg Lopressor IV to control the tachycardia and he left with a normal heart rate and rhythm  5   There is a moderate sized inferior wall defect which predominantly normalizes with prone imaging and most likely represents diaphragmatic shadowing  6  There is a small apical mixed defect of moderate intensity suggesting apical infarct and ischemia  7  Generalized left ventricular hypokinesis  Calculated ejection fraction is 34%  8  Resting EKG showed sinus rhythm and nonspecific ST-T changes    Nuclear Stress Findings    Isotope Administration The isotope used for nuclear imaging was Tc 99m tetrofosmin  The isotope was administered intravenously via the left antecubital fossa  Imaging was performed at rest 30 minutes after an injection on 4/12/2023 at 07:59 EDT of 15 93 mCi  Imaging was performed at peak stress 30 minutes after an injection on 4/12/2023 at 09:04 EDT of 49 70 mCi  Nuclear Study Quality A perfusion 1-day rest/stress protocol was performed  Images were viewed in the short axis, vertical long axis and horizontal long axis   Prone imaging acquired after stress imaging  Stress Findings A pharmacological stress test was performed using regadenoson  The patient was given Metoprolol for symptoms  The patient experienced no angina during the test  The patient reached the end of the protocol  Low level exercise was used during the pharmacological stress test  The patient reported dyspnea during the stress test  Symptoms began during stress and ended during recovery  The patient was in SVT/afib/flutter for entire stress period  The patient went in and out of SVT/afib/flutter during recovery  Stress Function Post-stress ejection fraction is 34 %  General Findings The stress/rest perfusion ratio is 1 09   Stress end diastolic index: 862 6 mL/m2  Echocardiogram  6/2/2023    Interpretation Summary       •  Left Ventricle: Left ventricular cavity size is normal  Wall thickness is normal  The left ventricular ejection fraction is 45%  Systolic function is mildly reduced  Diastolic function is mildly abnormal, consistent with grade I (abnormal) relaxation  Patient noted to have intermittent runs of tachycardia during study  •  Left Ventricle: Left ventricular cavity size is normal  The left ventricular ejection fraction is 40%  Systolic function is mildly reduced  Diastolic function is mildly abnormal, consistent with grade I (abnormal) relaxation  •  The following segments are hypokinetic: apical anterior, apical septal, apical inferior, apical lateral and apex  •  Right Ventricle: Right ventricular cavity size is normal  Systolic function is normal   •  Left Atrium: The atrium is mildly dilated  •  Mitral Valve: There is mild regurgitation  Findings    Left Ventricle Left ventricular cavity size is normal  Wall thickness is normal  The left ventricular ejection fraction is 40%  Systolic function is mildly reduced  Diastolic function is mildly abnormal, consistent with grade I (abnormal) relaxation  Wall Scoring Baseline     The following segments are hypokinetic: apical anterior, apical septal, apical inferior, apical lateral and apex  All other segments are normal                Right Ventricle Right ventricular cavity size is normal  Systolic function is normal  Wall thickness is normal       Left Atrium The atrium is mildly dilated  Right Atrium The atrium is normal in size  Aortic Valve The aortic valve is trileaflet  The leaflets are not thickened  The leaflets are not calcified  The leaflets exhibit normal mobility  There is no evidence of regurgitation  The aortic valve has no significant stenosis  Mitral Valve Mitral valve structure is normal  There is mild regurgitation  There is no evidence of stenosis  Tricuspid Valve Tricuspid valve structure is normal  There is trace regurgitation  There is no evidence of stenosis  Pulmonic Valve Pulmonic valve structure is normal  There is trace regurgitation  There is no evidence of stenosis  Ascending Aorta The aortic root is normal in size  IVC/SVC The inferior vena cava is not well visualized  Pericardium There is no pericardial effusion  Pulmonary Artery The pulmonary artery systolic pressure is normal             Echocardiogram  July 2018  LEFT VENTRICLE:  Ejection fraction was estimated to be 45 % to 50%  There was mild diffuse hypokinesis  EKG of tachycardia  The patient does get recurrent tachycardia, narrow complex, long RP  The maximum rate is almost always in the 140s              Ambulatory event recorder-[facility]  4/13/2023- 4/26/2023 4/25/2023        Review of Systems:  Review of Systems   All other systems reviewed and are negative  As described in my history of present illness      Physical Exam:  Physical Exam  Constitutional:       Appearance: Normal appearance  He is well-developed  He is obese        Comments: Not in any distress at the current time   HENT:      Head: Normocephalic and atraumatic  Right Ear: External ear normal       Left Ear: External ear normal       Nose: Nose normal       Mouth/Throat:      Pharynx: Uvula midline  Eyes:      General: Lids are normal       Extraocular Movements: Extraocular movements intact  Conjunctiva/sclera: Conjunctivae normal       Pupils: Pupils are equal, round, and reactive to light  Comments: No pallor  No cyanosis  No icterus   Neck:      Thyroid: No thyromegaly  Vascular: No carotid bruit or JVD  Trachea: Trachea normal       Comments: No jugular lymphadenopathy   thick neck  Cardiovascular:      Rate and Rhythm: Normal rate and regular rhythm  Chest Wall: PMI is not displaced  Pulses: Normal pulses  Heart sounds: Normal heart sounds, S1 normal and S2 normal  No murmur heard  No friction rub  No gallop  No S3 or S4 sounds  Pulmonary:      Effort: Pulmonary effort is normal  No accessory muscle usage or respiratory distress  Breath sounds: Examination of the right-lower field reveals decreased breath sounds  Examination of the left-lower field reveals decreased breath sounds  Decreased breath sounds present  No wheezing, rhonchi or rales  Chest:      Chest wall: No tenderness  Abdominal:      General: Bowel sounds are normal  There is no distension  Palpations: Abdomen is soft  There is no hepatomegaly, splenomegaly or mass  Tenderness: There is no abdominal tenderness  Comments: Central obesity present   Musculoskeletal:         General: Swelling present  No tenderness or deformity  Normal range of motion  Cervical back: Normal range of motion  Right lower leg: Edema present  Left lower leg: Edema present  Lymphadenopathy:      Cervical: No cervical adenopathy  Skin:     Findings: No abrasion, erythema, lesion or rash  Nails: There is no clubbing  Neurological:      Mental Status: He is alert and oriented to person, place, and time  Comments: Facial symmetry is retained  Extraocular movements are retained  Head neck tongue and palate movement are retained and symmetric   Psychiatric:         Speech: Speech normal          Behavior: Behavior normal          Thought Content: Thought content normal          Discussion/Summary:    PAF  Patient is on anticoagulation with Eliquis  Recent episode of PAF/flutter with RVR  Nuclear images show small mixed apical defect  LVEF 34%  Echocardiogram-LVEF 40%- 45%    Continue metoprolol succinate 25 mg daily  Price sotalol 160 mg twice daily/ dofetilide 500 mcg twice daily  Once affordability determined, admit to initiate the medication  Follow-up with me in 3 to 4 months time        SVT  Every time heart rate is in the 140s which raise suspicion of reentry tachycardia  It is long RP tachycardia  Differentials are - atrial tachycardia, atypical AVNRT, AVRT with decremental pathway    Most of the episodes are brief lasting less than a minute  These are happening 2 to 3 times a week  The patient does inform of episodes which have resulted in hospital visits and lightheadedness    My recommendation for the patient:  - use event monitor for 30 days to document the tachycardia  Initiation, cycle length and termination will be helpful to plan ablation  - start metoprolol tartrate on an as-needed basis at this time        Congestive heart failure  LVEF around 40-45%  Patient does have leg swelling    Cardiac catheterization, confirmed by the patient, was not available in the patient's chart  Need to obtain these records    Blood pressure is not well controlled  He has symptoms suggestive of obstructive sleep apnea  High dietary salt intake    Patient needs to be started on full therapy for the same  Will need beta-blocker, ACE-inhibitor/ ARB or isosorbide/hydralazine  Referred back to primary cardiologist for management of the same        Hypertension  Blood pressure is 142/90  This is not well controlled  Discussed need to stop salt  Patient will need to be started on beta-blocker, isosorbide, hydralazine  Patient prefers to discuss with primary cardiologist as he will have more regular follow-up with then      Hyperlipidemia  Patient is currently on rosuvastatin  No myositis or myalgia  Continue with the same      Bilateral pulmonary embolism  As per patient this was not provoked  He is going to be on a blood thinner which is to be continued indefinitely      long-term anticoagulation  On Eliquis for bilateral pulmonary embolism      Obesity  Discussed role of diet and exercise  Diet is responsible for 80% of obesity  BMI is 33      Sickle cell trait   Remain hydrated   Continue anticoagulation      Obstructive sleep apnea  Patient has a history of snoring, morning fatigue and daytime sleepiness  He has a  large thick neck and crowded posterior pharynx  He needs to be evaluated and treated for sleep apnea

## 2023-06-09 NOTE — PATIENT INSTRUCTIONS
The procedure  will call you to get you set up for admission to the hospital for the medication initiation  This admission will be about 3 days because the heart rate and heart rhythm measurements must be monitored  The medicine names are sotalol and dofetilide  These will be priced through your insurance company; whichever is more affordable, is the one that will be used  You will then be discharged on this medication  You will have an office follow up set up before you leave the hospital to have an EKG done to check your heart rate and rhythm  Please call the office in July to get scheduled for September or October office visit  Follow up in the office as scheduled  If you need anything prior to that time, please don't hesitate to call the office  Monday- Friday  [phone], Non-emergency [phone]  After hours/weekends/holidays or urgent for  ON CALL [phone]

## 2023-06-12 ENCOUNTER — TELEPHONE (OUTPATIENT)
Dept: CARDIOLOGY CLINIC | Facility: CLINIC | Age: 78
End: 2023-06-12

## 2023-06-12 DIAGNOSIS — I47.1 SVT (SUPRAVENTRICULAR TACHYCARDIA) (HCC): Primary | ICD-10-CM

## 2023-06-12 DIAGNOSIS — I48.0 PAF (PAROXYSMAL ATRIAL FIBRILLATION) (HCC): ICD-10-CM

## 2023-06-12 NOTE — TELEPHONE ENCOUNTER
Summary of my recommendation for the patient  Patient is on anticoagulation with Eliquis  Recent episode of PAF/flutter with RVR  Nuclear images show small mixed apical defect  LVEF 34%  Echocardiogram-LVEF 40%- 45%    Continue metoprolol succinate 25 mg daily  Price sotalol 160 mg twice daily/ dofetilide 500 mcg twice daily  Once affordability determined, admit to initiate the medication  Follow-up with me in 3 to 4 months time  Can we please have a price for medication for this patient    Thank you

## 2023-06-13 NOTE — TELEPHONE ENCOUNTER
Patient scheduled for medication loading (Dofetilide) at [facility] on 07/05/2023 per Dr Shawn Bergeron  Patient aware of general instructions  Can we please have insurance check for service

## 2023-06-17 PROBLEM — I48.0 PAF (PAROXYSMAL ATRIAL FIBRILLATION) (HCC): Status: ACTIVE | Noted: 2023-06-17

## 2023-06-21 ENCOUNTER — APPOINTMENT (OUTPATIENT)
Dept: LAB | Facility: CLINIC | Age: 78
End: 2023-06-21
Payer: COMMERCIAL

## 2023-06-21 ENCOUNTER — PROCEDURE VISIT (OUTPATIENT)
Dept: UROLOGY | Facility: AMBULATORY SURGERY CENTER | Age: 78
End: 2023-06-21
Payer: COMMERCIAL

## 2023-06-21 VITALS
DIASTOLIC BLOOD PRESSURE: 80 MMHG | HEIGHT: 75 IN | OXYGEN SATURATION: 98 % | HEART RATE: 50 BPM | BODY MASS INDEX: 32.58 KG/M2 | SYSTOLIC BLOOD PRESSURE: 130 MMHG | WEIGHT: 262 LBS | RESPIRATION RATE: 18 BRPM

## 2023-06-21 DIAGNOSIS — N52.01 ERECTILE DYSFUNCTION DUE TO ARTERIAL INSUFFICIENCY: ICD-10-CM

## 2023-06-21 DIAGNOSIS — N13.8 BPH WITH OBSTRUCTION/LOWER URINARY TRACT SYMPTOMS: Primary | ICD-10-CM

## 2023-06-21 DIAGNOSIS — Z12.5 PROSTATE CANCER SCREENING: ICD-10-CM

## 2023-06-21 DIAGNOSIS — N40.1 BPH WITH OBSTRUCTION/LOWER URINARY TRACT SYMPTOMS: Primary | ICD-10-CM

## 2023-06-21 LAB
PSA SERPL-MCNC: 1.12 NG/ML (ref 0–4)
SL AMB  POCT GLUCOSE, UA: NORMAL
SL AMB LEUKOCYTE ESTERASE,UA: NORMAL
SL AMB POCT BILIRUBIN,UA: NORMAL
SL AMB POCT BLOOD,UA: NORMAL
SL AMB POCT CLARITY,UA: CLEAR
SL AMB POCT COLOR,UA: YELLOW
SL AMB POCT KETONES,UA: NORMAL
SL AMB POCT NITRITE,UA: NORMAL
SL AMB POCT PH,UA: 5
SL AMB POCT SPECIFIC GRAVITY,UA: 1.01
SL AMB POCT URINE PROTEIN: NORMAL
SL AMB POCT UROBILINOGEN: 0.2

## 2023-06-21 PROCEDURE — 99214 OFFICE O/P EST MOD 30 MIN: CPT | Performed by: UROLOGY

## 2023-06-21 PROCEDURE — 81002 URINALYSIS NONAUTO W/O SCOPE: CPT | Performed by: UROLOGY

## 2023-06-21 PROCEDURE — 84153 ASSAY OF PSA TOTAL: CPT

## 2023-06-21 PROCEDURE — 52000 CYSTOURETHROSCOPY: CPT | Performed by: UROLOGY

## 2023-06-21 PROCEDURE — 36415 COLL VENOUS BLD VENIPUNCTURE: CPT

## 2023-06-21 RX ORDER — CEFAZOLIN SODIUM 2 G/50ML
2000 SOLUTION INTRAVENOUS ONCE
OUTPATIENT
Start: 2023-06-21 | End: 2023-06-21

## 2023-06-21 RX ORDER — TAMSULOSIN HYDROCHLORIDE 0.4 MG/1
0.4 CAPSULE ORAL
Qty: 60 CAPSULE | Refills: 3 | Status: SHIPPED | OUTPATIENT
Start: 2023-06-21

## 2023-06-21 RX ORDER — TADALAFIL 20 MG/1
20 TABLET ORAL DAILY PRN
Qty: 10 TABLET | Refills: 10 | Status: SHIPPED | OUTPATIENT
Start: 2023-06-21

## 2023-06-21 NOTE — PROGRESS NOTES
Assessment/Plan:    BPH with obstruction/lower urinary tract symptoms  Patient has a large prostate associated with allergic symptoms despite Flomax and UroLift in 2021  Based on the size of his prostate 80 g we discussed options of TURP versus HoLEP surgery  He wants to move forward with HoLEP surgery  He needs to hold his Eliquis and fish oil around the time of the surgery  We will have him see cardiology for clearance  We discussed the nature of HoLEP surgery including how the procedure is performed, the expected hospital stay and the risks of surgery  The risks including:  Bleeding requiring blood transfusion and/or take back surgery to address  Conversion to a TURP or open surgery (open simple prostatectomy)  Absorption of a high volume of fluids during case which can result in ICU stay and prolonged intubation  Damage to the bladder, ureteral orifices and scar tissue formation along the lower urinary tract  Saravia catheter for prolonged duration  An almost guaranteed issue with stress urinary incontinence that usually takes weeks to months to improve  Prostate regrowth over time    Consent was obtained    Erectile dysfunction due to arterial insufficiency  We will try Cialis 20 mg in the hopes this is tolerated better than Viagra patient understands there is still risk for being lightheaded with this medication  Prescription printed so he can take to pharmacy of choice          Subjective:      Patient ID: Chito Lee  is a 68 y o  male  HPI     Amaryllis Lesches Jr  is a 68 y  o  male patient with a history of BPH s/p UroLift in 2021 here for follow up       The patient was found to have an enlarged prostate measuring 76 g underwent UroLift in 2019  After this he continued to have LUTS and was put on Flomax which helped his symptoms some        However he continues to be bothered by weak stream with intermittency and hesitancy and feelings of incomplete emptying    Also has some issues with urgency and frequency  PVR 20cc in April 2023, consistent with prior values      He was seen in follow-up and potentially schedule for TURP but Dr Sujatha Camejo recommended reevaluation and consideration for HoLEP given his gland size in 2020 was 76 cc  He was also started on finasteride with the hopes of shrinking his gland some  The patient underwent cystoscopy and TRUS today showing enlarged prostate with obstructive changes in the bladder and prostate volume of 82 cc representing 10% increase from 2020      Patient also reports issues with erectile dysfunction  Viagra 50 mg is not effective enough and when he takes 100 mg he  feels lightheaded  Last PSA was 1 2 June 2023 from 0 8 in 2017       Patient is on Eliquis for history of pulmonary embolism  Also on fish oil  Past Surgical History:   Procedure Laterality Date   • ARTHROSCOPY KNEE Left     30 years ago   • RI CYSTO INSERTION TRANSPROSTATIC IMPLANT SINGLE N/A 06/17/2021    Procedure: CYSTOSCOPY WITH INSERTION UROLIFT;  Surgeon: Liz Ordoñez MD;  Location: [facility];  Service: Urology        Past Medical History:   Diagnosis Date   • Cardiomyopathy ([address] )    • Hyperlipidemia    • Post-nasal drip    • Pulmonary embolism ([facility] [address] )              Review of Systems   Constitutional: Negative for chills and fever  HENT: Negative for ear pain and sore throat  Eyes: Negative for pain and visual disturbance  Respiratory: Negative for cough and shortness of breath  Cardiovascular: Negative for chest pain and palpitations  Gastrointestinal: Negative for abdominal pain and vomiting  Genitourinary: Negative for dysuria and hematuria  Musculoskeletal: Negative for arthralgias and back pain  Skin: Negative for color change and rash  Neurological: Negative for seizures and syncope  All other systems reviewed and are negative          Objective:      /80 (BP Location: Right arm, Patient Position: Sitting, Cuff Size: Standard) "Pulse (!) 50   Resp 18   Ht 6' 3\" (1 905 m)   Wt 119 kg (262 lb)   SpO2 98%   BMI 32 75 kg/m²     Lab Results   Component Value Date    PSA 1 12 06/21/2023    PSA 0 8 08/21/2017          Physical Exam  Vitals reviewed  Constitutional:       Appearance: Normal appearance  He is normal weight  HENT:      Head: Normocephalic and atraumatic  Eyes:      Pupils: Pupils are equal, round, and reactive to light  Abdominal:      General: Abdomen is flat  Neurological:      General: No focal deficit present  Mental Status: He is alert and oriented to person, place, and time  Psychiatric:         Mood and Affect: Mood normal          Thought Content: Thought content normal                  Cystoscopy     Date/Time 6/21/2023 9:30 AM     Performed by  Jessica Simon MD   Authorized by Jessica Simon MD     Universal Protocol:  Consent: Written consent obtained  Risks and benefits: risks, benefits and alternatives were discussed  Consent given by: patient  Time out: Immediately prior to procedure a \"time out\" was called to verify the correct patient, procedure, equipment, support staff and site/side marked as required  Patient understanding: patient states understanding of the procedure being performed  Patient consent: the patient's understanding of the procedure matches consent given  Procedure consent: procedure consent matches procedure scheduled  Patient identity confirmed: verbally with patient        Procedure Details:  Procedure type: cystoscopy    Patient tolerance: Patient tolerated the procedure well with no immediate complications    Additional Procedure Details: A time-out was performed identifying the correct patient site and procedure  A MA chaperone was in the room  A flexible cystoscope was introduced into the urethra    The pendulous urethra was normal   The prostatic urethra showed significant and bilateral lobar hypertrophy without a median lobe with mild prostatic extrusion into the " "bladder  The bladder did not have any lesions concerning for malignancy  There were moderate trabeculations and no diverticula  The ureteral orifices were in orthotopic position  Biopsy prostate     Date/Time 6/21/2023 9:30 AM     Performed by  Ti Dobbins MD   Authorized by Ti Dobbins MD     [place] Protocol   Consent: Written consent obtained  Consent given by: patient  Time out: Immediately prior to procedure a \"time out\" was called to verify the correct patient, procedure, equipment, support staff and site/side marked as required  Patient understanding: patient states understanding of the procedure being performed  Patient consent: the patient's understanding of the procedure matches consent given  Procedure consent: procedure consent matches procedure scheduled  Relevant documents: relevant documents present and verified  Patient identity confirmed: verbally with patient        Local anesthesia used: no     Anesthesia   Local anesthesia used: no     Sedation   Patient sedated: no        Specimen: no   Procedure Details   Procedure Notes: The patient was placed in left lateral decubitus position  A digital rectal examination was performed  The prostate did not have any nodules  An ultrasound probe was introduced into the rectum  The prostate was evaluated and measurements made showing the prostate to be 82 cc in size    Patient tolerance: patient tolerated the procedure well with no immediate complications             Orders  Orders Placed This Encounter   Procedures   • Cystoscopy     This order was created via procedure documentation   • Biopsy prostate     This order was created via procedure documentation   • UA w Reflex to Microscopic w Reflex to Culture   • POCT urine dip     "

## 2023-06-21 NOTE — ASSESSMENT & PLAN NOTE
Patient has a large prostate associated with allergic symptoms despite Flomax and UroLift in 2021  Based on the size of his prostate 80 g we discussed options of TURP versus HoLEP surgery  He wants to move forward with HoLEP surgery  He needs to hold his Eliquis and fish oil around the time of the surgery  We will have him see cardiology for clearance  We discussed the nature of HoLEP surgery including how the procedure is performed, the expected hospital stay and the risks of surgery    The risks including:  Bleeding requiring blood transfusion and/or take back surgery to address  Conversion to a TURP or open surgery (open simple prostatectomy)  Absorption of a high volume of fluids during case which can result in ICU stay and prolonged intubation  Damage to the bladder, ureteral orifices and scar tissue formation along the lower urinary tract  Saravia catheter for prolonged duration  An almost guaranteed issue with stress urinary incontinence that usually takes weeks to months to improve  Prostate regrowth over time    Consent was obtained

## 2023-06-21 NOTE — ASSESSMENT & PLAN NOTE
We will try Cialis 20 mg in the hopes this is tolerated better than Viagra patient understands there is still risk for being lightheaded with this medication    Prescription printed so he can take to pharmacy of choice

## 2023-06-22 NOTE — TELEPHONE ENCOUNTER
Isatu Ruiz, making sure you have this request, just because I know how much time takes for the approval   Thank you

## 2023-06-22 NOTE — TELEPHONE ENCOUNTER
Nehemiah, I didn't post this on here  Pending case # [id number] for med [id number] @ [facility] w/Dr Miguel Gomez on 7/5/23  Clinicals were faxed to [phone]  Case determination usually takes about a week per Amberly Rizzo

## 2023-07-02 DIAGNOSIS — I47.1 SVT (SUPRAVENTRICULAR TACHYCARDIA) (HCC): ICD-10-CM

## 2023-07-03 RX ORDER — METOPROLOL SUCCINATE 25 MG/1
25 TABLET, EXTENDED RELEASE ORAL DAILY
Qty: 90 TABLET | Refills: 2 | Status: SHIPPED | OUTPATIENT
Start: 2023-07-03 | End: 2023-07-07

## 2023-07-05 ENCOUNTER — TELEPHONE (OUTPATIENT)
Dept: CARDIOLOGY CLINIC | Facility: CLINIC | Age: 78
End: 2023-07-05

## 2023-07-05 ENCOUNTER — HOSPITAL ENCOUNTER (INPATIENT)
Facility: HOSPITAL | Age: 78
LOS: 2 days | Discharge: HOME | End: 2023-07-07
Attending: INTERNAL MEDICINE | Admitting: INTERNAL MEDICINE
Payer: COMMERCIAL

## 2023-07-05 DIAGNOSIS — I48.0 PAF (PAROXYSMAL ATRIAL FIBRILLATION) (HCC): Primary | ICD-10-CM

## 2023-07-05 LAB
ANION GAP SERPL CALCULATED.3IONS-SCNC: 2 MMOL/L
BUN SERPL-MCNC: 14 MG/DL (ref 5–25)
CALCIUM SERPL-MCNC: 9 MG/DL (ref 8.3–10.1)
CHLORIDE SERPL-SCNC: 114 MMOL/L (ref 96–108)
CO2 SERPL-SCNC: 27 MMOL/L (ref 21–32)
CREAT SERPL-MCNC: 1.26 MG/DL (ref 0.6–1.3)
ERYTHROCYTE [DISTWIDTH] IN BLOOD BY AUTOMATED COUNT: 14.2 % (ref 11.6–15.1)
GFR SERPL CREATININE-BSD FRML MDRD: 54 ML/MIN/1.73SQ M
GLUCOSE SERPL-MCNC: 95 MG/DL (ref 65–140)
HCT VFR BLD AUTO: 35.3 % (ref 36.5–49.3)
HGB BLD-MCNC: 12 G/DL (ref 12–17)
MCH RBC QN AUTO: 31.9 PG (ref 26.8–34.3)
MCHC RBC AUTO-ENTMCNC: 34 G/DL (ref 31.4–37.4)
MCV RBC AUTO: 94 FL (ref 82–98)
PLATELET # BLD AUTO: 126 THOUSANDS/UL (ref 149–390)
PMV BLD AUTO: 11.5 FL (ref 8.9–12.7)
POTASSIUM SERPL-SCNC: 3.6 MMOL/L (ref 3.5–5.3)
RBC # BLD AUTO: 3.76 MILLION/UL (ref 3.88–5.62)
SODIUM SERPL-SCNC: 143 MMOL/L (ref 135–147)
WBC # BLD AUTO: 3.85 THOUSAND/UL (ref 4.31–10.16)

## 2023-07-05 PROCEDURE — 85027 COMPLETE CBC AUTOMATED: CPT | Performed by: PHYSICIAN ASSISTANT

## 2023-07-05 PROCEDURE — 80048 BASIC METABOLIC PNL TOTAL CA: CPT | Performed by: PHYSICIAN ASSISTANT

## 2023-07-05 PROCEDURE — 93005 ELECTROCARDIOGRAM TRACING: CPT

## 2023-07-05 PROCEDURE — 99223 1ST HOSP IP/OBS HIGH 75: CPT | Performed by: INTERNAL MEDICINE

## 2023-07-05 RX ORDER — FINASTERIDE 5 MG/1
5 TABLET, FILM COATED ORAL DAILY
Status: DISCONTINUED | OUTPATIENT
Start: 2023-07-05 | End: 2023-07-07 | Stop reason: HOSPADM

## 2023-07-05 RX ORDER — DOFETILIDE 0.25 MG/1
250 CAPSULE ORAL EVERY 12 HOURS SCHEDULED
Status: DISCONTINUED | OUTPATIENT
Start: 2023-07-05 | End: 2023-07-07 | Stop reason: HOSPADM

## 2023-07-05 RX ORDER — METOPROLOL SUCCINATE 25 MG/1
12.5 TABLET, EXTENDED RELEASE ORAL DAILY
Status: DISCONTINUED | OUTPATIENT
Start: 2023-07-06 | End: 2023-07-07 | Stop reason: HOSPADM

## 2023-07-05 RX ORDER — TAMSULOSIN HYDROCHLORIDE 0.4 MG/1
0.4 CAPSULE ORAL
Status: DISCONTINUED | OUTPATIENT
Start: 2023-07-05 | End: 2023-07-07 | Stop reason: HOSPADM

## 2023-07-05 RX ORDER — ATORVASTATIN CALCIUM 40 MG/1
40 TABLET, FILM COATED ORAL
Status: DISCONTINUED | OUTPATIENT
Start: 2023-07-05 | End: 2023-07-07 | Stop reason: HOSPADM

## 2023-07-05 RX ORDER — DULOXETIN HYDROCHLORIDE 60 MG/1
60 CAPSULE, DELAYED RELEASE ORAL DAILY
Status: DISCONTINUED | OUTPATIENT
Start: 2023-07-05 | End: 2023-07-05

## 2023-07-05 RX ORDER — DULOXETIN HYDROCHLORIDE 60 MG/1
60 CAPSULE, DELAYED RELEASE ORAL DAILY
Status: DISCONTINUED | OUTPATIENT
Start: 2023-07-06 | End: 2023-07-07 | Stop reason: HOSPADM

## 2023-07-05 RX ORDER — METOPROLOL SUCCINATE 25 MG/1
25 TABLET, EXTENDED RELEASE ORAL DAILY
Status: DISCONTINUED | OUTPATIENT
Start: 2023-07-05 | End: 2023-07-05

## 2023-07-05 RX ADMIN — TAMSULOSIN HYDROCHLORIDE 0.4 MG: 0.4 CAPSULE ORAL at 17:19

## 2023-07-05 RX ADMIN — ATORVASTATIN CALCIUM 40 MG: 40 TABLET, FILM COATED ORAL at 17:19

## 2023-07-05 RX ADMIN — DOFETILIDE 250 MCG: 0.25 CAPSULE ORAL at 20:07

## 2023-07-05 RX ADMIN — FINASTERIDE 5 MG: 5 TABLET, FILM COATED ORAL at 13:39

## 2023-07-05 RX ADMIN — APIXABAN 5 MG: 5 TABLET, FILM COATED ORAL at 17:19

## 2023-07-05 NOTE — PLAN OF CARE
Problem: SAFETY ADULT  Goal: Patient will remain free of falls  Description: INTERVENTIONS:  - Educate patient/family on patient safety including physical limitations  - Instruct patient to call for assistance with activity   - Consult OT/PT to assist with strengthening/mobility   - Keep Call bell within reach  - Keep bed low and locked with side rails adjusted as appropriate  - Keep care items and personal belongings within reach  - Initiate and maintain comfort rounds  - Make Fall Risk Sign visible to staff    - Obtain necessary fall risk management equipment  - Apply yellow socks and bracelet for high fall risk patients  - Consider moving patient to room near nurses station  Outcome: Progressing     Problem: CARDIOVASCULAR - ADULT  Goal: Maintains optimal cardiac output and hemodynamic stability  Description: INTERVENTIONS:  - Monitor I/O, vital signs and rhythm  - Monitor for S/S and trends of decreased cardiac output  - Administer and titrate ordered vasoactive medications to optimize hemodynamic stability  - Assess quality of pulses, skin color and temperature  - Assess for signs of decreased coronary artery perfusion  - Instruct patient to report change in severity of symptoms  Outcome: Progressing  Goal: Absence of cardiac dysrhythmias or at baseline rhythm  Description: INTERVENTIONS:  - Continuous cardiac monitoring, vital signs, obtain 12 lead EKG if ordered  - Administer antiarrhythmic and heart rate control medications as ordered  - Monitor electrolytes and administer replacement therapy as ordered  Outcome: Progressing

## 2023-07-05 NOTE — H&P
H&P Exam - Electrophysiology - Cardiology   Laura Walters. 68 y.o. male MRN: [id number]  Unit/Bed#: -[facility] Encounter: [id number]    Assessment/Plan     Assessment:  1. PAF   a. Relatively new onset after being found to have new onset A-fib/flutter during pharmacologic nuclear stress testing  b. Reduced ejection fraction as well and 34% on stress test with echo showing EF 40 to 45%  c. 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 Being admitted for Tikosyn/sotalol start for better control of A-fib/flutter   e. EKG here QTc 412 with T inversions in V3-V6  2. SVT   a. Noted history s/p ablation with Dr. Mariana Gutierrez 4/12/21  b. holter 4/28/23 without evidence of SVT   3. Nonischemic cardiomyopathy   a. Echo 6/2 with LVEF 45%  b. Felt to be tachycardia induced  c.   4. ARLENE   5. Hx Bilateral PE   a. On chronic AC with eliquis   6. Chronic anticoagulation   a. On eliquis for A fib and history of PE/DVT   7. HLD   a. On statin   8. History of sickle cell trait     Plan:  - plan for admission for tikosyn   - start at 250 mcg due to renal function   - EKG q 12 hours 2 hours after tikosyn dose to monitor QTC   - watch ekgs closely especially since patient is on cymbalta  - will recheck 2 week holter after med initiated to ensure a fib burden is decreasing.   -decrease metoprolol to 12.5 mg daily given bradycardia       History of Present Illness   HPI:  Laura Walters. is a 68y.o. 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 Briefly, patient's history begins with SVT for which he had ablated by Dr. Mariana Gutierrez on 4/12/2021.   Patient has done relatively well since then and followed up with Dr. Suzy Garnica as his general cardiologist.  He did present to his outpatient cardiologist office on 4/6/2023 with complaints of shortness of breath with exertion. He was thus ordered a pharmacologic nuclear stress test to assess for ischemia. During pharmacologic nuclear stress test patient was found to be in atrial fib/flutter. He does have history of cardiomyopathy which is felt to be tachycardia induced secondary to patient's SVT, and atrial fibrillation. He was thus referred to Dr. Lexis Brown for further management of his atrial fibs/flutter in the setting of cardiomyopathy. Antiarrhythmic initiation was discussed at office visit and patient was agreeable. Tam was checked with both Tikosyn, and sotalol and found to be affordable thus we will start Tikosyn while inpatient. EKG: sinus bradycardia with QTc 412. T wave inversions noted in lead V3-V6. Review of Systems   Constitutional: Negative for fatigue. Respiratory: Negative for chest tightness and shortness of breath. Cardiovascular: Negative for chest pain and palpitations. Neurological: Negative for dizziness, syncope and light-headedness. All other systems reviewed and are negative. ROS as noted above, otherwise 12 point review of systems was performed and is negative. Historical Information   Past Medical History:   Diagnosis Date   • Cardiomyopathy ([address])    • Hyperlipidemia    • Post-nasal drip    • Pulmonary embolism ([address])      Past Surgical History:   Procedure Laterality Date   • ARTHROSCOPY KNEE Left     30 years ago   • [place] CYSTO INSERTION TRANSPROSTATIC IMPLANT SINGLE N/A 06/17/2021    Procedure: CYSTOSCOPY WITH INSERTION UROLIFT;  Surgeon: Evonne Salinas MD;  Location: [facility];  Service: Urology     Family History: History reviewed. No pertinent family history.     Social History   Social History     Substance and Sexual Activity   Alcohol Use Yes   • Alcohol/week: 0.0 standard drinks of alcohol    Comment: socially     Social History Substance and Sexual Activity   Drug Use No     Social History     Tobacco Use   Smoking Status Former   • Types: Cigarettes   • Quit date:    • Years since quittin.5   Smokeless Tobacco Never       Meds/Allergies   all medications and allergies reviewed  Home Medications:   Medications Prior to Admission   Medication   • acetaminophen-codeine (TYLENOL #3) 300-30 mg per tablet   • apixaban (Eliquis) 5 mg   • DULoxetine (CYMBALTA) 60 mg delayed release capsule   • finasteride (PROSCAR) 5 mg tablet   • fluticasone (FLONASE) 50 mcg/act nasal spray   • meclizine (ANTIVERT) 25 mg tablet   • metoprolol succinate (TOPROL-XL) 25 mg 24 hr tablet   • Omega-3 1000 MG CAPS   • rosuvastatin (CRESTOR) 20 MG tablet   • sildenafil (VIAGRA) 50 MG tablet   • tadalafil (CIALIS) 20 MG tablet   • tamsulosin (FLOMAX) 0.4 mg       No Known Allergies    Objective   Vitals: Pulse (!) 54, temperature 98.5 °F (36.9 °C), SpO2 96 %. No intake or output data in the 24 hours ending 23 1210    Invasive Devices     None                 Physical Exam  Vitals and nursing note reviewed. Constitutional:       General: He is not in acute distress. Appearance: He is obese. Cardiovascular:      Rate and Rhythm: Regular rhythm. Bradycardia present. Pulmonary:      Effort: Pulmonary effort is normal.      Breath sounds: Normal breath sounds. Abdominal:      General: Abdomen is flat. Palpations: Abdomen is soft. Musculoskeletal:      Right lower leg: No edema. Left lower leg: No edema. Skin:     General: Skin is warm and dry. Neurological:      General: No focal deficit present. Mental Status: He is alert and oriented to person, place, and time. Psychiatric:         Mood and Affect: Mood normal.         Lab Results: I have personally reviewed pertinent lab results. Invalid input(s): "LABGLOM"              Imaging: I have personally reviewed pertinent reports.     Results for orders placed during the hospital encounter of 10/13/20    Echo complete with contrast if indicated    Narrative  [address] [facility]  [phone]    Transthoracic Echocardiogram  2D, M-mode, Doppler, and Color Doppler    Study date:  13-Oct-2020    Patient: Terrance Watson  MR number: [id number]  Account number: [de-identified]  : 1945  Age: 76 years  Gender: Male  Status: Outpatient  [facility]: [facility]  Height: 75 in  Weight: 254.5 lb  BP: 124/ 72 mmHg    Indications: Non-ischemic cardiomyopathy. Diagnoses: I42.9 - Cardiomyopathy, unspecified    Sonographer:  HILL Gutierrez  Primary Physician:  Antonieta Severe, MD  Referring Physician:  Roslyn Messina MD  Group:  [id number]  Interpreting Physician:  Mary Steel MD    SUMMARY    LEFT VENTRICLE:  Systolic function was at the lower limits of normal. Ejection fraction was estimated in the range of 50 % to 55 %. There were no regional wall motion abnormalities. Doppler parameters were consistent with abnormal left ventricular relaxation (grade 1 diastolic dysfunction). RIGHT VENTRICLE:  The size was normal.  Systolic function was normal.    LEFT ATRIUM:  The atrium was mildly dilated. RIGHT ATRIUM:  The atrium was mildly dilated. MITRAL VALVE:  There was trace to mild regurgitation. TRICUSPID VALVE:  There was trace regurgitation. Estimated peak PA pressure was 38 mmHg. HISTORY: PRIOR HISTORY: NICM, ARLENE, B/L pulmonary embolism, SVT, CKD II, Shortness of breath,    PROCEDURE: The study was performed in [facility]. This was a routine study. The transthoracic approach was used. The study included complete 2D imaging, M-mode, complete spectral Doppler, and color Doppler. The  heart rate was 69 bpm, at the start of the study. Images were obtained from the parasternal, apical, subcostal, and suprasternal notch acoustic windows.  Image quality was adequate. LEFT VENTRICLE: Size was normal. Systolic function was at the lower limits of normal. Ejection fraction was estimated in the range of 50 % to 55 %. There were no regional wall motion abnormalities. Wall thickness was normal. No evidence of  apical thrombus. DOPPLER: Doppler parameters were consistent with abnormal left ventricular relaxation (grade 1 diastolic dysfunction). RIGHT VENTRICLE: The size was normal. Systolic function was normal. Wall thickness was normal.    LEFT ATRIUM: The atrium was mildly dilated. RIGHT ATRIUM: The atrium was mildly dilated. MITRAL VALVE: Valve structure was normal. There was normal leaflet separation. DOPPLER: The transmitral velocity was within the normal range. There was no evidence for stenosis. There was trace to mild regurgitation. AORTIC VALVE: The valve was trileaflet. Leaflets exhibited normal thickness and normal cuspal separation. DOPPLER: Transaortic velocity was within the normal range. There was no evidence for stenosis. There was no significant  regurgitation. TRICUSPID VALVE: The valve structure was normal. There was normal leaflet separation. DOPPLER: The transtricuspid velocity was within the normal range. There was no evidence for stenosis. There was trace regurgitation. Estimated peak PA  pressure was 38 mmHg. PULMONIC VALVE: Leaflets exhibited normal thickness, no calcification, and normal cuspal separation. DOPPLER: The transpulmonic velocity was within the normal range. There was no significant regurgitation. PERICARDIUM: There was no pericardial effusion. The pericardium was normal in appearance. AORTA: The root exhibited normal size. SYSTEMIC VEINS: IVC: The inferior vena cava was normal in size.     SYSTEM MEASUREMENT TABLES    2D  %FS: 34.03 %  Ao Diam: 3.62 cm  EDV(Teich): 175.09 ml  EF(Teich): 62.11 %  ESV(Teich): 66.34 ml  IVSd: 1.1 cm  LA Area: 27.97 cm2  LA Diam: 5.41 cm  LVEDV MOD A4C: 165.87 ml  LVEF MOD A4C: 55.55 %  LVESV MOD A4C: 73.73 ml  LVIDd: 5.93 cm  LVIDs: 3.91 cm  LVLd A4C: 9.23 cm  LVLs A4C: 8.03 cm  LVPWd: 0.91 cm  RA Area: 20.75 cm2  RVIDd: 4.77 cm  SV MOD A4C: 92.15 ml  SV(Teich): 108.75 ml    CW  AV MaxPG: 10 mmHg  AV Vmax: 1.58 m/s  MR Vmax: 4.55 m/s  MR maxP.83 mmHg  TR MaxP.98 mmHg  TR Vmax: 2.96 m/s    MM  TAPSE: 2.38 cm    PW  MV A [place]: 1.02 m/s  MV Dec [place]: 3.21 m/s2  MV DecT: 190.81 ms  MV E [place]: 0.61 m/s  MV E/A Ratio: 0.6  MV PHT: 55.34 ms  MVA By PHT: 3.98 cm2    [employer] Accredited Echocardiography Laboratory    Prepared and electronically signed by    Meliton Soriano MD  Signed 13-Oct-2020 16:09:08      Code Status: Level 1 - Full Code    ** Please Note: Dictation voice to text software may have been used in the creation of this document.  **

## 2023-07-06 LAB
ANION GAP SERPL CALCULATED.3IONS-SCNC: 3 MMOL/L
ATRIAL RATE: 53 BPM
ATRIAL RATE: 55 BPM
ATRIAL RATE: 56 BPM
BUN SERPL-MCNC: 15 MG/DL (ref 5–25)
CALCIUM SERPL-MCNC: 8.7 MG/DL (ref 8.3–10.1)
CHLORIDE SERPL-SCNC: 112 MMOL/L (ref 96–108)
CO2 SERPL-SCNC: 26 MMOL/L (ref 21–32)
CREAT SERPL-MCNC: 1.18 MG/DL (ref 0.6–1.3)
ERYTHROCYTE [DISTWIDTH] IN BLOOD BY AUTOMATED COUNT: 13.9 % (ref 11.6–15.1)
GFR SERPL CREATININE-BSD FRML MDRD: 59 ML/MIN/1.73SQ M
GLUCOSE SERPL-MCNC: 104 MG/DL (ref 65–140)
HCT VFR BLD AUTO: 34.4 % (ref 36.5–49.3)
HGB BLD-MCNC: 11.7 G/DL (ref 12–17)
MCH RBC QN AUTO: 32 PG (ref 26.8–34.3)
MCHC RBC AUTO-ENTMCNC: 34 G/DL (ref 31.4–37.4)
MCV RBC AUTO: 94 FL (ref 82–98)
P AXIS: 49 DEGREES
P AXIS: 60 DEGREES
P AXIS: 78 DEGREES
PLATELET # BLD AUTO: 124 THOUSANDS/UL (ref 149–390)
PMV BLD AUTO: 11.9 FL (ref 8.9–12.7)
POTASSIUM SERPL-SCNC: 3.6 MMOL/L (ref 3.5–5.3)
PR INTERVAL: 176 MS
PR INTERVAL: 176 MS
PR INTERVAL: 178 MS
QRS AXIS: -27 DEGREES
QRS AXIS: -31 DEGREES
QRS AXIS: -32 DEGREES
QRSD INTERVAL: 102 MS
QRSD INTERVAL: 104 MS
QRSD INTERVAL: 112 MS
QT INTERVAL: 440 MS
QT INTERVAL: 458 MS
QT INTERVAL: 462 MS
QTC INTERVAL: 412 MS
QTC INTERVAL: 441 MS
QTC INTERVAL: 441 MS
RBC # BLD AUTO: 3.66 MILLION/UL (ref 3.88–5.62)
SODIUM SERPL-SCNC: 141 MMOL/L (ref 135–147)
T WAVE AXIS: -27 DEGREES
T WAVE AXIS: -31 DEGREES
T WAVE AXIS: -39 DEGREES
VENTRICULAR RATE: 53 BPM
VENTRICULAR RATE: 55 BPM
VENTRICULAR RATE: 56 BPM
WBC # BLD AUTO: 3.83 THOUSAND/UL (ref 4.31–10.16)

## 2023-07-06 PROCEDURE — 93010 ELECTROCARDIOGRAM REPORT: CPT | Performed by: INTERNAL MEDICINE

## 2023-07-06 PROCEDURE — 93005 ELECTROCARDIOGRAM TRACING: CPT

## 2023-07-06 PROCEDURE — 99232 SBSQ HOSP IP/OBS MODERATE 35: CPT | Performed by: INTERNAL MEDICINE

## 2023-07-06 PROCEDURE — 85027 COMPLETE CBC AUTOMATED: CPT | Performed by: PHYSICIAN ASSISTANT

## 2023-07-06 PROCEDURE — 80048 BASIC METABOLIC PNL TOTAL CA: CPT | Performed by: PHYSICIAN ASSISTANT

## 2023-07-06 RX ADMIN — DOFETILIDE 250 MCG: 0.25 CAPSULE ORAL at 20:00

## 2023-07-06 RX ADMIN — DOFETILIDE 250 MCG: 0.25 CAPSULE ORAL at 09:00

## 2023-07-06 RX ADMIN — METOPROLOL SUCCINATE 12.5 MG: 25 TABLET, EXTENDED RELEASE ORAL at 08:59

## 2023-07-06 RX ADMIN — APIXABAN 5 MG: 5 TABLET, FILM COATED ORAL at 08:59

## 2023-07-06 RX ADMIN — APIXABAN 5 MG: 5 TABLET, FILM COATED ORAL at 17:18

## 2023-07-06 RX ADMIN — FINASTERIDE 5 MG: 5 TABLET, FILM COATED ORAL at 08:59

## 2023-07-06 RX ADMIN — TAMSULOSIN HYDROCHLORIDE 0.4 MG: 0.4 CAPSULE ORAL at 16:29

## 2023-07-06 RX ADMIN — DULOXETINE HYDROCHLORIDE 60 MG: 60 CAPSULE, DELAYED RELEASE ORAL at 09:01

## 2023-07-06 RX ADMIN — ATORVASTATIN CALCIUM 40 MG: 40 TABLET, FILM COATED ORAL at 16:29

## 2023-07-06 NOTE — UTILIZATION REVIEW
NOTIFICATION OF INPATIENT ADMISSION   AUTHORIZATION REQUEST   SERVICING FACILITY:   [address] [place] [address]  Address: [address] [facility], [address] [place] [address] [address] [address]  Tax ID: [id number]  NPI: [id number] ATTENDING PROVIDER:  Attending Name and NPI#: Meredith Hankins Md [[id number]]  Address: [address] [place] [address]  Phone: [phone]   ADMISSION INFORMATION:  Place of Service: [address] [place] [address]  Place of Service Code: 21  Inpatient Admission Date/Time: 7/5/23 11:23 AM  Discharge Date/Time: No discharge date for patient encounter. Admitting Diagnosis Code/Description:  SVT (supraventricular tachycardia) ([address] [facility]) [I47.1]     UTILIZATION REVIEW CONTACT:  Diego Cokeries, Utilization   Network Utilization Review Department  Phone: [phone]  Fax: [phone]  Email: [email or web address]  Contact for approvals/pending authorizations, clinical reviews, and discharge. PHYSICIAN ADVISORY SERVICES:  Medical Necessity Denial & Qosb-ie-Hqbu Review  Phone: [phone]  Fax: [phone]  Email: [email or web address]

## 2023-07-06 NOTE — PLAN OF CARE
Problem: PAIN - ADULT  Goal: Verbalizes/displays adequate comfort level or baseline comfort level  Description: Interventions:  - Encourage patient to monitor pain and request assistance  - Assess pain using appropriate pain scale  - Administer analgesics based on type and severity of pain and evaluate response  - Implement non-pharmacological measures as appropriate and evaluate response  - Consider cultural and social influences on pain and pain management  - Notify physician/advanced practitioner if interventions unsuccessful or patient reports new pain  Outcome: Progressing     Problem: INFECTION - ADULT  Goal: Absence or prevention of progression during hospitalization  Description: INTERVENTIONS:  - Assess and monitor for signs and symptoms of infection  - Monitor lab/diagnostic results  - Monitor all insertion sites, i.e. indwelling lines, tubes, and drains  - Monitor endotracheal if appropriate and nasal secretions for changes in amount and color  - [place] appropriate cooling/warming therapies per order  - Administer medications as ordered  - Instruct and encourage patient and family to use good hand hygiene technique  - Identify and instruct in appropriate isolation precautions for identified infection/condition  Outcome: Progressing  Goal: Absence of fever/infection during neutropenic period  Description: INTERVENTIONS:  - Monitor WBC    Outcome: Progressing

## 2023-07-06 NOTE — PROGRESS NOTES
Progress Note - Electrophysiology - Cardiology  Betty Brooks. 68 y.o. male MRN: [id number]  Unit/Bed#: MS [place]5-01 Encounter: [id number]      Assessment:  1. PAF  a. Currently on Tikosyn 250 mcg given renal function  b. QTc is appeared stable  2. SVT  a. Status post ablation with Dr. Pat Camargo 4/12/2021  3. Nonischemic cardiomyopathy  4. ARLENE  5. Hx bilateral PE  6. Chronic AC with Eliquis  7. HLD  8. Hx of sickle cell trait    Plan:  -Continue with Tikosyn load at 250 mcg. -QTc has been stable with 441 last EKG. -Patient has been tolerating medication well with no evidence of A-fib on telemetry monitoring  -Plan to complete 5 doses total of Tikosyn while inpatient and discharge if QTc is adequate      Subjective/Objective   Subjective: patient feeling well today. Did get tikosyn a bit late today which was concerning to him but he was reassured. TELE: sinus rhythm. No evidence of atrial fibrillation.        Objective:  Vitals: /58   Pulse (!) 53   Temp 98.4 °F (36.9 °C)   Resp 18   Ht 6' 3" (1.905 m)   Wt 119 kg (262 lb 5.6 oz)   SpO2 94%   BMI 32.79 kg/m²     Vitals:    07/05/23 1155   Weight: 119 kg (262 lb 5.6 oz)     Orthostatic Blood Pressures    Flowsheet Row Most Recent Value   Blood Pressure 118/58 filed at 07/06/2023 1114   Patient Position - Orthostatic VS Lying filed at 07/06/2023 0720            Intake/Output Summary (Last 24 hours) at 7/6/2023 1154  Last data filed at 7/5/2023 1901  Gross per 24 hour   Intake 462 ml   Output --   Net 462 ml       Invasive Devices     Peripheral Intravenous Line  Duration           Peripheral IV 07/05/23 Left Antecubital <1 day                          Scheduled Meds:  Current Facility-Administered Medications   Medication Dose Route Frequency Provider Last Rate   • apixaban  5 mg Oral BID Varun Mendoza PA-C     • atorvastatin  40 mg Oral Daily With [employer] AgencySHERINE     • dofetilide  250 mcg Oral Q12H [address] FranckVarun Mendoza PA-C     • DULoxetine 60 mg Oral Daily Varun Mendoza PA-C     • finasteride  5 mg Oral Daily Varun Mendoza PA-C     • metoprolol succinate  12.5 mg Oral Daily Varun Mendoza PA-C     • tamsulosin  0.4 mg Oral Daily With [employer] SHERINE Jones       Continuous Infusions:   PRN Meds:    Review of Systems:  Review of Systems   Constitutional: Negative for malaise/fatigue. Cardiovascular: Negative for chest pain, dyspnea on exertion, irregular heartbeat, near-syncope, palpitations and syncope. All other systems reviewed and are negative. ROS as noted above, otherwise 12 point review of systems was performed and is negative. Physical Exam  Vitals and nursing note reviewed. Constitutional:       General: He is not in acute distress. Appearance: Normal appearance. Cardiovascular:      Rate and Rhythm: Normal rate and regular rhythm. Pulmonary:      Effort: Pulmonary effort is normal.      Breath sounds: Normal breath sounds. Abdominal:      General: Abdomen is flat. Palpations: Abdomen is soft. Musculoskeletal:      Right lower leg: No edema. Left lower leg: No edema. Skin:     General: Skin is warm and dry. Neurological:      General: No focal deficit present. Mental Status: He is alert and oriented to person, place, and time. Lab Results: I have personally reviewed pertinent lab results. Results from last 7 days   Lab Units 07/06/23  0507 07/05/23  1232   WBC Thousand/uL 3.83* 3.85*   HEMOGLOBIN g/dL 11.7* 12.0   HEMATOCRIT % 34.4* 35.3*   PLATELETS Thousands/uL 124* 126*     Results from last 7 days   Lab Units 07/06/23  0507 07/05/23  1232   POTASSIUM mmol/L 3.6 3.6   CHLORIDE mmol/L 112* 114*   CO2 mmol/L 26 27   BUN mg/dL 15 14   CREATININE mg/dL 1.18 1.26   CALCIUM mg/dL 8.7 9.0               Imaging: I have personally reviewed pertinent reports.     Results for orders placed during the hospital encounter of 10/13/20    Echo complete with contrast if indicated    Narrative  [address] [place] [address], [address] [place] [address]  [phone]    Transthoracic Echocardiogram  2D, M-mode, Doppler, and Color Doppler    Study date:  13-Oct-2020    Patient: Patricia Garner  MR number: [id number]  Account number: [de-identified]  : 1945  Age: 76 years  Gender: Male  Status: Outpatient  [facility]: [facility]  Height: 75 in  Weight: 254.5 lb  BP: 124/ 72 mmHg    Indications: Non-ischemic cardiomyopathy. Diagnoses: I42.9 - Cardiomyopathy, unspecified    Sonographer:  HILL Rice  Primary Physician:  Ovidio Leslie MD  Referring Physician:  Kinza Vaughan MD  Group:  [facility]  Interpreting Physician:  Branden Terrazas MD    SUMMARY    LEFT VENTRICLE:  Systolic function was at the lower limits of normal. Ejection fraction was estimated in the range of 50 % to 55 %. There were no regional wall motion abnormalities. Doppler parameters were consistent with abnormal left ventricular relaxation (grade 1 diastolic dysfunction). RIGHT VENTRICLE:  The size was normal.  Systolic function was normal.    LEFT ATRIUM:  The atrium was mildly dilated. RIGHT ATRIUM:  The atrium was mildly dilated. MITRAL VALVE:  There was trace to mild regurgitation. TRICUSPID VALVE:  There was trace regurgitation. Estimated peak PA pressure was 38 mmHg. HISTORY: PRIOR HISTORY: NICM, ARLENE, B/L pulmonary embolism, SVT, CKD II, Shortness of breath,    PROCEDURE: The study was performed in the [facility]. This was a routine study. The transthoracic approach was used. The study included complete 2D imaging, M-mode, complete spectral Doppler, and color Doppler. The  heart rate was 69 bpm, at the start of the study. Images were obtained from the parasternal, apical, subcostal, and suprasternal notch acoustic windows. Image quality was adequate.     LEFT VENTRICLE: Size was normal. Systolic function was at the lower limits of normal. Ejection fraction was estimated in the range of 50 % to 55 %. There were no regional wall motion abnormalities. Wall thickness was normal. No evidence of  apical thrombus. DOPPLER: Doppler parameters were consistent with abnormal left ventricular relaxation (grade 1 diastolic dysfunction). RIGHT VENTRICLE: The size was normal. Systolic function was normal. Wall thickness was normal.    LEFT ATRIUM: The atrium was mildly dilated. RIGHT ATRIUM: The atrium was mildly dilated. MITRAL VALVE: Valve structure was normal. There was normal leaflet separation. DOPPLER: The transmitral velocity was within the normal range. There was no evidence for stenosis. There was trace to mild regurgitation. AORTIC VALVE: The valve was trileaflet. Leaflets exhibited normal thickness and normal cuspal separation. DOPPLER: Transaortic velocity was within the normal range. There was no evidence for stenosis. There was no significant  regurgitation. TRICUSPID VALVE: The valve structure was normal. There was normal leaflet separation. DOPPLER: The transtricuspid velocity was within the normal range. There was no evidence for stenosis. There was trace regurgitation. Estimated peak PA  pressure was 38 mmHg. PULMONIC VALVE: Leaflets exhibited normal thickness, no calcification, and normal cuspal separation. DOPPLER: The transpulmonic velocity was within the normal range. There was no significant regurgitation. PERICARDIUM: There was no pericardial effusion. The pericardium was normal in appearance. AORTA: The root exhibited normal size. SYSTEMIC VEINS: IVC: The inferior vena cava was normal in size.     SYSTEM MEASUREMENT TABLES    2D  %FS: 34.03 %  Ao Diam: 3.62 cm  EDV(Teich): 175.09 ml  EF(Teich): 62.11 %  ESV(Teich): 66.34 ml  IVSd: 1.1 cm  LA Area: 27.97 cm2  LA Diam: 5.41 cm  LVEDV MOD A4C: 165.87 ml  LVEF MOD A4C: 55.55 %  LVESV MOD A4C: 73.73 ml  LVIDd: 5.93 cm  LVIDs: 3.91 cm  LVLd A4C: 9.23 cm  LVLs A4C: 8.03 cm  LVPWd: 0.91 cm  RA Area: 20.75 cm2  RVIDd: 4.77 cm  SV MOD A4C: 92.15 ml  SV(Teich): 108.75 ml    CW  AV MaxPG: 10 mmHg  AV Vmax: 1.58 m/s  MR Vmax: 4.55 m/s  MR maxP.83 mmHg  TR MaxP.98 mmHg  TR Vmax: 2.96 m/s    MM  TAPSE: 2.38 cm    PW  MV A [place]: 1.02 m/s  MV Dec [place]: 3.21 m/s2  MV DecT: 190.81 ms  MV E [place]: 0.61 m/s  MV E/A Ratio: 0.6  MV PHT: 55.34 ms  MVA By PHT: 3.98 cm2    [facility] Accredited Echocardiography Laboratory    Prepared and electronically signed by    Calvin Mccarthy MD  Signed 13-Oct-2020 16:09:08      VTE Pharmacologic Prophylaxis: eliquis   VTE Mechanical Prophylaxis: sequential compression device

## 2023-07-06 NOTE — UTILIZATION REVIEW
Initial Clinical Review    Admission: Date/Time/Statement:   Admission Orders (From admission, onward)     Ordered        07/05/23 1138  Inpatient Admission  Once                      Orders Placed This Encounter   Procedures   • Inpatient Admission     Standing Status:   Standing     Number of Occurrences:   1     Order Specific Question:   Level of Care     Answer:   Med Surg [16]     Order Specific Question:   Estimated length of stay     Answer:   More than 2 Midnights     Order Specific Question:   Certification     Answer:   I certify that inpatient services are medically necessary for this patient for a duration of greater than two midnights. See H&P and MD Progress Notes for additional information about the patient's course of treatment. Initial Presentation: 68 y.o. male presents to [facility] ADMITTED INPATIENT to M/S/TELE UNIT with PAF with plan for TIKOSYN/SOTALOL START  A-fib/flutter, relatively new onset after being found to have new onset during pharmacologic nuclear stress testing, reduced EF as well and 34% on stress test with echo showing EF 40 to 45%. 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 EKG: QTc 412 with T inversions in V3-V6. H/o ablation 4/12/21. On Eliquis for h/o A fib and h/o PE/DVT. Takes statin. PMHx also of obesity, ARLENE, CKD II, HLD, sickle cell trait. Plan: Telemetry. Start Tikosyn at 250 mcg d/t renal function. EKG q 12 hrs 2 hours after tikosyn dose to monitor QTC. Watch ekgs closely especially since pt is on cymbalta. Will recheck 2 wk olter after med initiated to ensure a fib burden is decreasing. Decrease metoprolol to 12.5 mg daily given bradycardia     Date: 7/6   Day 2: pt with no complaints today. Did get tikosyn a bit late today which was concerning to him but he was reassured. TELE: sinus rhythm. No evidence of atrial fibrillation. Currently on Tikosyn 250 mcg given renal function. QTc has been stable with 441 last EKG.  Plan to complete 5 doses total of Tikosyn while inpatient and discharge if QTc is adequate. Continue reg diet. I/Os. SCDs. OOB.        Wt Readings from Last 1 Encounters:   07/05/23 119 kg (262 lb 5.6 oz)     Vital Signs:   Date/Time Temp Pulse Resp BP MAP (mmHg) SpO2 O2 Device Patient Position - Orthostatic VS   07/06/23 1115 98.4 °F (36.9 °C) 53 Abnormal  -- 118/58 78 94 % -- --   07/06/23 11:14:52 98.4 °F (36.9 °C) 53 Abnormal  18 118/58 78 94 % -- --   07/06/23 0720 98.2 °F (36.8 °C) 56 18 113/60 78 94 % -- Lying   07/06/23 07:19:15 98.2 °F (36.8 °C) 51 Abnormal  17 113/60 78 94 % -- --   07/06/23 02:18:13 -- 67 -- 140/69 93 97 % -- --   07/05/23 22:02:39 98.1 °F (36.7 °C) 62 -- 110/57 75 97 % -- --   07/05/23 2100 -- -- -- -- -- -- None (Room air) --   07/05/23 1900 -- -- -- -- -- -- None (Room air) --   07/05/23 15:29:40 98.3 °F (36.8 °C) 56 16 112/57 75 95 % -- --   07/05/23 11:55:43 98.5 °F (36.9 °C) 54 Abnormal  17 130/68 -- 96 % None (Room air) Lying   07/05/23 11:50:19 -- 56 -- -- -- 97 % -- --     Pertinent Labs/Diagnostic Test Results:   EKG 7/5:  Sinus bradycardia  Left axis deviation  Moderate voltage criteria for LVH, may be normal variant  ST & T wave abnormality, consider anterolateral ischemia  Abnormal ECG  When compared with ECG of 22-OCT-2022 16:02,  Inverted T waves have replaced nonspecific T wave abnormality in Anterolateral leads  Confirmed by Rhoda Wagner ([id number]) on 7/6/2023 7:24:49 AM    EKG 7/5:  Sinus bradycardia  Left axis deviation  Minimal voltage criteria for LVH, may be normal variant  ST & T wave abnormality, consider anterolateral ischemia  Abnormal ECG  When compared with ECG of 05-JUL-2023 11:49, (unconfirmed)  No significant change was found  Confirmed by Rhoda Wagner ([id number]) on 7/6/2023 7:12:11 AM    Results from last 7 days   Lab Units 07/06/23  0507 07/05/23  1232   WBC Thousand/uL 3.83* 3.85*   HEMOGLOBIN g/dL 11.7* 12.0   HEMATOCRIT % 34.4* 35.3*   PLATELETS Thousands/uL 124* 126*     Results from last 7 days Lab Units 07/06/23  0507 07/05/23  1232   SODIUM mmol/L 141 143   POTASSIUM mmol/L 3.6 3.6   CHLORIDE mmol/L 112* 114*   CO2 mmol/L 26 27   ANION GAP mmol/L 3 2   BUN mg/dL 15 14   CREATININE mg/dL 1.18 1.26   EGFR ml/min/1.73sq m 59 54   CALCIUM mg/dL 8.7 9.0     Results from last 7 days   Lab Units 07/06/23  0507 07/05/23  1232   GLUCOSE RANDOM mg/dL 104 95       Past Medical History:   Diagnosis Date   • Cardiomyopathy ([facility])    • Hyperlipidemia    • Post-nasal drip    • Pulmonary embolism (HCC)      Present on Admission:  • PAF (paroxysmal atrial fibrillation) ([facility])      Admitting Diagnosis: SVT (supraventricular tachycardia) ([facility]) [I47.1]  Age/Sex: 68 y.o. male  Admission Orders:  Scheduled Medications:  apixaban, 5 mg, Oral, BID  atorvastatin, 40 mg, Oral, Daily With [employer]  dofetilide, 250 mcg, Oral, Q12H [address]  DULoxetine, 60 mg, Oral, Daily  finasteride, 5 mg, Oral, Daily  metoprolol succinate, 12.5 mg, Oral, Daily  tamsulosin, 0.4 mg, Oral, Daily With [employer] Utilization Review Department  ATTENTION: Please call with any questions or concerns to [phone] and carefully listen to the prompts so that you are directed to the right person. All voicemails are confidential.  Geovanny Brady all requests for admission clinical reviews, approved or denied determinations and any other requests to dedicated fax number below belonging to the campus where the patient is receiving treatment.  List of dedicated fax numbers for the Facilities:  [place] DENIALS (Administrative/Medical Necessity) [phone] [facility] [facility] [phone]   [address] [phone]   [facility] [address] [facility] [phone] 150[address] [facility] [address] [facility] 52[address] [facility] [phone]   [place]. [address] [facility] [address] [facility] [id number] [place] [address] [phone]

## 2023-07-07 ENCOUNTER — TELEPHONE (OUTPATIENT)
Dept: CARDIOLOGY CLINIC | Facility: CLINIC | Age: 78
End: 2023-07-07

## 2023-07-07 VITALS
WEIGHT: 262.35 LBS | SYSTOLIC BLOOD PRESSURE: 143 MMHG | HEART RATE: 52 BPM | TEMPERATURE: 98.7 F | OXYGEN SATURATION: 97 % | RESPIRATION RATE: 17 BRPM | HEIGHT: 75 IN | BODY MASS INDEX: 32.62 KG/M2 | DIASTOLIC BLOOD PRESSURE: 73 MMHG

## 2023-07-07 LAB
ANION GAP SERPL CALCULATED.3IONS-SCNC: 6 MMOL/L
BUN SERPL-MCNC: 13 MG/DL (ref 5–25)
CALCIUM SERPL-MCNC: 8.9 MG/DL (ref 8.3–10.1)
CHLORIDE SERPL-SCNC: 112 MMOL/L (ref 96–108)
CO2 SERPL-SCNC: 26 MMOL/L (ref 21–32)
CREAT SERPL-MCNC: 1.08 MG/DL (ref 0.6–1.3)
ERYTHROCYTE [DISTWIDTH] IN BLOOD BY AUTOMATED COUNT: 13.9 % (ref 11.6–15.1)
GFR SERPL CREATININE-BSD FRML MDRD: 65 ML/MIN/1.73SQ M
GLUCOSE SERPL-MCNC: 95 MG/DL (ref 65–140)
HCT VFR BLD AUTO: 36.3 % (ref 36.5–49.3)
HGB BLD-MCNC: 12 G/DL (ref 12–17)
MCH RBC QN AUTO: 31.3 PG (ref 26.8–34.3)
MCHC RBC AUTO-ENTMCNC: 33.1 G/DL (ref 31.4–37.4)
MCV RBC AUTO: 95 FL (ref 82–98)
PLATELET # BLD AUTO: 131 THOUSANDS/UL (ref 149–390)
PMV BLD AUTO: 12 FL (ref 8.9–12.7)
POTASSIUM SERPL-SCNC: 3.6 MMOL/L (ref 3.5–5.3)
RBC # BLD AUTO: 3.84 MILLION/UL (ref 3.88–5.62)
SODIUM SERPL-SCNC: 144 MMOL/L (ref 135–147)
WBC # BLD AUTO: 3.82 THOUSAND/UL (ref 4.31–10.16)

## 2023-07-07 PROCEDURE — 93005 ELECTROCARDIOGRAM TRACING: CPT

## 2023-07-07 PROCEDURE — 99238 HOSP IP/OBS DSCHRG MGMT 30/<: CPT | Performed by: INTERNAL MEDICINE

## 2023-07-07 PROCEDURE — 85027 COMPLETE CBC AUTOMATED: CPT | Performed by: PHYSICIAN ASSISTANT

## 2023-07-07 PROCEDURE — 80048 BASIC METABOLIC PNL TOTAL CA: CPT | Performed by: PHYSICIAN ASSISTANT

## 2023-07-07 RX ORDER — METOPROLOL SUCCINATE 25 MG/1
12.5 TABLET, EXTENDED RELEASE ORAL DAILY
Qty: 15 TABLET | Refills: 3 | Status: SHIPPED | OUTPATIENT
Start: 2023-07-08 | End: 2023-11-05

## 2023-07-07 RX ORDER — DOFETILIDE 0.25 MG/1
250 CAPSULE ORAL EVERY 12 HOURS SCHEDULED
Qty: 360 CAPSULE | Refills: 0 | Status: SHIPPED | OUTPATIENT
Start: 2023-07-07 | End: 2024-01-03

## 2023-07-07 RX ADMIN — METOPROLOL SUCCINATE 12.5 MG: 25 TABLET, EXTENDED RELEASE ORAL at 08:55

## 2023-07-07 RX ADMIN — DOFETILIDE 250 MCG: 0.25 CAPSULE ORAL at 08:55

## 2023-07-07 RX ADMIN — DULOXETINE HYDROCHLORIDE 60 MG: 60 CAPSULE, DELAYED RELEASE ORAL at 08:55

## 2023-07-07 RX ADMIN — FINASTERIDE 5 MG: 5 TABLET, FILM COATED ORAL at 08:56

## 2023-07-07 RX ADMIN — APIXABAN 5 MG: 5 TABLET, FILM COATED ORAL at 08:56

## 2023-07-07 NOTE — TELEPHONE ENCOUNTER
Per [facility] [place], call ref # [id number], William Brooms is not required for two 2W [id number]

## 2023-07-07 NOTE — DISCHARGE INSTR - AVS FIRST PAGE
Please start taking Tikosyn 250 mcg every 12 hours. If you miss a dose you will skip this dose and take your next scheduled dose. DO NOT double up on medications. Ask any doctors prescribing you medications if they interact with Tikosyn as this medications does have significant and dangerous drug interactions.

## 2023-07-07 NOTE — DISCHARGE SUMMARY
Discharge Summary - Ceci Martínez 68 y.o. male MRN: [id number]    Unit/Bed#: -01 Encounter: [id number]      Admission Date: 7/5/2023   Discharge Date: 7/7/23    Discharge Diagnosis: atrial fibrillation     Procedures Performed: Tikosyn start   No orders of the defined types were placed in this encounter. Consultants: none     HPI: Please refer to the initial history and physical as well as procedure notes for full details. Briefly, Ceci Martínez is a 68y.o. year old male with PAF and history of cardiomyopathy   He was seen by Dr. Cookie Solano as an outpatient, and AAD start was recommended. Patient presented this hospital admission to undergo initiation of Tikosyn. Hospital Course:   Ceci Martínez is a 68 y.o. male who was admitted elective for antiarrhythmic medication initiation. Patient was seen in the office by Dr. Cookie Solano and recommended to initiate therapy with Tikosyn. He has been on chronic anticoagulation therapy with eliquis without missed dose. He presents to the hospital in sinus bradycardai. He was initiated on tikosyn at 250 mcg twice daily. Serial EKGs were performed 2 hours after each dose of antiarrhythmic medication. There were no significant changes in QT/QTc with intiating doses. There were no significant occurrence of ventricular ectopy on telemetry. Electrolytes and renal function were monitored throughout his stay. He underwent a total of 4 doses, with his last EKG as follows:   Showing sinus rhythm with a QTc 438 ms    Physical exam on the day of discharge was as follows:    GEN: NAD, alert and oriented, well appearing  SKIN: dry without significant lesions or rashes  HEENT: NCAT, PERRL, EOMs intact  NECK: No JVD or carotid bruits appreciated  CARDIOVASCULAR: RRR, normal S1, S2 without murmurs, rubs, or gallops appreciated  LUNGS: Clear to auscultation bilaterally without wheezes, rhonchi, or rales  ABDOMEN: Soft, nontender, nondistended  EXTREMITIES/VASCULAR: perfused without clubbing, cyanosis, or edema b/l  PSYCH: Normal mood and affect  NEURO: CN ll-Xll grossly intact     At time of discharge, patient was ambulating the cardiac unit without complaints of  lightheadedness, presyncope, syncope, chest pain, shortness of breath, orthopnea, PND, palpitations, or bleeding problems. He received education regarding tikosyn therapy, avoiding missed doses, pharmacy moitoring for drug to drug interactions, and concerning signs and symptoms that would prompt urgent evaluation. He was given a one-week EKG appointment after starting tikosyn along with a follow up with electrophysiology in 4-6 weeks in the office. In terms of patient's medications, he will continue tikosyn at 250 mcg every 12 hours. We will decrease the dose of his metoprolol to 12.5 mg from 25. He is stable for discharge at this time with all questions answered. He was discussed in detail with Dr. Renato Quezada who is in agreement with this discharge summary. Discharge Medications:  See after visit summary for reconciled discharge medications provided to patient and family.     Medications Prior to Admission   Medication   • acetaminophen-codeine (TYLENOL #3) 300-30 mg per tablet   • apixaban (Eliquis) 5 mg   • DULoxetine (CYMBALTA) 60 mg delayed release capsule   • finasteride (PROSCAR) 5 mg tablet   • fluticasone (FLONASE) 50 mcg/act nasal spray   • meclizine (ANTIVERT) 25 mg tablet   • metoprolol succinate (TOPROL-XL) 25 mg 24 hr tablet   • Omega-3 1000 MG CAPS   • rosuvastatin (CRESTOR) 20 MG tablet   • sildenafil (VIAGRA) 50 MG tablet   • tadalafil (CIALIS) 20 MG tablet   • tamsulosin (FLOMAX) 0.4 mg         Pertininet Labs/diagnostics:  CBC with diff:   Results from last 7 days   Lab Units 07/07/23  0515 07/06/23  0507 07/05/23  1232   WBC Thousand/uL 3.82* 3.83* 3.85*   HEMOGLOBIN g/dL 12.0 11.7* 12.0   HEMATOCRIT % 36.3* 34.4* 35.3*   MCV fL 95 94 94   PLATELETS Thousands/uL 131* 124* 126*   RBC Million/uL 3.84* 3.66* 3.76*   MCH pg 31.3 32.0 31.9   MCHC g/dL 33.1 34.0 34.0   RDW % 13.9 13.9 14.2   MPV fL 12.0 11.9 11.5       BMP:  Results from last 7 days   Lab Units 07/07/23  0515 07/06/23  0507 07/05/23  1232   POTASSIUM mmol/L 3.6 3.6 3.6   CHLORIDE mmol/L 112* 112* 114*   CO2 mmol/L 26 26 27   BUN mg/dL 13 15 14   CREATININE mg/dL 1.08 1.18 1.26   CALCIUM mg/dL 8.9 8.7 9.0       Magnesium:       Coags:       Complications: none    Condition at Discharge: good     Discharge instructions/Information to patient and family:   See after visit summary for information provided to patient and family. Provisions for Follow-Up Care:  See after visit summary for information related to follow-up care and any pertinent home health orders. Disposition: Home    Planned Readmission: No    Discharge Statement   I spent 45 minutes minutes discharging the patient. This time was spent on the day of discharge. I had direct contact with the patient on the day of discharge. Additional documentation is required if more than 30 minutes were spent on discharge.  Evaluating the incision, discussing discharge instructions and restrictions, arranging follow up appointments, discussing medications

## 2023-07-07 NOTE — PLAN OF CARE
Problem: DISCHARGE PLANNING  Goal: Discharge to home or other facility with appropriate resources  Description: INTERVENTIONS:  - Identify barriers to discharge w/patient and caregiver  - Arrange for needed discharge resources and transportation as appropriate  - Identify discharge learning needs (meds, wound care, etc.)  - Arrange for interpretive services to assist at discharge as needed  - Refer to Case Management Department for coordinating discharge planning if the patient needs post-hospital services based on physician/advanced practitioner order or complex needs related to functional status, cognitive ability, or social support system  Outcome: Progressing     Problem: Knowledge Deficit  Goal: Patient/family/caregiver demonstrates understanding of disease process, treatment plan, medications, and discharge instructions  Description: Complete learning assessment and assess knowledge base.   Interventions:  - Provide teaching at level of understanding  - Provide teaching via preferred learning methods  Outcome: Progressing     Problem: CARDIOVASCULAR - ADULT  Goal: Maintains optimal cardiac output and hemodynamic stability  Description: INTERVENTIONS:  - Monitor I/O, vital signs and rhythm  - Monitor for S/S and trends of decreased cardiac output  - Administer and titrate ordered vasoactive medications to optimize hemodynamic stability  - Assess quality of pulses, skin color and temperature  - Assess for signs of decreased coronary artery perfusion  - Instruct patient to report change in severity of symptoms  Outcome: Progressing  Goal: Absence of cardiac dysrhythmias or at baseline rhythm  Description: INTERVENTIONS:  - Continuous cardiac monitoring, vital signs, obtain 12 lead EKG if ordered  - Administer antiarrhythmic and heart rate control medications as ordered  - Monitor electrolytes and administer replacement therapy as ordered  Outcome: Progressing

## 2023-07-08 LAB
ATRIAL RATE: 52 BPM
ATRIAL RATE: 56 BPM
P AXIS: 30 DEGREES
P AXIS: 65 DEGREES
PR INTERVAL: 176 MS
PR INTERVAL: 180 MS
QRS AXIS: -28 DEGREES
QRS AXIS: -29 DEGREES
QRSD INTERVAL: 104 MS
QRSD INTERVAL: 114 MS
QT INTERVAL: 454 MS
QT INTERVAL: 462 MS
QTC INTERVAL: 429 MS
QTC INTERVAL: 438 MS
T WAVE AXIS: -26 DEGREES
T WAVE AXIS: -35 DEGREES
VENTRICULAR RATE: 52 BPM
VENTRICULAR RATE: 56 BPM

## 2023-07-08 PROCEDURE — 93010 ELECTROCARDIOGRAM REPORT: CPT | Performed by: INTERNAL MEDICINE

## 2023-07-13 ENCOUNTER — TELEPHONE (OUTPATIENT)
Dept: CARDIOLOGY CLINIC | Facility: CLINIC | Age: 78
End: 2023-07-13

## 2023-07-13 NOTE — TELEPHONE ENCOUNTER
Pt dropped off [employer] forms that needs to be filled out, signed & faxed back to: [phone] once completed  Pt IS aware that Dr Lis Mike will not be back until the end of July  The forms have been scanned into [employer]

## 2023-07-17 ENCOUNTER — CLINICAL SUPPORT (OUTPATIENT)
Dept: CARDIOLOGY CLINIC | Facility: CLINIC | Age: 78
End: 2023-07-17
Payer: COMMERCIAL

## 2023-07-17 ENCOUNTER — TELEPHONE (OUTPATIENT)
Dept: CARDIOLOGY CLINIC | Facility: CLINIC | Age: 78
End: 2023-07-17

## 2023-07-17 DIAGNOSIS — I48.0 PAF (PAROXYSMAL ATRIAL FIBRILLATION) (HCC): Primary | ICD-10-CM

## 2023-07-17 PROCEDURE — RECHECK: Performed by: PHYSICIAN ASSISTANT

## 2023-07-17 PROCEDURE — 93000 ELECTROCARDIOGRAM COMPLETE: CPT

## 2023-07-17 NOTE — PROGRESS NOTES
Pt presents to the office under the direction of Dr. Nupur Vallecillo for a 1 week EKG post Tikosyn initiation. Pt was initiated on dofetilide 250mcg BID. Pt states he is doing well but did experience a little dizziness yesterday evening after taking his tamsulosin. BP - 120/78  HR - 66    Per Alis Manjarrez PA-C, EKG shows PAC's but will discuss with Dr. Nupur Vallecillo for further evaluation. Pt will continue with current meds at the moment.

## 2023-07-19 ENCOUNTER — TELEPHONE (OUTPATIENT)
Dept: UROLOGY | Facility: AMBULATORY SURGERY CENTER | Age: 78
End: 2023-07-19

## 2023-07-19 NOTE — TELEPHONE ENCOUNTER
Spoke to pt, offered him a surgical date of 11/02/23, he accepted. ..  I will call him back after packet is together

## 2023-08-23 DIAGNOSIS — I42.8 CARDIOMYOPATHY, NONISCHEMIC (HCC): Primary | ICD-10-CM

## 2023-08-29 NOTE — PROGRESS NOTES
Advanced Heart Failure/Pulmonary Hypertension Outpatient Note - Debby Neas. 68 y.o. male MRN: [id number]    @ Encounter: [id number]    Assessment:  68 y.o. male Hx per chart p/w HF fu.    Follows at the [facility] for some care, does not get meds through [facility]  Primary cardiologist is Dr. Kameron Sifuentes, general cardiology  Follows EP Dr. Radha Dudley  SVT, ablation in past  pAF on [facility]  NICM, HFrEF, EF 34% on NST while in AF, echo 40% (also with uncontrolled arrhythmias during study)  Recurrent DVT and PE  CAD. Nonobstructive. Per notes: "Cardiac catheterization in 2021 showed mild coronary artery disease. No intervention was necessary." my review: mild CAD of LAD and LCX, RCA not engaged.   4/2023 NST: small mixed apical defect - LVEF 34%  ARLENE  CKD  HLD  Obese  Sickle cell trait      I have reviewed all pertinent patient data including but not limited to:    Lab Results   Component Value Date    CREATININE 1.08 07/07/2023     Lab Results   Component Value Date    K 3.6 07/07/2023     No results found for: "HGBA1C"  Lab Results   Component Value Date    DGL3SSYJLAYL 2.133 11/21/2021     Lab Results   Component Value Date    LDLCALC 81 05/18/2022     No results found for: "BNP"   Lab Results   Component Value Date    NTBNP 36 11/21/2021        TODAY'S PLAN:  I am meeting patient for the first time today  Just got off cruise, was drinking more on ship, 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  +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  Generally feels well except ongoing episodic palpitations sometimes associated with dizziness, SOB; no recent syncope - gets these episode sfew times weekly at least  Recently improved LE edema after PCP stated lasix few weeks ago  SVT today in office to 130s bpm, hemodynamically stable, reverted spontaneously to NSR    EP and primary cardiology team plans noted  Since 7/2023 on tikosyn 250 bid  QTc, recent Cr and K acceptable  Messaged EP team today regarding ongoing SVT    gdmt below  On limited therapy now, will escalate as able for HFrEF    Long discussion today about risks/benefits, red flags, disease trajectory    Uses his cpap only intermittently  Gave new sleep med referral today    See heme for cytopenias and sickle cell trait - gve referral next visit    Note given today to stay out of work given ongoing cardiac issues    He may be planning to retire soon based on his age and job stress    No overt evidence ischemia now    Neurohormonal Blockade/GDMT:  --Beta-Blocker: not taking per pt, was on low dose per EP, will defer dosing to EP now; on tiksoyn  --ACEi, ARB, ARNi: start entresto 24/26 bid today, fu BMP in 1 week  --MRA: future  --SGLT2i: future  --Hydralazine/nitrates: not on    --Diuretic: lasix 40 qd but not taking on cruise last week; restart at q48hr now that starting entresto    Other HF pharmaco-invasive therapy (if applicable):   PPM,  ICD , CRT (if applicable): Interrogation:  Advanced Therapies (if applicable): --Inotrope:  --LVAD/Transplant Candidacy:    Studies:  I have reviewed all pertinent patient data/labs/imaging where available, including but not limited to the below studies. Selected results may be displayed here but comprehensive listing is omitted for note clarity and can be found in the epic chart. ECG. Echo. Stress. Cath. HPI:   68 y.o. male Hx per chart p/w HF fu.  No new CP  +episodic SOB/dizziness/palpitations  No syncope. No new fatigue. No new unintentional weight changes. No new leg swelling - recently better on lasix,   No PND, pillow orthopnea. No new fevers, chills, cough, nausea, vomiting, diarrhea, dysuria. Interval History:   As noted in 'plan' section above and prior epic chart notes.       Past Medical History:   Diagnosis Date   • Cardiomyopathy [facility])    • Hyperlipidemia    • Post-nasal drip    • Pulmonary embolism [facility])      Patient Active Problem List    Diagnosis Date Noted   • Erectile dysfunction due to arterial insufficiency 06/21/2023   • PAF (paroxysmal atrial fibrillation) ([address]) 06/17/2023   • BPH with obstruction/lower urinary tract symptoms 06/08/2022   • COVID-19 11/21/2021   • Chest pain 04/23/2021   • Acute kidney injury superimposed on CKD ([address]) 04/23/2021   • Lumbar disc disease with radiculopathy - Left 06/19/2020   • Spinal stenosis of lumbar region without neurogenic claudication - Left 06/19/2020   • BPH (benign prostatic hyperplasia) 06/12/2020   • Nephrolithiasis 06/12/2020   • Dyslipidemia 04/03/2019   • Cardiomyopathy, nonischemic ([address]) 08/16/2018   • ARLENE (obstructive sleep apnea)    • SVT (supraventricular tachycardia) ([address]) 02/13/2018   • Chronic anticoagulation 02/13/2018   • Chronic low back pain 08/22/2017   • Bilateral pulmonary embolism ([address]) 08/21/2017   • SOB (shortness of breath) 08/21/2017   • Hyperlipidemia 08/21/2017   • Lung nodule 08/21/2017   • Obesity 08/21/2017   • History of sickle cell trait 08/21/2017   • Leg edema, left 08/21/2017   • History of pulmonary embolism 08/21/2017   • Thrombocytopenia ([address]) 08/21/2017   • Chronic kidney disease (CKD), stage II (mild) 08/21/2017   • Tachycardia 08/21/2017       ROS:  10 point ROS negative except as specified in HPI/interval history    No Known Allergies    Current Outpatient Medications   Medication Instructions   • acetaminophen-codeine (TYLENOL #3) 300-30 mg per tablet TAKE 1-2 TABS BY MOUTH EVERY 4-6 HRS AS NEEDED FOR PAIN   • apixaban (ELIQUIS) 5 mg, Oral, 2 times daily   • dofetilide (TIKOSYN) 250 mcg, Oral, Every 12 hours scheduled   • DULoxetine (CYMBALTA) 60 mg delayed release capsule Oral   • finasteride (PROSCAR) 5 mg, Oral, Daily   • fluticasone (FLONASE) 50 mcg/act nasal spray 1 spray, Nasal, Daily PRN   • furosemide (LASIX) 40 mg, Oral, Daily, PRN   • Klor-Con M10 10 MEQ tablet 10 mEq, Oral, Daily, PRN   • metoprolol succinate (TOPROL-XL) 12.5 mg, Oral, Daily   • Omega-3 1,000 mg   • rosuvastatin (CRESTOR) 20 mg, Oral, Daily   • sacubitril-valsartan (Entresto) 24-26 MG TABS 1 tablet, Oral, 2 times daily   • sildenafil (VIAGRA) 50 mg, Oral, Daily PRN   • tadalafil (CIALIS) 20 mg, Oral, Daily PRN   • tamsulosin (FLOMAX) 0.4 mg, Oral, Daily with dinner        Social History     Socioeconomic History   • Marital status: /Civil Union     Spouse name: Not on file   • Number of children: 7   • Years of education: Not on file   • Highest education level: Not on file   Occupational History   • Occupation: employed   Tobacco Use   • Smoking status: Former     Types: Cigarettes     Quit date:      Years since quittin.6   • Smokeless tobacco: Never   Vaping Use   • Vaping Use: Never used   Substance and Sexual Activity   • Alcohol use: Yes     Alcohol/week: 0.0 standard drinks of alcohol     Comment: socially   • Drug use: No   • Sexual activity: Not on file   Other Topics Concern   • Not on file   Social History Narrative   • Not on file     Social Determinants of Health     Financial Resource Strain: Not on file   Food Insecurity: No Food Insecurity (2023)    Hunger Vital Sign    • Worried About Running Out of Food in the Last Year: Never true    • Ran Out of Food in the Last Year: Never true   Transportation Needs: No Transportation Needs (2023)    PRAPARE - Transportation    • Lack of Transportation (Medical): No    • Lack of Transportation (Non-Medical): No   Physical Activity: Not on file   Stress: Not on file   Social Connections: Not on file   Intimate Partner Violence: Not on file   Housing Stability: Low Risk  (2023)    Housing Stability Vital Sign    • Unable to Pay for Housing in the Last Year: No    • Number of Places Lived in the Last Year: 1    • Unstable Housing in the Last Year: No       No family history on file.     Physical Exam:  Vitals:    23 1447   BP: 140/80   BP Location: Left arm   Patient Position: Sitting   Cuff Size: Large   Pulse: (!) 135   SpO2: 97%   Weight: 126 kg (277 lb)   Height: 6' 3" (1.905 m)     Constitutional: NAD, non toxic  Ears/nose/mouth/throat: atraumatic  CV: RRR, no JVD  Resp: ctabl  GI: Soft, NTND  MSK: no swollen joints in exposed areas  Extr: +1 LE edema, warm LE  Pysche: Normal affect  Neuro: appropriate in conversation  Skin: dry and intact in exposed areas    Labs & Results:    Lab Results   Component Value Date    SODIUM 144 07/07/2023    K 3.6 07/07/2023     (H) 07/07/2023    CO2 26 07/07/2023    BUN 13 07/07/2023    CREATININE 1.08 07/07/2023    GLUC 95 07/07/2023    CALCIUM 8.9 07/07/2023     Lab Results   Component Value Date    WBC 3.82 (L) 07/07/2023    HGB 12.0 07/07/2023    HCT 36.3 (L) 07/07/2023    MCV 95 07/07/2023     (L) 07/07/2023     No results found for: "BNP"   Lab Results   Component Value Date    CHOLESTEROL 146 05/18/2022    CHOLESTEROL 201 (H) 03/31/2021    CHOLESTEROL 207 (H) 08/22/2017     Lab Results   Component Value Date    HDL 45 05/18/2022    HDL 46 03/31/2021    HDL 45 08/22/2017     Lab Results   Component Value Date    TRIG 98 05/18/2022    TRIG 113 11/21/2021    TRIG 143 03/31/2021     Lab Results   Component Value Date    [facility] [facility] 155 03/31/2021       Counseling / Coordination of Care  Time spent today 27 minutes. Greater than 50% of total time was spent with the patient and / or family counseling and / or coordination of care. We discussed diagnoses, most recent studies, tests and any changes in treatment plan. Thank you for the opportunity to participate in the care of this patient.     Anahy Meyers MD  Attending Physician  Advanced Heart Failure and Transplant Cardiology  [address] [facility]

## 2023-08-30 ENCOUNTER — CONSULT (OUTPATIENT)
Dept: CARDIOLOGY CLINIC | Facility: CLINIC | Age: 78
End: 2023-08-30
Payer: COMMERCIAL

## 2023-08-30 VITALS
SYSTOLIC BLOOD PRESSURE: 140 MMHG | HEIGHT: 75 IN | DIASTOLIC BLOOD PRESSURE: 80 MMHG | OXYGEN SATURATION: 97 % | BODY MASS INDEX: 34.44 KG/M2 | HEART RATE: 135 BPM | WEIGHT: 277 LBS

## 2023-08-30 DIAGNOSIS — Z00.00 HEALTHCARE MAINTENANCE: ICD-10-CM

## 2023-08-30 DIAGNOSIS — Z86.2 HISTORY OF SICKLE CELL TRAIT: ICD-10-CM

## 2023-08-30 DIAGNOSIS — I42.8 CARDIOMYOPATHY, NONISCHEMIC (HCC): Primary | ICD-10-CM

## 2023-08-30 DIAGNOSIS — I48.0 PAF (PAROXYSMAL ATRIAL FIBRILLATION) (HCC): ICD-10-CM

## 2023-08-30 DIAGNOSIS — G47.33 OSA (OBSTRUCTIVE SLEEP APNEA): ICD-10-CM

## 2023-08-30 DIAGNOSIS — I47.1 SVT (SUPRAVENTRICULAR TACHYCARDIA) (HCC): ICD-10-CM

## 2023-08-30 PROCEDURE — 93000 ELECTROCARDIOGRAM COMPLETE: CPT | Performed by: STUDENT IN AN ORGANIZED HEALTH CARE EDUCATION/TRAINING PROGRAM

## 2023-08-30 PROCEDURE — 99204 OFFICE O/P NEW MOD 45 MIN: CPT | Performed by: STUDENT IN AN ORGANIZED HEALTH CARE EDUCATION/TRAINING PROGRAM

## 2023-08-30 RX ORDER — POTASSIUM CHLORIDE 750 MG/1
10 TABLET, EXTENDED RELEASE ORAL DAILY
COMMUNITY
Start: 2023-07-24

## 2023-08-30 RX ORDER — SACUBITRIL AND VALSARTAN 24; 26 MG/1; MG/1
1 TABLET, FILM COATED ORAL 2 TIMES DAILY
Qty: 180 TABLET | Refills: 5 | Status: SHIPPED | OUTPATIENT
Start: 2023-08-30 | End: 2025-02-20

## 2023-08-30 RX ORDER — FUROSEMIDE 40 MG/1
40 TABLET ORAL
COMMUNITY
Start: 2023-07-24

## 2023-08-30 NOTE — LETTER
August 30, 2023     Patient: Carolina Harrison. YOB: 1945  Date of Visit: 8/30/2023      To Whom it May Concern:    Marobbi Sebastian is under my professional care. Leonela Starkey was seen in my office on 8/30/2023. Leonela Starkey should remain out of work at [employer] time due to his active medical conditions. 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 We will reassess his condition and ability to return to work on an ongoing basis. If you have any questions or concerns, please don't hesitate to call.          Sincerely,          Leonardo Albarado MD        CC: No Recipients

## 2023-08-31 DIAGNOSIS — I48.91 ATRIAL FIBRILLATION, UNSPECIFIED TYPE (HCC): ICD-10-CM

## 2023-08-31 NOTE — TELEPHONE ENCOUNTER
Patient Alia Body [phone] contacting office requesting 90 day Eliquis medication refill to be sent through [address]

## 2023-08-31 NOTE — TELEPHONE ENCOUNTER
apixaban (Eliquis) 5 mg         Sig: Take 1 tablet (5 mg total) by mouth 2 (two) times a day    Disp:  180 tablet    Refills:  3    Start: 8/31/2023    Class: Normal    Non-formulary For: Atrial fibrillation, unspecified type ([address])    Last ordered: 3 months ago (5/18/2023) by Mary Nance MD    Hematology:  Anticoagulants Failed 08/31/2023 02:11 PM   Protocol Details  PLT in normal range and within 360 days    HCT in normal range and within 360 days    HGB in normal range and within 360 days    eGFR is 60 or above and within 360 days    Valid encounter within last 6 months      Please review and refill if possible. [facility]  Thanks!

## 2023-09-01 ENCOUNTER — TELEPHONE (OUTPATIENT)
Dept: CARDIOLOGY CLINIC | Facility: CLINIC | Age: 78
End: 2023-09-01

## 2023-09-01 NOTE — TELEPHONE ENCOUNTER
----- Message from Raudel Claudio sent at 9/1/2023 10:17 AM EDT -----  Regarding: FW: Ongoing arrhythmias    ----- Message -----  From: Raudel Claudio  Sent: 9/1/2023  10:12 AM EDT  To: Aislinn Jama RN; Walter Will MD; #  Subject: RE: Ongoing arrhythmias                          Pt is scheduled for an appt w/ Dr. Hyun Adamson on 10/12/23. That was the next available. Thanks   ----- Message -----  From: Walter Will MD  Sent: 8/31/2023   8:59 PM EDT  To: Raudel Claudio; Aislinn Jama RN; #  Subject: RE: Ongoing arrhythmias                          Thank you Dr Annabella White reach out to the patient with regards to palpitation, and a visit to decide if ablation may be needed  ----- Message -----  From: Ciro Edwards MD  Sent: 8/30/2023   4:05 PM EDT  To: Walter Will MD  Subject: Ongoing arrhythmias                              Hi. I just met Mr. Robbie Kinney today and unfortunately, as you may already know, he is having ongoing uncontrolled arrhythmias - SVT today to 130s in office, hemodynamically stable; gets symptomatic at home multiple times weekly if not more frequent. Some of it is likely work stress, alcohol (just got off cruise and drinking more for last week though he is not a terrible chronic alcoholic), poorly treated ARLENE (uses cpap only intermittently), but will defer to you all next steps for his tikosyn 250 bid/bblocker/ablation plans. I am intensifying his gdmt starting today and will see how far I can get.     Thanks!    - Salena Starkey

## 2023-09-08 NOTE — PROGRESS NOTES
Advanced Heart Failure/Pulmonary Hypertension Outpatient Note - Lissa Murphy. 68 y.o. male MRN: [id number]    @ Encounter: [id number]    Assessment:  68 y.o. male Hx per chart p/w HF fu.    Follows at the [facility] for some care, does not get meds through [facility]  Primary cardiologist is Dr. Jose Angel Kwok, general cardiology  Follows EP Dr. Viann Sicard  SVT, ablation in past  pAF on [facility]  NICM, HFrEF, EF 34% on NST while in AF, echo 40% (also with uncontrolled arrhythmias during study)  Recurrent DVT and PE  CAD. Nonobstructive. Per notes: "Cardiac catheterization in 2021 showed mild coronary artery disease. No intervention was necessary." my review: mild CAD of LAD and LCX, RCA not engaged.   4/2023 NST: small mixed apical defect - LVEF 34%  ARLENE  CKD  HLD  Obese  Sickle cell trait  cytopenias      I have reviewed all pertinent patient data including but not limited to:    Lab Results   Component Value Date    CREATININE 1.08 07/07/2023     Lab Results   Component Value Date    K 3.6 07/07/2023     No results found for: "HGBA1C"  Lab Results   Component Value Date    FMY0FYTMDALM 2.133 11/21/2021     Lab Results   Component Value Date    LDLCALC 81 05/18/2022     No results found for: "BNP"   Lab Results   Component Value Date    NTBNP 36 11/21/2021        TODAY'S PLAN:  Warm, euvolemic  No new cardiac complaints, feels well  No further heavy etoh since his recent cruise  BP above goal    Spoke w EP about his ongoing SVT last visit, they are scheduling fu, possible ablation    Heme cx for cytopenias - given today  He says he may fu at [facility] for this    gdmt below  Start aldactone and jardiance today, fu BMP in 1 week  On limited therapy at our first meeting, will escalate as able for HFrEF    Key info from my prior notes:    I am meeting patient for the first time today  Just got off cruise, was drinking more on ship, 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  +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  Generally feels well except ongoing episodic palpitations sometimes associated with dizziness, SOB; no recent syncope - gets these episode sfew times weekly at least  Recently improved LE edema after PCP stated lasix few weeks ago  SVT today in office to 130s bpm, hemodynamically stable, reverted spontaneously to NSR    EP and primary cardiology team plans noted  Since 7/2023 on tikosyn 250 bid  QTc, recent Cr and K acceptable  Messaged EP team today regarding ongoing SVT    Long discussion today about risks/benefits, red flags, disease trajectory    Uses his cpap only intermittently  Gave new sleep med referral today    Note given today to stay out of work given ongoing cardiac issues    He may be planning to retire soon based on his age and job stress    No overt evidence ischemia now    Neurohormonal Blockade/GDMT:  --Beta-Blocker: not taking per pt, was on low dose per EP, will defer dosing to EP now; on tiksoyn  --ACEi, ARB, ARNi: entresto 24/26 bid  --MRA: aldactone 25 qd  --SGLT2i: jardiance 10 qd; discussed risks/benefits/red flags/when to call us; no overt contraindications  --Hydralazine/nitrates: not on    --Diuretic: lasix 40 as needed  Long discussion about evidence of worsening HF, when to self uptitrate home diuretic and call cardiology office    Other HF pharmaco-invasive therapy (if applicable):   PPM,  ICD , CRT (if applicable): Interrogation:  Advanced Therapies (if applicable): --Inotrope:  --LVAD/Transplant Candidacy:    Studies:  I have reviewed all pertinent patient data/labs/imaging where available, including but not limited to the below studies. Selected results may be displayed here but comprehensive listing is omitted for note clarity and can be found in the epic chart. ECG. Echo. Stress. Cath. HPI:   68 y.o. male Hx per chart p/w HF fu.  No new CP  +episodic SOB/dizziness/palpitations  No syncope. No new fatigue. No new unintentional weight changes.   No new leg swelling - recently better on lasix,   No PND, pillow orthopnea. No new fevers, chills, cough, nausea, vomiting, diarrhea, dysuria. Interval History:   As noted in 'plan' section above and prior epic chart notes. No new CP/SOB/dizziness/palpitations/syncope. No new fatigue. No new unintentional weight changes. No new leg swelling, PND, pillow orthopnea. No new fevers, chills, cough, nausea, vomiting, diarrhea, dysuria.       Past Medical History:   Diagnosis Date   • Cardiomyopathy [facility])    • Hyperlipidemia    • Post-nasal drip    • Pulmonary embolism [facility])      Patient Active Problem List    Diagnosis Date Noted   • Erectile dysfunction due to arterial insufficiency 06/21/2023   • PAF (paroxysmal atrial fibrillation) ([address]) 06/17/2023   • BPH with obstruction/lower urinary tract symptoms 06/08/2022   • COVID-19 11/21/2021   • Chest pain 04/23/2021   • Acute kidney injury superimposed on CKD ([address]) 04/23/2021   • Lumbar disc disease with radiculopathy - Left 06/19/2020   • Spinal stenosis of lumbar region without neurogenic claudication - Left 06/19/2020   • BPH (benign prostatic hyperplasia) 06/12/2020   • Nephrolithiasis 06/12/2020   • Dyslipidemia 04/03/2019   • Cardiomyopathy, nonischemic ([address]) 08/16/2018   • ARLENE (obstructive sleep apnea)    • SVT (supraventricular tachycardia) ([address]) 02/13/2018   • Chronic anticoagulation 02/13/2018   • Chronic low back pain 08/22/2017   • Bilateral pulmonary embolism ([address]) 08/21/2017   • SOB (shortness of breath) 08/21/2017   • Hyperlipidemia 08/21/2017   • Lung nodule 08/21/2017   • Obesity 08/21/2017   • History of sickle cell trait 08/21/2017   • Leg edema, left 08/21/2017   • History of pulmonary embolism 08/21/2017   • Thrombocytopenia ([address]) 08/21/2017   • Chronic kidney disease (CKD), stage II (mild) 08/21/2017   • Tachycardia 08/21/2017       ROS:  10 point ROS negative except as specified in HPI/interval history    No Known Allergies    Current Outpatient Medications   Medication Instructions   • acetaminophen-codeine (TYLENOL #3) 300-30 mg per tablet TAKE 1-2 TABS BY MOUTH EVERY 4-6 HRS AS NEEDED FOR PAIN   • apixaban (ELIQUIS) 5 mg, Oral, 2 times daily   • dofetilide (TIKOSYN) 250 mcg, Oral, Every 12 hours scheduled   • DULoxetine (CYMBALTA) 60 mg delayed release capsule Oral   • Empagliflozin (JARDIANCE) 10 mg, Oral, Every morning   • finasteride (PROSCAR) 5 mg, Oral, Daily   • fluticasone (FLONASE) 50 mcg/act nasal spray 1 spray, Nasal, Daily PRN   • furosemide (LASIX) 40 mg, Oral, As needed   • metoprolol succinate (TOPROL-XL) 12.5 mg, Oral, Daily   • Omega-3 1,000 mg   • rosuvastatin (CRESTOR) 20 mg, Oral, Daily   • sacubitril-valsartan (Entresto) 24-26 MG TABS 1 tablet, Oral, 2 times daily   • sildenafil (VIAGRA) 50 mg, Oral, Daily PRN   • spironolactone (ALDACTONE) 25 mg, Oral, Daily   • tadalafil (CIALIS) 20 mg, Oral, Daily PRN   • tamsulosin (FLOMAX) 0.4 mg, Oral, Daily with dinner        Social History     Socioeconomic History   • Marital status: /Civil Union     Spouse name: Not on file   • Number of children: 7   • Years of education: Not on file   • Highest education level: Not on file   Occupational History   • Occupation: employed   Tobacco Use   • Smoking status: Former     Types: Cigarettes     Quit date:      Years since quittin.7   • Smokeless tobacco: Never   Vaping Use   • Vaping Use: Never used   Substance and Sexual Activity   • Alcohol use:  Yes     Alcohol/week: 0.0 standard drinks of alcohol     Comment: socially   • Drug use: No   • Sexual activity: Not on file   Other Topics Concern   • Not on file   Social History Narrative   • Not on file     Social Determinants of Health     Financial Resource Strain: Not on file   Food Insecurity: No Food Insecurity (2023)    Hunger Vital Sign    • Worried About Running Out of Food in the Last Year: Never true    • Ran Out of Food in the Last Year: Never true   Transportation Needs: No Transportation Needs (2023) PRAPARE - Transportation    • Lack of Transportation (Medical): No    • Lack of Transportation (Non-Medical): No   Physical Activity: Not on file   Stress: Not on file   Social Connections: Not on file   Intimate Partner Violence: Not on file   Housing Stability: Low Risk  (7/6/2023)    Housing Stability Vital Sign    • Unable to Pay for Housing in the Last Year: No    • Number of Places Lived in the Last Year: 1    • Unstable Housing in the Last Year: No       No family history on file. Physical Exam:  Vitals:    09/18/23 0930   BP: 144/86   BP Location: Left arm   Patient Position: Sitting   Cuff Size: Standard   Pulse: 76   SpO2: 96%   Weight: 125 kg (275 lb 12.8 oz)   Height: 6' 3" (1.905 m)     Constitutional: NAD, non toxic  Ears/nose/mouth/throat: atraumatic  CV: RRR, no JVD  Resp: ctabl  GI: Soft, NTND  MSK: no swollen joints in exposed areas  Extr: no LE edema, warm LE  Pysche: Normal affect  Neuro: appropriate in conversation  Skin: dry and intact in exposed areas    Labs & Results:    Lab Results   Component Value Date    SODIUM 144 07/07/2023    K 3.6 07/07/2023     (H) 07/07/2023    CO2 26 07/07/2023    BUN 13 07/07/2023    CREATININE 1.08 07/07/2023    GLUC 95 07/07/2023    CALCIUM 8.9 07/07/2023     Lab Results   Component Value Date    WBC 3.82 (L) 07/07/2023    HGB 12.0 07/07/2023    HCT 36.3 (L) 07/07/2023    MCV 95 07/07/2023     (L) 07/07/2023     No results found for: "BNP"   Lab Results   Component Value Date    CHOLESTEROL 146 05/18/2022    CHOLESTEROL 201 (H) 03/31/2021    CHOLESTEROL 207 (H) 08/22/2017     Lab Results   Component Value Date    HDL 45 05/18/2022    HDL 46 03/31/2021    HDL 45 08/22/2017     Lab Results   Component Value Date    TRIG 98 05/18/2022    TRIG 113 11/21/2021    TRIG 143 03/31/2021     Lab Results   Component Value Date    [address] 155 03/31/2021       Counseling / Coordination of Care  Time spent today 28 minutes.   Greater than 50% of total time was spent with the patient and / or family counseling and / or coordination of care. We discussed diagnoses, most recent studies, tests and any changes in treatment plan. Thank you for the opportunity to participate in the care of this patient.     Philomena Nageotte, MD  Attending Physician  Advanced Heart Failure and Transplant Cardiology  [address] [facility]

## 2023-09-18 ENCOUNTER — DOCUMENTATION (OUTPATIENT)
Dept: HEMATOLOGY ONCOLOGY | Facility: CLINIC | Age: 78
End: 2023-09-18

## 2023-09-18 ENCOUNTER — OFFICE VISIT (OUTPATIENT)
Dept: CARDIOLOGY CLINIC | Facility: CLINIC | Age: 78
End: 2023-09-18
Payer: COMMERCIAL

## 2023-09-18 VITALS
DIASTOLIC BLOOD PRESSURE: 86 MMHG | OXYGEN SATURATION: 96 % | HEIGHT: 75 IN | HEART RATE: 76 BPM | WEIGHT: 275.8 LBS | BODY MASS INDEX: 34.29 KG/M2 | SYSTOLIC BLOOD PRESSURE: 144 MMHG

## 2023-09-18 DIAGNOSIS — I48.0 PAF (PAROXYSMAL ATRIAL FIBRILLATION) (HCC): ICD-10-CM

## 2023-09-18 DIAGNOSIS — I47.10 SVT (SUPRAVENTRICULAR TACHYCARDIA): ICD-10-CM

## 2023-09-18 DIAGNOSIS — D69.6 THROMBOCYTOPENIA (HCC): ICD-10-CM

## 2023-09-18 DIAGNOSIS — I42.8 CARDIOMYOPATHY, NONISCHEMIC (HCC): Primary | ICD-10-CM

## 2023-09-18 DIAGNOSIS — Z86.2 HISTORY OF SICKLE CELL TRAIT: ICD-10-CM

## 2023-09-18 PROCEDURE — 99214 OFFICE O/P EST MOD 30 MIN: CPT | Performed by: STUDENT IN AN ORGANIZED HEALTH CARE EDUCATION/TRAINING PROGRAM

## 2023-09-18 RX ORDER — SPIRONOLACTONE 25 MG/1
25 TABLET ORAL DAILY
Qty: 90 TABLET | Refills: 5 | Status: SHIPPED | OUTPATIENT
Start: 2023-09-18 | End: 2025-03-11

## 2023-09-18 NOTE — PROGRESS NOTES
Intake received- chart reviewed for external records retrieval.    Patient is scheduled on 9/19/23@ 9:00 with Dr. Maik Crameri for Dx: Thrombocytopenia    The intake Referral stated diagnosis was Cardiomyopathy but he was already seen today by Cardiology for that diagnosis  . According to the Cardiologist note from today it states that Pt will see Medical Oncology for Thrombocytopenia. Due to Dx on patient's referral, no records retrieval needed by Oncology Care Coordination.

## 2023-09-19 ENCOUNTER — TELEPHONE (OUTPATIENT)
Dept: HEMATOLOGY ONCOLOGY | Facility: CLINIC | Age: 78
End: 2023-09-19

## 2023-09-19 NOTE — TELEPHONE ENCOUNTER
Appointment Confirmation   Who are you speaking with? Patient   If it is not the patient, are they listed on an active communication consent form? N/A   Which provider is the appointment scheduled with? Dr. Colt Osman   When is the appointment scheduled? Please list date and time 10/05/2023 @1PM    At which location is the appointment scheduled to take place? [place]   Did caller verbalize understanding of appointment details?  Yes

## 2023-09-19 NOTE — TELEPHONE ENCOUNTER
Appointment Change  Cancel, Reschedule, Change to Virtual      Who are you speaking with? self   If it is not the patient, is the caller listed on the communication consent form? self   Which provider is the appointment scheduled with? [facility]   When was the original appointment scheduled? Please list date and time 9/19   At which location is the appointment scheduled to take place? [facility]   Was the appointment rescheduled? Was the appointment changed from an in person visit to a virtual visit? If so, please list the details of the change. Yes, 10/5 1pm Ruby Kwon   What is the reason for the appointment change? Missed appmt       Was STAR transport scheduled? no   Does STAR transport need to be scheduled for the new visit (if applicable) no   Does the patient need an infusion appointment rescheduled? no   Does the patient have an upcoming infusion appointment scheduled? If so, when? no   Is the patient undergoing chemotherapy? no   For appointments cancelled with less than 24 hours:  Was the no-show policy reviewed?  yes

## 2023-09-28 ENCOUNTER — OFFICE VISIT (OUTPATIENT)
Dept: CARDIOLOGY CLINIC | Facility: CLINIC | Age: 78
End: 2023-09-28
Payer: COMMERCIAL

## 2023-09-28 ENCOUNTER — APPOINTMENT (OUTPATIENT)
Dept: LAB | Facility: HOSPITAL | Age: 78
End: 2023-09-28
Payer: COMMERCIAL

## 2023-09-28 VITALS
HEART RATE: 73 BPM | SYSTOLIC BLOOD PRESSURE: 128 MMHG | WEIGHT: 268 LBS | BODY MASS INDEX: 33.32 KG/M2 | OXYGEN SATURATION: 96 % | HEIGHT: 75 IN | DIASTOLIC BLOOD PRESSURE: 82 MMHG

## 2023-09-28 DIAGNOSIS — Z79.01 CHRONIC ANTICOAGULATION: ICD-10-CM

## 2023-09-28 DIAGNOSIS — I48.0 PAF (PAROXYSMAL ATRIAL FIBRILLATION) (HCC): ICD-10-CM

## 2023-09-28 DIAGNOSIS — I42.8 CARDIOMYOPATHY, NONISCHEMIC (HCC): ICD-10-CM

## 2023-09-28 DIAGNOSIS — I42.8 CARDIOMYOPATHY, NONISCHEMIC (HCC): Primary | ICD-10-CM

## 2023-09-28 LAB
BACTERIA UR QL AUTO: NORMAL /HPF
BILIRUB UR QL STRIP: NEGATIVE
CLARITY UR: CLEAR
COLOR UR: ABNORMAL
GLUCOSE UR STRIP-MCNC: ABNORMAL MG/DL
HGB UR QL STRIP.AUTO: NEGATIVE
KETONES UR STRIP-MCNC: NEGATIVE MG/DL
LEUKOCYTE ESTERASE UR QL STRIP: ABNORMAL
NITRITE UR QL STRIP: NEGATIVE
NON-SQ EPI CELLS URNS QL MICRO: NORMAL /HPF
PH UR STRIP.AUTO: 5.5 [PH]
PROT UR STRIP-MCNC: NEGATIVE MG/DL
RBC #/AREA URNS AUTO: NORMAL /HPF
SP GR UR STRIP.AUTO: 1.01 (ref 1–1.03)
UROBILINOGEN UR STRIP-ACNC: <2 MG/DL
WBC #/AREA URNS AUTO: NORMAL /HPF

## 2023-09-28 PROCEDURE — 99214 OFFICE O/P EST MOD 30 MIN: CPT | Performed by: INTERNAL MEDICINE

## 2023-09-28 RX ORDER — DOFETILIDE 0.25 MG/1
250 CAPSULE ORAL EVERY 12 HOURS SCHEDULED
Qty: 360 CAPSULE | Refills: 0
Start: 2023-09-28 | End: 2024-03-26

## 2023-09-28 NOTE — PROGRESS NOTES
[facility]  [phone] [facility] [place] [address]  Cardiology Follow Up    Rossy Dennison.  1945  [id number]      1. Cardiomyopathy, nonischemic ([address])  Echo follow up/limited w/ contrast if indicated      2. PAF (paroxysmal atrial fibrillation) (HCC)  dofetilide (TIKOSYN) 250 mcg capsule    Echo follow up/limited w/ contrast if indicated      3. Chronic anticoagulation            Chief Complaint   Patient presents with   • Follow-up       Interval History: This patient has history of nonischemic cardiomyopathy. Patient also has history of SVT ablation in the past but now has paroxysmal atrial fibrillation. Patient was evaluated by electrophysiology. Patient was started on Tikosyn. Patient also on anticoagulation anyway for history of recurrent pulmonary embolism. Patient does have history of alcohol abuse from time to time. Patient was evaluated by heart failure cardiology. Patient was started on Jardiance. Patient does not want to take it. Patient is on Entresto and spironolactone also started by heart failure cardiology. Patient is supposed to get blood work. Patient denies any palpitations or dizziness. Patient denies any history of bleeding issues. He states that he has been compliant otherwise with all other medications.     Patient Active Problem List   Diagnosis   • Bilateral pulmonary embolism (HCC)   • SOB (shortness of breath)   • Hyperlipidemia   • Lung nodule   • Obesity   • History of sickle cell trait   • Leg edema, left   • History of pulmonary embolism   • Thrombocytopenia (HCC)   • Chronic kidney disease (CKD), stage II (mild)   • Tachycardia   • Chronic low back pain   • SVT (supraventricular tachycardia) (HCC)   • Chronic anticoagulation   • ARLENE (obstructive sleep apnea)   • Cardiomyopathy, nonischemic (HCC)   • Dyslipidemia   • BPH (benign prostatic hyperplasia)   • Nephrolithiasis   • Lumbar disc disease with radiculopathy - Left   • Spinal stenosis of lumbar region without neurogenic claudication - Left   • Chest pain   • Acute kidney injury superimposed on CKD ([address])   • COVID-19   • BPH with obstruction/lower urinary tract symptoms   • PAF (paroxysmal atrial fibrillation) (HCC)   • Erectile dysfunction due to arterial insufficiency     Past Medical History:   Diagnosis Date   • Cardiomyopathy ([address])    • Hyperlipidemia    • Post-nasal drip    • Pulmonary embolism (HCC)      Social History     Socioeconomic History   • Marital status: /Civil Union     Spouse name: Not on file   • Number of children: 7   • Years of education: Not on file   • Highest education level: Not on file   Occupational History   • Occupation: employed   Tobacco Use   • Smoking status: Former     Types: Cigarettes     Quit date:      Years since quittin.7   • Smokeless tobacco: Never   Vaping Use   • Vaping Use: Never used   Substance and Sexual Activity   • Alcohol use: Yes     Alcohol/week: 0.0 standard drinks of alcohol     Comment: socially   • Drug use: No   • Sexual activity: Not on file   Other Topics Concern   • Not on file   Social History Narrative   • Not on file     Social Determinants of Health     Financial Resource Strain: Not on file   Food Insecurity: No Food Insecurity (2023)    Hunger Vital Sign    • Worried About Running Out of Food in the Last Year: Never true    • Ran Out of Food in the Last Year: Never true   Transportation Needs: No Transportation Needs (2023)    PRAPARE - Transportation    • Lack of Transportation (Medical): No    • Lack of Transportation (Non-Medical): No   Physical Activity: Not on file   Stress: Not on file   Social Connections: Not on file   Intimate Partner Violence: Not on file   Housing Stability: Low Risk  (2023)    Housing Stability Vital Sign    • Unable to Pay for Housing in the Last Year: No    • Number of Places Lived in the Last Year: 1    • Unstable Housing in the Last Year: No      History reviewed.  No pertinent family history. Past Surgical History:   Procedure Laterality Date   • ARTHROSCOPY KNEE Left     30 years ago   • [place] CYSTO INSERTION TRANSPROSTATIC IMPLANT SINGLE N/A 06/17/2021    Procedure: CYSTOSCOPY WITH INSERTION UROLIFT;  Surgeon: Willi Pool MD;  Location: [facility];  Service: Urology       Current Outpatient Medications:   •  acetaminophen-codeine (TYLENOL #3) 300-30 mg per tablet, TAKE 1-2 TABS BY MOUTH EVERY 4-6 HRS AS NEEDED FOR PAIN, Disp: , Rfl:   •  apixaban (Eliquis) 5 mg, Take 1 tablet (5 mg total) by mouth 2 (two) times a day, Disp: 180 tablet, Rfl: 3  •  dofetilide (TIKOSYN) 250 mcg capsule, Take 1 capsule (250 mcg total) by mouth every 12 (twelve) hours, Disp: 360 capsule, Rfl: 0  •  DULoxetine (CYMBALTA) 60 mg delayed release capsule, Oral, Disp: , Rfl:   •  finasteride (PROSCAR) 5 mg tablet, TAKE 1 TABLET (5 MG TOTAL) BY MOUTH DAILY. , Disp: 90 tablet, Rfl: 5  •  fluticasone (FLONASE) 50 mcg/act nasal spray, 1 spray into each nostril daily as needed for rhinitis, Disp: 16 g, Rfl: 0  •  furosemide (LASIX) 40 mg tablet, Take 40 mg by mouth if needed for shortness of breath, Disp: , Rfl:   •  Omega-3 1000 MG CAPS, 1,000 mg, Disp: , Rfl:   •  rosuvastatin (CRESTOR) 20 MG tablet, Take 20 mg by mouth daily, Disp: , Rfl:   •  sacubitril-valsartan (Entresto) 24-26 MG TABS, Take 1 tablet by mouth 2 (two) times a day, Disp: 180 tablet, Rfl: 5  •  sildenafil (VIAGRA) 50 MG tablet, Take 1 tablet (50 mg total) by mouth daily as needed for erectile dysfunction, Disp: 10 tablet, Rfl: 3  •  spironolactone (ALDACTONE) 25 mg tablet, Take 1 tablet (25 mg total) by mouth daily, Disp: 90 tablet, Rfl: 5  •  tamsulosin (FLOMAX) 0.4 mg, Take 1 capsule (0.4 mg total) by mouth daily with dinner, Disp: 60 capsule, Rfl: 3  •  metoprolol succinate (TOPROL-XL) 25 mg 24 hr tablet, Take 0.5 tablets (12.5 mg total) by mouth daily Do not start before July 8, 2023.  (Patient not taking: Reported on 8/30/2023), Disp: 15 tablet, Rfl: 3  •  tadalafil (CIALIS) 20 MG tablet, Take 1 tablet (20 mg total) by mouth daily as needed for erectile dysfunction (Patient not taking: Reported on 8/30/2023), Disp: 10 tablet, Rfl: 10  No Known Allergies    Labs:  No visits with results within 2 Month(s) from this visit.    Latest known visit with results is:   Admission on 07/05/2023, Discharged on 07/07/2023   Component Date Value   • WBC 07/05/2023 3.85 (L)    • RBC 07/05/2023 3.76 (L)    • Hemoglobin 07/05/2023 12.0    • Hematocrit 07/05/2023 35.3 (L)    • MCV 07/05/2023 94    • MCH 07/05/2023 31.9    • MCHC 07/05/2023 34.0    • RDW 07/05/2023 14.2    • Platelets 60/29/6733 126 (L)    • MPV 07/05/2023 11.5    • Sodium 07/05/2023 143    • Potassium 07/05/2023 3.6    • Chloride 07/05/2023 114 (H)    • CO2 07/05/2023 27    • ANION GAP 07/05/2023 2    • BUN 07/05/2023 14    • Creatinine 07/05/2023 1.26    • Glucose 07/05/2023 95    • Calcium 07/05/2023 9.0    • eGFR 07/05/2023 54    • Ventricular Rate 07/05/2023 53    • Atrial Rate 07/05/2023 53    • MD Interval 07/05/2023 176    • QRSD Interval 07/05/2023 104    • QT Interval 07/05/2023 440    • QTC Interval 07/05/2023 412    • P Axis 07/05/2023 78    • QRS Axis 07/05/2023 -31    • T Wave Axis 07/05/2023 -31    • Ventricular Rate 07/05/2023 55    • Atrial Rate 07/05/2023 55    • MD Interval 07/05/2023 176    • QRSD Interval 07/05/2023 112    • QT Interval 07/05/2023 462    • QTC Interval 07/05/2023 441    • P Axis 07/05/2023 49    • QRS Axis 07/05/2023 -32    • T Wave Axis 07/05/2023 -39    • Sodium 07/06/2023 141    • Potassium 07/06/2023 3.6    • Chloride 07/06/2023 112 (H)    • CO2 07/06/2023 26    • ANION GAP 07/06/2023 3    • BUN 07/06/2023 15    • Creatinine 07/06/2023 1.18    • Glucose 07/06/2023 104    • Calcium 07/06/2023 8.7    • eGFR 07/06/2023 59    • WBC 07/06/2023 3.83 (L)    • RBC 07/06/2023 3.66 (L)    • Hemoglobin 07/06/2023 11.7 (L)    • Hematocrit 07/06/2023 34.4 (L)    • MCV 07/06/2023 94    • MCH 07/06/2023 32.0    • MCHC 07/06/2023 34.0    • RDW 07/06/2023 13.9    • Platelets 55/29/4589 124 (L)    • MPV 07/06/2023 11.9    • Ventricular Rate 07/06/2023 56    • Atrial Rate 07/06/2023 56    • MN Interval 07/06/2023 178    • QRSD Interval 07/06/2023 102    • QT Interval 07/06/2023 458    • QTC Interval 07/06/2023 441    • P Axis 07/06/2023 60    • QRS Axis 07/06/2023 -27    • T Wave Axis 07/06/2023 -27    • Ventricular Rate 07/06/2023 52    • Atrial Rate 07/06/2023 52    • MN Interval 07/06/2023 176    • QRSD Interval 07/06/2023 104    • QT Interval 07/06/2023 462    • QTC Interval 07/06/2023 429    • P Axis 07/06/2023 65    • QRS Axis 07/06/2023 -29    • T Wave Axis 07/06/2023 -26    • WBC 07/07/2023 3.82 (L)    • RBC 07/07/2023 3.84 (L)    • Hemoglobin 07/07/2023 12.0    • Hematocrit 07/07/2023 36.3 (L)    • MCV 07/07/2023 95    • MCH 07/07/2023 31.3    • MCHC 07/07/2023 33.1    • RDW 07/07/2023 13.9    • Platelets 93/78/2743 131 (L)    • MPV 07/07/2023 12.0    • Sodium 07/07/2023 144    • Potassium 07/07/2023 3.6    • Chloride 07/07/2023 112 (H)    • CO2 07/07/2023 26    • ANION GAP 07/07/2023 6    • BUN 07/07/2023 13    • Creatinine 07/07/2023 1.08    • Glucose 07/07/2023 95    • Calcium 07/07/2023 8.9    • eGFR 07/07/2023 65    • Ventricular Rate 07/07/2023 56    • Atrial Rate 07/07/2023 56    • MN Interval 07/07/2023 180    • QRSD Interval 07/07/2023 114    • QT Interval 07/07/2023 454    • QTC Interval 07/07/2023 438    • P Axis 07/07/2023 30    • QRS Axis 07/07/2023 -28    • T Wave Axis 07/07/2023 -35      Imaging: No results found.     Review of Systems:  Review of Systems   REVIEW OF SYSTEMS:  Constitutional:  Denies fever or chills   Eyes:  Denies change in visual acuity   HENT:  Denies nasal congestion or sore throat   Respiratory:  shortness of breath   Cardiovascular:  Denies chest pain or edema   GI:  Denies abdominal pain, nausea, vomiting, bloody stools or diarrhea :  Denies dysuria, frequency, difficulty in micturition and nocturia  Musculoskeletal:  Denies back pain or joint pain   Neurologic:  Denies headache, focal weakness or sensory changes   Endocrine:  Denies polyuria or polydipsia   Lymphatic:  Denies swollen glands   Psychiatric:  Denies depression or anxiety    Physical Exam:    /82 (BP Location: Left arm, Patient Position: Sitting, Cuff Size: Large)   Pulse 73   Ht 6' 3" (1.905 m)   Wt 122 kg (268 lb)   SpO2 96%   BMI 33.50 kg/m²     Physical Exam   PHYSICAL EXAM:  General:  Patient is not in acute distress   Head: Normocephalic, Atraumatic. HEENT:  Both pupils normal-size atraumatic, normocephalic, nonicteric  Neck:  JVP not raised. Trachea central. No carotid bruit  Respiratory:  normal breath sounds no crackles. no rhonchi  Cardiovascular:  Regular rate and rhythm no S3 no murmurs  GI:  Abdomen soft nontender. No organomegaly. Lymphatic:  No cervical or inguinal lymphadenopathy  Neurologic:  Patient is awake alert, oriented . Grossly nonfocal  Extremities no edema    Discussion/Summary:    Patient with multiple medical problems who seems to be doing reasonably well from cardiac standpoint. Previous studies reviewed with patient. Medications reviewed and possible side effects discussed. concepts of cardiovascular disease , signs and symptoms of heart disease. Dietary and risk factor modification reinforced. All questions answered. Safety measures reviewed. Patient advised to report any problems prompting medical attention. Risks and benefits  and alternatives of anticoagulation to prevent thromboembolic risk from atrial fibrillation discussed at length. Patient to report any bleeding issues. Follow-up with electrophysiology. Repeat limited echocardiogram in a few months to reassess ejection fraction given history of cardiomyopathy.     Symptoms watch her from cardiac standpoint which would indicate the need for further cardiac evaluation discussed. Patient counseled to lose weight to reduce cardiovascular morbidity and mortality. Patient counseled about excess alcohol use.

## 2023-10-05 ENCOUNTER — CONSULT (OUTPATIENT)
Dept: HEMATOLOGY ONCOLOGY | Facility: CLINIC | Age: 78
End: 2023-10-05
Payer: COMMERCIAL

## 2023-10-05 VITALS
OXYGEN SATURATION: 98 % | DIASTOLIC BLOOD PRESSURE: 86 MMHG | TEMPERATURE: 98.2 F | RESPIRATION RATE: 16 BRPM | HEIGHT: 75 IN | WEIGHT: 273 LBS | HEART RATE: 68 BPM | BODY MASS INDEX: 33.94 KG/M2 | SYSTOLIC BLOOD PRESSURE: 132 MMHG

## 2023-10-05 DIAGNOSIS — D61.818 PANCYTOPENIA (HCC): Primary | ICD-10-CM

## 2023-10-05 PROCEDURE — 99204 OFFICE O/P NEW MOD 45 MIN: CPT | Performed by: INTERNAL MEDICINE

## 2023-10-05 RX ORDER — LIDOCAINE 50 MG/G
PATCH TOPICAL
COMMUNITY
Start: 2023-09-29 | End: 2024-09-29

## 2023-10-05 NOTE — PROGRESS NOTES
Gloria Christie.  1945  [address] [facility] [address]    CHIEF COMPLAINT: Patient is a 27-year-old male who was referred in for evaluation of thrombocytopenia. Patient denies any symptoms of bleeding, easy bruising or any petechiae. HISTORY OF PRESENT ILLNESS patient had a CBC done in July 2023 which showed WBC count of 3.83 with a hemoglobin 11.7 hematocrit 34.4 and a platelet count of 451. The indices were normocytic normocytic normochromic. He has had thrombocytopenia since 11/22/2021. Patient has a history of pulmonary embolism. Episode occurred in 2017 patient had venous Doppler done at the time which was unremarkable. It appears his pulmonary embolism was unprovoked. Patient currently on lifetime treatment with Eliquis.     Patient Active Problem List   Diagnosis   • Bilateral pulmonary embolism (HCC)   • SOB (shortness of breath)   • Hyperlipidemia   • Lung nodule   • Obesity   • History of sickle cell trait   • Leg edema, left   • History of pulmonary embolism   • Thrombocytopenia (HCC)   • Chronic kidney disease (CKD), stage II (mild)   • Tachycardia   • Chronic low back pain   • SVT (supraventricular tachycardia)   • Chronic anticoagulation   • ARLENE (obstructive sleep apnea)   • Cardiomyopathy, nonischemic (HCC)   • Dyslipidemia   • BPH (benign prostatic hyperplasia)   • Nephrolithiasis   • Lumbar disc disease with radiculopathy - Left   • Spinal stenosis of lumbar region without neurogenic claudication - Left   • Chest pain   • Acute kidney injury superimposed on CKD    • COVID-19   • BPH with obstruction/lower urinary tract symptoms   • PAF (paroxysmal atrial fibrillation) (HCC)   • Erectile dysfunction due to arterial insufficiency     Past Medical History:   Diagnosis Date   • Cardiomyopathy ([address])    • Hyperlipidemia    • Post-nasal drip    • Pulmonary embolism ([address])      Oncology History    No history exists. Past Surgical History:   Procedure Laterality Date   • ARTHROSCOPY KNEE Left     30 years ago   • [place] CYSTO INSERTION TRANSPROSTATIC IMPLANT SINGLE N/A 2021    Procedure: CYSTOSCOPY WITH INSERTION UROLIFT;  Surgeon: Sridhar Maldonado MD;  Location: [facility] OR;  Service: Urology     No family history on file. Social History     Socioeconomic History   • Marital status: /Civil Union     Spouse name: Not on file   • Number of children: 7   • Years of education: Not on file   • Highest education level: Not on file   Occupational History   • Occupation: employed   Tobacco Use   • Smoking status: Former     Types: Cigarettes     Quit date:      Years since quittin.7   • Smokeless tobacco: Never   Vaping Use   • Vaping Use: Never used   Substance and Sexual Activity   • Alcohol use: Yes     Alcohol/week: 0.0 standard drinks of alcohol     Comment: socially   • Drug use: No   • Sexual activity: Not on file   Other Topics Concern   • Not on file   Social History Narrative   • Not on file     Social Determinants of Health     Financial Resource Strain: Not on file   Food Insecurity: No Food Insecurity (2023)    Hunger Vital Sign    • Worried About Running Out of Food in the Last Year: Never true    • Ran Out of Food in the Last Year: Never true   Transportation Needs: No Transportation Needs (2023)    PRAPARE - Transportation    • Lack of Transportation (Medical): No    • Lack of Transportation (Non-Medical):  No   Physical Activity: Not on file   Stress: Not on file   Social Connections: Not on file   Intimate Partner Violence: Not on file   Housing Stability: Low Risk  (2023)    Housing Stability Vital Sign    • Unable to Pay for Housing in the Last Year: No    • Number of Places Lived in the Last Year: 1    • Unstable Housing in the Last Year: No       No Known Allergies  Meds:    Current Outpatient Medications:   •  acetaminophen-codeine (TYLENOL #3) 300-30 mg per tablet, TAKE 1-2 TABS BY MOUTH EVERY 4-6 HRS AS NEEDED FOR PAIN, Disp: , Rfl:   •  apixaban (Eliquis) 5 mg, Take 1 tablet (5 mg total) by mouth 2 (two) times a day, Disp: 180 tablet, Rfl: 3  •  Diclofenac Sodium (VOLTAREN) 1 %, APPLY 2GM TO UPPER EXTREMITIES TOPICALLY FOUR TIMES A DAY AND APPLY 4GM TO LOWER EXTREMITIES FOUR TIMES A DAY FOR PAIN AND INFLAMMATION **USE DOSING CARD** (DO NOT EXCEED 16 GRAMS DAILY TO ANY AFFECTE, Disp: , Rfl:   •  dofetilide (TIKOSYN) 250 mcg capsule, Take 1 capsule (250 mcg total) by mouth every 12 (twelve) hours, Disp: 360 capsule, Rfl: 0  •  DULoxetine (CYMBALTA) 60 mg delayed release capsule, Oral, Disp: , Rfl:   •  finasteride (PROSCAR) 5 mg tablet, TAKE 1 TABLET (5 MG TOTAL) BY MOUTH DAILY. , Disp: 90 tablet, Rfl: 5  •  fluticasone (FLONASE) 50 mcg/act nasal spray, 1 spray into each nostril daily as needed for rhinitis, Disp: 16 g, Rfl: 0  •  furosemide (LASIX) 40 mg tablet, Take 40 mg by mouth if needed for shortness of breath, Disp: , Rfl:   •  lidocaine (LIDODERM) 5 %, APPLY 1 TO 2 PATCHES TOPICALLY EVERY DAY AS NEEDED FOR PAIN (REMOVE PATCH AFTER 12 HOURS; 12 HOURS ON AND 12 HOURS OFF), Disp: , Rfl:   •  Omega-3 1000 MG CAPS, 1,000 mg, Disp: , Rfl:   •  rosuvastatin (CRESTOR) 20 MG tablet, Take 20 mg by mouth daily, Disp: , Rfl:   •  sacubitril-valsartan (Entresto) 24-26 MG TABS, Take 1 tablet by mouth 2 (two) times a day, Disp: 180 tablet, Rfl: 5  •  sildenafil (VIAGRA) 50 MG tablet, Take 1 tablet (50 mg total) by mouth daily as needed for erectile dysfunction, Disp: 10 tablet, Rfl: 3  •  spironolactone (ALDACTONE) 25 mg tablet, Take 1 tablet (25 mg total) by mouth daily, Disp: 90 tablet, Rfl: 5  •  tamsulosin (FLOMAX) 0.4 mg, Take 1 capsule (0.4 mg total) by mouth daily with dinner, Disp: 60 capsule, Rfl: 3  •  metoprolol succinate (TOPROL-XL) 25 mg 24 hr tablet, Take 0.5 tablets (12.5 mg total) by mouth daily Do not start before July 8, 2023.  (Patient not taking: Reported on 8/30/2023), Disp: 15 tablet, Rfl: 3         REVIEW OF SYSTEMS:  Constitutional:  Denies any fever; denies night sweats; denies weight loss. HEENT:  Denies blurred vission; denies eue drainage; denies bulging eyes; denies hearing impairment; denies tinnitis; denies vertigo; denies sore-throat; denies sinues drainage or post nasal drip. Neck:  Denies neck swelling. Respiratory:  Denies cough; denises coughing up blood; denies chest pains on breathing; denies shortness of breath. Cardiovascular:  Denies chest pains on exertion; denies heart palpitations; denies light headedness or feeling of fainting; denies leg swelling; denies pains in calves; denies pain in legs on walking. GI:  Denies difficulty swallowing; denies heartburn; denies vomiting blood; denies black-colored stools; denies nausea; denies vomiting; denies abdominal paindenies passage of bright red blood. :  Denies blood in urine; denies urinary frequency; denies pain on urination; denies difficulty in passing urine. Spine:  Denies neck pain; denies radiation of pain into arms or legs; denies lower back pain. Hemotology:  Denies easy bruising. Lymphalics:  Denies new lumps anywhere    Neurological:  Denies headaches; denies dizziness; denies numbness in extremities; denies weakness in extremities; denies poor balance or disequilibrium. Dermatologic:  denies rash; denies itching. PHYSICAL EXAM:  /86 (BP Location: Left arm, Patient Position: Sitting, Cuff Size: Standard)   Pulse 68   Temp 98.2 °F (36.8 °C) (Temporal)   Resp 16   Ht 6' 3" (1.905 m)   Wt 124 kg (273 lb)   SpO2 98%   BMI 34.12 kg/m²      General:  Patient alert; oriented. HEENT:  PERRL; EOM intact; conjunctiva normal; no scleral icterus. Neck:  Trachea midline; thyroid not enlarged;  No carotid bruits    Lymphatics:  No submandibular adenopathy; no cervical adenopathy; no supraclavicular adenopathy; no axillary adenopathy; no inguinal adenopathy. Hent:  Regular rhythm; S1 and S2 normal; No murmurs; no rubs; no gallops; peripheral pulses normal and equal bilaterally. Lungs:  Clear to ausculation and percussion    Abdomen:  Soft; non-tender; no masses; no organomegaly; no superficial veins; no hernia; no abdominal bruise. Extremities:  No leg swelling; no calf tenderness    Spine:  No gross spinal deformity; no spinal tenderness; no CVA tenderness. Neurological: patient alert and oriented; crainials II - XII intact; no motor deficit; no sensory deficit; Gait normal.    Psychiatric:  Mood is appropriate, affect is normal, no active hallucinations or delusions. Labs:  Had CBC CMP drawn yesterday. The results are still pending. Imaging      Assessment:  Patient with mild thrombocytopenia. These results were obtained 7/6/2023. He had a repeat CBC done yesterday results of which are still pending. Patient has no bleeding symptomatology. Examination is unremarkable. Plan:  Patient with history of pancytopenia will await results for further discussion with the patient. Will follow-up again in 2 weeks with repeat CBC CMP for discussion.

## 2023-10-06 ENCOUNTER — TELEPHONE (OUTPATIENT)
Dept: CARDIOLOGY CLINIC | Facility: CLINIC | Age: 78
End: 2023-10-06

## 2023-10-06 NOTE — TELEPHONE ENCOUNTER
Fax received from [facility]  P:[phone]  L:[phone]    Med hold request: Eliquis    Surgery date: 11/02/2023    Form scanned in pts chart and placed in Dr. Kenzie ulloa

## 2023-10-10 ENCOUNTER — APPOINTMENT (EMERGENCY)
Dept: RADIOLOGY | Facility: HOSPITAL | Age: 78
End: 2023-10-10
Payer: COMMERCIAL

## 2023-10-10 ENCOUNTER — HOSPITAL ENCOUNTER (EMERGENCY)
Facility: HOSPITAL | Age: 78
Discharge: HOME | End: 2023-10-10
Attending: EMERGENCY MEDICINE | Admitting: EMERGENCY MEDICINE
Payer: COMMERCIAL

## 2023-10-10 ENCOUNTER — APPOINTMENT (EMERGENCY)
Dept: CT IMAGING | Facility: HOSPITAL | Age: 78
End: 2023-10-10
Payer: COMMERCIAL

## 2023-10-10 VITALS
HEART RATE: 90 BPM | DIASTOLIC BLOOD PRESSURE: 83 MMHG | OXYGEN SATURATION: 98 % | RESPIRATION RATE: 22 BRPM | SYSTOLIC BLOOD PRESSURE: 117 MMHG | TEMPERATURE: 100.7 F

## 2023-10-10 DIAGNOSIS — I48.92 ATRIAL FLUTTER (HCC): ICD-10-CM

## 2023-10-10 DIAGNOSIS — U07.1 COVID-19: Primary | ICD-10-CM

## 2023-10-10 LAB
2HR DELTA HS TROPONIN: 1 NG/L
ALBUMIN SERPL BCP-MCNC: 4 G/DL (ref 3.5–5)
ALP SERPL-CCNC: 56 U/L (ref 34–104)
ALT SERPL W P-5'-P-CCNC: 18 U/L (ref 7–52)
ANION GAP SERPL CALCULATED.3IONS-SCNC: 5 MMOL/L
APTT PPP: 35 SECONDS (ref 23–37)
AST SERPL W P-5'-P-CCNC: 20 U/L (ref 13–39)
ATRIAL RATE: 165 BPM
ATRIAL RATE: 169 BPM
ATRIAL RATE: 324 BPM
BASE EX.OXY STD BLDV CALC-SCNC: 88.4 % (ref 60–80)
BASE EXCESS BLDV CALC-SCNC: 4.1 MMOL/L
BASOPHILS # BLD AUTO: 0.01 THOUSANDS/ÂΜL (ref 0–0.1)
BASOPHILS NFR BLD AUTO: 0 % (ref 0–1)
BILIRUB SERPL-MCNC: 0.57 MG/DL (ref 0.2–1)
BILIRUB UR QL STRIP: NEGATIVE
BUN SERPL-MCNC: 13 MG/DL (ref 5–25)
CALCIUM SERPL-MCNC: 9.7 MG/DL (ref 8.4–10.2)
CARDIAC TROPONIN I PNL SERPL HS: 12 NG/L
CARDIAC TROPONIN I PNL SERPL HS: 13 NG/L
CHLORIDE SERPL-SCNC: 107 MMOL/L (ref 96–108)
CLARITY UR: CLEAR
CO2 SERPL-SCNC: 28 MMOL/L (ref 21–32)
COLOR UR: ABNORMAL
CREAT SERPL-MCNC: 1.22 MG/DL (ref 0.6–1.3)
EOSINOPHIL # BLD AUTO: 0.03 THOUSAND/ÂΜL (ref 0–0.61)
EOSINOPHIL NFR BLD AUTO: 0 % (ref 0–6)
ERYTHROCYTE [DISTWIDTH] IN BLOOD BY AUTOMATED COUNT: 14.8 % (ref 11.6–15.1)
FLUAV RNA RESP QL NAA+PROBE: NEGATIVE
FLUBV RNA RESP QL NAA+PROBE: NEGATIVE
GFR SERPL CREATININE-BSD FRML MDRD: 56 ML/MIN/1.73SQ M
GLUCOSE SERPL-MCNC: 128 MG/DL (ref 65–140)
GLUCOSE UR STRIP-MCNC: NEGATIVE MG/DL
HCO3 BLDV-SCNC: 26.7 MMOL/L (ref 24–30)
HCT VFR BLD AUTO: 40.3 % (ref 36.5–49.3)
HGB BLD-MCNC: 13.7 G/DL (ref 12–17)
HGB UR QL STRIP.AUTO: NEGATIVE
IMM GRANULOCYTES # BLD AUTO: 0.06 THOUSAND/UL (ref 0–0.2)
IMM GRANULOCYTES NFR BLD AUTO: 1 % (ref 0–2)
INR PPP: 1.03 (ref 0.84–1.19)
KETONES UR STRIP-MCNC: NEGATIVE MG/DL
LACTATE SERPL-SCNC: 0.9 MMOL/L (ref 0.5–2)
LEUKOCYTE ESTERASE UR QL STRIP: NEGATIVE
LYMPHOCYTES # BLD AUTO: 0.39 THOUSANDS/ÂΜL (ref 0.6–4.47)
LYMPHOCYTES NFR BLD AUTO: 5 % (ref 14–44)
MCH RBC QN AUTO: 31.2 PG (ref 26.8–34.3)
MCHC RBC AUTO-ENTMCNC: 34 G/DL (ref 31.4–37.4)
MCV RBC AUTO: 92 FL (ref 82–98)
MONOCYTES # BLD AUTO: 0.76 THOUSAND/ÂΜL (ref 0.17–1.22)
MONOCYTES NFR BLD AUTO: 10 % (ref 4–12)
NEUTROPHILS # BLD AUTO: 6.16 THOUSANDS/ÂΜL (ref 1.85–7.62)
NEUTS SEG NFR BLD AUTO: 84 % (ref 43–75)
NITRITE UR QL STRIP: NEGATIVE
NRBC BLD AUTO-RTO: 0 /100 WBCS
O2 CT BLDV-SCNC: 18.1 ML/DL
PCO2 BLDV: 33.9 MM HG (ref 42–50)
PH BLDV: 7.51 [PH] (ref 7.3–7.4)
PH UR STRIP.AUTO: 7.5 [PH]
PLATELET # BLD AUTO: 130 THOUSANDS/UL (ref 149–390)
PMV BLD AUTO: 11.8 FL (ref 8.9–12.7)
PO2 BLDV: 57.1 MM HG (ref 35–45)
POTASSIUM SERPL-SCNC: 3.6 MMOL/L (ref 3.5–5.3)
PR INTERVAL: 146 MS
PR INTERVAL: 150 MS
PROCALCITONIN SERPL-MCNC: 0.15 NG/ML
PROT SERPL-MCNC: 6.9 G/DL (ref 6.4–8.4)
PROT UR STRIP-MCNC: NEGATIVE MG/DL
PROTHROMBIN TIME: 14.2 SECONDS (ref 11.6–14.5)
QRS AXIS: -35 DEGREES
QRS AXIS: -36 DEGREES
QRS AXIS: -37 DEGREES
QRSD INTERVAL: 88 MS
QRSD INTERVAL: 90 MS
QRSD INTERVAL: 90 MS
QT INTERVAL: 278 MS
QT INTERVAL: 280 MS
QT INTERVAL: 288 MS
QTC INTERVAL: 456 MS
QTC INTERVAL: 469 MS
QTC INTERVAL: 477 MS
RBC # BLD AUTO: 4.39 MILLION/UL (ref 3.88–5.62)
RSV RNA RESP QL NAA+PROBE: NEGATIVE
SARS-COV-2 RNA RESP QL NAA+PROBE: POSITIVE
SODIUM SERPL-SCNC: 140 MMOL/L (ref 135–147)
SP GR UR STRIP.AUTO: 1.01 (ref 1–1.03)
T WAVE AXIS: 110 DEGREES
T WAVE AXIS: 119 DEGREES
T WAVE AXIS: 142 DEGREES
UROBILINOGEN UR STRIP-ACNC: 2 MG/DL
VENTRICULAR RATE: 162 BPM
VENTRICULAR RATE: 165 BPM
VENTRICULAR RATE: 169 BPM
WBC # BLD AUTO: 7.41 THOUSAND/UL (ref 4.31–10.16)

## 2023-10-10 PROCEDURE — 96374 THER/PROPH/DIAG INJ IV PUSH: CPT

## 2023-10-10 PROCEDURE — 80053 COMPREHEN METABOLIC PANEL: CPT | Performed by: EMERGENCY MEDICINE

## 2023-10-10 PROCEDURE — 85610 PROTHROMBIN TIME: CPT | Performed by: EMERGENCY MEDICINE

## 2023-10-10 PROCEDURE — 81003 URINALYSIS AUTO W/O SCOPE: CPT | Performed by: EMERGENCY MEDICINE

## 2023-10-10 PROCEDURE — 84484 ASSAY OF TROPONIN QUANT: CPT | Performed by: EMERGENCY MEDICINE

## 2023-10-10 PROCEDURE — 83605 ASSAY OF LACTIC ACID: CPT | Performed by: EMERGENCY MEDICINE

## 2023-10-10 PROCEDURE — 0241U HB NFCT DS VIR RESP RNA 4 TRGT: CPT | Performed by: EMERGENCY MEDICINE

## 2023-10-10 PROCEDURE — 74177 CT ABD & PELVIS W/CONTRAST: CPT

## 2023-10-10 PROCEDURE — 96361 HYDRATE IV INFUSION ADD-ON: CPT

## 2023-10-10 PROCEDURE — 99284 EMERGENCY DEPT VISIT MOD MDM: CPT

## 2023-10-10 PROCEDURE — 96375 TX/PRO/DX INJ NEW DRUG ADDON: CPT

## 2023-10-10 PROCEDURE — 36415 COLL VENOUS BLD VENIPUNCTURE: CPT | Performed by: EMERGENCY MEDICINE

## 2023-10-10 PROCEDURE — 71045 X-RAY EXAM CHEST 1 VIEW: CPT

## 2023-10-10 PROCEDURE — 99285 EMERGENCY DEPT VISIT HI MDM: CPT | Performed by: EMERGENCY MEDICINE

## 2023-10-10 PROCEDURE — 82805 BLOOD GASES W/O2 SATURATION: CPT | Performed by: EMERGENCY MEDICINE

## 2023-10-10 PROCEDURE — 85025 COMPLETE CBC W/AUTO DIFF WBC: CPT | Performed by: EMERGENCY MEDICINE

## 2023-10-10 PROCEDURE — 87040 BLOOD CULTURE FOR BACTERIA: CPT | Performed by: EMERGENCY MEDICINE

## 2023-10-10 PROCEDURE — 71275 CT ANGIOGRAPHY CHEST: CPT

## 2023-10-10 PROCEDURE — 85730 THROMBOPLASTIN TIME PARTIAL: CPT | Performed by: EMERGENCY MEDICINE

## 2023-10-10 PROCEDURE — 93010 ELECTROCARDIOGRAM REPORT: CPT | Performed by: INTERNAL MEDICINE

## 2023-10-10 PROCEDURE — 93005 ELECTROCARDIOGRAM TRACING: CPT

## 2023-10-10 PROCEDURE — 84145 PROCALCITONIN (PCT): CPT | Performed by: EMERGENCY MEDICINE

## 2023-10-10 RX ORDER — ACETAMINOPHEN 10 MG/ML
1000 INJECTION, SOLUTION INTRAVENOUS ONCE
Status: COMPLETED | OUTPATIENT
Start: 2023-10-10 | End: 2023-10-10

## 2023-10-10 RX ORDER — METOPROLOL TARTRATE 5 MG/5ML
5 INJECTION INTRAVENOUS ONCE
Status: COMPLETED | OUTPATIENT
Start: 2023-10-10 | End: 2023-10-10

## 2023-10-10 RX ADMIN — IOHEXOL 100 ML: 350 INJECTION, SOLUTION INTRAVENOUS at 02:19

## 2023-10-10 RX ADMIN — METOPROLOL TARTRATE 5 MG: 5 INJECTION INTRAVENOUS at 03:23

## 2023-10-10 RX ADMIN — ACETAMINOPHEN 1000 MG: 10 INJECTION INTRAVENOUS at 01:41

## 2023-10-10 RX ADMIN — SODIUM CHLORIDE 1000 ML: 0.9 INJECTION, SOLUTION INTRAVENOUS at 01:45

## 2023-10-10 NOTE — ED PROVIDER NOTES
History  Chief Complaint   Patient presents with   • Flu Symptoms     Arrives with c/o flu like sx, fever at home, tachycardic during triage, has afib baseline. Sx started within the past 24 hrs with wife recently being sick     26-year-old male presents the emergency room with a chief complaint of "coughing, temperature, and sore throat."     Patient states his symptoms started approximately 1 day ago and persisted, prompting him to come to the emergency room for further evaluation and treatment. Patient notably tachycardic on initial evaluation. Patient's EKG demonstrating sinus tachycardia rate of 169, though this is somewhat limited by the rate, there do appear to be P waves. Patient does have a history of atrial fibrillation however the rhythm is regular. Patient is febrile and has not taken any antipyretics prior to evaluation. Patient does affirm compliance with his medications. Patient notes recent exposure to family members with COVID-19. Patient denies any headache or neck pain. Patient denies any visual changes, focal motor weakness, or sensory loss. Patient denies any chest pain or dyspnea. Patient denies any nausea/vomiting. Patient denies any diarrhea. Patient denies any abdominal pain. Patient does not urinary frequency but denies any dysuria or other urinary symptoms. Patient denies any rashes. Impression and plan: Multiple symptoms with a broad differential based on patient's history and physical, clinically concerning for infectious etiology. Based on patient's known exposure, concern for potential COVID-19. Patient's EKG demonstrating significant narrow complex regular tachycardia, possibly sinus tachycardia versus atrial flutter though there appears to be P wave progression most notable in the anterior leads.  Will treat patient symptomatically with fluids and acetaminophen as he is febrile; if this does not improve, we will consider addition of rate controlling agents. Patient is fortunately asymptomatic regarding his tachycardia and denies any palpitations, chest pain or dyspnea. Will obtain septic evaluation along with cardiac evaluation. We will monitor patient, reassess closely and reevaluate. Prior to Admission Medications   Prescriptions Last Dose Informant Patient Reported? Taking? DULoxetine (CYMBALTA) 60 mg delayed release capsule  Self Yes No   Sig: Oral   Diclofenac Sodium (VOLTAREN) 1 %  Self Yes No   Sig: APPLY 2GM TO UPPER EXTREMITIES TOPICALLY FOUR TIMES A DAY AND APPLY 4GM TO LOWER EXTREMITIES FOUR TIMES A DAY FOR PAIN AND INFLAMMATION **USE DOSING CARD** (DO NOT EXCEED 16 GRAMS DAILY TO ANY AFFECTE   [place]-3 1000 MG CAPS  Self Yes No   Si,000 mg   acetaminophen-codeine (TYLENOL #3) 300-30 mg per tablet  Self Yes No   Sig: TAKE 1-2 TABS BY MOUTH EVERY 4-6 HRS AS NEEDED FOR PAIN   apixaban (Eliquis) 5 mg  Self No No   Sig: Take 1 tablet (5 mg total) by mouth 2 (two) times a day   dofetilide (TIKOSYN) 250 mcg capsule  Self No No   Sig: Take 1 capsule (250 mcg total) by mouth every 12 (twelve) hours   finasteride (PROSCAR) 5 mg tablet  Self No No   Sig: TAKE 1 TABLET (5 MG TOTAL) BY MOUTH DAILY. fluticasone (FLONASE) 50 mcg/act nasal spray  Self No No   Si spray into each nostril daily as needed for rhinitis   furosemide (LASIX) 40 mg tablet  Self Yes No   Sig: Take 40 mg by mouth if needed for shortness of breath   lidocaine (LIDODERM) 5 %  Self Yes No   Sig: APPLY 1 TO 2 PATCHES TOPICALLY EVERY DAY AS NEEDED FOR PAIN (REMOVE PATCH AFTER 12 HOURS; 12 HOURS ON AND 12 HOURS OFF)   metoprolol succinate (TOPROL-XL) 25 mg 24 hr tablet  Self No No   Sig: Take 0.5 tablets (12.5 mg total) by mouth daily Do not start before 2023.    Patient not taking: Reported on 2023   rosuvastatin (CRESTOR) 20 MG tablet  Self Yes No   Sig: Take 20 mg by mouth daily   sacubitril-valsartan (Entresto) 24-26 MG TABS  Self No No   Sig: Take 1 tablet by mouth 2 (two) times a day   sildenafil (VIAGRA) 50 MG tablet  Self No No   Sig: Take 1 tablet (50 mg total) by mouth daily as needed for erectile dysfunction   spironolactone (ALDACTONE) 25 mg tablet  Self No No   Sig: Take 1 tablet (25 mg total) by mouth daily   tamsulosin (FLOMAX) 0.4 mg  Self No No   Sig: Take 1 capsule (0.4 mg total) by mouth daily with dinner      Facility-Administered Medications: None       Past Medical History:   Diagnosis Date   • Cardiomyopathy ([address])    • Hyperlipidemia    • Post-nasal drip    • Pulmonary embolism ([address])        Past Surgical History:   Procedure Laterality Date   • ARTHROSCOPY KNEE Left     30 years ago   • WA CYSTO INSERTION TRANSPROSTATIC IMPLANT SINGLE N/A 2021    Procedure: CYSTOSCOPY WITH INSERTION Jun Sizer;  Surgeon: Syed Harmon MD;  Location: [facility] OR;  Service: Urology       No family history on file. I have reviewed and agree with the history as documented. E-Cigarette/Vaping   • E-Cigarette Use Never User      E-Cigarette/Vaping Substances   • Nicotine No    • THC No    • CBD No    • Flavoring No    • Other No    • Unknown No      Social History     Tobacco Use   • Smoking status: Former     Types: Cigarettes     Quit date:      Years since quittin.8   • Smokeless tobacco: Never   Vaping Use   • Vaping Use: Never used   Substance Use Topics   • Alcohol use: Yes     Alcohol/week: 0.0 standard drinks of alcohol     Comment: socially   • Drug use: No       Review of Systems    Physical Exam  Physical Exam  Vitals reviewed. HENT:      Head: Atraumatic. Eyes:      Pupils: Pupils are equal, round, and reactive to light. Cardiovascular:      Rate and Rhythm: Regular rhythm. Tachycardia present. Pulses: Normal pulses. Pulmonary:      Effort: Pulmonary effort is normal.      Breath sounds: Normal breath sounds. Abdominal:      General: There is no distension. Musculoskeletal:         General: No deformity.       Cervical back: Neck supple. Skin:     General: Skin is warm and dry. Neurological:      Mental Status: He is alert. Vital Signs  ED Triage Vitals   Temperature Pulse Respirations Blood Pressure SpO2   10/10/23 0059 10/10/23 0230 10/10/23 0059 10/10/23 0059 10/10/23 0059   (!) 101.6 °F (38.7 °C) (!) 160 22 (!) 143/104 95 %      Temp Source Heart Rate Source Patient Position - Orthostatic VS BP Location FiO2 (%)   10/10/23 0059 10/10/23 0059 10/10/23 0059 10/10/23 0059 --   Oral Monitor Lying Right arm       Pain Score       --                  Vitals:    10/10/23 0300 10/10/23 0330 10/10/23 0345 10/10/23 0430   BP: 136/86 138/71 147/83 117/83   Pulse: (!) 164 92 91 90   Patient Position - Orthostatic VS: Lying Lying Lying Lying         Visual Acuity  Visual Acuity    Flowsheet Row Most Recent Value   L Pupil Size (mm) 3   R Pupil Size (mm) 3          ED Medications  Medications   sodium chloride 0.9 % bolus 1,000 mL (0 mL Intravenous Stopped 10/10/23 0324)   acetaminophen (Ofirmev) injection 1,000 mg (0 mg Intravenous Stopped 10/10/23 0156)   iohexol (OMNIPAQUE) 350 MG/ML injection (MULTI-DOSE) 100 mL (100 mL Intravenous Given 10/10/23 0219)   metoprolol (LOPRESSOR) injection 5 mg (5 mg Intravenous Given 10/10/23 0323)       Diagnostic Studies  Results Reviewed     Procedure Component Value Units Date/Time    Blood culture #2 [[id number]] Collected: 10/10/23 0137    Lab Status: Preliminary result Specimen: Blood from Arm, Right Updated: 10/10/23 0901     Blood Culture Received in Microbiology Lab. Culture in Progress. Blood culture #1 [[id number]] Collected: 10/10/23 0137    Lab Status: Preliminary result Specimen: Blood from Arm, Left Updated: 10/10/23 0901     Blood Culture Received in Microbiology Lab. Culture in Progress.     HS Troponin I 2hr [[id number]]  (Normal) Collected: 10/10/23 0330    Lab Status: Final result Specimen: Blood from Arm, Left Updated: 10/10/23 0357     hs TnI 2hr 13 ng/L      Delta 2hr hsTnI 1 ng/L     FLU/RSV/COVID - if FLU/RSV clinically relevant [[id number]]  (Abnormal) Collected: 10/10/23 0137    Lab Status: Final result Specimen: Nares from Nose Updated: 10/10/23 0221     SARS-CoV-2 Positive     INFLUENZA A PCR Negative     INFLUENZA B PCR Negative     RSV PCR Negative    Narrative:      FOR PEDIATRIC PATIENTS - copy/paste COVID Guidelines URL to browser: [email or web address]    SARS-CoV-2 assay is a Nucleic Acid Amplification assay intended for the  qualitative detection of nucleic acid from SARS-CoV-2 in nasopharyngeal  swabs. Results are for the presumptive identification of SARS-CoV-2 RNA. Positive results are indicative of infection with SARS-CoV-2, the virus  causing COVID-19, but do not rule out bacterial infection or co-infection  with other viruses. Laboratories within the [facility] and its  territories are required to report all positive results to the appropriate  public health authorities. Negative results do not preclude SARS-CoV-2  infection and should not be used as the sole basis for treatment or other  patient management decisions. Negative results must be combined with  clinical observations, patient history, and epidemiological information. This test has not been FDA cleared or approved. This test has been authorized by FDA under an Emergency Use Authorization  (EUA). This test is only authorized for the duration of time the  declaration that circumstances exist justifying the authorization of the  emergency use of an in vitro diagnostic tests for detection of SARS-CoV-2  virus and/or diagnosis of COVID-19 infection under section 564(b)(1) of  the Act, 21 U. S.C. 205KQF-6(Q)(3), unless the authorization is terminated  or revoked sooner. The test has been validated but independent review by FDA  and CLIA is pending. Test performed using [employer]:  This RT-PCR assay targets N2,  a region unique to SARS-CoV-2. A conserved region in the E-gene was chosen  for pan-Sarbecovirus detection which includes SARS-CoV-2. According to CMS-2020-01-R, this platform meets the definition of high-throughput technology. Rica Roque [[id number]]  (Normal) Collected: 10/10/23 0159    Lab Status: Final result Specimen: Blood from Arm, Left Updated: 10/10/23 0216     Protime 14.2 seconds      INR 1.03    APTT [[id number]]  (Normal) Collected: 10/10/23 0159    Lab Status: Final result Specimen: Blood from Arm, Left Updated: 10/10/23 0216     PTT 35 seconds     Procalcitonin [[id number]]  (Normal) Collected: 10/10/23 0137    Lab Status: Final result Specimen: Blood from Arm, Left Updated: 10/10/23 9535     Procalcitonin 0.15 ng/ml     HS Troponin 0hr (reflex protocol) [[id number]]  (Normal) Collected: 10/10/23 0137    Lab Status: Final result Specimen: Blood from Arm, Left Updated: 10/10/23 0210     hs TnI 0hr 12 ng/L     Lactic acid [[id number]]  (Normal) Collected: 10/10/23 0137    Lab Status: Final result Specimen: Blood from Arm, Left Updated: 10/10/23 0205     LACTIC ACID 0.9 mmol/L     Narrative:      Result may be elevated if tourniquet was used during collection.     UA w Reflex to Microscopic w Reflex to Culture [[id number]]  (Abnormal) Collected: 10/10/23 0147    Lab Status: Final result Specimen: Urine, Clean Catch Updated: 10/10/23 0204     Color, UA Light Yellow     Clarity, UA Clear     Specific Gravity, UA 1.010     pH, UA 7.5     Leukocytes, UA Negative     Nitrite, UA Negative     Protein, UA Negative mg/dl      Glucose, UA Negative mg/dl      Ketones, UA Negative mg/dl      Urobilinogen, UA 2.0 mg/dl      Bilirubin, UA Negative     Occult Blood, UA Negative    Comprehensive metabolic panel [[id number]] Collected: 10/10/23 0137    Lab Status: Final result Specimen: Blood from Arm, Left Updated: 10/10/23 0202     Sodium 140 mmol/L      Potassium 3.6 mmol/L      Chloride 107 mmol/L      CO2 28 mmol/L      ANION GAP 5 mmol/L      BUN 13 mg/dL      Creatinine 1.22 mg/dL      Glucose 128 mg/dL      Calcium 9.7 mg/dL      AST 20 U/L      ALT 18 U/L      Alkaline Phosphatase 56 U/L      Total Protein 6.9 g/dL      Albumin 4.0 g/dL      Total Bilirubin 0.57 mg/dL      eGFR 56 ml/min/1.73sq m     Narrative:      [facility] guidelines for Chronic Kidney Disease (CKD):   •  Stage 1 with normal or high GFR (GFR > 90 mL/min/1.73 square meters)  •  Stage 2 Mild CKD (GFR = 60-89 mL/min/1.73 square meters)  •  Stage 3A Moderate CKD (GFR = 45-59 mL/min/1.73 square meters)  •  Stage 3B Moderate CKD (GFR = 30-44 mL/min/1.73 square meters)  •  Stage 4 Severe CKD (GFR = 15-29 mL/min/1.73 square meters)  •  Stage 5 End Stage CKD (GFR <15 mL/min/1.73 square meters)  Note: GFR calculation is accurate only with a steady state creatinine    CBC and differential [[id number]]  (Abnormal) Collected: 10/10/23 0137    Lab Status: Final result Specimen: Blood from Arm, Left Updated: 10/10/23 0153     WBC 7.41 Thousand/uL      RBC 4.39 Million/uL      Hemoglobin 13.7 g/dL      Hematocrit 40.3 %      MCV 92 fL      MCH 31.2 pg      MCHC 34.0 g/dL      RDW 14.8 %      MPV 11.8 fL      Platelets 910 Thousands/uL      nRBC 0 /100 WBCs      Neutrophils Relative 84 %      Immat GRANS % 1 %      Lymphocytes Relative 5 %      Monocytes Relative 10 %      Eosinophils Relative 0 %      Basophils Relative 0 %      Neutrophils Absolute 6.16 Thousands/µL      Immature Grans Absolute 0.06 Thousand/uL      Lymphocytes Absolute 0.39 Thousands/µL      Monocytes Absolute 0.76 Thousand/µL      Eosinophils Absolute 0.03 Thousand/µL      Basophils Absolute 0.01 Thousands/µL     Blood gas, Venous [[id number]]  (Abnormal) Collected: 10/10/23 0137    Lab Status: Final result Specimen: Blood from Arm, Left Updated: 10/10/23 0145     pH, Bowen 7.514     pCO2, Bowen 33.9 mm Hg      pO2, Bowen 57.1 mm Hg      HCO3, Bowen 26.7 mmol/L      Base Excess, Bowen 4.1 mmol/L      O2 Content, Bowen 18.1 ml/dL      O2 HGB, VENOUS 88.4 %                  CT pe study w abdomen pelvis w contrast   Final Result by Hannah Mcintosh MD (10/10 1444)      1. No acute pulmonary embolism or thoracic aortic aneurysm/dissection. 2.  Subtle peripheral groundglass nodular densities in the bilateral mid to lower lung fields which may be related to mild infectious/inflammatory process of the lung parenchyma including viral pneumonia. No lobar airspace consolidation. Trace pleural    effusions with mild bibasilar atelectasis. 3.  No acute intra-abdominal/pelvic abnormalities with findings detailed above. Workstation performed: [id number]         XR chest portable   ED Interpretation by John Martino MD (10/10 8829)   No acute findings. Final Result by Deanna Hamilton DO (10/10 8447)      No acute cardiopulmonary disease. Resident: Abigail Lay, the attending radiologist, have reviewed the images and agree with the final report above. Workstation performed: [id number]                    Procedures  Procedures         ED Course  ED Course as of 10/11/23 0414   Tue Oct 10, 2023   0216 Patient persistently tachycardic, does not have chest pain or palpitations again on reevaluation. Patient's blood pressure normal and continues to be asymptomatic regarding this. Will obtain CT PE study considering concerns for potential COVID and include abdomen pelvis is he does note left sided abdominal pain earlier. We will monitor closely and reassess. I am reluctant to initiate treatment with additional rate controlling agents considering the sinus tachycardia with concerns for potential sepsis as etiology. Continuing fluid administration   0306 Patient continues to have no chest pain or dyspnea. Heart rate improved to 160, repeat EKG, possible atrial flutter with 2 1 conduction. We will attempt treatment with metoprolol and reassess. 5000 Patient reassessed. Patient's heart rate improved after treatment with metoprolol. Patient does have this at home that he states he uses as needed if his heart rate increases. I feel overall patient's symptoms likely secondary to COVID-19. Offered admission to the patient and after discussion with the patient, he declines admission. Discussed risks and benefits of Paxlovid as patient is high risk though he has been vaccinated previously. Patient is on multiple medications that would require adjustment, which he is aware of. After discussion of risk and benefits, patient declined treatment. I discussed that he may contact us primary care cardiologist if he wishes to further consider this. Discussed close follow-up with primary care. Discussed return precautions including monitoring at home with pulse oximetry. Patient's pulse oximetry here has been normal throughout his stay in the emergency room. Discussed and emphasized return precautions in detail. SBIRT 22yo+    Flowsheet Row Most Recent Value   Initial Alcohol Screen: US AUDIT-C     1. How often do you have a drink containing alcohol? 0 Filed at: 10/10/2023 0101   2. How many drinks containing alcohol do you have on a typical day you are drinking? 0 Filed at: 10/10/2023 0101   3a. Male UNDER 65: How often do you have five or more drinks on one occasion? 0 Filed at: 10/10/2023 0101   Audit-C Score 0 Filed at: 10/10/2023 0101   TOMAS: How many times in the past year have you. .. Used an illegal drug or used a prescription medication for non-medical reasons?  Never Filed at: 10/10/2023 0101                    MDM    Disposition  Final diagnoses:   COVID-19   Atrial flutter ([address])     Time reflects when diagnosis was documented in both MDM as applicable and the Disposition within this note     Time User Action Codes Description Comment    10/10/2023  5:08 AM Shayy Gibson Add [U07.1] COVID-19     10/10/2023  5:09 AM Shayy Gibson Add [I48.92] Atrial flutter [facility])       ED Disposition     ED Disposition   Discharge    Condition   Stable    Date/Time   Tue Oct 10, 2023  5:08 AM    Comment   Raymundo Dudley. discharge to home/self care. Follow-up Information     Follow up With Specialties Details Why Contact Info Additional Information    Tian Oneil MD Internal Medicine Schedule an appointment as soon as possible for a visit in 2 days Follow-up and reassessment. [address] [facility]  [address]  [address] [place] [address]  [address] [facility] Emergency Department Emergency Medicine Go to  If symptoms worsen [address] [facility] [address] [facility] [address] [facility], [place], [place], [address]          Discharge Medication List as of 10/10/2023  5:10 AM      CONTINUE these medications which have NOT CHANGED    Details   acetaminophen-codeine (TYLENOL #3) 300-30 mg per tablet TAKE 1-2 TABS BY MOUTH EVERY 4-6 HRS AS NEEDED FOR PAIN, Historical Med      apixaban (Eliquis) 5 mg Take 1 tablet (5 mg total) by mouth 2 (two) times a day, Starting Thu 8/31/2023, Normal      Diclofenac Sodium (VOLTAREN) 1 % APPLY 2GM TO UPPER EXTREMITIES TOPICALLY FOUR TIMES A DAY AND APPLY 4GM TO LOWER EXTREMITIES FOUR TIMES A DAY FOR PAIN AND INFLAMMATION **USE DOSING CARD** (DO NOT EXCEED 16 GRAMS DAILY TO ANY AFFECTE, Historical Med      dofetilide (TIKOSYN) 250 mcg capsule Take 1 capsule (250 mcg total) by mouth every 12 (twelve) hours, Starting Thu 9/28/2023, Until Tue 3/26/2024, No Print      DULoxetine (CYMBALTA) 60 mg delayed release capsule Oral, Historical Med      finasteride (PROSCAR) 5 mg tablet TAKE 1 TABLET (5 MG TOTAL) BY MOUTH DAILY. , Starting Fri 5/26/2023, Normal      fluticasone (FLONASE) 50 mcg/act nasal spray 1 spray into each nostril daily as needed for rhinitis, Starting Fri 12/10/2021, No Print      furosemide (LASIX) 40 mg tablet Take 40 mg by mouth if needed for shortness of breath, Starting Mon 7/24/2023, Historical Med      lidocaine (LIDODERM) 5 % APPLY 1 TO 2 PATCHES TOPICALLY EVERY DAY AS NEEDED FOR PAIN (REMOVE PATCH AFTER 12 HOURS; 12 HOURS ON AND 12 HOURS OFF), Historical Med      metoprolol succinate (TOPROL-XL) 25 mg 24 hr tablet Take 0.5 tablets (12.5 mg total) by mouth daily Do not start before July 8, 2023., Starting Sat 7/8/2023, Until Sun 11/5/2023, Normal      Omega-3 1000 MG CAPS 1,000 mg, Starting Mon 4/10/2023, Historical Med      rosuvastatin (CRESTOR) 20 MG tablet Take 20 mg by mouth daily, Historical Med      sacubitril-valsartan (Entresto) 24-26 MG TABS Take 1 tablet by mouth 2 (two) times a day, Starting Wed 8/30/2023, Until Thu 2/20/2025, Normal      sildenafil (VIAGRA) 50 MG tablet Take 1 tablet (50 mg total) by mouth daily as needed for erectile dysfunction, Starting Thu 1/12/2023, Normal      spironolactone (ALDACTONE) 25 mg tablet Take 1 tablet (25 mg total) by mouth daily, Starting Mon 9/18/2023, Until Tue 3/11/2025, Normal      tamsulosin (FLOMAX) 0.4 mg Take 1 capsule (0.4 mg total) by mouth daily with dinner, Starting Wed 6/21/2023, Normal             No discharge procedures on file.     PDMP Review       Value Time User    PDMP Reviewed  Yes 6/2/2021  9:58 AM Jessee Gitelman, MD          ED Provider  Electronically Signed by           Desmond Ceja MD  10/11/23 9090

## 2023-10-11 ENCOUNTER — TELEPHONE (OUTPATIENT)
Age: 78
End: 2023-10-11

## 2023-10-11 ENCOUNTER — TRANSCRIBE ORDERS (OUTPATIENT)
Age: 78
End: 2023-10-11

## 2023-10-11 DIAGNOSIS — Z12.11 ENCOUNTER FOR SCREENING COLONOSCOPY: Primary | ICD-10-CM

## 2023-10-15 LAB
BACTERIA BLD CULT: NORMAL
BACTERIA BLD CULT: NORMAL

## 2023-10-19 ENCOUNTER — OFFICE VISIT (OUTPATIENT)
Dept: HEMATOLOGY ONCOLOGY | Facility: CLINIC | Age: 78
End: 2023-10-19
Payer: COMMERCIAL

## 2023-10-19 VITALS
BODY MASS INDEX: 33.94 KG/M2 | HEIGHT: 75 IN | RESPIRATION RATE: 18 BRPM | WEIGHT: 273 LBS | OXYGEN SATURATION: 98 % | DIASTOLIC BLOOD PRESSURE: 80 MMHG | SYSTOLIC BLOOD PRESSURE: 128 MMHG | HEART RATE: 58 BPM | TEMPERATURE: 98.3 F

## 2023-10-19 DIAGNOSIS — D69.6 THROMBOCYTOPENIA (HCC): Primary | ICD-10-CM

## 2023-10-19 PROCEDURE — 99213 OFFICE O/P EST LOW 20 MIN: CPT | Performed by: INTERNAL MEDICINE

## 2023-10-19 NOTE — PROGRESS NOTES
Yovana Bocanegra.  1945  [facility] [id number]    CHIEF COMPLAINT:    Thrombocytopenia     HISTORY OF PRESENT ILLNESS:  patient had a CBC done in July 2023 which showed WBC count of 3.83 with a hemoglobin 11.7 hematocrit 34.4 and a platelet count of 974. The indices were normocytic normocytic normochromic. He has had thrombocytopenia since 11/22/2021. Patient has a history of pulmonary embolism. Episode occurred in 2017 patient had venous Doppler done at the time which was unremarkable. It appears his pulmonary embolism was unprovoked. Patient currently on lifetime treatment with Eliquis    Patient had repeat CBC done 10/10/23.   Component      Latest Ref Rng 10/10/2023   WBC      4.31 - 10.16 Thousand/uL 7.41    Red Blood Cell Count      3.88 - 5.62 Million/uL 4.39    Hemoglobin      12.0 - 17.0 g/dL 13.7    HCT      36.5 - 49.3 % 40.3    MCV      82 - 98 fL 92    MCH      26.8 - 34.3 pg 31.2    MCHC      31.4 - 37.4 g/dL 34.0    RDW      11.6 - 15.1 % 14.8    MPV      8.9 - 12.7 fL 11.8    Platelet Count      157 - 390 Thousands/uL 130 (L)    nRBC      /100 WBCs 0    Neutrophils %      43 - 75 % 84 (H)    Immat GRANS %      0 - 2 % 1    Lymphocytes Relative      14 - 44 % 5 (L)    Monocytes Relative      4 - 12 % 10    Eosinophils      0 - 6 % 0    Basophils Relative      0 - 1 % 0    Absolute Neutrophils      1.85 - 7.62 Thousands/µL 6.16    Immature Grans Absolute      0.00 - 0.20 Thousand/uL 0.06    Lymphocytes Absolute      0.60 - 4.47 Thousands/µL 0.39 (L)    Absolute Monocytes      0.17 - 1.22 Thousand/µL 0.76    Absolute Eosinophils      0.00 - 0.61 Thousand/µL 0.03    Basophils Absolute      0.00 - 0.10 Thousands/µL 0.01         Patient Active Problem List   Diagnosis    Bilateral pulmonary embolism (HCC)    SOB (shortness of breath)    Hyperlipidemia    Lung nodule    Obesity    History of sickle cell trait    Leg edema, left    History of pulmonary embolism    Thrombocytopenia (HCC)    Chronic kidney disease (CKD), stage II (mild)    Tachycardia    Chronic low back pain    SVT (supraventricular tachycardia)    Chronic anticoagulation    ARLENE (obstructive sleep apnea)    Cardiomyopathy, nonischemic (HCC)    Dyslipidemia    BPH (benign prostatic hyperplasia)    Nephrolithiasis    Lumbar disc disease with radiculopathy - Left    Spinal stenosis of lumbar region without neurogenic claudication - Left    Chest pain    Acute kidney injury superimposed on CKD     COVID-19    BPH with obstruction/lower urinary tract symptoms    PAF (paroxysmal atrial fibrillation) ([address])    Erectile dysfunction due to arterial insufficiency     Past Medical History:   Diagnosis Date    Cardiomyopathy ([address])     Hyperlipidemia     Post-nasal drip     Pulmonary embolism ([address])      Oncology History    No history exists. Past Surgical History:   Procedure Laterality Date    ARTHROSCOPY KNEE Left     30 years ago    [place] CYSTO INSERTION TRANSPROSTATIC IMPLANT SINGLE N/A 2021    Procedure: CYSTOSCOPY WITH INSERTION UROLIFT;  Surgeon: Nico Barajas MD;  Location: [facility];  Service: Urology     No family history on file. Social History     Socioeconomic History    Marital status: /Civil Union     Spouse name: Not on file    Number of children: 7    Years of education: Not on file    Highest education level: Not on file   Occupational History    Occupation: employed   Tobacco Use    Smoking status: Former     Types: Cigarettes     Quit date:      Years since quittin.8    Smokeless tobacco: Never   Vaping Use    Vaping Use: Never used   Substance and Sexual Activity    Alcohol use:  Yes     Alcohol/week: 0.0 standard drinks of alcohol     Comment: socially    Drug use: No    Sexual activity: Not on file   Other Topics Concern    Not on file   Social History Narrative    Not on file     Social Determinants of Health     Financial Resource Strain: Not on file   Food Insecurity: No Food Insecurity (7/6/2023)    Hunger Vital Sign     Worried About Running Out of Food in the Last Year: Never true     Ran Out of Food in the Last Year: Never true   Transportation Needs: No Transportation Needs (7/6/2023)    PRAPARE - Transportation     Lack of Transportation (Medical): No     Lack of Transportation (Non-Medical): No   Physical Activity: Not on file   Stress: Not on file   Social Connections: Not on file   Intimate Partner Violence: Not on file   Housing Stability: Low Risk  (7/6/2023)    Housing Stability Vital Sign     Unable to Pay for Housing in the Last Year: No     Number of Places Lived in the Last Year: 1     Unstable Housing in the Last Year: No       No Known Allergies        Meds:    Current Outpatient Medications:     acetaminophen-codeine (TYLENOL #3) 300-30 mg per tablet, TAKE 1-2 TABS BY MOUTH EVERY 4-6 HRS AS NEEDED FOR PAIN, Disp: , Rfl:     apixaban (Eliquis) 5 mg, Take 1 tablet (5 mg total) by mouth 2 (two) times a day, Disp: 180 tablet, Rfl: 3    Diclofenac Sodium (VOLTAREN) 1 %, APPLY 2GM TO UPPER EXTREMITIES TOPICALLY FOUR TIMES A DAY AND APPLY 4GM TO LOWER EXTREMITIES FOUR TIMES A DAY FOR PAIN AND INFLAMMATION **USE DOSING CARD** (DO NOT EXCEED 16 GRAMS DAILY TO ANY AFFECTE, Disp: , Rfl:     dofetilide (TIKOSYN) 250 mcg capsule, Take 1 capsule (250 mcg total) by mouth every 12 (twelve) hours, Disp: 360 capsule, Rfl: 0    DULoxetine (CYMBALTA) 60 mg delayed release capsule, Oral, Disp: , Rfl:     finasteride (PROSCAR) 5 mg tablet, TAKE 1 TABLET (5 MG TOTAL) BY MOUTH DAILY. , Disp: 90 tablet, Rfl: 5    fluticasone (FLONASE) 50 mcg/act nasal spray, 1 spray into each nostril daily as needed for rhinitis, Disp: 16 g, Rfl: 0    furosemide (LASIX) 40 mg tablet, Take 40 mg by mouth if needed for shortness of breath, Disp: , Rfl:     lidocaine (LIDODERM) 5 %, APPLY 1 TO 2 PATCHES TOPICALLY EVERY DAY AS NEEDED FOR PAIN (REMOVE PATCH AFTER 12 HOURS; 12 HOURS ON AND 12 HOURS OFF), Disp: , Rfl:     metoprolol succinate (TOPROL-XL) 25 mg 24 hr tablet, Take 0.5 tablets (12.5 mg total) by mouth daily Do not start before July 8, 2023. (Patient not taking: Reported on 8/30/2023), Disp: 15 tablet, Rfl: 3    Omega-3 1000 MG CAPS, 1,000 mg, Disp: , Rfl:     rosuvastatin (CRESTOR) 20 MG tablet, Take 20 mg by mouth daily, Disp: , Rfl:     sacubitril-valsartan (Entresto) 24-26 MG TABS, Take 1 tablet by mouth 2 (two) times a day, Disp: 180 tablet, Rfl: 5    sildenafil (VIAGRA) 50 MG tablet, Take 1 tablet (50 mg total) by mouth daily as needed for erectile dysfunction, Disp: 10 tablet, Rfl: 3    spironolactone (ALDACTONE) 25 mg tablet, Take 1 tablet (25 mg total) by mouth daily, Disp: 90 tablet, Rfl: 5    tamsulosin (FLOMAX) 0.4 mg, Take 1 capsule (0.4 mg total) by mouth daily with dinner, Disp: 60 capsule, Rfl: 3         REVIEW OF SYSTEMS:  Constitutional:  Denies any fever; denies night sweats; denies weight loss. HEENT:  Denies blurred vission; denies eue drainage; denies bulging eyes; denies hearing impairment; denies tinnitis; denies vertigo; denies sore-throat; denies sinues drainage or post nasal drip. Neck:  Denies neck swelling. Respiratory:  Denies cough; denises coughing up blood; denies chest pains on breathing; denies shortness of breath. Cardiovascular:  Denies chest pains on exertion; describes episodic palpitations. ; denies light headedness or feeling of fainting; denies leg swelling; denies pains in calves; denies pain in legs on walking. GI:  Denies difficulty swallowing; denies heartburn; denies vomiting blood; denies black-colored stools; denies nausea; denies vomiting; denies abdominal paindenies passage of bright red blood. :  Denies blood in urine; denies urinary frequency; denies pain on urination; denies difficulty in passing urine.     Spine:  Denies neck pain; denies radiation of pain into arms or legs; denies lower back pain. Hemotology:  Denies easy bruising. Lymphalics:  Denies new lumps anywhere    Neurological:  Denies headaches; denies dizziness; denies numbness in extremities; denies weakness in extremities; denies poor balance or disequilibrium. Dermatologic:  denies rash; denies itching. PHYSICAL EXAM:  /80   Pulse 58   Temp 98.3 °F (36.8 °C)   Resp 18   Ht 6' 3" (1.905 m)   Wt 124 kg (273 lb)   SpO2 98%   BMI 34.12 kg/m²          Labs:  Component      Latest Ref Rng 10/10/2023   WBC      4.31 - 10.16 Thousand/uL 7.41    Red Blood Cell Count      3.88 - 5.62 Million/uL 4.39    Hemoglobin      12.0 - 17.0 g/dL 13.7    HCT      36.5 - 49.3 % 40.3    MCV      82 - 98 fL 92    MCH      26.8 - 34.3 pg 31.2    MCHC      31.4 - 37.4 g/dL 34.0    RDW      11.6 - 15.1 % 14.8    MPV      8.9 - 12.7 fL 11.8    Platelet Count      158 - 390 Thousands/uL 130 (L)    nRBC      /100 WBCs 0    Neutrophils %      43 - 75 % 84 (H)    Immat GRANS %      0 - 2 % 1    Lymphocytes Relative      14 - 44 % 5 (L)    Monocytes Relative      4 - 12 % 10    Eosinophils      0 - 6 % 0    Basophils Relative      0 - 1 % 0    Absolute Neutrophils      1.85 - 7.62 Thousands/µL 6.16    Immature Grans Absolute      0.00 - 0.20 Thousand/uL 0.06    Lymphocytes Absolute      0.60 - 4.47 Thousands/µL 0.39 (L)    Absolute Monocytes      0.17 - 1.22 Thousand/µL 0.76    Absolute Eosinophils      0.00 - 0.61 Thousand/µL 0.03    Basophils Absolute      0.00 - 0.10 Thousands/µL 0.01      Patient has normal LFT's, and normal sized spleen of CTA done 10/10/23. Imaging    Assessment/Plan:   Patient with mild thrombocytopenia. No symptoms. Normal LFT's and normal sized spleen. Patient advised. No intervention at this point. Will continue to monitor  F/U in 3 months.

## 2023-10-20 ENCOUNTER — TELEPHONE (OUTPATIENT)
Dept: UROLOGY | Facility: CLINIC | Age: 78
End: 2023-10-20

## 2023-10-20 NOTE — TELEPHONE ENCOUNTER
I tried calling pt and got his vm. Left message that I was calling to discuss HoLEP surgery which we had planned for when I saw him in June. If he does not want to have surgery certainly reasonable. I asked him to leave us a good time and number to reach him back at. I also tried calling the patient's wife and got her voicemail.

## 2023-10-23 ENCOUNTER — TELEPHONE (OUTPATIENT)
Dept: UROLOGY | Facility: CLINIC | Age: 78
End: 2023-10-23

## 2023-10-23 NOTE — TELEPHONE ENCOUNTER
I called pt and discussed holep surgery including risks and benefits including risks of leakage and erectile dysfunction, He already has retrograde ejaculation. He wants to move forward with surgery but want to reschedule because his daughter is getting  in November and prefer to do surgery after, which I think makes sense. He understands surgery would likely not be until January.

## 2023-11-20 ENCOUNTER — HOSPITAL ENCOUNTER (OUTPATIENT)
Dept: NON INVASIVE DIAGNOSTICS | Facility: CLINIC | Age: 78
Discharge: HOME | End: 2023-11-20
Payer: COMMERCIAL

## 2023-11-20 VITALS
HEIGHT: 75 IN | BODY MASS INDEX: 33.94 KG/M2 | WEIGHT: 273 LBS | HEART RATE: 58 BPM | SYSTOLIC BLOOD PRESSURE: 128 MMHG | DIASTOLIC BLOOD PRESSURE: 80 MMHG

## 2023-11-20 DIAGNOSIS — I48.0 PAF (PAROXYSMAL ATRIAL FIBRILLATION) (HCC): ICD-10-CM

## 2023-11-20 DIAGNOSIS — I42.8 CARDIOMYOPATHY, NONISCHEMIC (HCC): ICD-10-CM

## 2023-11-20 LAB
AORTIC ROOT: 3.5 CM
APICAL FOUR CHAMBER EJECTION FRACTION: 54 %
E WAVE DECELERATION TIME: 277 MS
E/A RATIO: 0.63
FRACTIONAL SHORTENING: 31 (ref 28–44)
INTERVENTRICULAR SEPTUM IN DIASTOLE (PARASTERNAL SHORT AXIS VIEW): 1.2 CM
INTERVENTRICULAR SEPTUM: 1.2 CM (ref 0.6–1.1)
LEFT ATRIUM SIZE: 5.6 CM
LEFT INTERNAL DIMENSION IN SYSTOLE: 4.1 CM (ref 2.1–4)
LEFT VENTRICULAR INTERNAL DIMENSION IN DIASTOLE: 5.9 CM (ref 3.5–6)
LEFT VENTRICULAR POSTERIOR WALL IN END DIASTOLE: 1 CM
LEFT VENTRICULAR STROKE VOLUME: 94 ML
LVSV (TEICH): 94 ML
MV PEAK A VEL: 0.82 M/S
MV PEAK E VEL: 52 CM/S
MV STENOSIS PRESSURE HALF TIME: 80 MS
MV VALVE AREA P 1/2 METHOD: 2.75
SL CV LV EF: 55
SL CV PED ECHO LEFT VENTRICLE DIASTOLIC VOLUME (MOD BIPLANE) 2D: 171 ML
SL CV PED ECHO LEFT VENTRICLE SYSTOLIC VOLUME (MOD BIPLANE) 2D: 76 ML

## 2023-11-20 PROCEDURE — 93308 TTE F-UP OR LMTD: CPT | Performed by: INTERNAL MEDICINE

## 2023-11-20 PROCEDURE — 93308 TTE F-UP OR LMTD: CPT

## 2023-11-20 PROCEDURE — 93321 DOPPLER ECHO F-UP/LMTD STD: CPT

## 2023-11-20 PROCEDURE — 93321 DOPPLER ECHO F-UP/LMTD STD: CPT | Performed by: INTERNAL MEDICINE

## 2023-11-20 PROCEDURE — 93325 DOPPLER ECHO COLOR FLOW MAPG: CPT | Performed by: INTERNAL MEDICINE

## 2023-11-20 PROCEDURE — 93325 DOPPLER ECHO COLOR FLOW MAPG: CPT

## 2023-11-28 ENCOUNTER — TELEPHONE (OUTPATIENT)
Dept: CARDIOLOGY CLINIC | Facility: CLINIC | Age: 78
End: 2023-11-28

## 2023-11-28 NOTE — TELEPHONE ENCOUNTER
----- Message from Andree Wilcox PA-C sent at 11/27/2023  3:35 PM EST -----  Please call patient to advise his heart pump function has improved and is now within normal limits. These results can be discussed in more detail at next office visit, thank you!

## 2023-12-11 NOTE — PROGRESS NOTES
Cardiology Consultation     Torie Berman  [id number]  1945  HEART & VASCULAR [facility] [place]  [address] [facility] [address]    1. PAF (paroxysmal atrial fibrillation) (HCC)  POCT ECG      2. ARLENE (obstructive sleep apnea)        3. Bilateral pulmonary embolism (HCC)        4. Cardiomyopathy, nonischemic ([address])        5. SVT (supraventricular tachycardia)        6. Erectile dysfunction due to arterial insufficiency        7. Chronic kidney disease (CKD), stage II (mild)        8. BPH with obstruction/lower urinary tract symptoms        9. Mixed hyperlipidemia        10. Class 1 obesity due to excess calories with serious comorbidity and body mass index (BMI) of 33.0 to 33.9 in adult        11. History of pulmonary embolism        12.  Chronic anticoagulation            Summary of my recommendation for the patient    Clinically doing very well, EF has improved to 55%, continue with full heart failure therapy    PAF, RVR - on dofetilide and low dose metoprolol    Aggressive management of hypertension-beta-blocker, isosorbide, hydralazine-to be started by primary cardiologist    Sleep medicine consult to evaluate and treat for sleep apnea    Use of Cialis - prefer short acting viagra, lowest possible dose - he will discuss with Dr Kristie Blackburn               History of present illness    The patient is doing very well and heart function is improved  He is not feeling any significant palpitation    He is however having difficulty with the use of Cialis    Recent episode of PAF/PAT with RVR  On anticoagulation and metoprolol    The patient has been referred to me for management of palpitations, SVT - by Dr. Kristie Blackburn    He has significant medical illnesses which include  Supraventricular tachycardia  Nonischemic dilated cardiomyopathy  Obesity  Obstructive sleep apnea  History of bilateral pulmonary embolism  Hyperlipidemia  BPH    As far as palpitations are concerned  Has been present for over 5 years  There has been a recent increase  Most episodes are brief lasting a minute  Recent increase in longer episodes  Associated symptoms of lightheadedness and presyncope    Currently he is having 2-3 episodes a week  Previous records show narrow complex tachycardia, long RP, heart rate around 140 per minute  There is record from 2017 and 2020 which show heart rate in the 140s      The patient is a retired  personal  He is relatively active though not much  His not complaining of anginal like chest pain or chest pressure  His not complaining of worsening orthopnea or PND  He does have chronic leg swelling    Palpitations and presyncope is as described above  There has been no episode of syncope  There is rarely orthostatic lightheadedness    The patient is having exertional intolerance    He does have a history of hypertension  He has diet has been a little uncontrolled lately  Both he and his wife have noted worsening hypertension    The patient does have a history of bilateral pulmonary embolism  He has been on blood thinner ever since then      The patient does have a history of cardiomyopathy  LVEF is around 45%  He informs that there has been a cardiac catheterization  However we were unable to pull up the records for the same - done at [facility]      The patient is a former smoker  He does not abuse alcohol  He does not use recreational drugs    There is family history of heart disease and hypertension    Patient Active Problem List   Diagnosis    Bilateral pulmonary embolism (HCC)    SOB (shortness of breath)    Hyperlipidemia    Lung nodule    Obesity    History of sickle cell trait    Leg edema, left    History of pulmonary embolism    Thrombocytopenia (HCC)    Chronic kidney disease (CKD), stage II (mild)    Tachycardia    Chronic low back pain    SVT (supraventricular tachycardia) Chronic anticoagulation    ARLENE (obstructive sleep apnea)    Cardiomyopathy, nonischemic (HCC)    Dyslipidemia    BPH (benign prostatic hyperplasia)    Nephrolithiasis    Lumbar disc disease with radiculopathy - Left    Spinal stenosis of lumbar region without neurogenic claudication - Left    Chest pain    Acute kidney injury superimposed on CKD     COVID-19    BPH with obstruction/lower urinary tract symptoms    PAF (paroxysmal atrial fibrillation) (HCC)    Erectile dysfunction due to arterial insufficiency     Past Medical History:   Diagnosis Date    Cardiomyopathy ([address])     Hyperlipidemia     Post-nasal drip     Pulmonary embolism (HCC)      Social History     Socioeconomic History    Marital status: /Civil Union     Spouse name: Not on file    Number of children: 7    Years of education: Not on file    Highest education level: Not on file   Occupational History    Occupation: employed   Tobacco Use    Smoking status: Former     Current packs/day: 0.00     Types: Cigarettes     Quit date:      Years since quittin.9    Smokeless tobacco: Never   Vaping Use    Vaping status: Never Used   Substance and Sexual Activity    Alcohol use: Yes     Alcohol/week: 0.0 standard drinks of alcohol     Comment: socially    Drug use: No    Sexual activity: Not on file   Other Topics Concern    Not on file   Social History Narrative    Not on file     Social Determinants of Health     Financial Resource Strain: Not on file   Food Insecurity: No Food Insecurity (2023)    Hunger Vital Sign     Worried About Running Out of Food in the Last Year: Never true     Ran Out of Food in the Last Year: Never true   Transportation Needs: No Transportation Needs (2023)    PRAPARE - Transportation     Lack of Transportation (Medical): No     Lack of Transportation (Non-Medical):  No   Physical Activity: Not on file   Stress: Not on file   Social Connections: Not on file   Intimate Partner Violence: Not on file   Housing Stability: Low Risk  (7/6/2023)    Housing Stability Vital Sign     Unable to Pay for Housing in the Last Year: No     Number of Places Lived in the Last Year: 1     Unstable Housing in the Last Year: No      No family history on file. Past Surgical History:   Procedure Laterality Date    ARTHROSCOPY KNEE Left     30 years ago    ID CYSTO INSERTION TRANSPROSTATIC IMPLANT SINGLE N/A 06/17/2021    Procedure: CYSTOSCOPY WITH INSERTION UROLIFT;  Surgeon: Charan Carlos MD;  Location: [facility] OR;  Service: Urology       Current Outpatient Medications:     acetaminophen-codeine (TYLENOL #3) 300-30 mg per tablet, TAKE 1-2 TABS BY MOUTH EVERY 4-6 HRS AS NEEDED FOR PAIN, Disp: , Rfl:     apixaban (Eliquis) 5 mg, Take 1 tablet (5 mg total) by mouth 2 (two) times a day, Disp: 180 tablet, Rfl: 3    Diclofenac Sodium (VOLTAREN) 1 %, APPLY 2GM TO UPPER EXTREMITIES TOPICALLY FOUR TIMES A DAY AND APPLY 4GM TO LOWER EXTREMITIES FOUR TIMES A DAY FOR PAIN AND INFLAMMATION **USE DOSING CARD** (DO NOT EXCEED 16 GRAMS DAILY TO ANY AFFECTE, Disp: , Rfl:     dofetilide (TIKOSYN) 250 mcg capsule, Take 1 capsule (250 mcg total) by mouth every 12 (twelve) hours, Disp: 360 capsule, Rfl: 0    DULoxetine (CYMBALTA) 60 mg delayed release capsule, Oral, Disp: , Rfl:     finasteride (PROSCAR) 5 mg tablet, TAKE 1 TABLET (5 MG TOTAL) BY MOUTH DAILY. , Disp: 90 tablet, Rfl: 5    fluticasone (FLONASE) 50 mcg/act nasal spray, 1 spray into each nostril daily as needed for rhinitis, Disp: 16 g, Rfl: 0    furosemide (LASIX) 40 mg tablet, Take 40 mg by mouth if needed for shortness of breath, Disp: , Rfl:     lidocaine (LIDODERM) 5 %, APPLY 1 TO 2 PATCHES TOPICALLY EVERY DAY AS NEEDED FOR PAIN (REMOVE PATCH AFTER 12 HOURS; 12 HOURS ON AND 12 HOURS OFF), Disp: , Rfl:     Omega-3 1000 MG CAPS, 1,000 mg, Disp: , Rfl:     rosuvastatin (CRESTOR) 20 MG tablet, Take 20 mg by mouth daily, Disp: , Rfl:     sacubitril-valsartan (Entresto) 24-26 MG TABS, Take 1 tablet by mouth 2 (two) times a day, Disp: 180 tablet, Rfl: 5    sildenafil (VIAGRA) 50 MG tablet, Take 1 tablet (50 mg total) by mouth daily as needed for erectile dysfunction, Disp: 10 tablet, Rfl: 3    spironolactone (ALDACTONE) 25 mg tablet, Take 1 tablet (25 mg total) by mouth daily, Disp: 90 tablet, Rfl: 5    tamsulosin (FLOMAX) 0.4 mg, Take 1 capsule (0.4 mg total) by mouth daily with dinner, Disp: 60 capsule, Rfl: 3    metoprolol succinate (TOPROL-XL) 25 mg 24 hr tablet, Take 0.5 tablets (12.5 mg total) by mouth daily Do not start before July 8, 2023.  (Patient not taking: Reported on 8/30/2023), Disp: 15 tablet, Rfl: 3  No Known Allergies  Vitals:    12/13/23 1030   BP: 136/82   BP Location: Left arm   Patient Position: Sitting   Cuff Size: Large   Pulse: 60   Weight: 123 kg (271 lb 4.8 oz)   Height: 6' 3" (1.905 m)         LAB RESULTS:    CBC:  WBC   Date Value Ref Range Status   10/10/2023 7.41 4.31 - 10.16 Thousand/uL Final     Hemoglobin   Date Value Ref Range Status   10/10/2023 13.7 12.0 - 17.0 g/dL Final     Hematocrit   Date Value Ref Range Status   10/10/2023 40.3 36.5 - 49.3 % Final     MCV   Date Value Ref Range Status   10/10/2023 92 82 - 98 fL Final     Platelets   Date Value Ref Range Status   10/10/2023 130 (L) 149 - 390 Thousands/uL Final     RBC   Date Value Ref Range Status   10/10/2023 4.39 3.88 - 5.62 Million/uL Final     MCH   Date Value Ref Range Status   10/10/2023 31.2 26.8 - 34.3 pg Final     MCHC   Date Value Ref Range Status   10/10/2023 34.0 31.4 - 37.4 g/dL Final     RDW   Date Value Ref Range Status   10/10/2023 14.8 11.6 - 15.1 % Final     MPV   Date Value Ref Range Status   10/10/2023 11.8 8.9 - 12.7 fL Final     nRBC   Date Value Ref Range Status   10/10/2023 0 /100 WBCs Final       CMP:  Potassium   Date Value Ref Range Status   10/10/2023 3.6 3.5 - 5.3 mmol/L Final     Chloride   Date Value Ref Range Status   10/10/2023 107 96 - 108 mmol/L Final     CO2   Date Value Ref Range Status   10/10/2023 28 21 - 32 mmol/L Final     BUN   Date Value Ref Range Status   10/10/2023 13 5 - 25 mg/dL Final     Creatinine   Date Value Ref Range Status   10/10/2023 1.22 0.60 - 1.30 mg/dL Final     Comment:     Standardized to IDMS reference method     Calcium   Date Value Ref Range Status   10/10/2023 9.7 8.4 - 10.2 mg/dL Final     AST   Date Value Ref Range Status   10/10/2023 20 13 - 39 U/L Final     ALT   Date Value Ref Range Status   10/10/2023 18 7 - 52 U/L Final     Comment:     Specimen collection should occur prior to Sulfasalazine administration due to the potential for falsely depressed results.       Alkaline Phosphatase   Date Value Ref Range Status   10/10/2023 56 34 - 104 U/L Final     eGFR   Date Value Ref Range Status   10/10/2023 56 ml/min/1.73sq m Final        Magnesium:   Magnesium   Date Value Ref Range Status   11/21/2021 2.4 1.6 - 2.6 mg/dL Final        A1C:  No results found for: "HGBA1C"     TSH:  TSH 3RD GENERATON   Date Value Ref Range Status   11/21/2021 2.133 0.358 - 3.740 uIU/mL Final        PT/INR:  Protime   Date Value Ref Range Status   10/10/2023 14.2 11.6 - 14.5 seconds Final     INR   Date Value Ref Range Status   10/10/2023 1.03 0.84 - 1.19 Final       Lipid Panel:  Cholesterol   Date Value Ref Range Status   05/18/2022 146 See Comment mg/dL Final     Comment:     Cholesterol:         Pediatric <18 Years        Desirable          <170 mg/dL      Borderline High    170-199 mg/dL      High               >=200 mg/dL        Adult >=18 Years            Desirable        <200 mg/dL      Borderline High  200-239 mg/dL      High             >239 mg/dL       Triglycerides   Date Value Ref Range Status   05/18/2022 98 See Comment mg/dL Final     Comment:     Triglyceride:     0-9Y            <75mg/dL     10Y-17Y         <90 mg/dL       >=18Y     Normal          <150 mg/dL     Borderline High 150-199 mg/dL     High            200-499 mg/dL        Very High       >499 mg/dL    Specimen collection should occur prior to N-Acetylcysteine or Metamizole administration due to the potential for falsely depressed results. HDL, Direct   Date Value Ref Range Status   05/18/2022 45 >=40 mg/dL Final     Comment:     Specimen collection should occur prior to Metamizole administration due to the potential for falsley depressed results. Non-HDL-Chol (CHOL-HDL)   Date Value Ref Range Status   03/31/2021 155 mg/dl Final       Troponin:  Troponin I   Date Value Ref Range Status   04/23/2021 0.02 <=0.04 ng/mL Final     Comment:     [employer] Chemistry analyzer 99% cutoff is > 0.04 ng/mL in network labs     o cTnI 99% cutoff is useful only when applied to patients in the clinical setting of myocardial ischemia   o cTnI 99% cutoff should be interpreted in the context of clinical history, ECG findings and possibly cardiac imaging to establish correct diagnosis. o cTnI 99% cutoff may be suggestive but clearly not indicative of a coronary event without the clinical setting of myocardial ischemia. Imaging:    EKG:    10/10/2023        TYLER:  No results found for this or any previous visit. Echocardiogram:  Results for orders placed during the hospital encounter of 06/02/23    Echo complete w/ contrast if indicated    Interpretation Summary    Left Ventricle: Left ventricular cavity size is normal. Wall thickness is normal. The left ventricular ejection fraction is 45%. Systolic function is mildly reduced. Diastolic function is mildly abnormal, consistent with grade I (abnormal) relaxation. Patient noted to have intermittent runs of tachycardia during study. Left Ventricle: Left ventricular cavity size is normal. The left ventricular ejection fraction is 40%. Systolic function is mildly reduced. Diastolic function is mildly abnormal, consistent with grade I (abnormal) relaxation.     The following segments are hypokinetic: apical anterior, apical septal, apical inferior, apical lateral and apex. Right Ventricle: Right ventricular cavity size is normal. Systolic function is normal.    Left Atrium: The atrium is mildly dilated. Mitral Valve: There is mild regurgitation. Results for orders placed during the hospital encounter of 11/20/23    Echo follow up/limited w/ contrast if indicated    Interpretation Summary    Left Ventricle: Left ventricular cavity size is upper normal. The left ventricular ejection fraction is 55% by visual estimation. Systolic function is normal. Although no diagnostic regional wall motion abnormality was identified, this possibility cannot be completely excluded on the basis of this study. Diastolic function is mildly abnormal, consistent with grade I (abnormal) relaxation. Limited echocardiogram performed to evaluate LV function      Stress Test:   Results for orders placed during the hospital encounter of 04/12/23    NM myocardial perfusion spect (rx stress and/or rest)    Interpretation Summary  1. The patient presented with intermittent supraventricular tachycardia which appeared to be primarily regular at a rate of 130 to 140 bpm.  It is suggested that this may be a 2-1 flutter. 2.  At times the rhythm was briefly irregular and rapid suggesting possible fibrillation  3. No discomfort with or without the rapid heart rate or with Lexiscan. 4.  The patient was given a total dose of 5 mg Lopressor IV to control the tachycardia and he left with a normal heart rate and rhythm. 5.  There is a moderate sized inferior wall defect which predominantly normalizes with prone imaging and most likely represents diaphragmatic shadowing  6. There is a small apical mixed defect of moderate intensity suggesting apical infarct and ischemia  7. Generalized left ventricular hypokinesis. Calculated ejection fraction is 34%  8.   Resting EKG showed sinus rhythm and nonspecific ST-T changes    Results for orders placed during the hospital encounter of 22    Echo stress test, exercise    Interpretation Summary    Stress ECG: No ST deviation is noted. Arrhythmias during recovery: rare PVCs. The ECG was negative for ischemia. The stress ECG is negative for ischemia. Stress ECG: The patient reached stage 2.0 of the protocol after exercising for 4 min and 29 sec and had a maximal HR of 179 bpm (124 % of MPHR) and 6.3 METS. Blood pressure demonstrated a normal response and heart rate demonstrated an exaggerated response to stress. Left Ventricle: Left ventricular cavity size is normal. Wall thickness is normal. The left ventricular ejection fraction is 55%. Systolic function is normal. Wall motion is normal.    Tricuspid Valve: There is mild regurgitation. Peak Stress Echo: Left ventricle cavity has normal reduction in size at peak stress. The left ventricle systolic function is normal at peak stress. The peak stress echo showed normal wall motion. There are no dynamic wall motion changes or echocardiographic evidence of stress-induced ischemia. Patient did have exaggerated heart rate response to exercise however only exercising for 4 minutes 29 seconds and achieving 124% of maximum age predicted heart rate. Cardiac Catheterization:  Results for orders placed during the hospital encounter of 21    Cardiac catheterization    Narrative  [address] [facility], [address] [facility]  [phone]    [facility]    Invasive Cardiovascular Lab Complete Report    Patient: Dorn Hatchet  MR number: [id number]  Account number: [de-identified]  Study date: 2021  Gender: Male  : 1945  Height: 75.2 in  Weight: 268.4 lb  BSA: 2.49 mï¾²    Allergies: SILDENAFIL, SIMVASTATIN    Diagnostic Cardiologist:  Eric Pacheco    SUMMARY    CORONARY CIRCULATION:  There was mild 3-vessel coronary artery disease (LAD, RCA, and circumflex). Left main: Normal.  LAD: Normal.  RCA: This vessel was not visualized.  None dominant    HEMODYNAMICS:  Hemodynamic assessment demonstrated mild systemic hypertension. HISTORY:  There is a history of supraventricular arrhythmia. PRE-PROCEDURE DATA:  The risks and alternatives of the procedures and conscious sedation were explained to the patient and informed consent was obtained. INDICATIONS:  --  Congestive heart failure with cardiomyopathy. He was noted to have worsening LV dysfunction    HISTORY: There is a history of supraventricular arrhythmia. PROCEDURES PERFORMED    --  Left heart catheterization without ventriculogram.  --  Left coronary angiography. --  Outpatient. --  Mod Sedation Same Physician Initial 15min. --  Mod Sedation Same Physician Add 15min. --  Coronary Catheterization (w/ [facility]). PROCEDURE: The risks and alternatives of the procedures and conscious sedation were explained to the patient and informed consent was obtained. The patient was brought to the cath lab and placed on the table. The planned puncture sites  were prepped and draped in the usual sterile fashion. --  Right radial artery access. After performing an Nas's test to verify adequate ulnar artery supply to the hand, the radial site was prepped. The puncture site was infiltrated with local anesthetic. The vessel was accessed using the  modified Seldinger technique, a wire was advanced into the vessel, and a sheath was advanced over the wire into the vessel. --  Left heart catheterization without ventriculogram. A catheter was advanced over a guidewire into the ascending aorta. After recording ascending aortic pressure, the catheter was advanced across the aortic valve and left ventricular  pressure was recorded. The catheter was pulled back across the aortic valve and into the ascending aorta and pullback pressures were obtained. --  Left coronary artery angiography.  A catheter was advanced over a guidewire into the aorta and positioned in the left coronary artery ostium under fluoroscopic guidance. Angiography was performed. --  Outpatient. --  Mod Sedation Same Physician Initial 15min. --  Mod Sedation Same Physician Add 15min. --  Coronary Catheterization (/ [facility]). PROCEDURE COMPLETION: The patient tolerated the procedure well and was discharged from the cath lab. TIMING: Test started at 08:07. Test concluded at 08:40. HEMOSTASIS: The sheath was removed. The site was compressed with a Hemoband  device. Hemostasis was obtained. MEDICATIONS GIVEN: Versed (2mg/2ml), 1 mg, IV, at 08:08. Fentanyl (1OOmcg/2 ml), 50 mcg, IV, at 08:08. 1% Lidocaine, 1 ml, subcutaneously, at 08:18. Nitroglycerin (200mcg/ml), 200 mcg, at 08:21. Heparin  1000 units/ml, 5,000 units, IV, at 08:22. Adenosine (6mg/2ml), 6 mg, IV, at 08:38. CONTRAST GIVEN: 30 ml Visipaque (320mg I/ml). RADIATION EXPOSURE: Fluoroscopy time: 6.03 min. HEMODYNAMICS: Hemodynamic assessment demonstrated mild systemic hypertension. CORONARY VESSELS:   --  The coronary circulation is left dominant. --  There was mild 3-vessel coronary artery disease (LAD, RCA, and circumflex). --  Left main: Normal.  --  LAD: Normal.  --  Circumflex: The vessel was large sized (dominant). Angiography showed mild atherosclerosis. Noted on a turn  --  1st obtuse marginal: mild disease and moderate size  --  RCA: This vessel was not visualized. None dominant    IMPRESSIONS:  Mild CAD    RECOMMENDATIONS  Patient management should include continuation of medical therapy. The patient will follow-up with his primary cardiologist and electrophysiologist in the near future. This reported noted node asymptomatic supraventricular tachycardia to  130 beats per minute both preoperatively and intraoperatively.     Prepared and signed by    Shakir Schaefer  Signed 03/03/2021 12:19:53    Study diagram    Hemodynamic tables    Pressures:  Baseline  Pressures:  - HR: 135  Pressures:  - Rhythm:  Pressures:  -- Aortic Pressure (S/D/M): 145/105/124  Pressures: -- Left Ventricle (s/edp): 146/34/--    Outputs:  Baseline  Outputs:  -- CALCULATIONS[age]  Outputs:  -- CALCULATIONS: Body Surface Area: 2.49  Outputs:  -- CALCULATIONS: Height in cm: 191.00  Outputs:  -- CALCULATIONS: Sex: Male  Outputs:  -- CALCULATIONS: Weight in k.00      HOLTER MONITOR: 24 HOUR/48 HOUR MONITORS  No results found for this or any previous visit. AMB Extended Holter Monitor: Zio XT/AT or BioTel    "[employer] - BioTel  2023 - 2023    Interpretation:   Bethany Villeda MD  2023  1:42 PM EDT       Preliminary Findings  Signature Date  30 events were transmitted. 13 patient triggered; 17 auto triggered  Patient monitored for 10d 18h 19m  AF occurred 22 time(s) with HR range of 56 - 102; Total AF Calvin <1%  17,022 PACs with PAC burden of 2%  12,415 PVCs with PVC burden of 1%     The patient was monitored for almost 11 days. The primary rhythm was normal sinus but there were paroxysms of atrial fibrillation. Average atrial fibrillation rate was 68 but at times there were brief increases to as high as 159. Total time in atrial fibrillation was 26 minutes. There were occasional PVCs     On 1 occasion the patient noted "skipped beat."  The rhythm was atrial fibrillation with a slightly rapid ventricular response. On 1 occasion the patient noted "dizzy" and the rhythm was sinus with a heart rate of 84 and 1 PVC. On 1 occasion the patient noted "skipped beat" and the rhythm was sinus bradycardia at a rate of 57. Average heart rate was 70 bpm.     Conclusion:     1. Generally the rhythm is normal sinus with intermittent episodes of atrial fibrillation which was generally well controlled although briefly elevated at times as noted above  2. Symptoms as noted above unaccompanied by significant rhythm abnormality other than atrial fibrillation with a slightly rapid ventricular response on 1 occasion.                   2023          DEVICE CHECK: No results found for this or any previous visit. Review of Systems:  Review of Systems   All other systems reviewed and are negative. As described in my history of present illness      Physical Exam:  Physical Exam  Constitutional:       Appearance: Normal appearance. He is well-developed. He is obese. Comments: Not in any distress at the current time   HENT:      Head: Normocephalic and atraumatic. Right Ear: External ear normal.      Left Ear: External ear normal.      Nose: Nose normal.      Mouth/Throat:      Pharynx: Uvula midline. Eyes:      General: Lids are normal.      Extraocular Movements: Extraocular movements intact. Conjunctiva/sclera: Conjunctivae normal.      Pupils: Pupils are equal, round, and reactive to light. Comments: No pallor  No cyanosis  No icterus   Neck:      Thyroid: No thyromegaly. Vascular: No carotid bruit or JVD. Trachea: Trachea normal.      Comments: No jugular lymphadenopathy   thick neck  Cardiovascular:      Rate and Rhythm: Normal rate and regular rhythm. Chest Wall: PMI is not displaced. Pulses: Normal pulses. Heart sounds: Normal heart sounds, S1 normal and S2 normal. No murmur heard. No friction rub. No gallop. No S3 or S4 sounds. Pulmonary:      Effort: Pulmonary effort is normal. No accessory muscle usage or respiratory distress. Breath sounds: Examination of the right-lower field reveals decreased breath sounds. Examination of the left-lower field reveals decreased breath sounds. Decreased breath sounds present. No wheezing, rhonchi or rales. Chest:      Chest wall: No tenderness. Abdominal:      General: Bowel sounds are normal. There is no distension. Palpations: Abdomen is soft. There is no hepatomegaly, splenomegaly or mass. Tenderness: There is no abdominal tenderness. Comments: Central obesity present   Musculoskeletal:         General: Swelling present.  No tenderness or deformity. Normal range of motion. Cervical back: Normal range of motion. Right lower leg: Edema present. Left lower leg: Edema present. Lymphadenopathy:      Cervical: No cervical adenopathy. Skin:     Findings: No abrasion, erythema, lesion or rash. Nails: There is no clubbing. Neurological:      Mental Status: He is alert and oriented to person, place, and time. Comments: Facial symmetry is retained  Extraocular movements are retained  Head neck tongue and palate movement are retained and symmetric   Psychiatric:         Speech: Speech normal.         Behavior: Behavior normal.         Thought Content:  Thought content normal.         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  Differentials are - atrial tachycardia, atypical AVNRT, AVRT with decremental pathway    Most of the episodes are brief lasting less than a minute  These are happening 2 to 3 times a week  The patient does inform of episodes which have resulted in hospital visits and lightheadedness    My recommendation for the patient:  Continue with dofetilide and metoprolol          Hypertension  Blood pressure is 136/82  This is not well controlled  Discussed need to stop salt  Patient will need to be started on beta-blocker, isosorbide, hydralazine  Patient prefers to discuss with primary cardiologist as he will have more regular follow-up with then      Hyperlipidemia  Patient is currently on rosuvastatin  No myositis or myalgia  Continue with the same      Bilateral pulmonary embolism  As per patient this was not provoked  He is going to be on a blood thinner which is to be continued indefinitely      long-term anticoagulation  On Eliquis for bilateral pulmonary embolism      Obesity  Discussed role of diet and exercise  Diet is responsible for 80% of obesity  BMI is 33      Sickle cell trait   Remain hydrated   Continue anticoagulation      Obstructive sleep apnea  Patient has a history of snoring, morning fatigue and daytime sleepiness  He has a  large thick neck and crowded posterior pharynx  He needs to be evaluated and treated for sleep apnea

## 2023-12-12 DIAGNOSIS — N52.9 ERECTILE DYSFUNCTION, UNSPECIFIED ERECTILE DYSFUNCTION TYPE: ICD-10-CM

## 2023-12-12 RX ORDER — SILDENAFIL 50 MG/1
50 TABLET, FILM COATED ORAL DAILY PRN
Qty: 10 TABLET | Refills: 3 | Status: SHIPPED | OUTPATIENT
Start: 2023-12-12

## 2023-12-13 ENCOUNTER — OFFICE VISIT (OUTPATIENT)
Dept: CARDIOLOGY CLINIC | Facility: CLINIC | Age: 78
End: 2023-12-13
Payer: COMMERCIAL

## 2023-12-13 VITALS
DIASTOLIC BLOOD PRESSURE: 82 MMHG | SYSTOLIC BLOOD PRESSURE: 136 MMHG | HEIGHT: 75 IN | WEIGHT: 271.3 LBS | BODY MASS INDEX: 33.73 KG/M2 | HEART RATE: 60 BPM

## 2023-12-13 DIAGNOSIS — N13.8 BPH WITH OBSTRUCTION/LOWER URINARY TRACT SYMPTOMS: ICD-10-CM

## 2023-12-13 DIAGNOSIS — Z86.711 HISTORY OF PULMONARY EMBOLISM: ICD-10-CM

## 2023-12-13 DIAGNOSIS — I47.10 SVT (SUPRAVENTRICULAR TACHYCARDIA): ICD-10-CM

## 2023-12-13 DIAGNOSIS — Z79.01 CHRONIC ANTICOAGULATION: ICD-10-CM

## 2023-12-13 DIAGNOSIS — G47.33 OSA (OBSTRUCTIVE SLEEP APNEA): ICD-10-CM

## 2023-12-13 DIAGNOSIS — I48.0 PAF (PAROXYSMAL ATRIAL FIBRILLATION) (HCC): Primary | ICD-10-CM

## 2023-12-13 DIAGNOSIS — E66.09 CLASS 1 OBESITY DUE TO EXCESS CALORIES WITH SERIOUS COMORBIDITY AND BODY MASS INDEX (BMI) OF 33.0 TO 33.9 IN ADULT: ICD-10-CM

## 2023-12-13 DIAGNOSIS — E78.2 MIXED HYPERLIPIDEMIA: ICD-10-CM

## 2023-12-13 DIAGNOSIS — I26.99 BILATERAL PULMONARY EMBOLISM (HCC): ICD-10-CM

## 2023-12-13 DIAGNOSIS — N40.1 BPH WITH OBSTRUCTION/LOWER URINARY TRACT SYMPTOMS: ICD-10-CM

## 2023-12-13 DIAGNOSIS — N52.01 ERECTILE DYSFUNCTION DUE TO ARTERIAL INSUFFICIENCY: ICD-10-CM

## 2023-12-13 DIAGNOSIS — I42.8 CARDIOMYOPATHY, NONISCHEMIC (HCC): ICD-10-CM

## 2023-12-13 DIAGNOSIS — N18.2 CHRONIC KIDNEY DISEASE (CKD), STAGE II (MILD): ICD-10-CM

## 2023-12-13 PROCEDURE — 99214 OFFICE O/P EST MOD 30 MIN: CPT | Performed by: INTERNAL MEDICINE

## 2023-12-13 PROCEDURE — 93000 ELECTROCARDIOGRAM COMPLETE: CPT | Performed by: INTERNAL MEDICINE

## 2023-12-13 NOTE — LETTER
December 13, 2023     Mary Nance MD  [address] [address]    Patient: Wonda Leventhal. YOB: 1945   Date of Visit: 12/13/2023       Dear Dr. Nelly Silver: Thank you for referring Sandra Lim to me for evaluation. Below are my notes for this consultation. If you have questions, please do not hesitate to call me. I look forward to following your patient along with you. Sincerely,        Braden Velasquez MD        CC: No Recipients    Braden Velasquez MD  12/13/2023 12:18 PM  Sign when [facility] Visit                                             Cardiology Consultation     Wonda Leventhal  [id number]  1945  HEART & VASCULAR [facility] [address] [id number]    1. PAF (paroxysmal atrial fibrillation) (HCC)  POCT ECG      2. ARLENE (obstructive sleep apnea)        3. Bilateral pulmonary embolism (HCC)        4. Cardiomyopathy, nonischemic ([address])        5. SVT (supraventricular tachycardia)        6. Erectile dysfunction due to arterial insufficiency        7. Chronic kidney disease (CKD), stage II (mild)        8. BPH with obstruction/lower urinary tract symptoms        9. Mixed hyperlipidemia        10. Class 1 obesity due to excess calories with serious comorbidity and body mass index (BMI) of 33.0 to 33.9 in adult        11. History of pulmonary embolism        12.  Chronic anticoagulation            Summary of my recommendation for the patient    Clinically doing very well, EF has improved to 55%, continue with full heart failure therapy    PAF, RVR - on dofetilide and low dose metoprolol    Aggressive management of hypertension-beta-blocker, isosorbide, hydralazine-to be started by primary cardiologist    Sleep medicine consult to evaluate and treat for sleep apnea    Use of Cialis - prefer short acting viagra, lowest possible dose - he will discuss with Dr Jovanny You               History of present illness    The patient is doing very well and heart function is improved  He is not feeling any significant palpitation    He is however having difficulty with the use of Cialis    Recent episode of PAF/PAT with RVR  On anticoagulation and metoprolol    The patient has been referred to me for management of palpitations, SVT - by Dr. Zehra Goel    He has significant medical illnesses which include  Supraventricular tachycardia  Nonischemic dilated cardiomyopathy  Obesity  Obstructive sleep apnea  History of bilateral pulmonary embolism  Hyperlipidemia  BPH    As far as palpitations are concerned  Has been present for over 5 years  There has been a recent increase  Most episodes are brief lasting a minute  Recent increase in longer episodes  Associated symptoms of lightheadedness and presyncope    Currently he is having 2-3 episodes a week  Previous records show narrow complex tachycardia, long RP, heart rate around 140 per minute  There is record from 2017 and 2020 which show heart rate in the 140s      The patient is a retired  personal  He is relatively active though not much  His not complaining of anginal like chest pain or chest pressure  His not complaining of worsening orthopnea or PND  He does have chronic leg swelling    Palpitations and presyncope is as described above  There has been no episode of syncope  There is rarely orthostatic lightheadedness    The patient is having exertional intolerance    He does have a history of hypertension  He has diet has been a little uncontrolled lately  Both he and his wife have noted worsening hypertension    The patient does have a history of bilateral pulmonary embolism  He has been on blood thinner ever since then      The patient does have a history of cardiomyopathy  LVEF is around 45%  He informs that there has been a cardiac catheterization  However we were unable to pull up the records for the same - done at [facility]      The patient is a former smoker  He does not abuse alcohol  He does not use recreational drugs    There is family history of heart disease and hypertension    Patient Active Problem List   Diagnosis   • Bilateral pulmonary embolism (HCC)   • SOB (shortness of breath)   • Hyperlipidemia   • Lung nodule   • Obesity   • History of sickle cell trait   • Leg edema, left   • History of pulmonary embolism   • Thrombocytopenia (HCC)   • Chronic kidney disease (CKD), stage II (mild)   • Tachycardia   • Chronic low back pain   • SVT (supraventricular tachycardia)   • Chronic anticoagulation   • ARLENE (obstructive sleep apnea)   • Cardiomyopathy, nonischemic (HCC)   • Dyslipidemia   • BPH (benign prostatic hyperplasia)   • Nephrolithiasis   • Lumbar disc disease with radiculopathy - Left   • Spinal stenosis of lumbar region without neurogenic claudication - Left   • Chest pain   • Acute kidney injury superimposed on CKD    • COVID-19   • BPH with obstruction/lower urinary tract symptoms   • PAF (paroxysmal atrial fibrillation) (HCC)   • Erectile dysfunction due to arterial insufficiency     Past Medical History:   Diagnosis Date   • Cardiomyopathy ([address])    • Hyperlipidemia    • Post-nasal drip    • Pulmonary embolism ([address])      Social History     Socioeconomic History   • Marital status: /Civil Union     Spouse name: Not on file   • Number of children: 7   • Years of education: Not on file   • Highest education level: Not on file   Occupational History   • Occupation: employed   Tobacco Use   • Smoking status: Former     Current packs/day: 0.00     Types: Cigarettes     Quit date:      Years since quittin.9   • Smokeless tobacco: Never   Vaping Use   • Vaping status: Never Used   Substance and Sexual Activity   • Alcohol use:  Yes     Alcohol/week: 0.0 standard drinks of alcohol     Comment: socially   • Drug use: No   • Sexual activity: Not on file   Other Topics Concern   • Not on file   Social History Narrative   • Not on file Social Determinants of Health     Financial Resource Strain: Not on file   Food Insecurity: No Food Insecurity (7/6/2023)    Hunger Vital Sign    • Worried About Running Out of Food in the Last Year: Never true    • Ran Out of Food in the Last Year: Never true   Transportation Needs: No Transportation Needs (7/6/2023)    PRAPARE - Transportation    • Lack of Transportation (Medical): No    • Lack of Transportation (Non-Medical): No   Physical Activity: Not on file   Stress: Not on file   Social Connections: Not on file   Intimate Partner Violence: Not on file   Housing Stability: Low Risk  (7/6/2023)    Housing Stability Vital Sign    • Unable to Pay for Housing in the Last Year: No    • Number of Places Lived in the Last Year: 1    • Unstable Housing in the Last Year: No      No family history on file. Past Surgical History:   Procedure Laterality Date   • ARTHROSCOPY KNEE Left     30 years ago   • WA CYSTO INSERTION TRANSPROSTATIC IMPLANT SINGLE N/A 06/17/2021    Procedure: CYSTOSCOPY WITH INSERTION UROLIFT;  Surgeon: Aiden Mendoza MD;  Location: [facility];  Service: Urology       Current Outpatient Medications:   •  acetaminophen-codeine (TYLENOL #3) 300-30 mg per tablet, TAKE 1-2 TABS BY MOUTH EVERY 4-6 HRS AS NEEDED FOR PAIN, Disp: , Rfl:   •  apixaban (Eliquis) 5 mg, Take 1 tablet (5 mg total) by mouth 2 (two) times a day, Disp: 180 tablet, Rfl: 3  •  Diclofenac Sodium (VOLTAREN) 1 %, APPLY 2GM TO UPPER EXTREMITIES TOPICALLY FOUR TIMES A DAY AND APPLY 4GM TO LOWER EXTREMITIES FOUR TIMES A DAY FOR PAIN AND INFLAMMATION **USE DOSING CARD** (DO NOT EXCEED 16 GRAMS DAILY TO ANY AFFECTE, Disp: , Rfl:   •  dofetilide (TIKOSYN) 250 mcg capsule, Take 1 capsule (250 mcg total) by mouth every 12 (twelve) hours, Disp: 360 capsule, Rfl: 0  •  DULoxetine (CYMBALTA) 60 mg delayed release capsule, Oral, Disp: , Rfl:   •  finasteride (PROSCAR) 5 mg tablet, TAKE 1 TABLET (5 MG TOTAL) BY MOUTH DAILY. , Disp: 90 tablet, Rfl: 5  •  fluticasone (FLONASE) 50 mcg/act nasal spray, 1 spray into each nostril daily as needed for rhinitis, Disp: 16 g, Rfl: 0  •  furosemide (LASIX) 40 mg tablet, Take 40 mg by mouth if needed for shortness of breath, Disp: , Rfl:   •  lidocaine (LIDODERM) 5 %, APPLY 1 TO 2 PATCHES TOPICALLY EVERY DAY AS NEEDED FOR PAIN (REMOVE PATCH AFTER 12 HOURS; 12 HOURS ON AND 12 HOURS OFF), Disp: , Rfl:   •  Omega-3 1000 MG CAPS, 1,000 mg, Disp: , Rfl:   •  rosuvastatin (CRESTOR) 20 MG tablet, Take 20 mg by mouth daily, Disp: , Rfl:   •  sacubitril-valsartan (Entresto) 24-26 MG TABS, Take 1 tablet by mouth 2 (two) times a day, Disp: 180 tablet, Rfl: 5  •  sildenafil (VIAGRA) 50 MG tablet, Take 1 tablet (50 mg total) by mouth daily as needed for erectile dysfunction, Disp: 10 tablet, Rfl: 3  •  spironolactone (ALDACTONE) 25 mg tablet, Take 1 tablet (25 mg total) by mouth daily, Disp: 90 tablet, Rfl: 5  •  tamsulosin (FLOMAX) 0.4 mg, Take 1 capsule (0.4 mg total) by mouth daily with dinner, Disp: 60 capsule, Rfl: 3  •  metoprolol succinate (TOPROL-XL) 25 mg 24 hr tablet, Take 0.5 tablets (12.5 mg total) by mouth daily Do not start before July 8, 2023.  (Patient not taking: Reported on 8/30/2023), Disp: 15 tablet, Rfl: 3  No Known Allergies  Vitals:    12/13/23 1030   BP: 136/82   BP Location: Left arm   Patient Position: Sitting   Cuff Size: Large   Pulse: 60   Weight: 123 kg (271 lb 4.8 oz)   Height: 6' 3" (1.905 m)         LAB RESULTS:    CBC:  WBC   Date Value Ref Range Status   10/10/2023 7.41 4.31 - 10.16 Thousand/uL Final     Hemoglobin   Date Value Ref Range Status   10/10/2023 13.7 12.0 - 17.0 g/dL Final     Hematocrit   Date Value Ref Range Status   10/10/2023 40.3 36.5 - 49.3 % Final     MCV   Date Value Ref Range Status   10/10/2023 92 82 - 98 fL Final     Platelets   Date Value Ref Range Status   10/10/2023 130 (L) 149 - 390 Thousands/uL Final     RBC   Date Value Ref Range Status   10/10/2023 4.39 3.88 - 5.62 Million/uL Final     MCH   Date Value Ref Range Status   10/10/2023 31.2 26.8 - 34.3 pg Final     MCHC   Date Value Ref Range Status   10/10/2023 34.0 31.4 - 37.4 g/dL Final     RDW   Date Value Ref Range Status   10/10/2023 14.8 11.6 - 15.1 % Final     MPV   Date Value Ref Range Status   10/10/2023 11.8 8.9 - 12.7 fL Final     nRBC   Date Value Ref Range Status   10/10/2023 0 /100 WBCs Final       CMP:  Potassium   Date Value Ref Range Status   10/10/2023 3.6 3.5 - 5.3 mmol/L Final     Chloride   Date Value Ref Range Status   10/10/2023 107 96 - 108 mmol/L Final     CO2   Date Value Ref Range Status   10/10/2023 28 21 - 32 mmol/L Final     BUN   Date Value Ref Range Status   10/10/2023 13 5 - 25 mg/dL Final     Creatinine   Date Value Ref Range Status   10/10/2023 1.22 0.60 - 1.30 mg/dL Final     Comment:     Standardized to IDMS reference method     Calcium   Date Value Ref Range Status   10/10/2023 9.7 8.4 - 10.2 mg/dL Final     AST   Date Value Ref Range Status   10/10/2023 20 13 - 39 U/L Final     ALT   Date Value Ref Range Status   10/10/2023 18 7 - 52 U/L Final     Comment:     Specimen collection should occur prior to Sulfasalazine administration due to the potential for falsely depressed results.       Alkaline Phosphatase   Date Value Ref Range Status   10/10/2023 56 34 - 104 U/L Final     eGFR   Date Value Ref Range Status   10/10/2023 56 ml/min/1.73sq m Final        Magnesium:   Magnesium   Date Value Ref Range Status   11/21/2021 2.4 1.6 - 2.6 mg/dL Final        A1C:  No results found for: "HGBA1C"     TSH:  TSH 3RD GENERATON   Date Value Ref Range Status   11/21/2021 2.133 0.358 - 3.740 uIU/mL Final        PT/INR:  Protime   Date Value Ref Range Status   10/10/2023 14.2 11.6 - 14.5 seconds Final     INR   Date Value Ref Range Status   10/10/2023 1.03 0.84 - 1.19 Final       Lipid Panel:  Cholesterol   Date Value Ref Range Status   05/18/2022 146 See Comment mg/dL Final     Comment: Cholesterol:         Pediatric <18 Years        Desirable          <170 mg/dL      Borderline High    170-199 mg/dL      High               >=200 mg/dL        Adult >=18 Years            Desirable        <200 mg/dL      Borderline High  200-239 mg/dL      High             >239 mg/dL       Triglycerides   Date Value Ref Range Status   05/18/2022 98 See Comment mg/dL Final     Comment:     Triglyceride:     0-9Y            <75mg/dL     10Y-17Y         <90 mg/dL       >=18Y     Normal          <150 mg/dL     Borderline High 150-199 mg/dL     High            200-499 mg/dL        Very High       >499 mg/dL    Specimen collection should occur prior to N-Acetylcysteine or Metamizole administration due to the potential for falsely depressed results. HDL, Direct   Date Value Ref Range Status   05/18/2022 45 >=40 mg/dL Final     Comment:     Specimen collection should occur prior to Metamizole administration due to the potential for falsley depressed results. Non-HDL-Chol (CHOL-HDL)   Date Value Ref Range Status   03/31/2021 155 mg/dl Final       Troponin:  Troponin I   Date Value Ref Range Status   04/23/2021 0.02 <=0.04 ng/mL Final     Comment:     [employer] Chemistry analyzer 99% cutoff is > 0.04 ng/mL in network labs     o cTnI 99% cutoff is useful only when applied to patients in the clinical setting of myocardial ischemia   o cTnI 99% cutoff should be interpreted in the context of clinical history, ECG findings and possibly cardiac imaging to establish correct diagnosis. o cTnI 99% cutoff may be suggestive but clearly not indicative of a coronary event without the clinical setting of myocardial ischemia. Imaging:    EKG:    10/10/2023        TYLER:  No results found for this or any previous visit.       Echocardiogram:  Results for orders placed during the hospital encounter of 06/02/23    Echo complete w/ contrast if indicated    Interpretation Summary  •  Left Ventricle: Left ventricular cavity size is normal. Wall thickness is normal. The left ventricular ejection fraction is 45%. Systolic function is mildly reduced. Diastolic function is mildly abnormal, consistent with grade I (abnormal) relaxation. Patient noted to have intermittent runs of tachycardia during study. •  Left Ventricle: Left ventricular cavity size is normal. The left ventricular ejection fraction is 40%. Systolic function is mildly reduced. Diastolic function is mildly abnormal, consistent with grade I (abnormal) relaxation. •  The following segments are hypokinetic: apical anterior, apical septal, apical inferior, apical lateral and apex. •  Right Ventricle: Right ventricular cavity size is normal. Systolic function is normal.  •  Left Atrium: The atrium is mildly dilated. •  Mitral Valve: There is mild regurgitation. Results for orders placed during the hospital encounter of 11/20/23    Echo follow up/limited w/ contrast if indicated    Interpretation Summary  •  Left Ventricle: Left ventricular cavity size is upper normal. The left ventricular ejection fraction is 55% by visual estimation. Systolic function is normal. Although no diagnostic regional wall motion abnormality was identified, this possibility cannot be completely excluded on the basis of this study. Diastolic function is mildly abnormal, consistent with grade I (abnormal) relaxation. •  Limited echocardiogram performed to evaluate LV function      Stress Test:   Results for orders placed during the hospital encounter of 04/12/23    NM myocardial perfusion spect (rx stress and/or rest)    Interpretation Summary  1. The patient presented with intermittent supraventricular tachycardia which appeared to be primarily regular at a rate of 130 to 140 bpm.  It is suggested that this may be a 2-1 flutter. 2.  At times the rhythm was briefly irregular and rapid suggesting possible fibrillation  3. No discomfort with or without the rapid heart rate or with Lexiscan.   4.  The patient was given a total dose of 5 mg Lopressor IV to control the tachycardia and he left with a normal heart rate and rhythm. 5.  There is a moderate sized inferior wall defect which predominantly normalizes with prone imaging and most likely represents diaphragmatic shadowing  6. There is a small apical mixed defect of moderate intensity suggesting apical infarct and ischemia  7. Generalized left ventricular hypokinesis. Calculated ejection fraction is 34%  8. Resting EKG showed sinus rhythm and nonspecific ST-T changes    Results for orders placed during the hospital encounter of 02/17/22    Echo stress test, exercise    Interpretation Summary  •  Stress ECG: No ST deviation is noted. Arrhythmias during recovery: rare PVCs. The ECG was negative for ischemia. The stress ECG is negative for ischemia. •  Stress ECG: The patient reached stage 2.0 of the protocol after exercising for 4 min and 29 sec and had a maximal HR of 179 bpm (124 % of MPHR) and 6.3 METS. Blood pressure demonstrated a normal response and heart rate demonstrated an exaggerated response to stress. •  Left Ventricle: Left ventricular cavity size is normal. Wall thickness is normal. The left ventricular ejection fraction is 55%. Systolic function is normal. Wall motion is normal.  •  Tricuspid Valve: There is mild regurgitation. •  Peak Stress Echo: Left ventricle cavity has normal reduction in size at peak stress. The left ventricle systolic function is normal at peak stress. The peak stress echo showed normal wall motion. There are no dynamic wall motion changes or echocardiographic evidence of stress-induced ischemia. Patient did have exaggerated heart rate response to exercise however only exercising for 4 minutes 29 seconds and achieving 124% of maximum age predicted heart rate.       Cardiac Catheterization:  Results for orders placed during the hospital encounter of 03/03/21    Cardiac catheterization    Narrative  [facility], [address] [facility]  [phone]    [facility]    Invasive Cardiovascular Lab Complete Report    Patient: Laurie Das  MR number: [id number]  Account number: [de-identified]  Study date: 2021  Gender: Male  : 1945  Height: 75.2 in  Weight: 268.4 lb  BSA: 2.49 mï¾²    Allergies: SILDENAFIL, SIMVASTATIN    Diagnostic Cardiologist:  Elois Duane    SUMMARY    CORONARY CIRCULATION:  There was mild 3-vessel coronary artery disease (LAD, RCA, and circumflex). Left main: Normal.  LAD: Normal.  RCA: This vessel was not visualized. None dominant    HEMODYNAMICS:  Hemodynamic assessment demonstrated mild systemic hypertension. HISTORY:  There is a history of supraventricular arrhythmia. PRE-PROCEDURE DATA:  The risks and alternatives of the procedures and conscious sedation were explained to the patient and informed consent was obtained. INDICATIONS:  --  Congestive heart failure with cardiomyopathy. He was noted to have worsening LV dysfunction    HISTORY: There is a history of supraventricular arrhythmia. PROCEDURES PERFORMED    --  Left heart catheterization without ventriculogram.  --  Left coronary angiography. --  Outpatient. --  Mod Sedation Same Physician Initial 15min. --  Mod Sedation Same Physician Add 15min. --  Coronary Catheterization (w/ [facility]). PROCEDURE: The risks and alternatives of the procedures and conscious sedation were explained to the patient and informed consent was obtained. The patient was brought to the cath lab and placed on the table. The planned puncture sites  were prepped and draped in the usual sterile fashion. --  Right radial artery access. After performing an Nas's test to verify adequate ulnar artery supply to the hand, the radial site was prepped. The puncture site was infiltrated with local anesthetic.  The vessel was accessed using the  modified Seldinger technique, a wire was advanced into the vessel, and a sheath was advanced over the wire into the vessel. --  Left heart catheterization without ventriculogram. A catheter was advanced over a guidewire into the ascending aorta. After recording ascending aortic pressure, the catheter was advanced across the aortic valve and left ventricular  pressure was recorded. The catheter was pulled back across the aortic valve and into the ascending aorta and pullback pressures were obtained. --  Left coronary artery angiography. A catheter was advanced over a guidewire into the aorta and positioned in the left coronary artery ostium under fluoroscopic guidance. Angiography was performed. --  Outpatient. --  Mod Sedation Same Physician Initial 15min. --  Mod Sedation Same Physician Add 15min. --  Coronary Catheterization (w/ [facility]). PROCEDURE COMPLETION: The patient tolerated the procedure well and was discharged from the cath lab. TIMING: Test started at 08:07. Test concluded at 08:40. HEMOSTASIS: The sheath was removed. The site was compressed with a Hemoband  device. Hemostasis was obtained. MEDICATIONS GIVEN: Versed (2mg/2ml), 1 mg, IV, at 08:08. Fentanyl (1OOmcg/2 ml), 50 mcg, IV, at 08:08. 1% Lidocaine, 1 ml, subcutaneously, at 08:18. Nitroglycerin (200mcg/ml), 200 mcg, at 08:21. Heparin  1000 units/ml, 5,000 units, IV, at 08:22. Adenosine (6mg/2ml), 6 mg, IV, at 08:38. CONTRAST GIVEN: 30 ml Visipaque (320mg I/ml). RADIATION EXPOSURE: Fluoroscopy time: 6.03 min. HEMODYNAMICS: Hemodynamic assessment demonstrated mild systemic hypertension. CORONARY VESSELS:   --  The coronary circulation is left dominant. --  There was mild 3-vessel coronary artery disease (LAD, RCA, and circumflex). --  Left main: Normal.  --  LAD: Normal.  --  Circumflex: The vessel was large sized (dominant). Angiography showed mild atherosclerosis. Noted on a turn  --  1st obtuse marginal: mild disease and moderate size  --  RCA: This vessel was not visualized.  None dominant    IMPRESSIONS:  Mild CAD    RECOMMENDATIONS  Patient management should include continuation of medical therapy. The patient will follow-up with his primary cardiologist and electrophysiologist in the near future. This reported noted node asymptomatic supraventricular tachycardia to  130 beats per minute both preoperatively and intraoperatively. Prepared and signed by    Sadia Wilks  Signed 2021 12:19:53    Study diagram    Hemodynamic tables    Pressures:  Baseline  Pressures:  - HR: 135  Pressures:  - Rhythm:  Pressures:  -- Aortic Pressure (S/D/M): 145/105/124  Pressures:  -- Left Ventricle (s/edp): 146/34/--    Outputs:  Baseline  Outputs:  -- CALCULATIONS[age]  Outputs:  -- CALCULATIONS: Body Surface Area: 2.49  Outputs:  -- CALCULATIONS: Height in cm: 191.00  Outputs:  -- CALCULATIONS: Sex: Male  Outputs:  -- CALCULATIONS: Weight in k.00      HOLTER MONITOR: 24 HOUR/48 HOUR MONITORS  No results found for this or any previous visit. AMB Extended Holter Monitor: Zio XT/AT or BioTel    [employer]  2023 - 2023    Interpretation:   Lexi Naik MD  2023  1:42 PM EDT       Preliminary Findings  Signature Date  30 events were transmitted. 13 patient triggered; 17 auto triggered  Patient monitored for 10d 18h 19m  AF occurred 22 time(s) with HR range of 56 - 102; Total AF Taylors <1%  17,022 PACs with PAC burden of 2%  12,415 PVCs with PVC burden of 1%     The patient was monitored for almost 11 days. The primary rhythm was normal sinus but there were paroxysms of atrial fibrillation. Average atrial fibrillation rate was 68 but at times there were brief increases to as high as 159. Total time in atrial fibrillation was 26 minutes. There were occasional PVCs     On 1 occasion the patient noted "skipped beat."  The rhythm was atrial fibrillation with a slightly rapid ventricular response.   On 1 occasion the patient noted "dizzy" and the rhythm was sinus with a heart rate of 84 and 1 PVC. On 1 occasion the patient noted "skipped beat" and the rhythm was sinus bradycardia at a rate of 57. Average heart rate was 70 bpm.     Conclusion:     1. Generally the rhythm is normal sinus with intermittent episodes of atrial fibrillation which was generally well controlled although briefly elevated at times as noted above  2. Symptoms as noted above unaccompanied by significant rhythm abnormality other than atrial fibrillation with a slightly rapid ventricular response on 1 occasion. 4/25/2023          DEVICE CHECK:     No results found for this or any previous visit. Review of Systems:  Review of Systems   All other systems reviewed and are negative. As described in my history of present illness      Physical Exam:  Physical Exam  Constitutional:       Appearance: Normal appearance. He is well-developed. He is obese. Comments: Not in any distress at the current time   HENT:      Head: Normocephalic and atraumatic. Right Ear: External ear normal.      Left Ear: External ear normal.      Nose: Nose normal.      Mouth/Throat:      Pharynx: Uvula midline. Eyes:      General: Lids are normal.      Extraocular Movements: Extraocular movements intact. Conjunctiva/sclera: Conjunctivae normal.      Pupils: Pupils are equal, round, and reactive to light. Comments: No pallor  No cyanosis  No icterus   Neck:      Thyroid: No thyromegaly. Vascular: No carotid bruit or JVD. Trachea: Trachea normal.      Comments: No jugular lymphadenopathy   thick neck  Cardiovascular:      Rate and Rhythm: Normal rate and regular rhythm. Chest Wall: PMI is not displaced. Pulses: Normal pulses. Heart sounds: Normal heart sounds, S1 normal and S2 normal. No murmur heard. No friction rub. No gallop. No S3 or S4 sounds.    Pulmonary:      Effort: Pulmonary effort is normal. No accessory muscle usage or respiratory distress. Breath sounds: Examination of the right-lower field reveals decreased breath sounds. Examination of the left-lower field reveals decreased breath sounds. Decreased breath sounds present. No wheezing, rhonchi or rales. Chest:      Chest wall: No tenderness. Abdominal:      General: Bowel sounds are normal. There is no distension. Palpations: Abdomen is soft. There is no hepatomegaly, splenomegaly or mass. Tenderness: There is no abdominal tenderness. Comments: Central obesity present   Musculoskeletal:         General: Swelling present. No tenderness or deformity. Normal range of motion. Cervical back: Normal range of motion. Right lower leg: Edema present. Left lower leg: Edema present. Lymphadenopathy:      Cervical: No cervical adenopathy. Skin:     Findings: No abrasion, erythema, lesion or rash. Nails: There is no clubbing. Neurological:      Mental Status: He is alert and oriented to person, place, and time. Comments: Facial symmetry is retained  Extraocular movements are retained  Head neck tongue and palate movement are retained and symmetric   Psychiatric:         Speech: Speech normal.         Behavior: Behavior normal.         Thought Content:  Thought content normal.         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  Differentials are - atrial tachycardia, atypical AVNRT, AVRT with decremental pathway    Most of the episodes are brief lasting less than a minute  These are happening 2 to 3 times a week  The patient does inform of episodes which have resulted in hospital visits and lightheadedness    My recommendation for the patient:  Continue with dofetilide and metoprolol          Hypertension  Blood pressure is 136/82  This is not well controlled  Discussed need to stop salt  Patient will need to be started on beta-blocker, isosorbide, hydralazine  Patient prefers to discuss with primary cardiologist as he will have more regular follow-up with then      Hyperlipidemia  Patient is currently on rosuvastatin  No myositis or myalgia  Continue with the same      Bilateral pulmonary embolism  As per patient this was not provoked  He is going to be on a blood thinner which is to be continued indefinitely      long-term anticoagulation  On Eliquis for bilateral pulmonary embolism      Obesity  Discussed role of diet and exercise  Diet is responsible for 80% of obesity  BMI is 33      Sickle cell trait   Remain hydrated   Continue anticoagulation      Obstructive sleep apnea  Patient has a history of snoring, morning fatigue and daytime sleepiness  He has a  large thick neck and crowded posterior pharynx  He needs to be evaluated and treated for sleep apnea

## 2023-12-13 NOTE — LETTER
December 13, 2023     Mirta Jackson MD  [address]  [address] [place]. [address] [address]    Patient: Neisha Stack. YOB: 1945   Date of Visit: 12/13/2023       Dear Dr. Jason Roberts: Thank you for referring Kate Dawson to me for evaluation. Below are my notes for this consultation. If you have questions, please do not hesitate to call me. I look forward to following your patient along with you. Sincerely,        Anne-Marie Brown MD        CC: Neisha Seda. Anne-Marie Brown MD  12/13/2023 12:18 PM  Sign when [facility] Visit                                             Cardiology Consultation     Neisha Stack  [id number]  1945  [facility] [place]  [address] [address]    1. PAF (paroxysmal atrial fibrillation) (HCC)  POCT ECG      2. ARLENE (obstructive sleep apnea)        3. Bilateral pulmonary embolism (HCC)        4. Cardiomyopathy, nonischemic ([address])        5. SVT (supraventricular tachycardia)        6. Erectile dysfunction due to arterial insufficiency        7. Chronic kidney disease (CKD), stage II (mild)        8. BPH with obstruction/lower urinary tract symptoms        9. Mixed hyperlipidemia        10. Class 1 obesity due to excess calories with serious comorbidity and body mass index (BMI) of 33.0 to 33.9 in adult        11. History of pulmonary embolism        12.  Chronic anticoagulation            Summary of my recommendation for the patient    Clinically doing very well, EF has improved to 55%, continue with full heart failure therapy    PAF, RVR - on dofetilide and low dose metoprolol    Aggressive management of hypertension-beta-blocker, isosorbide, hydralazine-to be started by primary cardiologist    Sleep medicine consult to evaluate and treat for sleep apnea    Use of Cialis - prefer short acting viagra, lowest possible dose - he will discuss with Dr Miguel Olguin History of present illness    The patient is doing very well and heart function is improved  He is not feeling any significant palpitation    He is however having difficulty with the use of Cialis    Recent episode of PAF/PAT with RVR  On anticoagulation and metoprolol    The patient has been referred to me for management of palpitations, SVT - by Dr. Alfredo Hernandez    He has significant medical illnesses which include  Supraventricular tachycardia  Nonischemic dilated cardiomyopathy  Obesity  Obstructive sleep apnea  History of bilateral pulmonary embolism  Hyperlipidemia  BPH    As far as palpitations are concerned  Has been present for over 5 years  There has been a recent increase  Most episodes are brief lasting a minute  Recent increase in longer episodes  Associated symptoms of lightheadedness and presyncope    Currently he is having 2-3 episodes a week  Previous records show narrow complex tachycardia, long RP, heart rate around 140 per minute  There is record from 2017 and 2020 which show heart rate in the 140s      The patient is a retired  personal  He is relatively active though not much  His not complaining of anginal like chest pain or chest pressure  His not complaining of worsening orthopnea or PND  He does have chronic leg swelling    Palpitations and presyncope is as described above  There has been no episode of syncope  There is rarely orthostatic lightheadedness    The patient is having exertional intolerance    He does have a history of hypertension  He has diet has been a little uncontrolled lately  Both he and his wife have noted worsening hypertension    The patient does have a history of bilateral pulmonary embolism  He has been on blood thinner ever since then      The patient does have a history of cardiomyopathy  LVEF is around 45%  He informs that there has been a cardiac catheterization  However we were unable to pull up the records for the same - done at [facility]      The patient is a former smoker  He does not abuse alcohol  He does not use recreational drugs    There is family history of heart disease and hypertension    Patient Active Problem List   Diagnosis   • Bilateral pulmonary embolism (HCC)   • SOB (shortness of breath)   • Hyperlipidemia   • Lung nodule   • Obesity   • History of sickle cell trait   • Leg edema, left   • History of pulmonary embolism   • Thrombocytopenia (HCC)   • Chronic kidney disease (CKD), stage II (mild)   • Tachycardia   • Chronic low back pain   • SVT (supraventricular tachycardia)   • Chronic anticoagulation   • ARLENE (obstructive sleep apnea)   • Cardiomyopathy, nonischemic (HCC)   • Dyslipidemia   • BPH (benign prostatic hyperplasia)   • Nephrolithiasis   • Lumbar disc disease with radiculopathy - Left   • Spinal stenosis of lumbar region without neurogenic claudication - Left   • Chest pain   • Acute kidney injury superimposed on CKD    • COVID-19   • BPH with obstruction/lower urinary tract symptoms   • PAF (paroxysmal atrial fibrillation) ([facility])   • Erectile dysfunction due to arterial insufficiency     Past Medical History:   Diagnosis Date   • Cardiomyopathy ([address])    • Hyperlipidemia    • Post-nasal drip    • Pulmonary embolism ([address])      Social History     Socioeconomic History   • Marital status: /Civil Union     Spouse name: Not on file   • Number of children: 7   • Years of education: Not on file   • Highest education level: Not on file   Occupational History   • Occupation: employed   Tobacco Use   • Smoking status: Former     Current packs/day: 0.00     Types: Cigarettes     Quit date:      Years since quittin.9   • Smokeless tobacco: Never   Vaping Use   • Vaping status: Never Used   Substance and Sexual Activity   • Alcohol use:  Yes     Alcohol/week: 0.0 standard drinks of alcohol     Comment: socially   • Drug use: No   • Sexual activity: Not on file   Other Topics Concern   • Not on file   Social History Narrative   • Not on file     Social Determinants of Health     Financial Resource Strain: Not on file   Food Insecurity: No Food Insecurity (7/6/2023)    Hunger Vital Sign    • Worried About Running Out of Food in the Last Year: Never true    • Ran Out of Food in the Last Year: Never true   Transportation Needs: No Transportation Needs (7/6/2023)    PRAPARE - Transportation    • Lack of Transportation (Medical): No    • Lack of Transportation (Non-Medical): No   Physical Activity: Not on file   Stress: Not on file   Social Connections: Not on file   Intimate Partner Violence: Not on file   Housing Stability: Low Risk  (7/6/2023)    Housing Stability Vital Sign    • Unable to Pay for Housing in the Last Year: No    • Number of Places Lived in the Last Year: 1    • Unstable Housing in the Last Year: No      No family history on file.   Past Surgical History:   Procedure Laterality Date   • ARTHROSCOPY KNEE Left     30 years ago   • ND CYSTO INSERTION TRANSPROSTATIC IMPLANT SINGLE N/A 06/17/2021    Procedure: CYSTOSCOPY WITH INSERTION UROLIFT;  Surgeon: Sridhar Maldonado MD;  Location: [facility];  Service: Urology       Current Outpatient Medications:   •  acetaminophen-codeine (TYLENOL #3) 300-30 mg per tablet, TAKE 1-2 TABS BY MOUTH EVERY 4-6 HRS AS NEEDED FOR PAIN, Disp: , Rfl:   •  apixaban (Eliquis) 5 mg, Take 1 tablet (5 mg total) by mouth 2 (two) times a day, Disp: 180 tablet, Rfl: 3  •  Diclofenac Sodium (VOLTAREN) 1 %, APPLY 2GM TO UPPER EXTREMITIES TOPICALLY FOUR TIMES A DAY AND APPLY 4GM TO LOWER EXTREMITIES FOUR TIMES A DAY FOR PAIN AND INFLAMMATION **USE DOSING CARD** (DO NOT EXCEED 16 GRAMS DAILY TO ANY AFFECTE, Disp: , Rfl:   •  dofetilide (TIKOSYN) 250 mcg capsule, Take 1 capsule (250 mcg total) by mouth every 12 (twelve) hours, Disp: 360 capsule, Rfl: 0  •  DULoxetine (CYMBALTA) 60 mg delayed release capsule, Oral, Disp: , Rfl:   •  finasteride (PROSCAR) 5 mg tablet, TAKE 1 TABLET (5 MG TOTAL) BY MOUTH DAILY. , Disp: 90 tablet, Rfl: 5  •  fluticasone (FLONASE) 50 mcg/act nasal spray, 1 spray into each nostril daily as needed for rhinitis, Disp: 16 g, Rfl: 0  •  furosemide (LASIX) 40 mg tablet, Take 40 mg by mouth if needed for shortness of breath, Disp: , Rfl:   •  lidocaine (LIDODERM) 5 %, APPLY 1 TO 2 PATCHES TOPICALLY EVERY DAY AS NEEDED FOR PAIN (REMOVE PATCH AFTER 12 HOURS; 12 HOURS ON AND 12 HOURS OFF), Disp: , Rfl:   •  Omega-3 1000 MG CAPS, 1,000 mg, Disp: , Rfl:   •  rosuvastatin (CRESTOR) 20 MG tablet, Take 20 mg by mouth daily, Disp: , Rfl:   •  sacubitril-valsartan (Entresto) 24-26 MG TABS, Take 1 tablet by mouth 2 (two) times a day, Disp: 180 tablet, Rfl: 5  •  sildenafil (VIAGRA) 50 MG tablet, Take 1 tablet (50 mg total) by mouth daily as needed for erectile dysfunction, Disp: 10 tablet, Rfl: 3  •  spironolactone (ALDACTONE) 25 mg tablet, Take 1 tablet (25 mg total) by mouth daily, Disp: 90 tablet, Rfl: 5  •  tamsulosin (FLOMAX) 0.4 mg, Take 1 capsule (0.4 mg total) by mouth daily with dinner, Disp: 60 capsule, Rfl: 3  •  metoprolol succinate (TOPROL-XL) 25 mg 24 hr tablet, Take 0.5 tablets (12.5 mg total) by mouth daily Do not start before July 8, 2023.  (Patient not taking: Reported on 8/30/2023), Disp: 15 tablet, Rfl: 3  No Known Allergies  Vitals:    12/13/23 1030   BP: 136/82   BP Location: Left arm   Patient Position: Sitting   Cuff Size: Large   Pulse: 60   Weight: 123 kg (271 lb 4.8 oz)   Height: 6' 3" (1.905 m)         LAB RESULTS:    CBC:  WBC   Date Value Ref Range Status   10/10/2023 7.41 4.31 - 10.16 Thousand/uL Final     Hemoglobin   Date Value Ref Range Status   10/10/2023 13.7 12.0 - 17.0 g/dL Final     Hematocrit   Date Value Ref Range Status   10/10/2023 40.3 36.5 - 49.3 % Final     MCV   Date Value Ref Range Status   10/10/2023 92 82 - 98 fL Final     Platelets   Date Value Ref Range Status   10/10/2023 130 (L) 149 - 390 Thousands/uL Final     RBC   Date Value Ref Range Status   10/10/2023 4.39 3.88 - 5.62 Million/uL Final     MCH   Date Value Ref Range Status   10/10/2023 31.2 26.8 - 34.3 pg Final     MCHC   Date Value Ref Range Status   10/10/2023 34.0 31.4 - 37.4 g/dL Final     RDW   Date Value Ref Range Status   10/10/2023 14.8 11.6 - 15.1 % Final     MPV   Date Value Ref Range Status   10/10/2023 11.8 8.9 - 12.7 fL Final     nRBC   Date Value Ref Range Status   10/10/2023 0 /100 WBCs Final       CMP:  Potassium   Date Value Ref Range Status   10/10/2023 3.6 3.5 - 5.3 mmol/L Final     Chloride   Date Value Ref Range Status   10/10/2023 107 96 - 108 mmol/L Final     CO2   Date Value Ref Range Status   10/10/2023 28 21 - 32 mmol/L Final     BUN   Date Value Ref Range Status   10/10/2023 13 5 - 25 mg/dL Final     Creatinine   Date Value Ref Range Status   10/10/2023 1.22 0.60 - 1.30 mg/dL Final     Comment:     Standardized to IDMS reference method     Calcium   Date Value Ref Range Status   10/10/2023 9.7 8.4 - 10.2 mg/dL Final     AST   Date Value Ref Range Status   10/10/2023 20 13 - 39 U/L Final     ALT   Date Value Ref Range Status   10/10/2023 18 7 - 52 U/L Final     Comment:     Specimen collection should occur prior to Sulfasalazine administration due to the potential for falsely depressed results.       Alkaline Phosphatase   Date Value Ref Range Status   10/10/2023 56 34 - 104 U/L Final     eGFR   Date Value Ref Range Status   10/10/2023 56 ml/min/1.73sq m Final        Magnesium:   Magnesium   Date Value Ref Range Status   11/21/2021 2.4 1.6 - 2.6 mg/dL Final        A1C:  No results found for: "HGBA1C"     TSH:  TSH 3RD GENERATON   Date Value Ref Range Status   11/21/2021 2.133 0.358 - 3.740 uIU/mL Final        PT/INR:  Protime   Date Value Ref Range Status   10/10/2023 14.2 11.6 - 14.5 seconds Final     INR   Date Value Ref Range Status   10/10/2023 1.03 0.84 - 1.19 Final       Lipid Panel:  Cholesterol   Date Value Ref Range Status   05/18/2022 146 See Comment mg/dL Final     Comment:     Cholesterol:         Pediatric <18 Years        Desirable          <170 mg/dL      Borderline High    170-199 mg/dL      High               >=200 mg/dL        Adult >=18 Years            Desirable        <200 mg/dL      Borderline High  200-239 mg/dL      High             >239 mg/dL       Triglycerides   Date Value Ref Range Status   05/18/2022 98 See Comment mg/dL Final     Comment:     Triglyceride:     0-9Y            <75mg/dL     10Y-17Y         <90 mg/dL       >=18Y     Normal          <150 mg/dL     Borderline High 150-199 mg/dL     High            200-499 mg/dL        Very High       >499 mg/dL    Specimen collection should occur prior to N-Acetylcysteine or Metamizole administration due to the potential for falsely depressed results. HDL, Direct   Date Value Ref Range Status   05/18/2022 45 >=40 mg/dL Final     Comment:     Specimen collection should occur prior to Metamizole administration due to the potential for falsley depressed results. Non-HDL-Chol (CHOL-HDL)   Date Value Ref Range Status   03/31/2021 155 mg/dl Final       Troponin:  Troponin I   Date Value Ref Range Status   04/23/2021 0.02 <=0.04 ng/mL Final     Comment:     [employer] Chemistry analyzer 99% cutoff is > 0.04 ng/mL in network labs     o cTnI 99% cutoff is useful only when applied to patients in the clinical setting of myocardial ischemia   o cTnI 99% cutoff should be interpreted in the context of clinical history, ECG findings and possibly cardiac imaging to establish correct diagnosis. o cTnI 99% cutoff may be suggestive but clearly not indicative of a coronary event without the clinical setting of myocardial ischemia. Imaging:    EKG:    10/10/2023        TYLER:  No results found for this or any previous visit.       Echocardiogram:  Results for orders placed during the hospital encounter of 06/02/23    Echo complete w/ contrast if indicated    Interpretation Summary  •  Left Ventricle: Left ventricular cavity size is normal. Wall thickness is normal. The left ventricular ejection fraction is 45%. Systolic function is mildly reduced. Diastolic function is mildly abnormal, consistent with grade I (abnormal) relaxation. Patient noted to have intermittent runs of tachycardia during study. •  Left Ventricle: Left ventricular cavity size is normal. The left ventricular ejection fraction is 40%. Systolic function is mildly reduced. Diastolic function is mildly abnormal, consistent with grade I (abnormal) relaxation. •  The following segments are hypokinetic: apical anterior, apical septal, apical inferior, apical lateral and apex. •  Right Ventricle: Right ventricular cavity size is normal. Systolic function is normal.  •  Left Atrium: The atrium is mildly dilated. •  Mitral Valve: There is mild regurgitation. Results for orders placed during the hospital encounter of 11/20/23    Echo follow up/limited w/ contrast if indicated    Interpretation Summary  •  Left Ventricle: Left ventricular cavity size is upper normal. The left ventricular ejection fraction is 55% by visual estimation. Systolic function is normal. Although no diagnostic regional wall motion abnormality was identified, this possibility cannot be completely excluded on the basis of this study. Diastolic function is mildly abnormal, consistent with grade I (abnormal) relaxation. •  Limited echocardiogram performed to evaluate LV function      Stress Test:   Results for orders placed during the hospital encounter of 04/12/23    NM myocardial perfusion spect (rx stress and/or rest)    Interpretation Summary  1. The patient presented with intermittent supraventricular tachycardia which appeared to be primarily regular at a rate of 130 to 140 bpm.  It is suggested that this may be a 2-1 flutter. 2.  At times the rhythm was briefly irregular and rapid suggesting possible fibrillation  3.   No discomfort with or without the rapid heart rate or with Lexiscan. 4.  The patient was given a total dose of 5 mg Lopressor IV to control the tachycardia and he left with a normal heart rate and rhythm. 5.  There is a moderate sized inferior wall defect which predominantly normalizes with prone imaging and most likely represents diaphragmatic shadowing  6. There is a small apical mixed defect of moderate intensity suggesting apical infarct and ischemia  7. Generalized left ventricular hypokinesis. Calculated ejection fraction is 34%  8. Resting EKG showed sinus rhythm and nonspecific ST-T changes    Results for orders placed during the hospital encounter of 02/17/22    Echo stress test, exercise    Interpretation Summary  •  Stress ECG: No ST deviation is noted. Arrhythmias during recovery: rare PVCs. The ECG was negative for ischemia. The stress ECG is negative for ischemia. •  Stress ECG: The patient reached stage 2.0 of the protocol after exercising for 4 min and 29 sec and had a maximal HR of 179 bpm (124 % of MPHR) and 6.3 METS. Blood pressure demonstrated a normal response and heart rate demonstrated an exaggerated response to stress. •  Left Ventricle: Left ventricular cavity size is normal. Wall thickness is normal. The left ventricular ejection fraction is 55%. Systolic function is normal. Wall motion is normal.  •  Tricuspid Valve: There is mild regurgitation. •  Peak Stress Echo: Left ventricle cavity has normal reduction in size at peak stress. The left ventricle systolic function is normal at peak stress. The peak stress echo showed normal wall motion. There are no dynamic wall motion changes or echocardiographic evidence of stress-induced ischemia. Patient did have exaggerated heart rate response to exercise however only exercising for 4 minutes 29 seconds and achieving 124% of maximum age predicted heart rate.       Cardiac Catheterization:  Results for orders placed during the hospital encounter of 03/03/21    Cardiac catheterization    Narrative  [address], [address] [facility]  [phone]    [facility]    Invasive Cardiovascular Lab Complete Report    Patient: Daniel Alcantara  MR number: [id number]  Account number: [de-identified]  Study date: 2021  Gender: Male  : 1945  Height: 75.2 in  Weight: 268.4 lb  BSA: 2.49 mï¾²    Allergies: SILDENAFIL, SIMVASTATIN    Diagnostic Cardiologist:  Silas REICH    CORONARY CIRCULATION:  There was mild 3-vessel coronary artery disease (LAD, RCA, and circumflex). Left main: Normal.  LAD: Normal.  RCA: This vessel was not visualized. None dominant    HEMODYNAMICS:  Hemodynamic assessment demonstrated mild systemic hypertension. HISTORY:  There is a history of supraventricular arrhythmia. PRE-PROCEDURE DATA:  The risks and alternatives of the procedures and conscious sedation were explained to the patient and informed consent was obtained. INDICATIONS:  --  Congestive heart failure with cardiomyopathy. He was noted to have worsening LV dysfunction    HISTORY: There is a history of supraventricular arrhythmia. PROCEDURES PERFORMED    --  Left heart catheterization without ventriculogram.  --  Left coronary angiography. --  Outpatient. --  Mod Sedation Same Physician Initial 15min. --  Mod Sedation Same Physician Add 15min. --  Coronary Catheterization (w/ LHC). PROCEDURE: The risks and alternatives of the procedures and conscious sedation were explained to the patient and informed consent was obtained. The patient was brought to the cath lab and placed on the table. The planned puncture sites  were prepped and draped in the usual sterile fashion. --  Right radial artery access. After performing an Nas's test to verify adequate ulnar artery supply to the hand, the radial site was prepped. The puncture site was infiltrated with local anesthetic.  The vessel was accessed using the  modified Seldinger technique, a wire was advanced into the vessel, and a sheath was advanced over the wire into the vessel. --  Left heart catheterization without ventriculogram. A catheter was advanced over a guidewire into the ascending aorta. After recording ascending aortic pressure, the catheter was advanced across the aortic valve and left ventricular  pressure was recorded. The catheter was pulled back across the aortic valve and into the ascending aorta and pullback pressures were obtained. --  Left coronary artery angiography. A catheter was advanced over a guidewire into the aorta and positioned in the left coronary artery ostium under fluoroscopic guidance. Angiography was performed. --  Outpatient. --  Mod Sedation Same Physician Initial 15min. --  Mod Sedation Same Physician Add 15min. --  Coronary Catheterization (w/ [facility]). PROCEDURE COMPLETION: The patient tolerated the procedure well and was discharged from the cath lab. TIMING: Test started at 08:07. Test concluded at 08:40. HEMOSTASIS: The sheath was removed. The site was compressed with a Hemoband  device. Hemostasis was obtained. MEDICATIONS GIVEN: Versed (2mg/2ml), 1 mg, IV, at 08:08. Fentanyl (1OOmcg/2 ml), 50 mcg, IV, at 08:08. 1% Lidocaine, 1 ml, subcutaneously, at 08:18. Nitroglycerin (200mcg/ml), 200 mcg, at 08:21. Heparin  1000 units/ml, 5,000 units, IV, at 08:22. Adenosine (6mg/2ml), 6 mg, IV, at 08:38. CONTRAST GIVEN: 30 ml Visipaque (320mg I/ml). RADIATION EXPOSURE: Fluoroscopy time: 6.03 min. HEMODYNAMICS: Hemodynamic assessment demonstrated mild systemic hypertension. CORONARY VESSELS:   --  The coronary circulation is left dominant. --  There was mild 3-vessel coronary artery disease (LAD, RCA, and circumflex). --  Left main: Normal.  --  LAD: Normal.  --  Circumflex: The vessel was large sized (dominant). Angiography showed mild atherosclerosis.  Noted on a turn  --  1st obtuse marginal: mild disease and moderate size  -- RCA: This vessel was not visualized. None dominant    IMPRESSIONS:  Mild CAD    RECOMMENDATIONS  Patient management should include continuation of medical therapy. The patient will follow-up with his primary cardiologist and electrophysiologist in the near future. This reported noted node asymptomatic supraventricular tachycardia to  130 beats per minute both preoperatively and intraoperatively. Prepared and signed by    Shakir Schaefer  Signed 2021 12:19:53    Study diagram    Hemodynamic tables    Pressures:  Baseline  Pressures:  - HR: 135  Pressures:  - Rhythm:  Pressures:  -- Aortic Pressure (S/D/M): 145/105/124  Pressures:  -- Left Ventricle (s/edp): 146/34/--    Outputs:  Baseline  Outputs:  -- CALCULATIONS[age]  Outputs:  -- CALCULATIONS: Body Surface Area: 2.49  Outputs:  -- CALCULATIONS: Height in cm: 191.00  Outputs:  -- CALCULATIONS: Sex: Male  Outputs:  -- CALCULATIONS: Weight in k.00      HOLTER MONITOR: 24 HOUR/48 HOUR MONITORS  No results found for this or any previous visit. AMB Extended Holter Monitor: [employer] XT/AT or [employer]    [employer]  2023 - 2023    Interpretation:   Lisette Moses MD  2023  1:42 PM EDT       Preliminary Findings  Signature Date  30 events were transmitted. 13 patient triggered; 17 auto triggered  Patient monitored for 10d 18h 19m  AF occurred 22 time(s) with HR range of 56 - 102; Total AF [place] <1%  17,022 PACs with PAC burden of 2%  12,415 PVCs with PVC burden of 1%     The patient was monitored for almost 11 days. The primary rhythm was normal sinus but there were paroxysms of atrial fibrillation. Average atrial fibrillation rate was 68 but at times there were brief increases to as high as 159. Total time in atrial fibrillation was 26 minutes. There were occasional PVCs     On 1 occasion the patient noted "skipped beat."  The rhythm was atrial fibrillation with a slightly rapid ventricular response.   On 1 occasion the patient noted "dizzy" and the rhythm was sinus with a heart rate of 84 and 1 PVC. On 1 occasion the patient noted "skipped beat" and the rhythm was sinus bradycardia at a rate of 57. Average heart rate was 70 bpm.     Conclusion:     1. Generally the rhythm is normal sinus with intermittent episodes of atrial fibrillation which was generally well controlled although briefly elevated at times as noted above  2. Symptoms as noted above unaccompanied by significant rhythm abnormality other than atrial fibrillation with a slightly rapid ventricular response on 1 occasion. 4/25/2023          DEVICE CHECK:     No results found for this or any previous visit. Review of Systems:  Review of Systems   All other systems reviewed and are negative. As described in my history of present illness      Physical Exam:  Physical Exam  Constitutional:       Appearance: Normal appearance. He is well-developed. He is obese. Comments: Not in any distress at the current time   HENT:      Head: Normocephalic and atraumatic. Right Ear: External ear normal.      Left Ear: External ear normal.      Nose: Nose normal.      Mouth/Throat:      Pharynx: Uvula midline. Eyes:      General: Lids are normal.      Extraocular Movements: Extraocular movements intact. Conjunctiva/sclera: Conjunctivae normal.      Pupils: Pupils are equal, round, and reactive to light. Comments: No pallor  No cyanosis  No icterus   Neck:      Thyroid: No thyromegaly. Vascular: No carotid bruit or JVD. Trachea: Trachea normal.      Comments: No jugular lymphadenopathy   thick neck  Cardiovascular:      Rate and Rhythm: Normal rate and regular rhythm. Chest Wall: PMI is not displaced. Pulses: Normal pulses. Heart sounds: Normal heart sounds, S1 normal and S2 normal. No murmur heard. No friction rub. No gallop. No S3 or S4 sounds.    Pulmonary:      Effort: Pulmonary effort is normal. No accessory muscle usage or respiratory distress. Breath sounds: Examination of the right-lower field reveals decreased breath sounds. Examination of the left-lower field reveals decreased breath sounds. Decreased breath sounds present. No wheezing, rhonchi or rales. Chest:      Chest wall: No tenderness. Abdominal:      General: Bowel sounds are normal. There is no distension. Palpations: Abdomen is soft. There is no hepatomegaly, splenomegaly or mass. Tenderness: There is no abdominal tenderness. Comments: Central obesity present   Musculoskeletal:         General: Swelling present. No tenderness or deformity. Normal range of motion. Cervical back: Normal range of motion. Right lower leg: Edema present. Left lower leg: Edema present. Lymphadenopathy:      Cervical: No cervical adenopathy. Skin:     Findings: No abrasion, erythema, lesion or rash. Nails: There is no clubbing. Neurological:      Mental Status: He is alert and oriented to person, place, and time. Comments: Facial symmetry is retained  Extraocular movements are retained  Head neck tongue and palate movement are retained and symmetric   Psychiatric:         Speech: Speech normal.         Behavior: Behavior normal.         Thought Content:  Thought content normal.         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  Differentials are - atrial tachycardia, atypical AVNRT, AVRT with decremental pathway    Most of the episodes are brief lasting less than a minute  These are happening 2 to 3 times a week  The patient does inform of episodes which have resulted in hospital visits and lightheadedness    My recommendation for the patient:  Continue with dofetilide and metoprolol          Hypertension  Blood pressure is 136/82  This is not well controlled  Discussed need to stop salt  Patient will need to be started on beta-blocker, isosorbide, hydralazine  Patient prefers to discuss with primary cardiologist as he will have more regular follow-up with then      Hyperlipidemia  Patient is currently on rosuvastatin  No myositis or myalgia  Continue with the same      Bilateral pulmonary embolism  As per patient this was not provoked  He is going to be on a blood thinner which is to be continued indefinitely      long-term anticoagulation  On Eliquis for bilateral pulmonary embolism      Obesity  Discussed role of diet and exercise  Diet is responsible for 80% of obesity  BMI is 33      Sickle cell trait   Remain hydrated   Continue anticoagulation      Obstructive sleep apnea  Patient has a history of snoring, morning fatigue and daytime sleepiness  He has a  large thick neck and crowded posterior pharynx  He needs to be evaluated and treated for sleep apnea

## 2023-12-14 ENCOUNTER — NURSE TRIAGE (OUTPATIENT)
Age: 78
End: 2023-12-14

## 2023-12-14 NOTE — TELEPHONE ENCOUNTER
the patient called he would like to know when he is due for his next colonoscopy. His last colonoscopy was 8/2/2022 with 1 tubular adenoma. Please advise.

## 2023-12-28 ENCOUNTER — TELEPHONE (OUTPATIENT)
Dept: HEMATOLOGY ONCOLOGY | Facility: CLINIC | Age: 78
End: 2023-12-28

## 2023-12-28 ENCOUNTER — TELEPHONE (OUTPATIENT)
Dept: CARDIOLOGY CLINIC | Facility: CLINIC | Age: 78
End: 2023-12-28

## 2023-12-28 ENCOUNTER — OFFICE VISIT (OUTPATIENT)
Dept: CARDIOLOGY CLINIC | Facility: CLINIC | Age: 78
End: 2023-12-28
Payer: COMMERCIAL

## 2023-12-28 VITALS
HEIGHT: 76 IN | WEIGHT: 275 LBS | DIASTOLIC BLOOD PRESSURE: 82 MMHG | HEART RATE: 76 BPM | RESPIRATION RATE: 16 BRPM | BODY MASS INDEX: 33.49 KG/M2 | SYSTOLIC BLOOD PRESSURE: 124 MMHG | OXYGEN SATURATION: 97 %

## 2023-12-28 DIAGNOSIS — Z79.01 CHRONIC ANTICOAGULATION: ICD-10-CM

## 2023-12-28 DIAGNOSIS — E78.2 MIXED HYPERLIPIDEMIA: Primary | ICD-10-CM

## 2023-12-28 DIAGNOSIS — I26.99 BILATERAL PULMONARY EMBOLISM (HCC): ICD-10-CM

## 2023-12-28 DIAGNOSIS — I42.8 CARDIOMYOPATHY, NONISCHEMIC (HCC): ICD-10-CM

## 2023-12-28 DIAGNOSIS — N18.31 STAGE 3A CHRONIC KIDNEY DISEASE (HCC): ICD-10-CM

## 2023-12-28 DIAGNOSIS — I48.0 PAF (PAROXYSMAL ATRIAL FIBRILLATION) (HCC): ICD-10-CM

## 2023-12-28 PROCEDURE — 99214 OFFICE O/P EST MOD 30 MIN: CPT | Performed by: INTERNAL MEDICINE

## 2023-12-28 RX ORDER — ROSUVASTATIN CALCIUM 20 MG/1
20 TABLET, COATED ORAL DAILY
Qty: 90 TABLET | Refills: 3 | Status: SHIPPED | OUTPATIENT
Start: 2023-12-28

## 2023-12-28 RX ORDER — SACUBITRIL AND VALSARTAN 24; 26 MG/1; MG/1
1 TABLET, FILM COATED ORAL 2 TIMES DAILY
Start: 2023-12-28 | End: 2025-06-20

## 2023-12-28 NOTE — PROGRESS NOTES
[facility]  [address] [place] [address]  Cardiology Follow Up    Nas Cantor Jr.  1945  [id number]      1. Mixed hyperlipidemia        2. PAF (paroxysmal atrial fibrillation) (HCC)        3. Bilateral pulmonary embolism (HCC)        4. Chronic anticoagulation            Chief Complaint   Patient presents with    Follow-up       Interval History: Patient presents for follow-up visit.  Patient denies any chest pain or shortness of breath.  No history of palpitations or dizziness.  No history of leg edema orthopnea PND.  No history of bleeding issues.  Patient was placed on Tikosyn for paroxysmal atrial fibrillation.  His ejection fraction is improved.  Patient also was evaluated by heart failure cardiology .  He was placed on Entresto and Aldactone.  Patient and family not sure whether the patient is taking this or not.  No bleeding issues.  Patient also has history of recurrent pulm embolism.    Patient Active Problem List   Diagnosis    Bilateral pulmonary embolism (HCC)    SOB (shortness of breath)    Hyperlipidemia    Lung nodule    Obesity    History of sickle cell trait    Leg edema, left    History of pulmonary embolism    Thrombocytopenia (HCC)    Chronic kidney disease (CKD), stage II (mild)    Tachycardia    Chronic low back pain    SVT (supraventricular tachycardia)    Chronic anticoagulation    ARLENE (obstructive sleep apnea)    Cardiomyopathy, nonischemic (HCC)    Dyslipidemia    BPH (benign prostatic hyperplasia)    Nephrolithiasis    Lumbar disc disease with radiculopathy - Left    Spinal stenosis of lumbar region without neurogenic claudication - Left    Chest pain    Acute kidney injury superimposed on CKD     COVID-19    BPH with obstruction/lower urinary tract symptoms    PAF (paroxysmal atrial fibrillation) (HCC)    Erectile dysfunction due to arterial insufficiency     Past Medical History:   Diagnosis Date    Cardiomyopathy (HCC)     Hyperlipidemia      Post-nasal drip     Pulmonary embolism (HCC)      Social History     Socioeconomic History    Marital status: /Civil Union     Spouse name: Not on file    Number of children: 7    Years of education: Not on file    Highest education level: Not on file   Occupational History    Occupation: employed   Tobacco Use    Smoking status: Former     Current packs/day: 0.00     Types: Cigarettes     Quit date:      Years since quittin.0    Smokeless tobacco: Never   Vaping Use    Vaping status: Never Used   Substance and Sexual Activity    Alcohol use: Yes     Alcohol/week: 0.0 standard drinks of alcohol     Comment: socially    Drug use: No    Sexual activity: Not on file   Other Topics Concern    Not on file   Social History Narrative    Not on file     Social Determinants of Health     Financial Resource Strain: Not on file   Food Insecurity: No Food Insecurity (2023)    Hunger Vital Sign     Worried About Running Out of Food in the Last Year: Never true     Ran Out of Food in the Last Year: Never true   Transportation Needs: No Transportation Needs (2023)    PRAPARE - Transportation     Lack of Transportation (Medical): No     Lack of Transportation (Non-Medical): No   Physical Activity: Not on file   Stress: Not on file   Social Connections: Not on file   Intimate Partner Violence: Not on file   Housing Stability: Low Risk  (2023)    Housing Stability Vital Sign     Unable to Pay for Housing in the Last Year: No     Number of Places Lived in the Last Year: 1     Unstable Housing in the Last Year: No      No family history on file.  Past Surgical History:   Procedure Laterality Date    ARTHROSCOPY KNEE Left     30 years ago    ME CYSTO INSERTION TRANSPROSTATIC IMPLANT SINGLE N/A 2021    Procedure: CYSTOSCOPY WITH INSERTION UROLIFT;  Surgeon: Mahendra Cruz MD;  Location: [facility] OR;  Service: Urology       Current Outpatient Medications:     acetaminophen-codeine (TYLENOL #3)  300-30 mg per tablet, TAKE 1-2 TABS BY MOUTH EVERY 4-6 HRS AS NEEDED FOR PAIN, Disp: , Rfl:     apixaban (Eliquis) 5 mg, Take 1 tablet (5 mg total) by mouth 2 (two) times a day, Disp: 180 tablet, Rfl: 3    Diclofenac Sodium (VOLTAREN) 1 %, APPLY 2GM TO UPPER EXTREMITIES TOPICALLY FOUR TIMES A DAY AND APPLY 4GM TO LOWER EXTREMITIES FOUR TIMES A DAY FOR PAIN AND INFLAMMATION **USE DOSING CARD** (DO NOT EXCEED 16 GRAMS DAILY TO ANY AFFECTE, Disp: , Rfl:     dofetilide (TIKOSYN) 250 mcg capsule, Take 1 capsule (250 mcg total) by mouth every 12 (twelve) hours, Disp: 360 capsule, Rfl: 0    DULoxetine (CYMBALTA) 60 mg delayed release capsule, Take 60 mg by mouth daily, Disp: , Rfl:     finasteride (PROSCAR) 5 mg tablet, TAKE 1 TABLET (5 MG TOTAL) BY MOUTH DAILY., Disp: 90 tablet, Rfl: 5    fluticasone (FLONASE) 50 mcg/act nasal spray, 1 spray into each nostril daily as needed for rhinitis, Disp: 16 g, Rfl: 0    furosemide (LASIX) 40 mg tablet, Take 40 mg by mouth if needed for shortness of breath, Disp: , Rfl:     lidocaine (LIDODERM) 5 %, APPLY 1 TO 2 PATCHES TOPICALLY EVERY DAY AS NEEDED FOR PAIN (REMOVE PATCH AFTER 12 HOURS; 12 HOURS ON AND 12 HOURS OFF), Disp: , Rfl:     metoprolol succinate (TOPROL-XL) 25 mg 24 hr tablet, Take 0.5 tablets (12.5 mg total) by mouth daily Do not start before July 8, 2023., Disp: 15 tablet, Rfl: 3    Omega-3 1000 MG CAPS, 1,000 mg As needed, Disp: , Rfl:     rosuvastatin (CRESTOR) 20 MG tablet, Take 20 mg by mouth daily, Disp: , Rfl:     sildenafil (VIAGRA) 50 MG tablet, Take 1 tablet (50 mg total) by mouth daily as needed for erectile dysfunction, Disp: 10 tablet, Rfl: 3    tamsulosin (FLOMAX) 0.4 mg, Take 1 capsule (0.4 mg total) by mouth daily with dinner, Disp: 60 capsule, Rfl: 3    sacubitril-valsartan (Entresto) 24-26 MG TABS, Take 1 tablet by mouth 2 (two) times a day (Patient not taking: Reported on 12/28/2023), Disp: 180 tablet, Rfl: 5    spironolactone (ALDACTONE) 25 mg tablet,  "Take 1 tablet (25 mg total) by mouth daily (Patient not taking: Reported on 12/28/2023), Disp: 90 tablet, Rfl: 5  No Known Allergies    Labs:  Hospital Outpatient Visit on 11/20/2023   Component Date Value    A4C EF 11/20/2023 54     LVIDd 11/20/2023 5.90     LVIDS 11/20/2023 4.10     IVSd 11/20/2023 1.20     LVPWd 11/20/2023 1.00     FS 11/20/2023 31     E/A ratio 11/20/2023 0.63     E wave deceleration time 11/20/2023 277     MV Peak E [place] 11/20/2023 52     MV Peak A [place] 11/20/2023 0.82     LA size 11/20/2023 5.6     MV stenosis pressure 1/2* 11/20/2023 80     MV valve area p 1/2 meth* 11/20/2023 2.75     Ao root 11/20/2023 3.50     Left ventricular stroke * 11/20/2023 94.00     IVS 11/20/2023 1.2     LEFT VENTRICLE SYSTOLIC * 11/20/2023 76     LV DIASTOLIC VOLUME (MOD* 11/20/2023 171     LVSV, 2D 11/20/2023 94     LV EF 11/20/2023 55      Imaging: No results found.    Review of Systems:  Review of Systems  REVIEW OF SYSTEMS:  Constitutional:  Denies fever or chills   Eyes:  Denies change in visual acuity   HENT:  Denies nasal congestion or sore throat   Respiratory:  Denies cough or shortness of breath   Cardiovascular:  Denies chest pain or edema   GI:  Denies abdominal pain, nausea, vomiting, bloody stools or diarrhea   Musculoskeletal:  Denies back pain or joint pain   Neurologic:  Denies headache, focal weakness or sensory changes   Endocrine:  Denies polyuria or polydipsia   Lymphatic:  Denies swollen glands   Psychiatric:  Denies depression or anxiety    Physical Exam:    /82 (BP Location: Left arm, Patient Position: Sitting, Cuff Size: Large)   Pulse 76   Resp 16   Ht 6' 3.5\" (1.918 m)   Wt 125 kg (275 lb)   SpO2 97%   BMI 33.92 kg/m²     Physical Exam  PHYSICAL EXAM:  General:  Patient is not in acute distress   Head: Normocephalic, Atraumatic.  HEENT:  Both pupils normal-size atraumatic, normocephalic, nonicteric  Neck:  JVP not raised. Trachea central. No carotid bruit  Respiratory:  normal " breath sounds no crackles. no rhonchi  Cardiovascular:  Regular rate and rhythm no S3 no murmurs  GI:  Abdomen soft nontender. No organomegaly.   Lymphatic:  No cervical or inguinal lymphadenopathy  Neurologic:  Patient is awake alert, oriented . Grossly nonfocal  Extremities no edema      Discussion/Summary:    Patient with multiple medical problems who seems to be doing reasonably well from cardiac standpoint. Previous studies reviewed with patient. Medications reviewed and possible side effects discussed. concepts of cardiovascular disease , signs and symptoms of heart disease. Dietary and risk factor modification reinforced. All questions answered.  Safety measures reviewed. Patient advised to report any problems prompting medical attention.    Risks and benefits  and alternatives of anticoagulation to prevent thromboembolic risk from atrial fibrillation discussed at length.  Patient to report any bleeding issues.    Patient and family instructed to check at home whether the patient is taking Entresto and Aldactone and call back.    Recent limited echocardiogram showed ejection fraction improved to 55%  This was reviewed with patient and family.  Follow-up in 6 months or earlier as needed.  Follow-up with primary care physician.

## 2024-01-08 DIAGNOSIS — N52.9 ERECTILE DYSFUNCTION, UNSPECIFIED ERECTILE DYSFUNCTION TYPE: ICD-10-CM

## 2024-01-08 RX ORDER — SILDENAFIL 50 MG/1
50 TABLET, FILM COATED ORAL DAILY PRN
Qty: 10 TABLET | Refills: 3 | Status: SHIPPED | OUTPATIENT
Start: 2024-01-08

## 2024-03-11 DIAGNOSIS — I48.0 PAF (PAROXYSMAL ATRIAL FIBRILLATION) (HCC): ICD-10-CM

## 2024-03-11 RX ORDER — DOFETILIDE 0.25 MG/1
250 CAPSULE ORAL EVERY 12 HOURS
Qty: 60 CAPSULE | Refills: 3 | Status: SHIPPED | OUTPATIENT
Start: 2024-03-11

## 2024-03-11 NOTE — TELEPHONE ENCOUNTER
Requested medication(s) are due for refill today: Yes  Patient has already received a courtesy refill: No  Other reason request has been forwarded to provider:   
supervision

## 2024-03-19 DIAGNOSIS — N52.9 ERECTILE DYSFUNCTION, UNSPECIFIED ERECTILE DYSFUNCTION TYPE: ICD-10-CM

## 2024-03-19 RX ORDER — SILDENAFIL 50 MG/1
50 TABLET, FILM COATED ORAL DAILY PRN
Qty: 10 TABLET | Refills: 3 | Status: SHIPPED | OUTPATIENT
Start: 2024-03-19

## 2024-03-19 NOTE — TELEPHONE ENCOUNTER
.Name of Caller:  Patient     Call back Number: [phone]    Medication(s):  sildenafil (VIAGRA) 50 MG tablet    Are we prescribing provider?:    30 or 90 day supply: 30 day     Pharmacy name/number:  [facility]/pharmacy #[id number] - [place], [place] - [address]     Last or Next appt?:

## 2024-03-25 DIAGNOSIS — I48.0 PAF (PAROXYSMAL ATRIAL FIBRILLATION) (HCC): ICD-10-CM

## 2024-03-25 RX ORDER — DOFETILIDE 0.25 MG/1
250 CAPSULE ORAL EVERY 12 HOURS
Qty: 180 CAPSULE | Refills: 2 | Status: SHIPPED | OUTPATIENT
Start: 2024-03-25

## 2024-03-25 NOTE — TELEPHONE ENCOUNTER
Requested medication(s) are due for refill today: Yes  Patient has already received a courtesy refill: No  Other reason request has been forwarded to provider:    none

## 2024-04-23 ENCOUNTER — APPOINTMENT (EMERGENCY)
Dept: RADIOLOGY | Facility: HOSPITAL | Age: 79
DRG: 308 | End: 2024-04-23
Payer: COMMERCIAL

## 2024-04-23 ENCOUNTER — APPOINTMENT (EMERGENCY)
Dept: CT IMAGING | Facility: HOSPITAL | Age: 79
DRG: 308 | End: 2024-04-23
Payer: COMMERCIAL

## 2024-04-23 ENCOUNTER — HOSPITAL ENCOUNTER (INPATIENT)
Facility: HOSPITAL | Age: 79
LOS: 3 days | Discharge: HOME | DRG: 308 | End: 2024-04-27
Attending: EMERGENCY MEDICINE | Admitting: FAMILY MEDICINE
Payer: COMMERCIAL

## 2024-04-23 DIAGNOSIS — R00.2 PALPITATIONS: ICD-10-CM

## 2024-04-23 DIAGNOSIS — I48.92 ATRIAL FLUTTER (HCC): ICD-10-CM

## 2024-04-23 DIAGNOSIS — I48.0 PAF (PAROXYSMAL ATRIAL FIBRILLATION) (HCC): ICD-10-CM

## 2024-04-23 DIAGNOSIS — I48.91 ATRIAL FIBRILLATION WITH RAPID VENTRICULAR RESPONSE (HCC): Primary | ICD-10-CM

## 2024-04-23 DIAGNOSIS — R06.00 DYSPNEA: ICD-10-CM

## 2024-04-23 DIAGNOSIS — R07.9 CHEST PAIN: ICD-10-CM

## 2024-04-23 LAB
2HR DELTA HS TROPONIN: 0 NG/L
ALBUMIN SERPL BCP-MCNC: 4 G/DL (ref 3.5–5)
ALP SERPL-CCNC: 58 U/L (ref 34–104)
ALT SERPL W P-5'-P-CCNC: 13 U/L (ref 7–52)
ANION GAP SERPL CALCULATED.3IONS-SCNC: 8 MMOL/L (ref 4–13)
APTT PPP: 35 SECONDS (ref 23–37)
AST SERPL W P-5'-P-CCNC: 11 U/L (ref 13–39)
ATRIAL RATE: 68 BPM
BASOPHILS # BLD AUTO: 0.02 THOUSANDS/ÂΜL (ref 0–0.1)
BASOPHILS NFR BLD AUTO: 0 % (ref 0–1)
BILIRUB SERPL-MCNC: 0.57 MG/DL (ref 0.2–1)
BNP SERPL-MCNC: 64 PG/ML (ref 0–100)
BUN SERPL-MCNC: 21 MG/DL (ref 5–25)
CALCIUM SERPL-MCNC: 9.4 MG/DL (ref 8.4–10.2)
CARDIAC TROPONIN I PNL SERPL HS: 19 NG/L
CARDIAC TROPONIN I PNL SERPL HS: 19 NG/L
CHLORIDE SERPL-SCNC: 107 MMOL/L (ref 96–108)
CO2 SERPL-SCNC: 27 MMOL/L (ref 21–32)
CREAT SERPL-MCNC: 1.3 MG/DL (ref 0.6–1.3)
EOSINOPHIL # BLD AUTO: 0.06 THOUSAND/ÂΜL (ref 0–0.61)
EOSINOPHIL NFR BLD AUTO: 1 % (ref 0–6)
ERYTHROCYTE [DISTWIDTH] IN BLOOD BY AUTOMATED COUNT: 14.6 % (ref 11.6–15.1)
ETHANOL SERPL-MCNC: <10 MG/DL
GFR SERPL CREATININE-BSD FRML MDRD: 52 ML/MIN/1.73SQ M
GLUCOSE SERPL-MCNC: 151 MG/DL (ref 65–140)
HCT VFR BLD AUTO: 42.3 % (ref 36.5–49.3)
HGB BLD-MCNC: 14.5 G/DL (ref 12–17)
IMM GRANULOCYTES # BLD AUTO: 0.03 THOUSAND/UL (ref 0–0.2)
IMM GRANULOCYTES NFR BLD AUTO: 0 % (ref 0–2)
INR PPP: 1.04 (ref 0.84–1.19)
LIPASE SERPL-CCNC: 24 U/L (ref 11–82)
LYMPHOCYTES # BLD AUTO: 1.44 THOUSANDS/ÂΜL (ref 0.6–4.47)
LYMPHOCYTES NFR BLD AUTO: 21 % (ref 14–44)
MAGNESIUM SERPL-MCNC: 2.1 MG/DL (ref 1.9–2.7)
MCH RBC QN AUTO: 31.5 PG (ref 26.8–34.3)
MCHC RBC AUTO-ENTMCNC: 34.3 G/DL (ref 31.4–37.4)
MCV RBC AUTO: 92 FL (ref 82–98)
MONOCYTES # BLD AUTO: 0.58 THOUSAND/ÂΜL (ref 0.17–1.22)
MONOCYTES NFR BLD AUTO: 8 % (ref 4–12)
NEUTROPHILS # BLD AUTO: 4.91 THOUSANDS/ÂΜL (ref 1.85–7.62)
NEUTS SEG NFR BLD AUTO: 70 % (ref 43–75)
NRBC BLD AUTO-RTO: 0 /100 WBCS
P AXIS: 48 DEGREES
PLATELET # BLD AUTO: 147 THOUSANDS/UL (ref 149–390)
PMV BLD AUTO: 11.3 FL (ref 8.9–12.7)
POTASSIUM SERPL-SCNC: 3.7 MMOL/L (ref 3.5–5.3)
PR INTERVAL: 176 MS
PROT SERPL-MCNC: 6.6 G/DL (ref 6.4–8.4)
PROTHROMBIN TIME: 14.2 SECONDS (ref 11.6–14.5)
QRS AXIS: -45 DEGREES
QRSD INTERVAL: 152 MS
QT INTERVAL: 386 MS
QTC INTERVAL: 410 MS
RBC # BLD AUTO: 4.61 MILLION/UL (ref 3.88–5.62)
SODIUM SERPL-SCNC: 142 MMOL/L (ref 135–147)
T WAVE AXIS: -21 DEGREES
TSH SERPL DL<=0.05 MIU/L-ACNC: 2.82 UIU/ML (ref 0.45–4.5)
VENTRICULAR RATE: 68 BPM
WBC # BLD AUTO: 7.04 THOUSAND/UL (ref 4.31–10.16)

## 2024-04-23 PROCEDURE — 93005 ELECTROCARDIOGRAM TRACING: CPT

## 2024-04-23 PROCEDURE — 83690 ASSAY OF LIPASE: CPT | Performed by: EMERGENCY MEDICINE

## 2024-04-23 PROCEDURE — 84443 ASSAY THYROID STIM HORMONE: CPT | Performed by: EMERGENCY MEDICINE

## 2024-04-23 PROCEDURE — 93010 ELECTROCARDIOGRAM REPORT: CPT | Performed by: INTERNAL MEDICINE

## 2024-04-23 PROCEDURE — 83735 ASSAY OF MAGNESIUM: CPT | Performed by: EMERGENCY MEDICINE

## 2024-04-23 PROCEDURE — 99285 EMERGENCY DEPT VISIT HI MDM: CPT

## 2024-04-23 PROCEDURE — 84484 ASSAY OF TROPONIN QUANT: CPT | Performed by: EMERGENCY MEDICINE

## 2024-04-23 PROCEDURE — 71275 CT ANGIOGRAPHY CHEST: CPT

## 2024-04-23 PROCEDURE — 80053 COMPREHEN METABOLIC PANEL: CPT | Performed by: EMERGENCY MEDICINE

## 2024-04-23 PROCEDURE — 99222 1ST HOSP IP/OBS MODERATE 55: CPT | Performed by: FAMILY MEDICINE

## 2024-04-23 PROCEDURE — 85025 COMPLETE CBC W/AUTO DIFF WBC: CPT | Performed by: EMERGENCY MEDICINE

## 2024-04-23 PROCEDURE — 71045 X-RAY EXAM CHEST 1 VIEW: CPT

## 2024-04-23 PROCEDURE — 85730 THROMBOPLASTIN TIME PARTIAL: CPT | Performed by: EMERGENCY MEDICINE

## 2024-04-23 PROCEDURE — 96374 THER/PROPH/DIAG INJ IV PUSH: CPT

## 2024-04-23 PROCEDURE — 83880 ASSAY OF NATRIURETIC PEPTIDE: CPT | Performed by: EMERGENCY MEDICINE

## 2024-04-23 PROCEDURE — 82077 ASSAY SPEC XCP UR&BREATH IA: CPT | Performed by: EMERGENCY MEDICINE

## 2024-04-23 PROCEDURE — 85610 PROTHROMBIN TIME: CPT | Performed by: EMERGENCY MEDICINE

## 2024-04-23 PROCEDURE — 96361 HYDRATE IV INFUSION ADD-ON: CPT

## 2024-04-23 PROCEDURE — 99285 EMERGENCY DEPT VISIT HI MDM: CPT | Performed by: EMERGENCY MEDICINE

## 2024-04-23 PROCEDURE — 36415 COLL VENOUS BLD VENIPUNCTURE: CPT | Performed by: EMERGENCY MEDICINE

## 2024-04-23 RX ORDER — ACETAMINOPHEN 325 MG/1
650 TABLET ORAL EVERY 6 HOURS PRN
Status: DISCONTINUED | OUTPATIENT
Start: 2024-04-23 | End: 2024-04-27 | Stop reason: HOSPADM

## 2024-04-23 RX ORDER — TAMSULOSIN HYDROCHLORIDE 0.4 MG/1
0.4 CAPSULE ORAL
Status: DISCONTINUED | OUTPATIENT
Start: 2024-04-23 | End: 2024-04-27 | Stop reason: HOSPADM

## 2024-04-23 RX ORDER — ASPIRIN 81 MG/1
324 TABLET, CHEWABLE ORAL ONCE
Status: COMPLETED | OUTPATIENT
Start: 2024-04-23 | End: 2024-04-23

## 2024-04-23 RX ORDER — METOPROLOL TARTRATE 1 MG/ML
5 INJECTION, SOLUTION INTRAVENOUS ONCE
Status: COMPLETED | OUTPATIENT
Start: 2024-04-23 | End: 2024-04-23

## 2024-04-23 RX ORDER — FINASTERIDE 5 MG/1
5 TABLET, FILM COATED ORAL DAILY
Status: DISCONTINUED | OUTPATIENT
Start: 2024-04-23 | End: 2024-04-27 | Stop reason: HOSPADM

## 2024-04-23 RX ORDER — DOFETILIDE 0.25 MG/1
250 CAPSULE ORAL EVERY 12 HOURS
Status: DISCONTINUED | OUTPATIENT
Start: 2024-04-24 | End: 2024-04-23

## 2024-04-23 RX ORDER — ATORVASTATIN CALCIUM 40 MG/1
40 TABLET, FILM COATED ORAL
Status: DISCONTINUED | OUTPATIENT
Start: 2024-04-23 | End: 2024-04-27 | Stop reason: HOSPADM

## 2024-04-23 RX ORDER — DILTIAZEM HYDROCHLORIDE 5 MG/ML
20 INJECTION INTRAVENOUS ONCE
Status: DISCONTINUED | OUTPATIENT
Start: 2024-04-23 | End: 2024-04-23

## 2024-04-23 RX ADMIN — ATORVASTATIN CALCIUM 40 MG: 40 TABLET, FILM COATED ORAL at 17:16

## 2024-04-23 RX ADMIN — TAMSULOSIN HYDROCHLORIDE 0.4 MG: 0.4 CAPSULE ORAL at 17:16

## 2024-04-23 RX ADMIN — IOHEXOL 85 ML: 350 INJECTION, SOLUTION INTRAVENOUS at 12:13

## 2024-04-23 RX ADMIN — METOROPROLOL TARTRATE 5 MG: 5 INJECTION, SOLUTION INTRAVENOUS at 10:59

## 2024-04-23 RX ADMIN — APIXABAN 5 MG: 5 TABLET, FILM COATED ORAL at 17:16

## 2024-04-23 RX ADMIN — SODIUM CHLORIDE 500 ML: 0.9 INJECTION, SOLUTION INTRAVENOUS at 10:58

## 2024-04-23 RX ADMIN — FINASTERIDE 5 MG: 5 TABLET, FILM COATED ORAL at 15:29

## 2024-04-23 RX ADMIN — METOROPROLOL TARTRATE 5 MG: 5 INJECTION, SOLUTION INTRAVENOUS at 11:14

## 2024-04-23 RX ADMIN — ASPIRIN 324 MG: 81 TABLET, CHEWABLE ORAL at 10:58

## 2024-04-23 NOTE — ASSESSMENT & PLAN NOTE
Patient presented to the ED after having 3 days of chest pain, shortness of breath, lightheadedness, and severe fatigue.  His heart rate in the 150s.  While in the ED he was given 10 mg of IV Lopressor and spontaneously resolved to normal sinus rhythm.  Patient symptomology improved and he returned back to baseline.  He will be admitted under observation to monitor and ensure he continues to remain in normal sinus rhythm.    EKG prior to IV Lopressor showed 133 heart rate A-flutter  Takes Tikosyn in the outpatient setting, will continue tomorrow  Anticoagulated on Eliquis, will continue  Placed on telemetry  Patient reportedly takes metoprolol as needed  TSH 2.819  BNP 64  CTA chest showed no acute cardiopulmonary findings  If a flutter returns, will likely consult cardiology

## 2024-04-23 NOTE — ASSESSMENT & PLAN NOTE
Patient presented to the ED after having 3 days of chest pain, shortness of breath, lightheadedness, and severe fatigue.  His heart rate in the 150s.  While in the ED he was given 10 mg of IV Lopressor and spontaneously resolved to normal sinus rhythm.        While in ED EKG prior to IV Lopressor showed 133 heart rate A-flutter  Patient initially reverted to normal sinus rhythm after IV Lopressor given in ED, during observation during course of night, patient converted back to symptomatic A-fib with rvr rate of 120s to 130s  Cardiology consulted- restarted Tikosyn, continue to order IV Lopressor, will need continued monitoring  Anticoagulated on Eliquis, will continue  TSH 2.819  BNP 64  CTA chest showed no acute cardiopulmonary findings  No acute signs of infection  Continue telemetry

## 2024-04-23 NOTE — PLAN OF CARE
Problem: PAIN - ADULT  Goal: Verbalizes/displays adequate comfort level or baseline comfort level  Description: Interventions:  - Encourage patient to monitor pain and request assistance  - Assess pain using appropriate pain scale  - Administer analgesics based on type and severity of pain and evaluate response  - Implement non-pharmacological measures as appropriate and evaluate response  - Consider cultural and social influences on pain and pain management  - Notify physician/advanced practitioner if interventions unsuccessful or patient reports new pain  Outcome: Progressing     Problem: INFECTION - ADULT  Goal: Absence or prevention of progression during hospitalization  Description: INTERVENTIONS:  - Assess and monitor for signs and symptoms of infection  - Monitor lab/diagnostic results  - Monitor all insertion sites, i.e. indwelling lines, tubes, and drains  - Monitor endotracheal if appropriate and nasal secretions for changes in amount and color  - [place] appropriate cooling/warming therapies per order  - Administer medications as ordered  - Instruct and encourage patient and family to use good hand hygiene technique  - Identify and instruct in appropriate isolation precautions for identified infection/condition  Outcome: Progressing  Goal: Absence of fever/infection during neutropenic period  Description: INTERVENTIONS:  - Monitor WBC    Outcome: Progressing     Problem: SAFETY ADULT  Goal: Patient will remain free of falls  Description: INTERVENTIONS:  - Educate patient/family on patient safety including physical limitations  - Instruct patient to call for assistance with activity   - Consult OT/PT to assist with strengthening/mobility   - Keep Call bell within reach  - Keep bed low and locked with side rails adjusted as appropriate  - Keep care items and personal belongings within reach  - Initiate and maintain comfort rounds  - Make Fall Risk Sign visible to staff  - Offer Toileting every 2 Hours,  in advance of need  - Initiate/Maintain 2alarm  - Obtain necessary fall risk management equipment: 2  - Apply yellow socks and bracelet for high fall risk patients  - Consider moving patient to room near nurses station  Outcome: Progressing  Goal: Maintain or return to baseline ADL function  Description: INTERVENTIONS:  -  Assess patient's ability to carry out ADLs; assess patient's baseline for ADL function and identify physical deficits which impact ability to perform ADLs (bathing, care of mouth/teeth, toileting, grooming, dressing, etc.)  - Assess/evaluate cause of self-care deficits   - Assess range of motion  - Assess patient's mobility; develop plan if impaired  - Assess patient's need for assistive devices and provide as appropriate  - Encourage maximum independence but intervene and supervise when necessary  - Involve family in performance of ADLs  - Assess for home care needs following discharge   - Consider OT consult to assist with ADL evaluation and planning for discharge  - Provide patient education as appropriate  Outcome: Progressing  Goal: Maintains/Returns to pre admission functional level  Description: INTERVENTIONS:  - Perform AM-PAC 6 Click Basic Mobility/ Daily Activity assessment daily.  - Set and communicate daily mobility goal to care team and patient/family/caregiver.   - Collaborate with rehabilitation services on mobility goals if consulted  - Perform Range of Motion 2 times a day.  - Reposition patient every 2 hours.  - Dangle patient 2 times a day  - Stand patient 2 times a day  - Ambulate patient 2 times a day  - Out of bed to chair 2 times a day   - Out of bed for meals 2 times a day  - Out of bed for toileting  - Record patient progress and toleration of activity level   Outcome: Progressing     Problem: DISCHARGE PLANNING  Goal: Discharge to home or other facility with appropriate resources  Description: INTERVENTIONS:  - Identify barriers to discharge w/patient and caregiver  -  Arrange for needed discharge resources and transportation as appropriate  - Identify discharge learning needs (meds, wound care, etc.)  - Arrange for interpretive services to assist at discharge as needed  - Refer to Case Management Department for coordinating discharge planning if the patient needs post-hospital services based on physician/advanced practitioner order or complex needs related to functional status, cognitive ability, or social support system  Outcome: Progressing     Problem: Knowledge Deficit  Goal: Patient/family/caregiver demonstrates understanding of disease process, treatment plan, medications, and discharge instructions  Description: Complete learning assessment and assess knowledge base.  Interventions:  - Provide teaching at level of understanding  - Provide teaching via preferred learning methods  Outcome: Progressing

## 2024-04-23 NOTE — ASSESSMENT & PLAN NOTE
Resolved after IV metoprolol administered  EKG showed a flutter heart rates 130s  Troponin within normal limits x 2  Obtain EKG  Placement telemetry

## 2024-04-23 NOTE — ED PROVIDER NOTES
History  Chief Complaint   Patient presents with    Rapid Heart Rate     Pt has had a rapid heart rate for the past 3 days. Pt came in today because his sister told him he needed to be seen.      Patient is a 78-year-old male with past medical history of hypertension, hyperlipidemia, cardiomyopathy, bilateral PE diagnosed 1 year ago currently on Eliquis, history of alcohol use, paroxysmal atrial fibrillation, history of SVT, presents to the emergency department for elevated heart rate for the past 3 days as well as chest pain and dyspnea.  Patient reports discomfort in his chest on and off for the past 3 days as well as feeling as though his heart has been racing.  He also reports feeling more short of breath than normal.  He denies any new pain or swelling in the legs, recent travel or immobilization.  He states he is compliant with his Eliquis.  According to records, patient was recently taken off metoprolol by his cardiologist in December and was started on dofetilide.  Patient does report intermittent dizziness and lightheadedness.  He also reports loose stool since yesterday, nonbloody.  He denies any recent fevers, chills, headache, cough, hemoptysis, URI symptoms, abdominal pain, back pain, nausea, vomiting, melena, urinary symptoms, skin rash or color change, lateralizing weakness or extremity paresthesia or other focal neurologic deficits.  He does admit to being depressed and being under a lot of stress due to marital issues.  Denies any suicidal or homicidal ideation.      History provided by:  Patient   used: No    Rapid Heart Rate  Associated symptoms: chest pain, dizziness and shortness of breath    Associated symptoms: no back pain, no cough, no nausea, no numbness, no vomiting and no weakness        Prior to Admission Medications   Prescriptions Last Dose Informant Patient Reported? Taking?   DULoxetine (CYMBALTA) 60 mg delayed release capsule  Self Yes No   Sig: Take 60 mg by  mouth daily   Diclofenac Sodium (VOLTAREN) 1 %  Self Yes No   Sig: APPLY 2GM TO UPPER EXTREMITIES TOPICALLY FOUR TIMES A DAY AND APPLY 4GM TO LOWER EXTREMITIES FOUR TIMES A DAY FOR PAIN AND INFLAMMATION **USE DOSING CARD** (DO NOT EXCEED 16 GRAMS DAILY TO ANY AFFECTE   [place]-3 1000 MG CAPS  Self Yes No   Si,000 mg As needed   acetaminophen-codeine (TYLENOL #3) 300-30 mg per tablet  Self Yes No   Sig: TAKE 1-2 TABS BY MOUTH EVERY 4-6 HRS AS NEEDED FOR PAIN   apixaban (Eliquis) 5 mg   No No   Sig: Take 1 tablet (5 mg total) by mouth 2 (two) times a day   dofetilide (TIKOSYN) 250 mcg capsule   No No   Sig: TAKE 1 CAPSULE BY MOUTH EVERY 12 HOURS   finasteride (PROSCAR) 5 mg tablet  Self No No   Sig: TAKE 1 TABLET (5 MG TOTAL) BY MOUTH DAILY.   fluticasone (FLONASE) 50 mcg/act nasal spray  Self No No   Si spray into each nostril daily as needed for rhinitis   furosemide (LASIX) 40 mg tablet  Self Yes No   Sig: Take 40 mg by mouth if needed for shortness of breath   lidocaine (LIDODERM) 5 %  Self Yes No   Sig: APPLY 1 TO 2 PATCHES TOPICALLY EVERY DAY AS NEEDED FOR PAIN (REMOVE PATCH AFTER 12 HOURS; 12 HOURS ON AND 12 HOURS OFF)   metoprolol succinate (TOPROL-XL) 25 mg 24 hr tablet  Self No No   Sig: Take 0.5 tablets (12.5 mg total) by mouth daily Do not start before 2023.   rosuvastatin (CRESTOR) 20 MG tablet   No No   Sig: Take 1 tablet (20 mg total) by mouth daily   sacubitril-valsartan (Entresto) 24-26 MG TABS   No No   Sig: Take 1 tablet by mouth 2 (two) times a day   sildenafil (VIAGRA) 50 MG tablet   No No   Sig: Take 1 tablet (50 mg total) by mouth daily as needed for erectile dysfunction   tamsulosin (FLOMAX) 0.4 mg  Self No No   Sig: Take 1 capsule (0.4 mg total) by mouth daily with dinner      Facility-Administered Medications: None       Past Medical History:   Diagnosis Date    Cardiomyopathy (HCC)     Hyperlipidemia     Post-nasal drip     Pulmonary embolism (HCC)        Past Surgical History:    Procedure Laterality Date    ARTHROSCOPY KNEE Left     30 years ago    IN CYSTO INSERTION TRANSPROSTATIC IMPLANT SINGLE N/A 2021    Procedure: CYSTOSCOPY WITH INSERTION UROLIFT;  Surgeon: Mahendra Cruz MD;  Location: [facility];  Service: Urology       History reviewed. No pertinent family history.  I have reviewed and agree with the history as documented.    E-Cigarette/Vaping    E-Cigarette Use Never User      E-Cigarette/Vaping Substances    Nicotine No     THC No     CBD No     Flavoring No     Other No     Unknown No      Social History     Tobacco Use    Smoking status: Former     Current packs/day: 0.00     Types: Cigarettes     Quit date:      Years since quittin.3    Smokeless tobacco: Never   Vaping Use    Vaping status: Never Used   Substance Use Topics    Alcohol use: Yes     Alcohol/week: 0.0 standard drinks of alcohol     Comment: socially    Drug use: No       Review of Systems   Constitutional:  Negative for chills and fever.   HENT:  Negative for congestion, ear pain, rhinorrhea and sore throat.    Eyes:  Negative for photophobia, pain and visual disturbance.   Respiratory:  Positive for shortness of breath. Negative for cough, chest tightness and wheezing.    Cardiovascular:  Positive for chest pain and palpitations. Negative for leg swelling.   Gastrointestinal:  Positive for diarrhea. Negative for abdominal distention, abdominal pain, blood in stool, constipation, nausea and vomiting.   Genitourinary:  Negative for dysuria, flank pain, frequency and hematuria.   Musculoskeletal:  Negative for back pain, neck pain and neck stiffness.   Skin:  Negative for color change, pallor, rash and wound.   Allergic/Immunologic: Negative for immunocompromised state.   Neurological:  Positive for dizziness and light-headedness. Negative for syncope, facial asymmetry, speech difficulty, weakness, numbness and headaches.   Hematological:  Negative for adenopathy. Bruises/bleeds easily.    Psychiatric/Behavioral:  Negative for confusion, decreased concentration and sleep disturbance.    All other systems reviewed and are negative.      Physical Exam  Physical Exam  Vitals and nursing note reviewed.   Constitutional:       General: He is not in acute distress.     Appearance: Normal appearance. He is well-developed. He is obese. He is not ill-appearing, toxic-appearing or diaphoretic.   HENT:      Head: Normocephalic and atraumatic.      Right Ear: External ear normal.      Left Ear: External ear normal.      Nose: Nose normal.      Mouth/Throat:      Pharynx: Oropharynx is clear.   Eyes:      Extraocular Movements: Extraocular movements intact.      Conjunctiva/sclera: Conjunctivae normal.   Neck:      Vascular: No JVD.   Cardiovascular:      Rate and Rhythm: Regular rhythm. Tachycardia present.      Pulses: Normal pulses.      Heart sounds: Normal heart sounds. No murmur heard.     No friction rub. No gallop.   Pulmonary:      Effort: Pulmonary effort is normal. No respiratory distress.      Breath sounds: Normal breath sounds. No wheezing, rhonchi or rales.   Abdominal:      General: There is no distension.      Palpations: Abdomen is soft.      Tenderness: There is no abdominal tenderness. There is no guarding or rebound.   Musculoskeletal:         General: No swelling or tenderness. Normal range of motion.      Cervical back: Normal range of motion and neck supple. No rigidity.      Right lower leg: No edema.      Left lower leg: No edema.   Skin:     General: Skin is warm and dry.      Coloration: Skin is not pale.      Findings: No erythema or rash.   Neurological:      General: No focal deficit present.      Mental Status: He is alert and oriented to person, place, and time.      Sensory: No sensory deficit.      Motor: No weakness.   Psychiatric:         Behavior: Behavior normal.      Comments: Patient tearful and appears depressed.         Vital Signs  ED Triage Vitals   Temperature Pulse  Respirations Blood Pressure SpO2   04/23/24 1046 04/23/24 1046 04/23/24 1046 04/23/24 1046 04/23/24 1046   97.8 °F (36.6 °C) (!) 154 20 (!) 156/109 100 %      Temp Source Heart Rate Source Patient Position - Orthostatic VS BP Location FiO2 (%)   04/23/24 1046 04/23/24 1046 04/23/24 1046 04/23/24 1046 --   Oral Monitor Sitting Left arm       Pain Score       04/23/24 1057       No Pain         Vitals:    04/23/24 1200 04/23/24 1215 04/23/24 1328 04/23/24 1352   BP: 114/70 123/74 133/81 125/71   BP Location: Right arm Right arm Right arm Right arm   Pulse: (!) 129 69 66 63   Resp: 18 22 22 19   Temp:    98.6 °F (37 °C)   TempSrc:    Oral   SpO2: 98% 99% 100% 99%          Visual Acuity      ED Medications  Medications   diltiazem (CARDIZEM) injection 20 mg (0 mg Intravenous Hold 4/23/24 1202)   sodium chloride 0.9 % bolus 500 mL (0 mL Intravenous Stopped 4/23/24 1201)   metoprolol (LOPRESSOR) injection 5 mg (5 mg Intravenous Given 4/23/24 1059)   aspirin chewable tablet 324 mg (324 mg Oral Given 4/23/24 1058)   metoprolol (LOPRESSOR) injection 5 mg (5 mg Intravenous Given 4/23/24 1114)   iohexol (OMNIPAQUE) 350 MG/ML injection (MULTI-DOSE) 85 mL (85 mL Intravenous Given 4/23/24 1213)       Diagnostic Studies  Results Reviewed       Procedure Component Value Units Date/Time    HS Troponin I 2hr [[id number]]  (Normal) Collected: 04/23/24 1258    Lab Status: Final result Specimen: Blood from Hand, Right Updated: 04/23/24 1333     hs TnI 2hr 19 ng/L      Delta 2hr hsTnI 0 ng/L     B-Type Natriuretic Peptide(BNP) [[id number]]  (Normal) Collected: 04/23/24 1056    Lab Status: Final result Specimen: Blood from Arm, Left Updated: 04/23/24 1257     BNP 64 pg/mL     Ethanol [[id number]]  (Normal) Collected: 04/23/24 1236    Lab Status: Final result Specimen: Blood from Arm, Left Updated: 04/23/24 1256     Ethanol Lvl <10 mg/dL     TSH, 3rd generation with Free T4 reflex [[id number]]  (Normal) Collected: 04/23/24 1056    Lab Status:  Final result Specimen: Blood from Arm, Left Updated: 04/23/24 1248     TSH 3RD GENERATON 2.819 uIU/mL     HS Troponin I 4hr [[id number]]     Lab Status: No result Specimen: Blood     HS Troponin 0hr (reflex protocol) [[id number]]  (Normal) Collected: 04/23/24 1056    Lab Status: Final result Specimen: Blood from Arm, Left Updated: 04/23/24 1129     hs TnI 0hr 19 ng/L     Lipase [[id number]]  (Normal) Collected: 04/23/24 1056    Lab Status: Final result Specimen: Blood from Arm, Left Updated: 04/23/24 1125     Lipase 24 u/L     Magnesium [[id number]]  (Normal) Collected: 04/23/24 1056    Lab Status: Final result Specimen: Blood from Arm, Left Updated: 04/23/24 1125     Magnesium 2.1 mg/dL     Comprehensive metabolic panel [[id number]]  (Abnormal) Collected: 04/23/24 1056    Lab Status: Final result Specimen: Blood from Arm, Left Updated: 04/23/24 1124     Sodium 142 mmol/L      Potassium 3.7 mmol/L      Chloride 107 mmol/L      CO2 27 mmol/L      ANION GAP 8 mmol/L      BUN 21 mg/dL      Creatinine 1.30 mg/dL      Glucose 151 mg/dL      Calcium 9.4 mg/dL      AST 11 U/L      ALT 13 U/L      Alkaline Phosphatase 58 U/L      Total Protein 6.6 g/dL      Albumin 4.0 g/dL      Total Bilirubin 0.57 mg/dL      eGFR 52 ml/min/1.73sq m     Narrative:      [employer] guidelines for Chronic Kidney Disease (CKD):     Stage 1 with normal or high GFR (GFR > 90 mL/min/1.73 square meters)    Stage 2 Mild CKD (GFR = 60-89 mL/min/1.73 square meters)    Stage 3A Moderate CKD (GFR = 45-59 mL/min/1.73 square meters)    Stage 3B Moderate CKD (GFR = 30-44 mL/min/1.73 square meters)    Stage 4 Severe CKD (GFR = 15-29 mL/min/1.73 square meters)    Stage 5 End Stage CKD (GFR <15 mL/min/1.73 square meters)  Note: GFR calculation is accurate only with a steady state creatinine    Protime-INR [[id number]]  (Normal) Collected: 04/23/24 1056    Lab Status: Final result Specimen: Blood from Arm, Left Updated: 04/23/24 1128      Protime 14.2 seconds      INR 1.04    APTT [[id number]]  (Normal) Collected: 04/23/24 1056    Lab Status: Final result Specimen: Blood from Arm, Left Updated: 04/23/24 1120     PTT 35 seconds     CBC and differential [[id number]]  (Abnormal) Collected: 04/23/24 1056    Lab Status: Final result Specimen: Blood from Arm, Left Updated: 04/23/24 1104     WBC 7.04 Thousand/uL      RBC 4.61 Million/uL      Hemoglobin 14.5 g/dL      Hematocrit 42.3 %      MCV 92 fL      MCH 31.5 pg      MCHC 34.3 g/dL      RDW 14.6 %      MPV 11.3 fL      Platelets 147 Thousands/uL      nRBC 0 /100 WBCs      Segmented % 70 %      Immature Grans % 0 %      Lymphocytes % 21 %      Monocytes % 8 %      Eosinophils Relative 1 %      Basophils Relative 0 %      Absolute Neutrophils 4.91 Thousands/µL      Absolute Immature Grans 0.03 Thousand/uL      Absolute Lymphocytes 1.44 Thousands/µL      Absolute Monocytes 0.58 Thousand/µL      Eosinophils Absolute 0.06 Thousand/µL      Basophils Absolute 0.02 Thousands/µL                    CTA ED chest PE study   Final Result by Lainey Quan MD (04/23 1300)      No pulmonary embolus.      No acute pulmonary disease.            Workstation performed: [id number]         XR chest 1 view portable   ED Interpretation by Tricia Jaquez DO (04/23 1311)   No acute abnormality in the chest.                 Procedures  ECG 12 Lead Documentation Only    Date/Time: 4/23/2024 10:48 PM    Performed by: Tricia Jaquez DO  Authorized by: Tricia Jaquez DO    ECG reviewed by me, the ED Provider: yes    Patient location:  ED  Previous ECG:     Previous ECG:  Compared to current    Comparison ECG info:  10- (HR and rhythm similar and read as sinus tach vs. atrial tach); RBBB is now present  Rate:     ECG rate:  152    ECG rate assessment: tachycardic    Rhythm:     Rhythm: atrial flutter      Rhythm comment:  With RVR vs. SVT vs. sinus tachycardia (suspect rapid  A-flutter)  Ectopy:     Ectopy: none    QRS:     QRS axis:  Left    QRS intervals:  Wide  Conduction:     Conduction: abnormal      Abnormal conduction: complete RBBB, LAFB and bifascicular block    ST segments:     ST segments:  Non-specific  T waves:     T waves: inverted      Inverted:  V2 and V3  Other findings:     Other findings: LVH    ECG 12 Lead Documentation Only    Date/Time: 4/23/2024 11:04 PM    Performed by: Tricia Jaquez DO  Authorized by: Tricia Jaquez DO    ECG reviewed by me, the ED Provider: yes    Patient location:  ED  Previous ECG:     Previous ECG:  Compared to current    Similarity:  No change  Rate:     ECG rate:  140    ECG rate assessment: tachycardic    Rhythm:     Rhythm: atrial flutter    Ectopy:     Ectopy: none    QRS:     QRS axis:  Left    QRS intervals:  Wide  Conduction:     Conduction: abnormal      Abnormal conduction: complete RBBB, LAFB and bifascicular block    ST segments:     ST segments:  Non-specific  T waves:     T waves: inverted      Inverted:  V2 and V3  Other findings:     Other findings: LVH and prolonged qTc interval    ECG 12 Lead Documentation Only    Date/Time: 4/23/2024 11:37 PM    Performed by: Tricia Jaquez DO  Authorized by: Tricia Jaquez DO    ECG reviewed by me, the ED Provider: yes    Patient location:  ED  Previous ECG:     Previous ECG:  Compared to current    Similarity:  Changes noted  Rate:     ECG rate:  68    ECG rate assessment: normal    Rhythm:     Rhythm: sinus rhythm    Ectopy:     Ectopy: none    QRS:     QRS axis:  Left    QRS intervals:  Wide  Conduction:     Conduction: abnormal      Abnormal conduction: complete RBBB, LAFB and bifascicular block    ST segments:     ST segments:  Non-specific  T waves:     T waves: non-specific    Other findings:     Other findings: LVH    ECG 12 Lead Documentation Only    Date/Time: 4/23/2024 11:53 PM    Performed by: Tricia Jaquez DO  Authorized by:  Tricia Jaquez,     ECG reviewed by me, the ED Provider: yes    Patient location:  ED  Rate:     ECG rate:  133    ECG rate assessment: tachycardic    Rhythm:     Rhythm: atrial flutter    Ectopy:     Ectopy: PVCs      PVCs:  Frequent  QRS:     QRS axis:  Left    QRS intervals:  Wide  Conduction:     Conduction: abnormal      Abnormal conduction: complete RBBB, LAFB and bifascicular block    ST segments:     ST segments:  Non-specific  T waves:     T waves: non-specific             ED Course  ED Course as of 04/23/24 1401   Tue Apr 23, 2024   1106 Patient had mild reduction in heart rate immediately after the Lopressor and heart rate now in the 130s.  Repeat EKG still shows sinus tachycardia.   1138 Spoke with cardiologist,   1155 Shortly after the second Lopressor dose, patient had converted to a normal sinus rhythm with heart rate in the 60s.  About 20 to 30 minutes after converting to normal sinus rhythm, heart rate went up back into the 130s.  Suspect atrial flutter.  I did discuss case with cardiologist and recommended continuing with beta-blocker or calcium channel blocker but if unable to get rate controlled, he suggested pursuing CT scan to rule out more PE burden.   1156 Will give a dose of Cardizem as heart rate now back in the 130s.   1207 HR back to 60's in SR and that was before he was given cardizem. Will hold off on Cardizem. Nurse made aware not to give any further meds for now.              HEART Risk Score      Flowsheet Row Most Recent Value   Heart Score Risk Calculator    History 0 Filed at: 04/23/2024 1314   ECG 1 Filed at: 04/23/2024 1314   Age 2 Filed at: 04/23/2024 1314   Risk Factors 2 Filed at: 04/23/2024 1314   Troponin 1 Filed at: 04/23/2024 1314   HEART Score 6 Filed at: 04/23/2024 1314          Identification of Seniors at Risk      Flowsheet Row Most Recent Value   (ISAR) Identification of Seniors at Risk    Before the illness or injury that brought you to the Emergency,  did you need someone to help you on a regular basis? 0 Filed at: 04/23/2024 1046   In the last 24 hours, have you needed more help than usual? 0 Filed at: 04/23/2024 1046   Have you been hospitalized for one or more nights during the past 6 months? 0 Filed at: 04/23/2024 1046   In general, do you see well? 0 Filed at: 04/23/2024 1046   In general, do you have serious problems with your memory? 0 Filed at: 04/23/2024 1046   Do you take more than three different medications every day? 0 Filed at: 04/23/2024 1046   ISAR Score 0 Filed at: 04/23/2024 1046                        SBIRT 22yo+      Flowsheet Row Most Recent Value   Initial Alcohol Screen: US AUDIT-C     1. How often do you have a drink containing alcohol? 0 Filed at: 04/23/2024 1046   2. How many drinks containing alcohol do you have on a typical day you are drinking?  0 Filed at: 04/23/2024 1046   3a. Male UNDER 65: How often do you have five or more drinks on one occasion? 0 Filed at: 04/23/2024 1046   Audit-C Score 0 Filed at: 04/23/2024 1046   TOMAS: How many times in the past year have you...    Used an illegal drug or used a prescription medication for non-medical reasons? Never Filed at: 04/23/2024 1046                      Medical Decision Making  78-year-old male presents to the ED for 3 days of elevated heart rate, chest pain and dyspnea.  Patient has history of paroxysmal A-fib and I suspect he is likely having rapid A-fib/flutter.  EKG obtained and shows sinus tachycardia however very low suspicion that patient truly has sinus tachycardia with heart rate in the 150s without any evidence of infectious etiology or internal bleeding.  Will try Lopressor 5 mg for rate control and will workup with cardiac labs.  Will consider CTA chest given his history of bilateral PE.    Amount and/or Complexity of Data Reviewed  Labs: ordered. Decision-making details documented in ED Course.  Radiology: ordered and independent interpretation performed.  Decision-making details documented in ED Course.  ECG/medicine tests: ordered and independent interpretation performed. Decision-making details documented in ED Course.    Risk  OTC drugs.  Prescription drug management.  Decision regarding hospitalization.             Disposition  Final diagnoses:   Atrial fibrillation with rapid ventricular response (HCC)   Palpitations   Chest pain   Dyspnea     Time reflects when diagnosis was documented in both MDM as applicable and the Disposition within this note       Time User Action Codes Description Comment    4/23/2024  1:15 PM Aufiero, Tricia E Add [I48.91] Atrial fibrillation with rapid ventricular response (HCC)     4/23/2024  1:15 PM Aufiero, Tricia E Add [R00.2] Palpitations     4/23/2024  1:15 PM Aufiero, Tricia E Add [R07.9] Chest pain     4/23/2024  1:15 PM Aufiero, Tricia E Add [R06.00] Dyspnea           ED Disposition       ED Disposition   Admit    Condition   Stable    Date/Time   Tue Apr 23, 2024 1315    Comment   Case was discussed with [place] and the patient's admission status was agreed to be Admission Status: observation status to the service of Dr. Barba .               Follow-up Information    None         Current Discharge Medication List        CONTINUE these medications which have NOT CHANGED    Details   acetaminophen-codeine (TYLENOL #3) 300-30 mg per tablet TAKE 1-2 TABS BY MOUTH EVERY 4-6 HRS AS NEEDED FOR PAIN      apixaban (Eliquis) 5 mg Take 1 tablet (5 mg total) by mouth 2 (two) times a day  Qty: 180 tablet, Refills: 3    Associated Diagnoses: PAF (paroxysmal atrial fibrillation) ([facility]); Bilateral pulmonary embolism (HCC)      Diclofenac Sodium (VOLTAREN) 1 % APPLY 2GM TO UPPER EXTREMITIES TOPICALLY FOUR TIMES A DAY AND APPLY 4GM TO LOWER EXTREMITIES FOUR TIMES A DAY FOR PAIN AND INFLAMMATION **USE DOSING CARD** (DO NOT EXCEED 16 GRAMS DAILY TO ANY AFFECTE      dofetilide (TIKOSYN) 250 mcg capsule TAKE 1 CAPSULE BY MOUTH EVERY 12 HOURS  Qty: 180  capsule, Refills: 2    Associated Diagnoses: PAF (paroxysmal atrial fibrillation) ([facility])      DULoxetine (CYMBALTA) 60 mg delayed release capsule Take 60 mg by mouth daily      finasteride (PROSCAR) 5 mg tablet TAKE 1 TABLET (5 MG TOTAL) BY MOUTH DAILY.  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  Qty: 15 tablet, Refills: 3    Associated Diagnoses: PAF (paroxysmal atrial fibrillation) ([facility])      Omega-3 1000 MG CAPS 1,000 mg As needed      rosuvastatin (CRESTOR) 20 MG tablet Take 1 tablet (20 mg total) by mouth daily  Qty: 90 tablet, Refills: 3    Associated Diagnoses: Mixed hyperlipidemia      sacubitril-valsartan (Entresto) 24-26 MG TABS Take 1 tablet by mouth 2 (two) times a day    Associated Diagnoses: Cardiomyopathy, nonischemic ([facility])      sildenafil (VIAGRA) 50 MG tablet Take 1 tablet (50 mg total) by mouth daily as needed for erectile dysfunction  Qty: 10 tablet, Refills: 3    Associated Diagnoses: Erectile dysfunction, unspecified erectile dysfunction type      tamsulosin (FLOMAX) 0.4 mg Take 1 capsule (0.4 mg total) by mouth daily with dinner  Qty: 60 capsule, Refills: 3    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    PDMP Review         Value Time User    PDMP Reviewed  Yes 6/2/2021  9:58  AM Luis Cartagena MD            ED Provider  Electronically Signed by             Tricia Jaquez DO  04/23/24 3193

## 2024-04-23 NOTE — H&P
[facility]  H&P  Name: Nas Cantor Jr. 78 y.o. male I MRN: [id number]  Unit/Bed#: MS [facility]st[facility] I Date of Admission: 4/23/2024   Date of Service: 4/23/2024 I Hospital Day: 0      Assessment/Plan   * Atrial flutter (HCC)  Assessment & Plan  Patient presented to the ED after having 3 days of chest pain, shortness of breath, lightheadedness, and severe fatigue.  His heart rate in the 150s.  While in the ED he was given 10 mg of IV Lopressor and spontaneously resolved to normal sinus rhythm.  Patient symptomology improved and he returned back to baseline.  He will be admitted under observation to monitor and ensure he continues to remain in normal sinus rhythm.    EKG prior to IV Lopressor showed 133 heart rate A-flutter  Takes Tikosyn in the outpatient setting, will continue tomorrow if QTc allows, currently prolonged, repeat EKG  Anticoagulated on Eliquis, will continue  Placed on telemetry  Patient reportedly takes metoprolol as needed  TSH 2.819  BNP 64  CTA chest showed no acute cardiopulmonary findings  If a flutter returns, will likely consult cardiology    Chest pain  Assessment & Plan  Resolved after IV metoprolol administered  EKG showed a flutter heart rates 130s  Troponin within normal limits x 2  Obtain EKG  Placement telemetry    BPH (benign prostatic hyperplasia)  Assessment & Plan  Continue with tamsulosin and Proscar    Chronic kidney disease (CKD), stage II (mild)  Assessment & Plan  Lab Results   Component Value Date    EGFR 52 04/23/2024    EGFR 65 02/01/2024    EGFR 56 10/10/2023    CREATININE 1.30 04/23/2024    CREATININE 1.15 02/01/2024    CREATININE 1.22 10/10/2023     Creatinine near baseline-  appears euvolemic  No longer takes diuretics           VTE Pharmacologic Prophylaxis: VTE Score: 4 Moderate Risk (Score 3-4) - Pharmacological DVT Prophylaxis Ordered: apixaban (Eliquis).  Code Status: Full code  Discussion with family: Patient declined call to .      Anticipated Length of Stay: Patient will be admitted on an observation basis with an anticipated length of stay of less than 2 midnights secondary to a flutter.    Total Time Spent on Date of Encounter in care of patient: 65 mins. This time was spent on one or more of the following: performing physical exam; counseling and coordination of care; obtaining or reviewing history; documenting in the medical record; reviewing/ordering tests, medications or procedures; communicating with other healthcare professionals and discussing with patient's family/caregivers.    Chief Complaint: Chest pain, shortness of breath, lightheadedness      History of Present Illness:  Nas Cantor Jr. is a 78 y.o. male with a PMH of a flutter, BPH who presents with shortness of breath, chest pain, lightheadedness.Patient presented to the ED after having 3 days of chest pain, shortness of breath, lightheadedness, and severe fatigue.  His heart rate in the 150s.  While in the ED he was given 10 mg of IV Lopressor and spontaneously resolved to normal sinus rhythm.  Patient symptomology improved and he returned back to baseline.  He will be admitted under observation to monitor and ensure he continues to remain in normal sinus rhythm.    Review of Systems:  Review of Systems   Constitutional:  Positive for fatigue. Negative for chills, diaphoresis and fever.   HENT:  Negative for ear pain, rhinorrhea, sinus pain and sore throat.    Eyes:  Negative for photophobia, pain and visual disturbance.   Respiratory:  Positive for chest tightness and shortness of breath. Negative for cough and wheezing.    Cardiovascular:  Positive for chest pain and palpitations. Negative for leg swelling.   Gastrointestinal:  Negative for abdominal pain, diarrhea, nausea and vomiting.   Genitourinary:  Negative for dysuria, frequency and hematuria.   Musculoskeletal:  Negative for arthralgias and back pain.   Skin:  Negative for color change, pallor, rash and  wound.   Neurological:  Positive for weakness and light-headedness. Negative for tremors, seizures and syncope.   All other systems reviewed and are negative.      Past Medical and Surgical History:   Past Medical History:   Diagnosis Date    Cardiomyopathy (HCC)     Hyperlipidemia     Post-nasal drip     Pulmonary embolism (HCC)        Past Surgical History:   Procedure Laterality Date    ARTHROSCOPY KNEE Left     30 years ago    AR CYSTO INSERTION TRANSPROSTATIC IMPLANT SINGLE N/A 06/17/2021    Procedure: CYSTOSCOPY WITH INSERTION UROLIFT;  Surgeon: Mahendra Cruz MD;  Location: [facility] OR;  Service: Urology       Meds/Allergies:  Prior to Admission medications    Medication Sig Start Date End Date Taking? Authorizing Provider   apixaban (Eliquis) 5 mg Take 1 tablet (5 mg total) by mouth 2 (two) times a day 12/28/23  Yes Lian Botello MD   dofetilide (TIKOSYN) 250 mcg capsule TAKE 1 CAPSULE BY MOUTH EVERY 12 HOURS 3/25/24  Yes John Montero MD   finasteride (PROSCAR) 5 mg tablet TAKE 1 TABLET (5 MG TOTAL) BY MOUTH DAILY. 5/26/23  Yes Tre Ibrahim MD   rosuvastatin (CRESTOR) 20 MG tablet Take 1 tablet (20 mg total) by mouth daily 12/28/23  Yes Lian Botello MD   sildenafil (VIAGRA) 50 MG tablet Take 1 tablet (50 mg total) by mouth daily as needed for erectile dysfunction 3/19/24  Yes Lian Botello MD   acetaminophen-codeine (TYLENOL #3) 300-30 mg per tablet TAKE 1-2 TABS BY MOUTH EVERY 4-6 HRS AS NEEDED FOR PAIN  Patient not taking: Reported on 4/23/2024 5/5/22   Historical Provider, MD   Diclofenac Sodium (VOLTAREN) 1 % APPLY 2GM TO UPPER EXTREMITIES TOPICALLY FOUR TIMES A DAY AND APPLY 4GM TO LOWER EXTREMITIES FOUR TIMES A DAY FOR PAIN AND INFLAMMATION **USE DOSING CARD** (DO NOT EXCEED 16 GRAMS DAILY TO ANY AFFECTE  Patient not taking: Reported on 4/23/2024 9/29/23 9/29/24  Historical Provider, MD   DULoxetine (CYMBALTA) 60 mg delayed release capsule Take 60 mg by mouth daily  Patient not  taking: Reported on 2024    Historical Provider, MD   fluticasone (FLONASE) 50 mcg/act nasal spray 1 spray into each nostril daily as needed for rhinitis  Patient not taking: Reported on 2024 12/10/21   COLLIN Sibley   furosemide (LASIX) 40 mg tablet Take 40 mg by mouth if needed for shortness of breath  Patient not taking: Reported on 2024   Historical Provider, MD   lidocaine (LIDODERM) 5 % APPLY 1 TO 2 PATCHES TOPICALLY EVERY DAY AS NEEDED FOR PAIN (REMOVE PATCH AFTER 12 HOURS; 12 HOURS ON AND 12 HOURS OFF)  Patient not taking: Reported on 2024  Historical Provider, MD   metoprolol succinate (TOPROL-XL) 25 mg 24 hr tablet Take 0.5 tablets (12.5 mg total) by mouth daily Do not start before 2023. 23  Varun Mendoza PA-C   Omega-3 1000 MG CAPS 1,000 mg As needed  Patient not taking: Reported on 2024 4/10/23   Historical Provider, MD   sacubitril-valsartan (Entresto) 24-26 MG TABS Take 1 tablet by mouth 2 (two) times a day  Patient not taking: Reported on 23  Lian Botello MD   tamsulosin (FLOMAX) 0.4 mg Take 1 capsule (0.4 mg total) by mouth daily with dinner 23   Tre Ibrahim MD     I have reviewed home medications with patient personally.    Allergies: No Known Allergies    Social History:  Marital Status: /Civil Union   Occupation: N/A  Patient Pre-hospital Living Situation: Home  Patient Pre-hospital Level of Mobility: walks  Patient Pre-hospital Diet Restrictions: None  Substance Use History:   Social History     Substance and Sexual Activity   Alcohol Use Yes    Alcohol/week: 0.0 standard drinks of alcohol    Comment: socially     Social History     Tobacco Use   Smoking Status Former    Current packs/day: 0.00    Types: Cigarettes    Quit date:     Years since quittin.3   Smokeless Tobacco Never     Social History     Substance and Sexual Activity   Drug Use No       Family  "History:  History reviewed. No pertinent family history.    Physical Exam:     Vitals:   Blood Pressure: 125/71 (04/23/24 1352)  Pulse: 63 (04/23/24 1352)  Temperature: 98.6 °F (37 °C) (04/23/24 1352)  Temp Source: Oral (04/23/24 1352)  Respirations: 19 (04/23/24 1352)  Height: 6' 3\" (190.5 cm) (04/23/24 1352)  Weight - Scale: 118 kg (260 lb) (04/23/24 1352)  SpO2: 99 % (04/23/24 1352)    Physical Exam  Vitals and nursing note reviewed.   Constitutional:       Appearance: He is obese.   HENT:      Head: Normocephalic.      Nose: Nose normal.      Mouth/Throat:      Mouth: Mucous membranes are moist.      Pharynx: Oropharynx is clear.   Eyes:      General: No scleral icterus.     Conjunctiva/sclera: Conjunctivae normal.      Pupils: Pupils are equal, round, and reactive to light.   Cardiovascular:      Rate and Rhythm: Normal rate and regular rhythm.      Heart sounds: No murmur heard.     No friction rub. No gallop.   Pulmonary:      Effort: Pulmonary effort is normal. No respiratory distress.      Breath sounds: Normal breath sounds. No stridor. No wheezing, rhonchi or rales.   Abdominal:      General: Abdomen is flat.      Palpations: Abdomen is soft.   Musculoskeletal:         General: Normal range of motion.      Cervical back: Normal range of motion and neck supple.      Right lower leg: No edema.      Left lower leg: No edema.   Lymphadenopathy:      Cervical: No cervical adenopathy.   Skin:     General: Skin is warm.      Coloration: Skin is not jaundiced or pale.      Findings: No bruising, erythema or lesion.   Neurological:      General: No focal deficit present.      Mental Status: He is alert and oriented to person, place, and time. Mental status is at baseline.      Cranial Nerves: No cranial nerve deficit.      Motor: No weakness.   Psychiatric:         Mood and Affect: Mood normal.         Behavior: Behavior normal.         Thought Content: Thought content normal.          Additional Data:     Lab " Results:  Results from last 7 days   Lab Units 04/23/24  1056   WBC Thousand/uL 7.04   HEMOGLOBIN g/dL 14.5   HEMATOCRIT % 42.3   PLATELETS Thousands/uL 147*   SEGS PCT % 70   LYMPHO PCT % 21   MONO PCT % 8   EOS PCT % 1     Results from last 7 days   Lab Units 04/23/24  1056   SODIUM mmol/L 142   POTASSIUM mmol/L 3.7   CHLORIDE mmol/L 107   CO2 mmol/L 27   BUN mg/dL 21   CREATININE mg/dL 1.30   ANION GAP mmol/L 8   CALCIUM mg/dL 9.4   ALBUMIN g/dL 4.0   TOTAL BILIRUBIN mg/dL 0.57   ALK PHOS U/L 58   ALT U/L 13   AST U/L 11*   GLUCOSE RANDOM mg/dL 151*     Results from last 7 days   Lab Units 04/23/24  1056   INR  1.04                   Lines/Drains:  Invasive Devices       Peripheral Intravenous Line  Duration             Peripheral IV 04/23/24 Left Antecubital <1 day                        Imaging: Reviewed radiology reports from this admission including: chest CT scan  CTA ED chest PE study   Final Result by Lainey Quan MD (04/23 1300)      No pulmonary embolus.      No acute pulmonary disease.            Workstation performed: [id number]         XR chest 1 view portable   ED Interpretation by Tricia Jaquez DO (04/23 1311)   No acute abnormality in the chest.          EKG and Other Studies Reviewed on Admission:   EKG:  A flutter, heart rate 133, complete RBBB, LAFB, and bifascicular, block.    ** Please Note: This note has been constructed using a voice recognition system. **

## 2024-04-23 NOTE — ASSESSMENT & PLAN NOTE
Resolved after IV metoprolol administered  EKG showed a flutter heart rates 130s  Troponin within normal limits x 2  Resolved as heart rate improved  Placement telemetry

## 2024-04-23 NOTE — ASSESSMENT & PLAN NOTE
Lab Results   Component Value Date    EGFR 52 04/23/2024    EGFR 65 02/01/2024    EGFR 56 10/10/2023    CREATININE 1.30 04/23/2024    CREATININE 1.15 02/01/2024    CREATININE 1.22 10/10/2023     Creatinine near baseline-  appears euvolemic  No longer takes diuretics

## 2024-04-24 ENCOUNTER — APPOINTMENT (OUTPATIENT)
Dept: NON INVASIVE DIAGNOSTICS | Facility: HOSPITAL | Age: 79
DRG: 308 | End: 2024-04-24
Payer: COMMERCIAL

## 2024-04-24 LAB
ANION GAP SERPL CALCULATED.3IONS-SCNC: 6 MMOL/L (ref 4–13)
ATRIAL RATE: 129 BPM
ATRIAL RATE: 131 BPM
ATRIAL RATE: 133 BPM
ATRIAL RATE: 140 BPM
ATRIAL RATE: 144 BPM
ATRIAL RATE: 152 BPM
ATRIAL RATE: 50 BPM
ATRIAL RATE: 62 BPM
ATRIAL RATE: 65 BPM
BUN SERPL-MCNC: 17 MG/DL (ref 5–25)
CALCIUM SERPL-MCNC: 8.6 MG/DL (ref 8.4–10.2)
CHLORIDE SERPL-SCNC: 109 MMOL/L (ref 96–108)
CO2 SERPL-SCNC: 25 MMOL/L (ref 21–32)
CREAT SERPL-MCNC: 1.16 MG/DL (ref 0.6–1.3)
ERYTHROCYTE [DISTWIDTH] IN BLOOD BY AUTOMATED COUNT: 14.6 % (ref 11.6–15.1)
GFR SERPL CREATININE-BSD FRML MDRD: 59 ML/MIN/1.73SQ M
GLUCOSE P FAST SERPL-MCNC: 103 MG/DL (ref 65–99)
GLUCOSE SERPL-MCNC: 103 MG/DL (ref 65–140)
HCT VFR BLD AUTO: 37.6 % (ref 36.5–49.3)
HGB BLD-MCNC: 12.6 G/DL (ref 12–17)
MAGNESIUM SERPL-MCNC: 2 MG/DL (ref 1.9–2.7)
MCH RBC QN AUTO: 31.2 PG (ref 26.8–34.3)
MCHC RBC AUTO-ENTMCNC: 33.5 G/DL (ref 31.4–37.4)
MCV RBC AUTO: 93 FL (ref 82–98)
P AXIS: -16 DEGREES
P AXIS: 0 DEGREES
P AXIS: 56 DEGREES
P AXIS: 7 DEGREES
P AXIS: 70 DEGREES
PHOSPHATE SERPL-MCNC: 3.4 MG/DL (ref 2.3–4.1)
PLATELET # BLD AUTO: 143 THOUSANDS/UL (ref 149–390)
PMV BLD AUTO: 11.9 FL (ref 8.9–12.7)
POTASSIUM SERPL-SCNC: 3.8 MMOL/L (ref 3.5–5.3)
PR INTERVAL: 104 MS
PR INTERVAL: 126 MS
PR INTERVAL: 136 MS
PR INTERVAL: 166 MS
PR INTERVAL: 172 MS
PR INTERVAL: 96 MS
QRS AXIS: -38 DEGREES
QRS AXIS: -38 DEGREES
QRS AXIS: -40 DEGREES
QRS AXIS: -40 DEGREES
QRS AXIS: -44 DEGREES
QRS AXIS: -45 DEGREES
QRS AXIS: -46 DEGREES
QRS AXIS: -48 DEGREES
QRS AXIS: -49 DEGREES
QRSD INTERVAL: 140 MS
QRSD INTERVAL: 142 MS
QRSD INTERVAL: 144 MS
QRSD INTERVAL: 146 MS
QRSD INTERVAL: 146 MS
QRSD INTERVAL: 148 MS
QRSD INTERVAL: 148 MS
QRSD INTERVAL: 150 MS
QRSD INTERVAL: 152 MS
QT INTERVAL: 278 MS
QT INTERVAL: 324 MS
QT INTERVAL: 332 MS
QT INTERVAL: 340 MS
QT INTERVAL: 350 MS
QT INTERVAL: 356 MS
QT INTERVAL: 358 MS
QT INTERVAL: 420 MS
QT INTERVAL: 422 MS
QTC INTERVAL: 428 MS
QTC INTERVAL: 436 MS
QTC INTERVAL: 442 MS
QTC INTERVAL: 500 MS
QTC INTERVAL: 514 MS
QTC INTERVAL: 519 MS
QTC INTERVAL: 520 MS
QTC INTERVAL: 525 MS
QTC INTERVAL: 528 MS
RBC # BLD AUTO: 4.04 MILLION/UL (ref 3.88–5.62)
SODIUM SERPL-SCNC: 140 MMOL/L (ref 135–147)
T WAVE AXIS: -12 DEGREES
T WAVE AXIS: -23 DEGREES
T WAVE AXIS: -54 DEGREES
T WAVE AXIS: -57 DEGREES
T WAVE AXIS: -57 DEGREES
T WAVE AXIS: -60 DEGREES
T WAVE AXIS: 77 DEGREES
T WAVE AXIS: 82 DEGREES
T WAVE AXIS: 96 DEGREES
VENTRICULAR RATE: 131 BPM
VENTRICULAR RATE: 131 BPM
VENTRICULAR RATE: 133 BPM
VENTRICULAR RATE: 140 BPM
VENTRICULAR RATE: 143 BPM
VENTRICULAR RATE: 144 BPM
VENTRICULAR RATE: 152 BPM
VENTRICULAR RATE: 62 BPM
VENTRICULAR RATE: 65 BPM
WBC # BLD AUTO: 5.63 THOUSAND/UL (ref 4.31–10.16)

## 2024-04-24 PROCEDURE — 93010 ELECTROCARDIOGRAM REPORT: CPT | Performed by: INTERNAL MEDICINE

## 2024-04-24 PROCEDURE — 80048 BASIC METABOLIC PNL TOTAL CA: CPT

## 2024-04-24 PROCEDURE — 84100 ASSAY OF PHOSPHORUS: CPT

## 2024-04-24 PROCEDURE — 99222 1ST HOSP IP/OBS MODERATE 55: CPT | Performed by: INTERNAL MEDICINE

## 2024-04-24 PROCEDURE — 83735 ASSAY OF MAGNESIUM: CPT

## 2024-04-24 PROCEDURE — 85027 COMPLETE CBC AUTOMATED: CPT

## 2024-04-24 PROCEDURE — 93005 ELECTROCARDIOGRAM TRACING: CPT

## 2024-04-24 PROCEDURE — 99232 SBSQ HOSP IP/OBS MODERATE 35: CPT

## 2024-04-24 RX ORDER — METOPROLOL TARTRATE 1 MG/ML
5 INJECTION, SOLUTION INTRAVENOUS ONCE
Status: COMPLETED | OUTPATIENT
Start: 2024-04-24 | End: 2024-04-24

## 2024-04-24 RX ORDER — METOPROLOL SUCCINATE 25 MG/1
25 TABLET, EXTENDED RELEASE ORAL DAILY
Status: DISCONTINUED | OUTPATIENT
Start: 2024-04-24 | End: 2024-04-25

## 2024-04-24 RX ORDER — LABETALOL HYDROCHLORIDE 5 MG/ML
10 INJECTION, SOLUTION INTRAVENOUS ONCE
Status: DISCONTINUED | OUTPATIENT
Start: 2024-04-24 | End: 2024-04-24

## 2024-04-24 RX ORDER — DOFETILIDE 0.25 MG/1
250 CAPSULE ORAL EVERY 12 HOURS SCHEDULED
Status: DISCONTINUED | OUTPATIENT
Start: 2024-04-24 | End: 2024-04-26

## 2024-04-24 RX ORDER — DILTIAZEM HYDROCHLORIDE 5 MG/ML
15 INJECTION INTRAVENOUS ONCE
Status: COMPLETED | OUTPATIENT
Start: 2024-04-24 | End: 2024-04-24

## 2024-04-24 RX ADMIN — METOROPROLOL TARTRATE 5 MG: 5 INJECTION, SOLUTION INTRAVENOUS at 10:36

## 2024-04-24 RX ADMIN — DILTIAZEM HYDROCHLORIDE 15 MG: 5 INJECTION INTRAVENOUS at 04:28

## 2024-04-24 RX ADMIN — ATORVASTATIN CALCIUM 40 MG: 40 TABLET, FILM COATED ORAL at 17:19

## 2024-04-24 RX ADMIN — TAMSULOSIN HYDROCHLORIDE 0.4 MG: 0.4 CAPSULE ORAL at 17:19

## 2024-04-24 RX ADMIN — METOROPROLOL TARTRATE 5 MG: 5 INJECTION, SOLUTION INTRAVENOUS at 22:10

## 2024-04-24 RX ADMIN — APIXABAN 5 MG: 5 TABLET, FILM COATED ORAL at 09:07

## 2024-04-24 RX ADMIN — APIXABAN 5 MG: 5 TABLET, FILM COATED ORAL at 17:19

## 2024-04-24 RX ADMIN — METOPROLOL SUCCINATE 25 MG: 25 TABLET, EXTENDED RELEASE ORAL at 10:36

## 2024-04-24 RX ADMIN — METOROPROLOL TARTRATE 5 MG: 5 INJECTION, SOLUTION INTRAVENOUS at 04:01

## 2024-04-24 RX ADMIN — DOFETILIDE 250 MCG: 0.25 CAPSULE ORAL at 11:08

## 2024-04-24 RX ADMIN — DOFETILIDE 250 MCG: 0.25 CAPSULE ORAL at 20:27

## 2024-04-24 RX ADMIN — FINASTERIDE 5 MG: 5 TABLET, FILM COATED ORAL at 09:07

## 2024-04-24 RX ADMIN — METOROPROLOL TARTRATE 5 MG: 5 INJECTION, SOLUTION INTRAVENOUS at 17:18

## 2024-04-24 NOTE — PLAN OF CARE
Problem: PAIN - ADULT  Goal: Verbalizes/displays adequate comfort level or baseline comfort level  Description: Interventions:  - Encourage patient to monitor pain and request assistance  - Assess pain using appropriate pain scale  - Administer analgesics based on type and severity of pain and evaluate response  - Implement non-pharmacological measures as appropriate and evaluate response  - Consider cultural and social influences on pain and pain management  - Notify physician/advanced practitioner if interventions unsuccessful or patient reports new pain  Outcome: Progressing     Problem: INFECTION - ADULT  Goal: Absence or prevention of progression during hospitalization  Description: INTERVENTIONS:  - Assess and monitor for signs and symptoms of infection  - Monitor lab/diagnostic results  - Monitor all insertion sites, i.e. indwelling lines, tubes, and drains  - Monitor endotracheal if appropriate and nasal secretions for changes in amount and color  - [place] appropriate cooling/warming therapies per order  - Administer medications as ordered  - Instruct and encourage patient and family to use good hand hygiene technique  - Identify and instruct in appropriate isolation precautions for identified infection/condition  Outcome: Progressing  Goal: Absence of fever/infection during neutropenic period  Description: INTERVENTIONS:  - Monitor WBC    Outcome: Progressing     Problem: SAFETY ADULT  Goal: Patient will remain free of falls  Description: INTERVENTIONS:  - Educate patient/family on patient safety including physical limitations  - Instruct patient to call for assistance with activity   - Consult OT/PT to assist with strengthening/mobility   - Keep Call bell within reach  - Keep bed low and locked with side rails adjusted as appropriate  - Keep care items and personal belongings within reach  - Initiate and maintain comfort rounds  - Make Fall Risk Sign visible to staff  - Offer Toileting every  Hours,  in advance of need  - Initiate/Maintain alarm  - Obtain necessary fall risk management equipment:   - Apply yellow socks and bracelet for high fall risk patients  - Consider moving patient to room near nurses station  Outcome: Progressing  Goal: Maintain or return to baseline ADL function  Description: INTERVENTIONS:  -  Assess patient's ability to carry out ADLs; assess patient's baseline for ADL function and identify physical deficits which impact ability to perform ADLs (bathing, care of mouth/teeth, toileting, grooming, dressing, etc.)  - Assess/evaluate cause of self-care deficits   - Assess range of motion  - Assess patient's mobility; develop plan if impaired  - Assess patient's need for assistive devices and provide as appropriate  - Encourage maximum independence but intervene and supervise when necessary  - Involve family in performance of ADLs  - Assess for home care needs following discharge   - Consider OT consult to assist with ADL evaluation and planning for discharge  - Provide patient education as appropriate  Outcome: Progressing  Goal: Maintains/Returns to pre admission functional level  Description: INTERVENTIONS:  - Perform AM-PAC 6 Click Basic Mobility/ Daily Activity assessment daily.  - Set and communicate daily mobility goal to care team and patient/family/caregiver.   - Collaborate with rehabilitation services on mobility goals if consulted  - Perform Range of Motion  times a day.  - Reposition patient every  hours.  - Dangle patient  times a day  - Stand patient  times a day  - Ambulate patient  times a day  - Out of bed to chair  times a day   - Out of bed for meals  times a day  - Out of bed for toileting  - Record patient progress and toleration of activity level   Outcome: Progressing     Problem: DISCHARGE PLANNING  Goal: Discharge to home or other facility with appropriate resources  Description: INTERVENTIONS:  - Identify barriers to discharge w/patient and caregiver  - Arrange for  needed discharge resources and transportation as appropriate  - Identify discharge learning needs (meds, wound care, etc.)  - Arrange for interpretive services to assist at discharge as needed  - Refer to Case Management Department for coordinating discharge planning if the patient needs post-hospital services based on physician/advanced practitioner order or complex needs related to functional status, cognitive ability, or social support system  Outcome: Progressing     Problem: Knowledge Deficit  Goal: Patient/family/caregiver demonstrates understanding of disease process, treatment plan, medications, and discharge instructions  Description: Complete learning assessment and assess knowledge base.  Interventions:  - Provide teaching at level of understanding  - Provide teaching via preferred learning methods  Outcome: Progressing

## 2024-04-24 NOTE — PROGRESS NOTES
[facility]  Progress Note  Name: Nas Estrada I  MRN: [id number]  Unit/Bed#: MS Vela I Date of Admission: 4/23/2024   Date of Service: 4/24/2024 I Hospital Day: 0    Assessment/Plan   * Atrial flutter (HCC)  Assessment & Plan  Patient presented to the ED after having 3 days of chest pain, shortness of breath, lightheadedness, and severe fatigue.  His heart rate in the 150s.  While in the ED he was given 10 mg of IV Lopressor and spontaneously resolved to normal sinus rhythm.        While in ED EKG prior to IV Lopressor showed 133 heart rate A-flutter  Patient initially reverted to normal sinus rhythm after IV Lopressor given in ED, during observation during course of night, patient converted back to symptomatic A-fib with rvr rate of 120s to 130s  Cardiology consulted- restarted Tikosyn, continue to order IV Lopressor, will need continued monitoring  Anticoagulated on Eliquis, will continue  TSH 2.819  BNP 64  CTA chest showed no acute cardiopulmonary findings  No acute signs of infection  Continue telemetry    Chest pain  Assessment & Plan  Resolved after IV metoprolol administered  EKG showed a flutter heart rates 130s  Troponin within normal limits x 2  Resolved as heart rate improved  Placement telemetry    BPH (benign prostatic hyperplasia)  Assessment & Plan  Continue with tamsulosin and Proscar    Chronic kidney disease (CKD), stage II (mild)  Assessment & Plan  Lab Results   Component Value Date    EGFR 52 04/23/2024    EGFR 65 02/01/2024    EGFR 56 10/10/2023    CREATININE 1.30 04/23/2024    CREATININE 1.15 02/01/2024    CREATININE 1.22 10/10/2023     Creatinine near baseline-  appears euvolemic  No longer takes diuretics             VTE Pharmacologic Prophylaxis: VTE Score: 4 Moderate Risk (Score 3-4) - Pharmacological DVT Prophylaxis Ordered: apixaban (Eliquis).    Mobility:   Basic Mobility Inpatient Raw Score: 24  JH-HLM Goal: 8: Walk 250 feet or more  -HLM Achieved: 1:  Laying in bed  JH-HLM Goal achieved. Continue to encourage appropriate mobility.    Patient Centered Rounds: I performed bedside rounds with nursing staff today.   Discussions with Specialists or Other Care Team Provider: CM, cardiology    Education and Discussions with Family / Patient: Updated  (wife and daughter) at bedside.    Total Time Spent on Date of Encounter in care of patient: 35 mins. This time was spent on one or more of the following: performing physical exam; counseling and coordination of care; obtaining or reviewing history; documenting in the medical record; reviewing/ordering tests, medications or procedures; communicating with other healthcare professionals and discussing with patient's family/caregivers.    Current Length of Stay: 0 day(s)  Current Patient Status: Observation   Certification Statement: The patient will continue to require additional inpatient hospital stay due to continued treatment for A-fib with RVR with medical management and cardiology consult  Discharge Plan: Anticipate discharge in 48 hrs to home.    Code Status: Level 1 - Full Code    Subjective:   Patient reports when heart rate is controlled he feels like his normal self, however when heart rate is elevated he feels lightheaded, mild chest pain, and shortness of breath.  He currently denies any lightheadedness, nausea, or chills.    Objective:     Vitals:   Temp (24hrs), Av.2 °F (36.8 °C), Min:97.8 °F (36.6 °C), Max:98.7 °F (37.1 °C)    Temp:  [97.8 °F (36.6 °C)-98.7 °F (37.1 °C)] 98.7 °F (37.1 °C)  HR:  [] 136  Resp:  [18-26] 18  BP: (120-146)/() 128/92  SpO2:  [95 %-100 %] 96 %  Body mass index is 32.5 kg/m².     Input and Output Summary (last 24 hours):     Intake/Output Summary (Last 24 hours) at 2024 1222  Last data filed at 2024 0907  Gross per 24 hour   Intake 240 ml   Output --   Net 240 ml       Physical Exam:   Physical Exam  Vitals and nursing note reviewed.    Constitutional:       Appearance: He is obese.   HENT:      Head: Normocephalic.      Nose: Nose normal.      Mouth/Throat:      Mouth: Mucous membranes are moist.      Pharynx: Oropharynx is clear.   Eyes:      General: No scleral icterus.     Conjunctiva/sclera: Conjunctivae normal.      Pupils: Pupils are equal, round, and reactive to light.   Cardiovascular:      Rate and Rhythm: Tachycardia present. Rhythm irregular.      Heart sounds: No murmur heard.     No friction rub. No gallop.   Pulmonary:      Effort: Pulmonary effort is normal. No respiratory distress.      Breath sounds: Normal breath sounds. No stridor. No wheezing, rhonchi or rales.   Abdominal:      General: Abdomen is flat.      Palpations: Abdomen is soft.   Musculoskeletal:         General: Normal range of motion.      Cervical back: Normal range of motion and neck supple.      Right lower leg: No edema.      Left lower leg: No edema.   Lymphadenopathy:      Cervical: No cervical adenopathy.   Skin:     General: Skin is warm.      Coloration: Skin is not jaundiced or pale.      Findings: No bruising, erythema or lesion.   Neurological:      General: No focal deficit present.      Mental Status: He is alert and oriented to person, place, and time. Mental status is at baseline.      Cranial Nerves: No cranial nerve deficit.      Motor: No weakness.   Psychiatric:         Mood and Affect: Mood normal.         Behavior: Behavior normal.         Thought Content: Thought content normal.          Additional Data:     Labs:  Results from last 7 days   Lab Units 04/24/24  0441 04/23/24  1056   WBC Thousand/uL 5.63 7.04   HEMOGLOBIN g/dL 12.6 14.5   HEMATOCRIT % 37.6 42.3   PLATELETS Thousands/uL 143* 147*   SEGS PCT %  --  70   LYMPHO PCT %  --  21   MONO PCT %  --  8   EOS PCT %  --  1     Results from last 7 days   Lab Units 04/24/24  0441 04/23/24  1056   SODIUM mmol/L 140 142   POTASSIUM mmol/L 3.8 3.7   CHLORIDE mmol/L 109* 107   CO2 mmol/L 25 27    BUN mg/dL 17 21   CREATININE mg/dL 1.16 1.30   ANION GAP mmol/L 6 8   CALCIUM mg/dL 8.6 9.4   ALBUMIN g/dL  --  4.0   TOTAL BILIRUBIN mg/dL  --  0.57   ALK PHOS U/L  --  58   ALT U/L  --  13   AST U/L  --  11*   GLUCOSE RANDOM mg/dL 103 151*     Results from last 7 days   Lab Units 04/23/24  1056   INR  1.04                   Lines/Drains:  Invasive Devices       Peripheral Intravenous Line  Duration             Peripheral IV 04/23/24 Left Antecubital 1 day                      Telemetry:  Telemetry Orders (From admission, onward)               24 Hour Telemetry Monitoring  Continuous x 24 Hours (Telem)        Question:  Reason for 24 Hour Telemetry  Answer:  Arrhythmias requiring acute medical intervention / PPM or ICD malfunction                     Telemetry Reviewed: Atrial fibrillation. HR averaging 110s to 130s  Indication for Continued Telemetry Use: Arrthymias requiring medical therapy             Imaging: No pertinent imaging reviewed.    Recent Cultures (last 7 days):         Last 24 Hours Medication List:   Current Facility-Administered Medications   Medication Dose Route Frequency Provider Last Rate    acetaminophen  650 mg Oral Q6H PRN Wilbert Gonzalez PA-C      apixaban  5 mg Oral BID Wilbert Gonzalez PA-C      atorvastatin  40 mg Oral Daily With Dinner Wilbert Gonzalez PA-C      dofetilide  250 mcg Oral Q12H FRANCIA Dianne Howard PA-C      finasteride  5 mg Oral Daily Wilbert Gonzalez PA-C      metoprolol succinate  25 mg Oral Daily Dianne Howard PA-C      tamsulosin  0.4 mg Oral Daily With Dinner Wilbert Gonzalez PA-C          Today, Patient Was Seen By: Wilbert Gonzalez PA-C    **Please Note: This note may have been constructed using a voice recognition system.**

## 2024-04-24 NOTE — CONSULTS
Consultation - Cardiology   Nas Cantor Jr. 78 y.o. male MRN: [id number]  Unit/Bed#: [facility] Encounter: [id number]  04/24/24  1:47 PM    Assessment/ Plan:  1.  Atrial flutter with RVR, heart rates in the 140s.  Patient was not started back on his metoprolol or Tikosyn since admission.  Plan to resume them, increase metoprolol to 25.  Also plan for 1 dose of IV Lopressor.  Monitor telemetry.  Continue Eliquis.  Check echocardiogram to assess LV function, regional wall motion for abnormalities.  TSH within normal limits    2.  Chest pain, troponins negative x 2; related to #1.  Echocardiogram ordered and pending. Stress test 1 year ago; moderate size inferior wall defect predominantly normalized with prone imaging likely diaphragmatic shadowing, small apical mixed defect of moderate intensity suggesting apical infarct/ischemia.  Continue Eliquis, Lipitor, Tikosyn, Toprol    3.  CKD 2, creatinine today 1.1    4.  Thrombocytopenia, platelet count 143, appears chronic.  Continue to monitor, management per Slim        History of Present Illness   Physician Requesting Consult: Fransisco Barba MD    Reason for Consult / Principal Problem: afib/flutter    HPI: Nas Cantor Jr. is a 78 y.o. year old male who presents with chest pain, shortness of breath, lightheadedness, severe fatigue.  Patient noted symptoms for 3 days.  Patient heart rate in the 150s while in the ED.  Patient given 10 mg of IV Lopressor with spontaneous resolution back to sinus rhythm.  Patient was feeling back to normal.  Patient was actually planned for discharge today after coming in yesterday but unfortunately patient went back to A-fib/flutter with RVR this a.m.  Heart rates in the 140s.  Patient currently asymptomatic.  Denies chest pain, palpitations, shortness of breath.  Peers comfortable in no acute distress.    PMH: A flutter, BPH, per lipidemia, history of bilateral pulmonary embolism on Eliquis    Inpatient consult to  "Cardiology  Consult performed by: Dianne Howard PA-C  Consult ordered by: Wilbert Gonzalez PA-C          EKG: aflutter with rvr 140's      Review of Systems   Constitutional:  Positive for fatigue.   Respiratory:  Positive for shortness of breath.    Cardiovascular:  Positive for chest pain.   Gastrointestinal: Negative.    Musculoskeletal: Negative.    Neurological:  Positive for dizziness and light-headedness.   Hematological: Negative.    Psychiatric/Behavioral: Negative.     All other systems reviewed and are negative.      Historical Information   Past Medical History:   Diagnosis Date    Cardiomyopathy (HCC)     Hyperlipidemia     Post-nasal drip     Pulmonary embolism (HCC)      Past Surgical History:   Procedure Laterality Date    ARTHROSCOPY KNEE Left     30 years ago    DE CYSTO INSERTION TRANSPROSTATIC IMPLANT SINGLE N/A 2021    Procedure: CYSTOSCOPY WITH INSERTION UROLIFT;  Surgeon: Mahendra Cruz MD;  Location: [facility];  Service: Urology     Social History     Substance and Sexual Activity   Alcohol Use Yes    Alcohol/week: 0.0 standard drinks of alcohol    Comment: socially     Social History     Substance and Sexual Activity   Drug Use No     Social History     Tobacco Use   Smoking Status Former    Current packs/day: 0.00    Types: Cigarettes    Quit date:     Years since quittin.3   Smokeless Tobacco Never       Family History: History reviewed. No pertinent family history.    Meds/Allergies   all current active meds have been reviewed  No Known Allergies    Objective   Vitals: Blood pressure 128/92, pulse (!) 136, temperature 98.7 °F (37.1 °C), temperature source Oral, resp. rate 18, height 6' 3\" (1.905 m), weight 118 kg (260 lb), SpO2 96%., Body mass index is 32.5 kg/m².,   Orthostatic Blood Pressures      Flowsheet Row Most Recent Value   Blood Pressure 128/92 filed at 2024 1037   Patient Position - Orthostatic VS Lying filed at 2024 1037      "       Systolic (24hrs), Av , Min:120 , Max:146     Diastolic (24hrs), Av, Min:71, Max:119        Intake/Output Summary (Last 24 hours) at 2024 1347  Last data filed at 2024 0907  Gross per 24 hour   Intake 240 ml   Output --   Net 240 ml       Invasive Devices       Peripheral Intravenous Line  Duration             Peripheral IV 24 Left Antecubital 1 day                        Physical Exam:  GEN: Alert and oriented x 3, in no acute distress.  Well appearing and well nourished.   HEENT: Sclera anicteric, conjunctivae pink, mucous membranes moist. Oropharynx clear.   NECK: Supple, no carotid bruits, no significant JVD. Trachea midline, no thyromegaly.   HEART: Irregularly tachycardic, normal S1 and S2, no murmurs, clicks, gallops or rubs. PMI nondisplaced, no thrills.   LUNGS: decreased breath sounds bilaterally; no wheezes, rales, or rhonchi. No increased work of breathing or signs of respiratory distress.   ABDOMEN: Soft, nontender, nondistended, normoactive bowel sounds.   EXTREMITIES: Skin warm and well perfused, no clubbing, cyanosis, or edema.  NEURO: No focal findings. Normal speech. Mood and affect normal.   SKIN: Normal without suspicious lesions on exposed skin.      Lab Results:     Troponins:   Results from last 7 days   Lab Units 24  1258 24  1056   HS TNI 0HR ng/L  --  19   HS TNI 2HR ng/L 19  --        CBC with diff:   Results from last 7 days   Lab Units 24  0441 24  1056   WBC Thousand/uL 5.63 7.04   HEMOGLOBIN g/dL 12.6 14.5   HEMATOCRIT % 37.6 42.3   MCV fL 93 92   PLATELETS Thousands/uL 143* 147*   RBC Million/uL 4.04 4.61   MCH pg 31.2 31.5   MCHC g/dL 33.5 34.3   RDW % 14.6 14.6   MPV fL 11.9 11.3   NRBC AUTO /100 WBCs  --  0         CMP:   Results from last 7 days   Lab Units 24  0441 24  1056   POTASSIUM mmol/L 3.8 3.7   CHLORIDE mmol/L 109* 107   CO2 mmol/L 25 27   BUN mg/dL 17 21   CREATININE mg/dL 1.16 1.30   CALCIUM mg/dL 8.6 9.4    AST U/L  --  11*   ALT U/L  --  13   ALK PHOS U/L  --  58   EGFR ml/min/1.73sq m 59 52

## 2024-04-24 NOTE — PLAN OF CARE
Problem: PAIN - ADULT  Goal: Verbalizes/displays adequate comfort level or baseline comfort level  Description: Interventions:  - Encourage patient to monitor pain and request assistance  - Assess pain using appropriate pain scale  - Administer analgesics based on type and severity of pain and evaluate response  - Implement non-pharmacological measures as appropriate and evaluate response  - Consider cultural and social influences on pain and pain management  - Notify physician/advanced practitioner if interventions unsuccessful or patient reports new pain  Outcome: Progressing     Problem: INFECTION - ADULT  Goal: Absence or prevention of progression during hospitalization  Description: INTERVENTIONS:  - Assess and monitor for signs and symptoms of infection  - Monitor lab/diagnostic results  - Monitor all insertion sites, i.e. indwelling lines, tubes, and drains  - Monitor endotracheal if appropriate and nasal secretions for changes in amount and color  - Sparkman appropriate cooling/warming therapies per order  - Administer medications as ordered  - Instruct and encourage patient and family to use good hand hygiene technique  - Identify and instruct in appropriate isolation precautions for identified infection/condition  Outcome: Progressing  Goal: Absence of fever/infection during neutropenic period  Description: INTERVENTIONS:  - Monitor WBC    Outcome: Progressing     Problem: SAFETY ADULT  Goal: Patient will remain free of falls  Description: INTERVENTIONS:  - Educate patient/family on patient safety including physical limitations  - Instruct patient to call for assistance with activity   - Consult OT/PT to assist with strengthening/mobility   - Keep Call bell within reach  - Keep bed low and locked with side rails adjusted as appropriate  - Keep care items and personal belongings within reach  - Initiate and maintain comfort rounds  - Make Fall Risk Sign visible to staff  - Offer Toileting every 2 Hours,  in advance of need  - Initiate/Maintain bed alarm  - Obtain necessary fall risk management equipment:   - Apply yellow socks and bracelet for high fall risk patients  - Consider moving patient to room near nurses station  Outcome: Progressing  Goal: Maintain or return to baseline ADL function  Description: INTERVENTIONS:  -  Assess patient's ability to carry out ADLs; assess patient's baseline for ADL function and identify physical deficits which impact ability to perform ADLs (bathing, care of mouth/teeth, toileting, grooming, dressing, etc.)  - Assess/evaluate cause of self-care deficits   - Assess range of motion  - Assess patient's mobility; develop plan if impaired  - Assess patient's need for assistive devices and provide as appropriate  - Encourage maximum independence but intervene and supervise when necessary  - Involve family in performance of ADLs  - Assess for home care needs following discharge   - Consider OT consult to assist with ADL evaluation and planning for discharge  - Provide patient education as appropriate  Outcome: Progressing  Goal: Maintains/Returns to pre admission functional level  Description: INTERVENTIONS:  - Perform AM-PAC 6 Click Basic Mobility/ Daily Activity assessment daily.  - Set and communicate daily mobility goal to care team and patient/family/caregiver.   - Collaborate with rehabilitation services on mobility goals if consulted  - Perform Range of Motion 3 times a day.  - Reposition patient every 2 hours.  - Dangle patient 3 times a day  - Stand patient 3 times a day  - Ambulate patient 3 times a day  - Out of bed to chair 3 times a day   - Out of bed for meals 3 times a day  - Out of bed for toileting  - Record patient progress and toleration of activity level   Outcome: Progressing     Problem: DISCHARGE PLANNING  Goal: Discharge to home or other facility with appropriate resources  Description: INTERVENTIONS:  - Identify barriers to discharge w/patient and caregiver  -  Arrange for needed discharge resources and transportation as appropriate  - Identify discharge learning needs (meds, wound care, etc.)  - Arrange for interpretive services to assist at discharge as needed  - Refer to Case Management Department for coordinating discharge planning if the patient needs post-hospital services based on physician/advanced practitioner order or complex needs related to functional status, cognitive ability, or social support system  Outcome: Progressing     Problem: Knowledge Deficit  Goal: Patient/family/caregiver demonstrates understanding of disease process, treatment plan, medications, and discharge instructions  Description: Complete learning assessment and assess knowledge base.  Interventions:  - Provide teaching at level of understanding  - Provide teaching via preferred learning methods  Outcome: Progressing

## 2024-04-24 NOTE — UTILIZATION REVIEW
Initial Clinical Review  Observation on 04/23 @ 1316 upgraded to Inpatient on 04/24 @ 1416. Pt requiring continued stay for ongoing Afib monitoring and tx.    Admission: Date/Time/Statement:   Admission Orders (From admission, onward)       Ordered        04/24/24 1416  INPATIENT ADMISSION  Once            04/23/24 1316  Place in Observation  Once                          Orders Placed This Encounter   Procedures    INPATIENT ADMISSION     Standing Status:   Standing     Number of Occurrences:   1     Order Specific Question:   Level of Care     Answer:   Med Surg [16]     Order Specific Question:   Estimated length of stay     Answer:   More than 2 Midnights     Order Specific Question:   Certification     Answer:   I certify that inpatient services are medically necessary for this patient for a duration of greater than two midnights. See H&P and MD Progress Notes for additional information about the patient's course of treatment.     ED Arrival Information       Expected   -    Arrival   4/23/2024 10:39    Acuity   Emergent              Means of arrival   Walk-In    Escorted by   Self    Service   Hospitalist    Admission type   Emergency              Arrival complaint   heart palpitations             Chief Complaint   Patient presents with    Rapid Heart Rate     Pt has had a rapid heart rate for the past 3 days. Pt came in today because his sister told him he needed to be seen.        Initial Presentation: 78 y.o. male  with a PMH of a flutter, BPH presented to the ED from home w/ shortness of breath, chest pain, lightheadedness, severe fatigue x 3 days. HR in the 150s.  In the ED, , RR20 /109. On exam, tearful, appears depressed, tachycardia. EKG showed 133 heart rate A-flutter. Troponin within normal limits x 2.  given 10 mg of IV Lopressor and spontaneously resolved to normal sinus rhythm.     Admit as observation level of care for atrilal flutter, chest pain.  Plan: Mon on tele. Cont Eliquis.  Obtain EKG.     Date: 04/24   Day 2:   Cardiology Consult: new onset atrial fibrillation/flutter with rapid ventricular response. continue Tikosyn at 250 mg twice a day and monitor on telemetry. Continue Eliquis for anticoagulation  Echocardiogram. Optimize rate control with beta-blockers.  1 dose of IV Lopressor given.      Anticipated Length of Stay/Certification Statement: The patient will continue to require additional inpatient hospital stay due to continued treatment for A-fib with RVR with medical management and cardiology consult       Date: 04/25  Day 3: Has surpassed a 2nd midnight with active treatments and services.  Pt still with elevated heart rates. Follow Up echo. Plan for cardioversion 4/25. Cont Eliquis. Mon on tele. Cont Tikosyn.      ED Triage Vitals   Temperature Pulse Respirations Blood Pressure SpO2   04/23/24 1046 04/23/24 1046 04/23/24 1046 04/23/24 1046 04/23/24 1046   97.8 °F (36.6 °C) (!) 154 20 (!) 156/109 100 %      Temp Source Heart Rate Source Patient Position - Orthostatic VS BP Location FiO2 (%)   04/23/24 1046 04/23/24 1046 04/23/24 1046 04/23/24 1046 --   Oral Monitor Sitting Left arm       Pain Score       04/23/24 1057       No Pain          Wt Readings from Last 1 Encounters:   04/23/24 118 kg (260 lb)     Additional Vital Signs:   Date/Time Temp Pulse Resp BP MAP (mmHg) SpO2 O2 Device Patient Position - Orthostatic VS   04/24/24 14:54:40 97.4 °F (36.3 °C) Abnormal  135 Abnormal  18 122/88 99 94 % None (Room air) Lying   04/24/24 1037 98.7 °F (37.1 °C) 136 Abnormal  18 128/92 104 96 % None (Room air) Lying   04/24/24 07:23:22 98.4 °F (36.9 °C) 66 18 121/83 96 96 % None (Room air) Lying   04/24/24 05:54:40 -- 140 Abnormal  -- 146/119 Abnormal  128 95 % -- --   04/24/24 05:03:03 -- -- -- 120/88 99 -- -- --   04/24/24 05:02:29 -- 52 Abnormal  -- 120/88 99 95 % -- --   04/24/24 0500 -- 124 Abnormal  -- 120/88 99 96 % -- --   04/24/24 3235 -- 121 Abnormal  -- 120/88 99 96 % None  (Room air) --   04/24/24 0430 -- 129 Abnormal  -- 133/92 106 97 % -- --   04/24/24 04:23:25 -- 128 Abnormal  -- 133/92 106 96 % -- --   04/24/24 0415 -- 131 Abnormal  -- 135/94 108 97 % -- --   04/24/24 04:01:09 97.9 °F (36.6 °C) 109 Abnormal  26 Abnormal  131/92 105 97 % -- --   04/24/24 0400 97.9 °F (36.6 °C) 139 Abnormal  -- 131/92 105 97 % -- --   04/24/24 02:32:03 -- 136 Abnormal  18 141/91 108 98 % -- --   04/23/24 21:51:45 98.4 °F (36.9 °C) 60 18 132/91 105 96 % -- --   04/23/24 19:19:58 97.8 °F (36.6 °C) 66 18 129/85 100 97 % -- --   04/23/24 15:05:37 98 °F (36.7 °C) 63 18 128/84 99 98 % -- --   04/23/24 13:52:36 98.6 °F (37 °C) 63 19 125/71 87 99 % None (Room air) Sitting   04/23/24 1328 -- 66 22 133/81 100 100 % None (Room air) Lying   04/23/24 1215 -- 69 22 123/74 95 99 % None (Room air) Lying   04/23/24 1200 -- 129 Abnormal  18 114/70 87 98 % None (Room air) Lying   04/23/24 1145 -- 66 16 116/73 90 99 % None (Room air) Lying   04/23/24 1141 -- 61 18 -- -- -- -- --   04/23/24 1130 -- 129 Abnormal  25 Abnormal  126/85 101 99 % None (Room air) Lying   04/23/24 1115 -- 133 Abnormal  22 134/95 112 98 % None (Room air) Lying   04/23/24 1100 -- 150 Abnormal  22 141/95 112 100 % None (Room air) Lying   04/23/24 1053 -- -- -- -- -- -- None (Room air) --       Pertinent Labs/Diagnostic Test Results:   CTA ED chest PE study   Final Result by Lainey Quan MD (04/23 1300)      No pulmonary embolus.      No acute pulmonary disease.            Workstation performed: [id number]         XR chest 1 view portable   ED Interpretation by Tricia Jaquez DO (04/23 1311)   No acute abnormality in the chest.      Final Result by Jose Ham MD (04/23 1505)      No acute cardiopulmonary disease.            Workstation performed: [id number]           04/25   EKG result: Atrial flutter  Right bundle branch block  Left anterior fascicular block   Bifascicular block   Minimal voltage criteria for LVH, may be normal  variant    ECHO:    Left Ventricle: Left ventricular cavity size is normal. Wall thickness is moderately increased. There is moderate concentric hypertrophy. There is moderate asymmetric hypertrophy of the basal septal wall. The left ventricular ejection fraction is 50%, lower in other views. Systolic function is low normal. Wall motion is normal.  Possible mild septal and lateral wall hypokinesis. Diastolic function is moderately abnormal, consistent with grade II (pseudonormal) relaxation.    Possible plaque in the left subclavian, nondiagnostic.    Technically difficult study.    Compared to previous echocardiogram 11/20/2023 the EF then was 55%.    04/23   EKG result:Atrial flutter  Right bundle branch block  Left anterior fascicular block   Bifascicular block   Voltage criteria for left ventricular hypertrophy    EKG 2: Atrial fibrillation with rate increase  Left axis deviation  Right bundle branch block  Moderate voltage criteria for LVH, may be normal variant  Nonspecific ST and T wave abnormality    04/24   EKG result:  Atrial fibrillation with rate increase  Left axis deviation  Right bundle branch block  Nonspecific ST and T wave abnormality     EKG 2:  Atrial fibrillation with rate increase  Right bundle branch block  Left anterior fascicular block   Bifascicular block   Minimal voltage criteria for LVH, may be normal variant  Nonspecific ST and T wave abnormality        Results from last 7 days   Lab Units 04/24/24  0441 04/23/24  1056   WBC Thousand/uL 5.63 7.04   HEMOGLOBIN g/dL 12.6 14.5   HEMATOCRIT % 37.6 42.3   PLATELETS Thousands/uL 143* 147*   TOTAL NEUT ABS Thousands/µL  --  4.91         Results from last 7 days   Lab Units 04/24/24  0441 04/23/24  1056   SODIUM mmol/L 140 142   POTASSIUM mmol/L 3.8 3.7   CHLORIDE mmol/L 109* 107   CO2 mmol/L 25 27   ANION GAP mmol/L 6 8   BUN mg/dL 17 21   CREATININE mg/dL 1.16 1.30   EGFR ml/min/1.73sq m 59 52   CALCIUM mg/dL 8.6 9.4   MAGNESIUM mg/dL 2.0 2.1  "  PHOSPHORUS mg/dL 3.4  --      Results from last 7 days   Lab Units 04/23/24  1056   AST U/L 11*   ALT U/L 13   ALK PHOS U/L 58   TOTAL PROTEIN g/dL 6.6   ALBUMIN g/dL 4.0   TOTAL BILIRUBIN mg/dL 0.57         Results from last 7 days   Lab Units 04/24/24  0441 04/23/24  1056   GLUCOSE RANDOM mg/dL 103 151*             No results found for: \"BETA-HYDROXYBUTYRATE\"                   Results from last 7 days   Lab Units 04/23/24  1258 04/23/24  1056   HS TNI 0HR ng/L  --  19   HS TNI 2HR ng/L 19  --    HSTNI D2 ng/L 0  --          Results from last 7 days   Lab Units 04/23/24  1056   PROTIME seconds 14.2   INR  1.04   PTT seconds 35     Results from last 7 days   Lab Units 04/23/24  1056   TSH 3RD GENERATON uIU/mL 2.819                     Results from last 7 days   Lab Units 04/23/24  1056   BNP pg/mL 64                     Results from last 7 days   Lab Units 04/23/24  1056   LIPASE u/L 24                                 Results from last 7 days   Lab Units 04/23/24  1236   ETHANOL LVL mg/dL <10                                   ED Treatment:   Medication Administration from 04/23/2024 1038 to 04/23/2024 1342         Date/Time Order Dose Route Action     04/23/2024 1201 EDT sodium chloride 0.9 % bolus 500 mL 0 mL Intravenous Stopped     04/23/2024 1058 EDT sodium chloride 0.9 % bolus 500 mL 500 mL Intravenous New Bag     04/23/2024 1059 EDT metoprolol (LOPRESSOR) injection 5 mg 5 mg Intravenous Given     04/23/2024 1058 EDT aspirin chewable tablet 324 mg 324 mg Oral Given     04/23/2024 1114 EDT metoprolol (LOPRESSOR) injection 5 mg 5 mg Intravenous Given     04/23/2024 1213 EDT iohexol (OMNIPAQUE) 350 MG/ML injection (MULTI-DOSE) 85 mL 85 mL Intravenous Given          Past Medical History:   Diagnosis Date    Cardiomyopathy (HCC)     Hyperlipidemia     Post-nasal drip     Pulmonary embolism (HCC)      Present on Admission:   BPH (benign prostatic hyperplasia)   Chest pain   Chronic kidney disease (CKD), stage II " (mild)      Admitting Diagnosis: Palpitations [R00.2]  Chest pain [R07.9]  Dyspnea [R06.00]  Heart palpitations [R00.2]  Atrial fibrillation with rapid ventricular response (HCC) [I48.91]  Age/Sex: 78 y.o. male  Admission Orders:  SCD  Tele  EKG for QTc monitoring      Scheduled Medications:  apixaban, 5 mg, Oral, BID  atorvastatin, 40 mg, Oral, Daily With Dinner  dofetilide, 250 mcg, Oral, Q12H FRANCIA  finasteride, 5 mg, Oral, Daily  metoprolol succinate, 25 mg, Oral, Daily  tamsulosin, 0.4 mg, Oral, Daily With Dinner      Continuous IV Infusions: none     PRN Meds:  acetaminophen, 650 mg, Oral, Q6H PRN        IP CONSULT TO CARDIOLOGY    Network Utilization Review Department  ATTENTION: Please call with any questions or concerns to [phone] and carefully listen to the prompts so that you are directed to the right person. All voicemails are confidential.   For Discharge needs, contact Care Management DC Support Team at [phone] opt. 2  Send all requests for admission clinical reviews, approved or denied determinations and any other requests to dedicated fax number below belonging to the campus where the patient is receiving treatment. List of dedicated fax numbers for the Facilities:  FACILITY NAME UR FAX NUMBER   ADMISSION DENIALS (Administrative/Medical Necessity) [phone]   DISCHARGE SUPPORT TEAM (NETWORK) [phone]   PARENT CHILD HEALTH (Maternity/NICU/Pediatrics) [phone]   [facility] [phone]   [facility] [phone]   [facility] [phone]   [facility] [phone]   Critical access hospital [phone]   [facility] [phone]   [facility] [phone]   [facility] [phone]   [facility] [phone]   [facility]  [phone]   [facility] [phone]   [facility] [phone]

## 2024-04-24 NOTE — QUICK NOTE
Contacted by bedside RN about heart rate in the 140s to 160s on telemetry monitoring after patient went to the bathroom, the patient reports some shortness of breath, he is in no acute distress.  At bedside on assessment, the patient is alert and oriented x 4, able to follow commands, verbalize needs, speaks in long sentences, asymptomatic.  EKG shows sinus tachycardia with PVCs heart rate 144.  A one-time dose of metoprolol 5 mg IV was administrated bringing heart rate to the 130s with some fluctuations, patient remains asymptomatic.  This provider watched  telemetry monitor for around 3 minutes and noted  heart rate fluctuates from the low 100s to the 140s, noted patient in and out of atrial fibrillation.  A one-time order of Cardizem 15 mg IV was ordered.  BMP and CMP has been ordered, added on mag and Phos.  Noted in patient history he follows with cardiology due to paroxysmal atrial fibrillation on chronic anticoagulation.  Continue with telemetry and follow-up with cardiology as outpatient.

## 2024-04-25 ENCOUNTER — APPOINTMENT (INPATIENT)
Dept: NON INVASIVE DIAGNOSTICS | Facility: HOSPITAL | Age: 79
DRG: 308 | End: 2024-04-25
Payer: COMMERCIAL

## 2024-04-25 LAB
ANION GAP SERPL CALCULATED.3IONS-SCNC: 4 MMOL/L (ref 4–13)
AORTIC ROOT: 3.5 CM
APICAL FOUR CHAMBER EJECTION FRACTION: 46 %
ASCENDING AORTA: 3.6 CM
ATRIAL RATE: 127 BPM
ATRIAL RATE: 127 BPM
BSA FOR ECHO PROCEDURE: 2.45 M2
BUN SERPL-MCNC: 14 MG/DL (ref 5–25)
CALCIUM SERPL-MCNC: 8.6 MG/DL (ref 8.4–10.2)
CHLORIDE SERPL-SCNC: 110 MMOL/L (ref 96–108)
CO2 SERPL-SCNC: 26 MMOL/L (ref 21–32)
CREAT SERPL-MCNC: 1.13 MG/DL (ref 0.6–1.3)
E WAVE DECELERATION TIME: 83 MS
ERYTHROCYTE [DISTWIDTH] IN BLOOD BY AUTOMATED COUNT: 14.4 % (ref 11.6–15.1)
FRACTIONAL SHORTENING: 29 (ref 28–44)
GFR SERPL CREATININE-BSD FRML MDRD: 61 ML/MIN/1.73SQ M
GLUCOSE SERPL-MCNC: 105 MG/DL (ref 65–140)
HCT VFR BLD AUTO: 37.2 % (ref 36.5–49.3)
HGB BLD-MCNC: 12.5 G/DL (ref 12–17)
INTERVENTRICULAR SEPTUM IN DIASTOLE (PARASTERNAL SHORT AXIS VIEW): 1.4 CM
INTERVENTRICULAR SEPTUM: 1.4 CM (ref 0.6–1.1)
LA/AORTA RATIO 2D: 1.46
LAAS-AP2: 23 CM2
LAAS-AP4: 17.2 CM2
LEFT ATRIUM SIZE: 5.1 CM
LEFT ATRIUM VOLUME (MOD BIPLANE): 58 ML
LEFT ATRIUM VOLUME INDEX (MOD BIPLANE): 23.7 ML/M2
LEFT INTERNAL DIMENSION IN SYSTOLE: 3.2 CM (ref 2.1–4)
LEFT VENTRICULAR INTERNAL DIMENSION IN DIASTOLE: 4.5 CM (ref 3.5–6)
LEFT VENTRICULAR POSTERIOR WALL IN END DIASTOLE: 1.3 CM
LEFT VENTRICULAR STROKE VOLUME: 49 ML
LVSV (TEICH): 49 ML
MAGNESIUM SERPL-MCNC: 2 MG/DL (ref 1.9–2.7)
MCH RBC QN AUTO: 31.1 PG (ref 26.8–34.3)
MCHC RBC AUTO-ENTMCNC: 33.6 G/DL (ref 31.4–37.4)
MCV RBC AUTO: 93 FL (ref 82–98)
MV PEAK E VEL: 90 CM/S
MV STENOSIS PRESSURE HALF TIME: 24 MS
MV VALVE AREA P 1/2 METHOD: 9.17
PLATELET # BLD AUTO: 138 THOUSANDS/UL (ref 149–390)
PMV BLD AUTO: 10.9 FL (ref 8.9–12.7)
POTASSIUM SERPL-SCNC: 3.8 MMOL/L (ref 3.5–5.3)
PR INTERVAL: 112 MS
QRS AXIS: -47 DEGREES
QRS AXIS: -48 DEGREES
QRSD INTERVAL: 142 MS
QRSD INTERVAL: 142 MS
QT INTERVAL: 366 MS
QT INTERVAL: 370 MS
QTC INTERVAL: 531 MS
QTC INTERVAL: 537 MS
RBC # BLD AUTO: 4.02 MILLION/UL (ref 3.88–5.62)
RIGHT VENTRICLE ID DIMENSION: 3 CM
SL CV LV EF: 50
SL CV PED ECHO LEFT VENTRICLE DIASTOLIC VOLUME (MOD BIPLANE) 2D: 90 ML
SL CV PED ECHO LEFT VENTRICLE SYSTOLIC VOLUME (MOD BIPLANE) 2D: 41 ML
SODIUM SERPL-SCNC: 140 MMOL/L (ref 135–147)
T WAVE AXIS: -26 DEGREES
T WAVE AXIS: -9 DEGREES
TR MAX PG: 12 MMHG
TR PEAK VELOCITY: 1.8 M/S
TRICUSPID ANNULAR PLANE SYSTOLIC EXCURSION: 2.1 CM
TRICUSPID VALVE PEAK REGURGITATION VELOCITY: 1.75 M/S
VENTRICULAR RATE: 127 BPM
VENTRICULAR RATE: 127 BPM
WBC # BLD AUTO: 4.93 THOUSAND/UL (ref 4.31–10.16)

## 2024-04-25 PROCEDURE — 93005 ELECTROCARDIOGRAM TRACING: CPT

## 2024-04-25 PROCEDURE — 83735 ASSAY OF MAGNESIUM: CPT

## 2024-04-25 PROCEDURE — 99232 SBSQ HOSP IP/OBS MODERATE 35: CPT | Performed by: NURSE PRACTITIONER

## 2024-04-25 PROCEDURE — 93306 TTE W/DOPPLER COMPLETE: CPT

## 2024-04-25 PROCEDURE — 80048 BASIC METABOLIC PNL TOTAL CA: CPT

## 2024-04-25 PROCEDURE — 85027 COMPLETE CBC AUTOMATED: CPT

## 2024-04-25 PROCEDURE — 93010 ELECTROCARDIOGRAM REPORT: CPT | Performed by: INTERNAL MEDICINE

## 2024-04-25 PROCEDURE — 99232 SBSQ HOSP IP/OBS MODERATE 35: CPT | Performed by: INTERNAL MEDICINE

## 2024-04-25 PROCEDURE — 93306 TTE W/DOPPLER COMPLETE: CPT | Performed by: INTERNAL MEDICINE

## 2024-04-25 RX ORDER — METOPROLOL SUCCINATE 50 MG/1
50 TABLET, EXTENDED RELEASE ORAL DAILY
Status: DISCONTINUED | OUTPATIENT
Start: 2024-04-26 | End: 2024-04-27 | Stop reason: HOSPADM

## 2024-04-25 RX ORDER — METOPROLOL SUCCINATE 25 MG/1
25 TABLET, EXTENDED RELEASE ORAL ONCE
Status: COMPLETED | OUTPATIENT
Start: 2024-04-25 | End: 2024-04-25

## 2024-04-25 RX ADMIN — METOPROLOL SUCCINATE 25 MG: 25 TABLET, EXTENDED RELEASE ORAL at 08:52

## 2024-04-25 RX ADMIN — ATORVASTATIN CALCIUM 40 MG: 40 TABLET, FILM COATED ORAL at 17:30

## 2024-04-25 RX ADMIN — APIXABAN 5 MG: 5 TABLET, FILM COATED ORAL at 08:52

## 2024-04-25 RX ADMIN — DOFETILIDE 250 MCG: 0.25 CAPSULE ORAL at 20:52

## 2024-04-25 RX ADMIN — APIXABAN 5 MG: 5 TABLET, FILM COATED ORAL at 17:43

## 2024-04-25 RX ADMIN — FINASTERIDE 5 MG: 5 TABLET, FILM COATED ORAL at 08:52

## 2024-04-25 RX ADMIN — METOPROLOL SUCCINATE 25 MG: 25 TABLET, EXTENDED RELEASE ORAL at 13:43

## 2024-04-25 RX ADMIN — TAMSULOSIN HYDROCHLORIDE 0.4 MG: 0.4 CAPSULE ORAL at 17:30

## 2024-04-25 RX ADMIN — DOFETILIDE 250 MCG: 0.25 CAPSULE ORAL at 08:52

## 2024-04-25 NOTE — CASE MANAGEMENT
Case Management Assessment & Discharge Planning Note    Patient name Nas Cantor Jr.  Location /-01 MRN [id number]  : 1945 Date 2024       Current Admission Date: 2024  Current Admission Diagnosis:Atrial flutter (HCC)   Patient Active Problem List    Diagnosis Date Noted    Atrial flutter (HCC) 2024    Stage 3a chronic kidney disease (HCC) 2023    Erectile dysfunction due to arterial insufficiency 2023    PAF (paroxysmal atrial fibrillation) (HCC) 2023    BPH with obstruction/lower urinary tract symptoms 2022    COVID-19 2021    Chest pain 2021    Acute kidney injury superimposed on CKD  (HCC) 2021    Lumbar disc disease with radiculopathy - Left 2020    Spinal stenosis of lumbar region without neurogenic claudication - Left 2020    BPH (benign prostatic hyperplasia) 2020    Nephrolithiasis 2020    Dyslipidemia 2019    Cardiomyopathy, nonischemic (HCC) 2018    ARLENE (obstructive sleep apnea)     SVT (supraventricular tachycardia) 2018    Chronic anticoagulation 2018    Chronic low back pain 2017    Bilateral pulmonary embolism (HCC) 2017    SOB (shortness of breath) 2017    Hyperlipidemia 2017    Lung nodule 2017    Obesity 2017    History of sickle cell trait 2017    Leg edema, left 2017    History of pulmonary embolism 2017    Thrombocytopenia (HCC) 2017    Chronic kidney disease (CKD), stage II (mild) 2017    Tachycardia 2017      LOS (days): 1  Geometric Mean LOS (GMLOS) (days): 1.8  Days to GMLOS:0.7     OBJECTIVE:    Risk of Unplanned Readmission Score: 11.29      Current admission status: Inpatient     Preferred Pharmacy:   [id number] - [place] - [address]  [address]  [place] [address]  Phone: [phone] Fax: [phone]    [facility] - [place], [place] - [address]  [address]  [place] [address]  Phone: [phone] Fax: [phone]    Primary Care Provider: Richard Lipscomb MD  Primary Insurance: [employer] RE[employer]  Secondary Insurance:     ASSESSMENT:  Active Health Care Proxies       Christy Cantor Health Care Representative - Spouse   Primary Phone: [phone] (Home)                 Advance Directives  Does patient have a Health Care POA?: No  Does patient currently have a Health Care decision maker?: Yes, please see Health Care Proxy section  Does patient have Advance Directives?: No  Primary Contact: Christy Cantor (Spouse) [phone]    Readmission Root Cause  30 Day Readmission: No    Patient Information  Admitted from:: Home  Mental Status: Alert  During Assessment patient was accompanied by: Not accompanied during assessment  Assessment information provided by:: Patient  Primary Caregiver: Self  Support Systems: Self, Spouse/significant other  County of Residence: [place]  What city do you live in?: [place]  Home entry access options. Select all that apply.: No steps to enter home  Type of Current Residence: [facility]  Living Arrangements: Lives w/ Spouse/significant other  Is patient a ?: Yes  Is patient active with [facility]: Yes  Is patient service connected?: Yes ()    Activities of Daily Living Prior to Admission  Functional Status: Independent  Completes ADLs independently?: Yes  Ambulates independently?: Yes  Does patient use assisted devices?: No  Does patient currently own DME?: No  Does patient have a history of Outpatient Therapy (PT/OT)?: Yes  Does the patient have a history of Short-Term Rehab?: No  Does patient have a history of HHC?: No  Does patient currently have HHC?: No    Patient Information Continued  Income Source:  (retired)  Does patient have prescription coverage?: Yes  Does patient receive dialysis treatments?: No  Does patient have a history of substance abuse?: No  Does patient have a history of Mental  Health Diagnosis?: No    PHQ 2/9 Screening   Reviewed PHQ 2/9 Depression Screening Score?: No    Means of Transportation  Means of Transport to Appts:: Drives Self      Social Determinants of Health (SDOH)      Flowsheet Row Most Recent Value   Housing Stability    In the last 12 months, was there a time when you were not able to pay the mortgage or rent on time? N   In the last 12 months, how many places have you lived? 1   In the last 12 months, was there a time when you did not have a steady place to sleep or slept in a shelter (including now)? N   Transportation Needs    In the past 12 months, has lack of transportation kept you from medical appointments or from getting medications? no   In the past 12 months, has lack of transportation kept you from meetings, work, or from getting things needed for daily living? No   Food Insecurity    Within the past 12 months, you worried that your food would run out before you got the money to buy more. Never true   Within the past 12 months, the food you bought just didn't last and you didn't have money to get more. Never true   Utilities    In the past 12 months has the electric, gas, oil, or water company threatened to shut off services in your home? No       DISCHARGE DETAILS:    Discharge planning discussed with:: Patient at bedside.  Freedom of Choice: Yes  Comments - Freedom of Choice: FOC maintained - CM introduced self and role.  Patient with no identified CM needs, patient confirms same.  Aware that CM will continue to follow through to d/c.  CM contacted family/caregiver?: No- see comments (Independent)  Were Treatment Team discharge recommendations reviewed with patient/caregiver?: Yes (As it pertains to d/c planning and CM role to this point.)  Did patient/caregiver verbalize understanding of patient care needs?: Yes (As it pertains to d/c planning and CM role to this point.)  Were patient/caregiver advised of the risks associated with not following Treatment  Team discharge recommendations?: Yes (As it pertains to d/c planning and CM role to this point.)    Requested Home Health Care         Is the patient interested in HHC at discharge?: No    DME Referral Provided  Referral made for DME?: No    Other Referral/Resources/Interventions Provided:  Interventions: None Indicated    Treatment Team Recommendation: Home  Discharge Destination Plan:: Home  Transport at Discharge : Family

## 2024-04-25 NOTE — PROGRESS NOTES
[facility]  Progress Note  Name: Nas Estrada I  MRN: [id number]  Unit/Bed#: [facility] I Date of Admission: 4/23/2024   Date of Service: 4/25/2024 I Hospital Day: 1    Assessment/Plan   * Atrial flutter (HCC)  Assessment & Plan  Patient presented to the ED after having 3 days of chest pain, shortness of breath, lightheadedness, and severe fatigue.  His heart rate in the 150s.  While in the ED he was given 10 mg of IV Lopressor and spontaneously resolved to normal sinus rhythm.      While in ED  heart rate A-flutter  Patient initially reverted to normal sinus rhythm after IV Lopressor given in ED, patient converted back to symptomatic A-fib with rvr rate of 120s to 130s  Cardiology consulted  restarted Tikosyn, continue to order IV Lopressor, will need continued monitoring  Still with elevated heart rates  Follow Up echo  Plan for cardioversion 4/25  Continue Eliquis  TSH 2.819  CTA chest Negative  Continue telemetry    Chest pain  Assessment & Plan  Resolved after IV metoprolol administered  EKG showed a flutter heart rates 130s  Troponin within normal limits x 2  Telemetry  resolved    BPH (benign prostatic hyperplasia)  Assessment & Plan  Continue with tamsulosin and Proscar    Chronic kidney disease (CKD), stage II (mild)  Assessment & Plan  Lab Results   Component Value Date    EGFR 61 04/25/2024    EGFR 59 04/24/2024    EGFR 52 04/23/2024    CREATININE 1.13 04/25/2024    CREATININE 1.16 04/24/2024    CREATININE 1.30 04/23/2024     Creatinine baseline-  appears euvolemic  No longer takes diuretics           VTE Pharmacologic Prophylaxis: VTE Score: 4 Moderate Risk (Score 3-4) - Pharmacological DVT Prophylaxis Ordered: apixaban (Eliquis).    Mobility:   Basic Mobility Inpatient Raw Score: 24  JH-HLM Goal: 8: Walk 250 feet or more  JH-HLM Achieved: 8: Walk 250 feet ot more  JH-HLM Goal achieved. Continue to encourage appropriate mobility.    Patient Centered Rounds: I performed  bedside rounds with nursing staff today. Christiana PADILLA  Discussions with Specialists or Other Care Team Provider: cardiology notes    Education and Discussions with Family / Patient: Updated  (wife) via phone.Christy on the phone    Total Time Spent on Date of Encounter in care of patient: 35 mins. This time was spent on one or more of the following: performing physical exam; counseling and coordination of care; obtaining or reviewing history; documenting in the medical record; reviewing/ordering tests, medications or procedures; communicating with other healthcare professionals and discussing with patient's family/caregivers.    Current Length of Stay: 1 day(s)  Current Patient Status: Inpatient   Certification Statement: The patient will continue to require additional inpatient hospital stay due to uncontrolled heart rate A-fib and needing cardioversion  Discharge Plan: Anticipate discharge in 48 hrs to home.    Code Status: Level 1 - Full Code    Subjective:   Denies any chest pain chest tightness shortness of breath difficulty breathing denies any palpitations understands that he is going to go for cardioversion offers no additional questions or concerns answered all of his current questions now    Objective:     Vitals:   Temp (24hrs), Av °F (36.7 °C), Min:97.4 °F (36.3 °C), Max:98.5 °F (36.9 °C)    Temp:  [97.4 °F (36.3 °C)-98.5 °F (36.9 °C)] 98.2 °F (36.8 °C)  HR:  [] 133  Resp:  [16-21] 21  BP: (117-140)/(53-96) 128/53  SpO2:  [94 %-100 %] 97 %  Body mass index is 32.5 kg/m².     Input and Output Summary (last 24 hours):     Intake/Output Summary (Last 24 hours) at 2024 1127  Last data filed at 2024 0801  Gross per 24 hour   Intake 960 ml   Output 2100 ml   Net -1140 ml       Physical Exam:   Physical Exam  Vitals and nursing note reviewed.   Constitutional:       General: He is not in acute distress.     Appearance: He is obese. He is not ill-appearing.   Cardiovascular:       Rate and Rhythm: Tachycardia present. Rhythm irregular.   Pulmonary:      [place]: No respiratory distress.   Abdominal:      Palpations: Abdomen is soft.   Skin:     General: Skin is warm.   Neurological:      Mental Status: He is alert. Mental status is at baseline.   Psychiatric:         Mood and Affect: Mood normal.        Additional Data:     Labs:  Results from last 7 days   Lab Units 24  0558 24  1056   WBC Thousand/uL 4.93   < > 7.04   HEMOGLOBIN g/dL 12.5   < > 14.5   HEMATOCRIT % 37.2   < > 42.3   PLATELETS Thousands/uL 138*   < > 147*   SEGS PCT %  --   --  70   LYMPHO PCT %  --   --  21   MONO PCT %  --   --  8   EOS PCT %  --   --  1    < > = values in this interval not displayed.     Results from last 7 days   Lab Units 24  0558 24  1056   SODIUM mmol/L 140   < > 142   POTASSIUM mmol/L 3.8   < > 3.7   CHLORIDE mmol/L 110*   < > 107   CO2 mmol/L 26   < > 27   BUN mg/dL 14   < > 21   CREATININE mg/dL 1.13   < > 1.30   ANION GAP mmol/L 4   < > 8   CALCIUM mg/dL 8.6   < > 9.4   ALBUMIN g/dL  --   --  4.0   TOTAL BILIRUBIN mg/dL  --   --  0.57   ALK PHOS U/L  --   --  58   ALT U/L  --   --  13   AST U/L  --   --  11*   GLUCOSE RANDOM mg/dL 105   < > 151*    < > = values in this interval not displayed.     Results from last 7 days   Lab Units 24  1056   INR  1.04                   Lines/Drains:  Invasive Devices       Peripheral Intravenous Line  Duration             Peripheral IV 24 Left Antecubital 2 days                      Telemetry:  Telemetry Orders (From admission, onward)               24 Hour Telemetry Monitoring  Continuous x 24 Hours (Telem)           Question:  Reason for 24 Hour Telemetry  Answer:  Arrhythmias requiring acute medical intervention / PPM or ICD malfunction                     Recent Cultures (last 7 days):         Last 24 Hours Medication List:   Current Facility-Administered Medications   Medication Dose  Route Frequency Provider Last Rate    acetaminophen  650 mg Oral Q6H PRN Wilbert Gonzalez PA-C      apixaban  5 mg Oral BID Wilbert Lázaro Gonzalez PA-C      atorvastatin  40 mg Oral Daily With Dinner Wilbert Gonzalez PA-C      dofetilide  250 mcg Oral Q12H FRANCIA Dianne Howard PA-C      finasteride  5 mg Oral Daily Wilbert Lázaro Gonzalez PA-C      metoprolol succinate  25 mg Oral Daily Dianne Howard PA-C      tamsulosin  0.4 mg Oral Daily With Dinner Wilbert Gonzalez PA-C          Today, Patient Was Seen By: COLLIN Sheikh    **Please Note: This note may have been constructed using a voice recognition system.**

## 2024-04-25 NOTE — PROGRESS NOTES
General Cardiology   Progress Note   Nas Cantor Jr. 78 y.o. male MRN: [id number]  Unit/Bed#: -01 Encounter: [id number]      SUBJECTIVE:   No significant events overnight.  Patient may have had a brief episode of sinus rhythm last night.  But predominantly in atrial fibrillation/flutter with heart rates in the 120-130 range.  Patient restarted on Tikosyn.  Patient denies any chest pain shortness of breath or palpitations.    REVIEW OF SYSTEMS:  Constitutional:  Denies fever or chills   Eyes:  Denies change in visual acuity   HENT:  Denies nasal congestion or sore throat   Respiratory:  Denies cough or shortness of breath   Cardiovascular:  Denies chest pain or edema   GI:  Denies abdominal pain, nausea, vomiting, bloody stools or diarrhea   :  Denies dysuria, frequency, difficulty in micturition and nocturia  Musculoskeletal:  Denies back pain or joint pain   Neurologic:  Denies headache, focal weakness or sensory changes   Endocrine:  Denies polyuria or polydipsia   Lymphatic:  Denies swollen glands   Psychiatric:  Denies depression or anxiety     OBJECTIVE:   Vitals:  Vitals:    24 1150   BP: 135/86   Pulse: (!) 128   Resp:    Temp:    SpO2: 94%     Body mass index is 32.5 kg/m².  Systolic (24hrs), Av , Min:117 , Max:140     Diastolic (24hrs), Av, Min:53, Max:96      Intake/Output Summary (Last 24 hours) at 2024 1324  Last data filed at 2024 0801  Gross per 24 hour   Intake 960 ml   Output 2100 ml   Net -1140 ml     Weight (last 2 days)       Date/Time Weight    24 13:52:36 118 (260)            Telemetry Review: A-fib        PHYSICAL EXAMS:  General:  Patient is not in acute distress, laying in the bed comfortably, awake, alert responding to commands.  Head: Normocephalic, Atraumatic.  HEENT:  Both pupils normal-size atraumatic, normocephalic, nonicteric  Neck:  JVP not raised. Trachea central  Respiratory:  Bronchovascular breathing all over the chest without any  "accompaniment  Cardiovascular: Irregularly irregular  GI:  Abdomen soft nontender. Liver and spleen normal size  Lymphatic:  No cervical or inguinal lymphadenopathy  Neurologic:  Patient is awake alert, responding to command, well-oriented to time and place and person moving     LABORATORY RESULTS:        CBC with diff:   Results from last 7 days   Lab Units 04/25/24  0558 04/24/24 0441 04/23/24  1056   WBC Thousand/uL 4.93 5.63 7.04   HEMOGLOBIN g/dL 12.5 12.6 14.5   HEMATOCRIT % 37.2 37.6 42.3   MCV fL 93 93 92   PLATELETS Thousands/uL 138* 143* 147*   RBC Million/uL 4.02 4.04 4.61   MCH pg 31.1 31.2 31.5   MCHC g/dL 33.6 33.5 34.3   RDW % 14.4 14.6 14.6   MPV fL 10.9 11.9 11.3   NRBC AUTO /100 WBCs  --   --  0       CMP:  Results from last 7 days   Lab Units 04/25/24 0558 04/24/24 0441 04/23/24  1056   POTASSIUM mmol/L 3.8 3.8 3.7   CHLORIDE mmol/L 110* 109* 107   CO2 mmol/L 26 25 27   BUN mg/dL 14 17 21   CREATININE mg/dL 1.13 1.16 1.30   CALCIUM mg/dL 8.6 8.6 9.4   AST U/L  --   --  11*   ALT U/L  --   --  13   ALK PHOS U/L  --   --  58   EGFR ml/min/1.73sq m 61 59 52       BMP:  Results from last 7 days   Lab Units 04/25/24 0558 04/24/24 0441 04/23/24  1056   POTASSIUM mmol/L 3.8 3.8 3.7   CHLORIDE mmol/L 110* 109* 107   CO2 mmol/L 26 25 27   BUN mg/dL 14 17 21   CREATININE mg/dL 1.13 1.16 1.30   CALCIUM mg/dL 8.6 8.6 9.4            Results from last 7 days   Lab Units 04/25/24  0558 04/24/24 0441 04/23/24  1056   MAGNESIUM mg/dL 2.0 2.0 2.1         Results from last 7 days   Lab Units 04/23/24  1056   TSH 3RD GENERATON uIU/mL 2.819     Results from last 7 days   Lab Units 04/23/24  1056   INR  1.04       Lipid Profile:   No results found for: \"CHOL\"  Lab Results   Component Value Date    HDL 45 05/18/2022    HDL 46 03/31/2021    HDL 45 08/22/2017     Lab Results   Component Value Date    LDLCALC 81 05/18/2022    LDLCALC 126 (H) 03/31/2021    LDLCALC 129 (H) 08/22/2017     Lab Results   Component Value " Date    TRIG 98 2022    TRIG 113 2021    TRIG 143 2021       Cardiac testing:  Results for orders placed during the hospital encounter of 10/13/20    Echo complete with contrast if indicated    [facility]  [address]  [place] [address]  [phone]    Transthoracic Echocardiogram  2D, M-mode, Doppler, and Color Doppler    Study date:  13-Oct-2020    Patient: PARKER PONCE  MR number: [id number]  Account number: [id number]  : 1945  Age: 74 years  Gender: Male  Status: Outpatient  Location: [facility]  Height: 75 in  Weight: 254.5 lb  BP: 124/ 72 mmHg    Indications: Non-ischemic cardiomyopathy.    Diagnoses: I42.9 - Cardiomyopathy, unspecified    Sonographer:  HILL Claudio  Primary Physician:  Richard Lipscomb MD  Referring Physician:  Lian Botello MD  Group:  [facility]  Interpreting Physician:  Lorenzo Khanna MD    SUMMARY    LEFT VENTRICLE:  Systolic function was at the lower limits of normal. Ejection fraction was estimated in the range of 50 % to 55 %.  There were no regional wall motion abnormalities.  Doppler parameters were consistent with abnormal left ventricular relaxation (grade 1 diastolic dysfunction).    RIGHT VENTRICLE:  The size was normal.  Systolic function was normal.    LEFT ATRIUM:  The atrium was mildly dilated.    RIGHT ATRIUM:  The atrium was mildly dilated.    MITRAL VALVE:  There was trace to mild regurgitation.    TRICUSPID VALVE:  There was trace regurgitation.  Estimated peak PA pressure was 38 mmHg.    HISTORY: PRIOR HISTORY: NICM, ARLENE, B/L pulmonary embolism, SVT, CKD II, Shortness of breath,    PROCEDURE: The study was performed in the [facility]. This was a routine study. The transthoracic approach was used. The study included complete 2D imaging, M-mode, complete spectral Doppler, and color Doppler. The  heart rate was 69 bpm, at the start of the study.  Images were obtained from the parasternal, apical, subcostal, and suprasternal notch acoustic windows. Image quality was adequate.    LEFT VENTRICLE: Size was normal. Systolic function was at the lower limits of normal. Ejection fraction was estimated in the range of 50 % to 55 %. There were no regional wall motion abnormalities. Wall thickness was normal. No evidence of  apical thrombus. DOPPLER: Doppler parameters were consistent with abnormal left ventricular relaxation (grade 1 diastolic dysfunction).    RIGHT VENTRICLE: The size was normal. Systolic function was normal. Wall thickness was normal.    LEFT ATRIUM: The atrium was mildly dilated.    RIGHT ATRIUM: The atrium was mildly dilated.    MITRAL VALVE: Valve structure was normal. There was normal leaflet separation. DOPPLER: The transmitral velocity was within the normal range. There was no evidence for stenosis. There was trace to mild regurgitation.    AORTIC VALVE: The valve was trileaflet. Leaflets exhibited normal thickness and normal cuspal separation. DOPPLER: Transaortic velocity was within the normal range. There was no evidence for stenosis. There was no significant  regurgitation.    TRICUSPID VALVE: The valve structure was normal. There was normal leaflet separation. DOPPLER: The transtricuspid velocity was within the normal range. There was no evidence for stenosis. There was trace regurgitation. Estimated peak PA  pressure was 38 mmHg.    PULMONIC VALVE: Leaflets exhibited normal thickness, no calcification, and normal cuspal separation. DOPPLER: The transpulmonic velocity was within the normal range. There was no significant regurgitation.    PERICARDIUM: There was no pericardial effusion. The pericardium was normal in appearance.    AORTA: The root exhibited normal size.    SYSTEMIC VEINS: IVC: The inferior vena cava was normal in size.    SYSTEM MEASUREMENT TABLES    2D  %FS: 34.03 %  Ao Diam: 3.62 cm  EDV(Teich): 175.09 ml  EF(Teich):  62.11 %  ESV(Teich): 66.34 ml  IVSd: 1.1 cm  LA Area: 27.97 cm2  LA Diam: 5.41 cm  LVEDV MOD A4C: 165.87 ml  LVEF MOD A4C: 55.55 %  LVESV MOD A4C: 73.73 ml  LVIDd: 5.93 cm  LVIDs: 3.91 cm  LVLd A4C: 9.23 cm  LVLs A4C: 8.03 cm  LVPWd: 0.91 cm  RA Area: 20.75 cm2  RVIDd: 4.77 cm  SV MOD A4C: 92.15 ml  SV(Teich): 108.75 ml    CW  AV MaxPG: 10 mmHg  AV Vmax: 1.58 m/s  MR Vmax: 4.55 m/s  MR maxP.83 mmHg  TR MaxP.98 mmHg  TR Vmax: 2.96 m/s    MM  TAPSE: 2.38 cm    PW  MV A [place]: 1.02 m/s  MV Dec [place]: 3.21 m/s2  MV DecT: 190.81 ms  MV E [place]: 0.61 m/s  MV E/A Ratio: 0.6  MV PHT: 55.34 ms  MVA By PHT: 3.98 cm2    [facility] Accredited Echocardiography Laboratory    Prepared and electronically signed by    Lorenzo Khanna MD  Signed 13-Oct-2020 16:09:08    No results found for this or any previous visit.    No results found for this or any previous visit.    No valid procedures specified.  Results for orders placed during the hospital encounter of 18    NM myocardial perfusion spect (stress and/or rest)    [address] [address] [phone]    Rest/Stress Gated SPECT Myocardial Perfusion Imaging After Exercise    Name: PARKER PONCE  MR #: [id number]  Account #: [id number]  Study date: 2018  : 1945  Age: 72 years  Gender: Male  Height: 75 in  Weight: 250 lb  BSA: 2.41 m squared    Allergies: NO KNOWN ALLERGIES    Diagnosis: R55. - Syncope and collapse    Primary Physician:  Richard Lipscomb MD  Interpreting Physician:  Lian Botello MD  Referring Physician:  Richard Lipscomb MD  Technician:  Juma Colunga BS, BA, AART(N)  Group:  [facility]  Other:  Dena Rogers, MS, CCT    CLINICAL QUESTION: Detection of CAD    HISTORY: Patient presented with history of one syncopal event and a history of pulmonary embolism from three months ago. Patient on blood thinner, Simvastatin and ASA. The patient is a 72 year old   male. Chest pain status:  no chest pain. Other symptoms: syncope with collapse. Coronary artery disease risk factors: dyslipidemia. Cardiovascular history: none significant. Co-morbidity: obesity. Medications: aspirin and a lipid lowering agent.    REST ECG: Normal sinus rhythm. Nonspecific T wave abnormalities were present.    PROCEDURE: Treadmill exercise testing was performed, using the Skyler protocol. Gated SPECT myocardial perfusion imaging was performed after stress and at rest. Systolic blood pressure was 128 mmHg, at the start of the study. Diastolic  blood pressure was 76 mmHg, at the start of the study. The heart rate was 68 bpm, at the start of the study.    SKYLER PROTOCOL:  HR bpm SBP mmHg DBP mmHg Symptoms Rhythm/conduct  Baseline 68 128 76 none NSR  Stage 1 130 164 78 -- occasional PAC's  Stage 2 142 -- -- mild dyspnea frequent PAC's  Immediate 142 -- -- -- frequent PAC's  Recovery 1 131 -- -- -- occasional PAC's, rare PVC's  Recovery 2 153 -- -- -- SVT  Recovery 3 146 -- -- -- SVT  Recovery 5 130 120 80 -- SVT  Recovery 10 82 120 70 -- NSR, rare PVC's  No medications or fluids given.    STRESS SUMMARY: Duration of exercise was 4 min. The patient exercised to protocol stage 2. Maximal work rate was 6.7 METs. Functional capacity was normal. Maximal heart rate during stress was 142 bpm ( 96 % of maximal predicted heart  rate). The heart rate response to stress was exaggerated. There was normal resting blood pressure with an appropriate response to stress. The rate-pressure product for the peak heart rate and blood pressure was 28526. There was no chest  pain during stress. The stress test was terminated due to achievement of target heart rate and mild dyspnea. The stress ECG was negative for ischemia and normal. Arrhythmia during stress: isolated atrial premature beats, isolated premature  ventricular beats, and supraventricular tachycardia brief episodes self limiting.    ISOTOPE  ADMINISTRATION:  Resting isotope administration Stress isotope administration  Agent Tetrofosmin Tetrofosmin  Dose 15.02 mCi 44.9 mCi  Date 02/09/2018 02/09/2018  Injection-image interval 30 min 30 min    The radiopharmaceutical was injected one minute before the end of exercise.    MYOCARDIAL PERFUSION IMAGING:  The image quality was fair. Rotating projection images reveal moderate diaphragmatic attenuation. The left ventricle was mildly dilated. The TID ratio was 0.94.    GATED SPECT:  The calculated left ventricular ejection fraction was 41 %. There was mild global left ventricular hypokinesis.    SUMMARY:  -  Stress results: Duration of exercise was 4 min. Target heart rate was achieved. There was no chest pain during stress.  -  ECG conclusions: The stress ECG was negative for ischemia and normal. Arrhythmia during stress: isolated atrial premature beats, isolated premature ventricular beats, and supraventricular tachycardia brief episodes self limiting.  -  Gated SPECT: The calculated left ventricular ejection fraction was 41 %. There was mild global left ventricular hypokinesis.    IMPRESSIONS: Moderate sized, moderate intensity fixed inferior defect likely representing diaphrgmatic attenuation. No ischemia. Inferior defect looks worse at rest. Left ventricular systolic function was mildly reduced.    Prepared and signed by    Lian Botello MD  Signed 02/12/2018 11:52:33      Meds/Allergies   all current active meds have been reviewed  Medications Prior to Admission   Medication    apixaban (Eliquis) 5 mg    dofetilide (TIKOSYN) 250 mcg capsule    finasteride (PROSCAR) 5 mg tablet    rosuvastatin (CRESTOR) 20 MG tablet    sildenafil (VIAGRA) 50 MG tablet    acetaminophen-codeine (TYLENOL #3) 300-30 mg per tablet    Diclofenac Sodium (VOLTAREN) 1 %    DULoxetine (CYMBALTA) 60 mg delayed release capsule    fluticasone (FLONASE) 50 mcg/act nasal spray    furosemide (LASIX) 40 mg tablet    lidocaine  "(LIDODERM) 5 %    metoprolol succinate (TOPROL-XL) 25 mg 24 hr tablet    Omega-3 1000 MG CAPS    sacubitril-valsartan (Entresto) 24-26 MG TABS    tamsulosin (FLOMAX) 0.4 mg          ASSESSMENT & PLAN   Principal Problem:    Atrial flutter (HCC)/A-fib  Active Problems:    Chronic kidney disease (CKD), stage II (mild)    BPH (benign prostatic hyperplasia)  Anticoagulation  Chronic pulmonary embolism on Eliquis      This patient with known history of paroxysmal atrial fibrillation/flutter presented with high heart rate and was found to be in atrial fibrillation/flutter with rapid ventricular response.  Patient overall asymptomatic but heart rate still in the 120-130 range.    Continue Tikosyn and beta-blockers.  Continue Eliquis for anticoagulation.  Patient states compliance with anticoagulation.    The patient is in persistent atrial fibrillation/flutter, to consider options of cardioversion tomorrow.    Risks and benefits of cardioversion discussed with patient including but not limited to aspiration, stroke, complications related to anesthesia.  Patient wishes to proceed.  N.p.o. after midnight.    Discussed with patient.        Lian Botello MD  4/25/2024,1:24 PM    Portions of the record may have been created with voice recognition software.  Occasional wrong word or \"sound a like\" substitutions may have occurred due to the inherent limitations of voice recognition software.  Read the chart carefully and recognize, using context, where substitutions have occurred.   "

## 2024-04-25 NOTE — PLAN OF CARE
Problem: PAIN - ADULT  Goal: Verbalizes/displays adequate comfort level or baseline comfort level  Description: Interventions:  - Encourage patient to monitor pain and request assistance  - Assess pain using appropriate pain scale  - Administer analgesics based on type and severity of pain and evaluate response  - Implement non-pharmacological measures as appropriate and evaluate response  - Consider cultural and social influences on pain and pain management  - Notify physician/advanced practitioner if interventions unsuccessful or patient reports new pain  Outcome: Progressing     Problem: SAFETY ADULT  Goal: Patient will remain free of falls  Description: INTERVENTIONS:  - Educate patient/family on patient safety including physical limitations  - Instruct patient to call for assistance with activity   - Consult OT/PT to assist with strengthening/mobility   - Keep Call bell within reach  - Keep bed low and locked with side rails adjusted as appropriate  - Keep care items and personal belongings within reach  - Initiate and maintain comfort rounds  - Make Fall Risk Sign visible to staff  - Offer Toileting every 2  Hours, in advance of need  - Initiate/Maintain bed alarm  - Obtain necessary fall risk management equipment:   - Apply yellow socks and bracelet for high fall risk patients  - Consider moving patient to room near nurses station  Outcome: Progressing     Problem: Knowledge Deficit  Goal: Patient/family/caregiver demonstrates understanding of disease process, treatment plan, medications, and discharge instructions  Description: Complete learning assessment and assess knowledge base.  Interventions:  - Provide teaching at level of understanding  - Provide teaching via preferred learning methods  Outcome: Progressing

## 2024-04-25 NOTE — PLAN OF CARE
Problem: PAIN - ADULT  Goal: Verbalizes/displays adequate comfort level or baseline comfort level  Description: Interventions:  - Encourage patient to monitor pain and request assistance  - Assess pain using appropriate pain scale  - Administer analgesics based on type and severity of pain and evaluate response  - Implement non-pharmacological measures as appropriate and evaluate response  - Consider cultural and social influences on pain and pain management  - Notify physician/advanced practitioner if interventions unsuccessful or patient reports new pain  Outcome: Progressing     Problem: INFECTION - ADULT  Goal: Absence or prevention of progression during hospitalization  Description: INTERVENTIONS:  - Assess and monitor for signs and symptoms of infection  - Monitor lab/diagnostic results  - Monitor all insertion sites, i.e. indwelling lines, tubes, and drains  - Monitor endotracheal if appropriate and nasal secretions for changes in amount and color  - [place] appropriate cooling/warming therapies per order  - Administer medications as ordered  - Instruct and encourage patient and family to use good hand hygiene technique  - Identify and instruct in appropriate isolation precautions for identified infection/condition  Outcome: Progressing  Goal: Absence of fever/infection during neutropenic period  Description: INTERVENTIONS:  - Monitor WBC    Outcome: Progressing     Problem: SAFETY ADULT  Goal: Patient will remain free of falls  Description: INTERVENTIONS:  - Educate patient/family on patient safety including physical limitations  - Instruct patient to call for assistance with activity   - Consult OT/PT to assist with strengthening/mobility   - Keep Call bell within reach  - Keep bed low and locked with side rails adjusted as appropriate  - Keep care items and personal belongings within reach  - Initiate and maintain comfort rounds  - Make Fall Risk Sign visible to staff  - Offer Toileting every 2 Hours,  in advance of need  - Initiate/Maintain bed alarm  - Obtain necessary fall risk management equipment:   - Apply yellow socks and bracelet for high fall risk patients  - Consider moving patient to room near nurses station  Outcome: Progressing  Goal: Maintain or return to baseline ADL function  Description: INTERVENTIONS:  -  Assess patient's ability to carry out ADLs; assess patient's baseline for ADL function and identify physical deficits which impact ability to perform ADLs (bathing, care of mouth/teeth, toileting, grooming, dressing, etc.)  - Assess/evaluate cause of self-care deficits   - Assess range of motion  - Assess patient's mobility; develop plan if impaired  - Assess patient's need for assistive devices and provide as appropriate  - Encourage maximum independence but intervene and supervise when necessary  - Involve family in performance of ADLs  - Assess for home care needs following discharge   - Consider OT consult to assist with ADL evaluation and planning for discharge  - Provide patient education as appropriate  Outcome: Progressing  Goal: Maintains/Returns to pre admission functional level  Description: INTERVENTIONS:  - Perform AM-PAC 6 Click Basic Mobility/ Daily Activity assessment daily.  - Set and communicate daily mobility goal to care team and patient/family/caregiver.   - Collaborate with rehabilitation services on mobility goals if consulted  - Perform Range of Motion 3 times a day.  - Reposition patient every 2 hours.  - Dangle patient 3 times a day  - Stand patient 3 times a day  - Ambulate patient 3 times a day  - Out of bed to chair 3 times a day   - Out of bed for meals 3 times a day  - Out of bed for toileting  - Record patient progress and toleration of activity level   Outcome: Progressing     Problem: DISCHARGE PLANNING  Goal: Discharge to home or other facility with appropriate resources  Description: INTERVENTIONS:  - Identify barriers to discharge w/patient and caregiver  -  Arrange for needed discharge resources and transportation as appropriate  - Identify discharge learning needs (meds, wound care, etc.)  - Arrange for interpretive services to assist at discharge as needed  - Refer to Case Management Department for coordinating discharge planning if the patient needs post-hospital services based on physician/advanced practitioner order or complex needs related to functional status, cognitive ability, or social support system  Outcome: Progressing     Problem: Knowledge Deficit  Goal: Patient/family/caregiver demonstrates understanding of disease process, treatment plan, medications, and discharge instructions  Description: Complete learning assessment and assess knowledge base.  Interventions:  - Provide teaching at level of understanding  - Provide teaching via preferred learning methods  Outcome: Progressing

## 2024-04-25 NOTE — ASSESSMENT & PLAN NOTE
Resolved after IV metoprolol administered  EKG showed a flutter heart rates 130s  Troponin within normal limits x 2  Telemetry  resolved

## 2024-04-25 NOTE — ASSESSMENT & PLAN NOTE
Patient presented to the ED after having 3 days of chest pain, shortness of breath, lightheadedness, and severe fatigue.  His heart rate in the 150s.  While in the ED he was given 10 mg of IV Lopressor and spontaneously resolved to normal sinus rhythm.      While in ED  heart rate A-flutter  Patient initially reverted to normal sinus rhythm after IV Lopressor given in ED, patient converted back to symptomatic A-fib with rvr rate of 120s to 130s  Cardiology consulted  restarted Tikosyn, continue to order IV Lopressor, will need continued monitoring  Still with elevated heart rates  Follow Up echo  Plan for cardioversion 4/25  Continue Eliquis  TSH 2.819  CTA chest Negative  Continue telemetry

## 2024-04-25 NOTE — ASSESSMENT & PLAN NOTE
Lab Results   Component Value Date    EGFR 61 04/25/2024    EGFR 59 04/24/2024    EGFR 52 04/23/2024    CREATININE 1.13 04/25/2024    CREATININE 1.16 04/24/2024    CREATININE 1.30 04/23/2024     Creatinine baseline-  appears euvolemic  No longer takes diuretics

## 2024-04-26 LAB
ANION GAP SERPL CALCULATED.3IONS-SCNC: 5 MMOL/L (ref 4–13)
ATRIAL RATE: 113 BPM
ATRIAL RATE: 114 BPM
ATRIAL RATE: 55 BPM
BUN SERPL-MCNC: 15 MG/DL (ref 5–25)
CALCIUM SERPL-MCNC: 8.7 MG/DL (ref 8.4–10.2)
CHLORIDE SERPL-SCNC: 110 MMOL/L (ref 96–108)
CO2 SERPL-SCNC: 26 MMOL/L (ref 21–32)
CREAT SERPL-MCNC: 1.18 MG/DL (ref 0.6–1.3)
ERYTHROCYTE [DISTWIDTH] IN BLOOD BY AUTOMATED COUNT: 14.2 % (ref 11.6–15.1)
GFR SERPL CREATININE-BSD FRML MDRD: 58 ML/MIN/1.73SQ M
GLUCOSE SERPL-MCNC: 99 MG/DL (ref 65–140)
HCT VFR BLD AUTO: 36.5 % (ref 36.5–49.3)
HGB BLD-MCNC: 12.4 G/DL (ref 12–17)
MAGNESIUM SERPL-MCNC: 2 MG/DL (ref 1.9–2.7)
MCH RBC QN AUTO: 31.5 PG (ref 26.8–34.3)
MCHC RBC AUTO-ENTMCNC: 34 G/DL (ref 31.4–37.4)
MCV RBC AUTO: 93 FL (ref 82–98)
P AXIS: 56 DEGREES
PLATELET # BLD AUTO: 140 THOUSANDS/UL (ref 149–390)
PMV BLD AUTO: 11.3 FL (ref 8.9–12.7)
POTASSIUM SERPL-SCNC: 3.8 MMOL/L (ref 3.5–5.3)
PR INTERVAL: 180 MS
PR INTERVAL: 216 MS
PR INTERVAL: 218 MS
QRS AXIS: -41 DEGREES
QRS AXIS: -45 DEGREES
QRS AXIS: -46 DEGREES
QRSD INTERVAL: 144 MS
QRSD INTERVAL: 146 MS
QRSD INTERVAL: 146 MS
QT INTERVAL: 332 MS
QT INTERVAL: 430 MS
QT INTERVAL: 448 MS
QTC INTERVAL: 428 MS
QTC INTERVAL: 457 MS
QTC INTERVAL: 589 MS
RBC # BLD AUTO: 3.94 MILLION/UL (ref 3.88–5.62)
SODIUM SERPL-SCNC: 141 MMOL/L (ref 135–147)
T WAVE AXIS: -32 DEGREES
T WAVE AXIS: -64 DEGREES
T WAVE AXIS: 6 DEGREES
VENTRICULAR RATE: 113 BPM
VENTRICULAR RATE: 114 BPM
VENTRICULAR RATE: 55 BPM
WBC # BLD AUTO: 4.95 THOUSAND/UL (ref 4.31–10.16)

## 2024-04-26 PROCEDURE — 83735 ASSAY OF MAGNESIUM: CPT | Performed by: NURSE PRACTITIONER

## 2024-04-26 PROCEDURE — 85027 COMPLETE CBC AUTOMATED: CPT | Performed by: NURSE PRACTITIONER

## 2024-04-26 PROCEDURE — 99232 SBSQ HOSP IP/OBS MODERATE 35: CPT | Performed by: INTERNAL MEDICINE

## 2024-04-26 PROCEDURE — 93010 ELECTROCARDIOGRAM REPORT: CPT | Performed by: INTERNAL MEDICINE

## 2024-04-26 PROCEDURE — 80048 BASIC METABOLIC PNL TOTAL CA: CPT | Performed by: NURSE PRACTITIONER

## 2024-04-26 PROCEDURE — 93005 ELECTROCARDIOGRAM TRACING: CPT

## 2024-04-26 PROCEDURE — 99232 SBSQ HOSP IP/OBS MODERATE 35: CPT | Performed by: NURSE PRACTITIONER

## 2024-04-26 RX ORDER — DOFETILIDE 0.25 MG/1
250 CAPSULE ORAL EVERY 12 HOURS SCHEDULED
Status: DISCONTINUED | OUTPATIENT
Start: 2024-04-26 | End: 2024-04-27 | Stop reason: HOSPADM

## 2024-04-26 RX ORDER — DOFETILIDE 0.12 MG/1
125 CAPSULE ORAL EVERY 12 HOURS SCHEDULED
Status: DISCONTINUED | OUTPATIENT
Start: 2024-04-26 | End: 2024-04-27 | Stop reason: HOSPADM

## 2024-04-26 RX ADMIN — FINASTERIDE 5 MG: 5 TABLET, FILM COATED ORAL at 08:06

## 2024-04-26 RX ADMIN — DOFETILIDE 250 MCG: 0.25 CAPSULE ORAL at 08:07

## 2024-04-26 RX ADMIN — DOFETILIDE 125 MCG: 0.12 CAPSULE ORAL at 10:23

## 2024-04-26 RX ADMIN — APIXABAN 5 MG: 5 TABLET, FILM COATED ORAL at 08:06

## 2024-04-26 RX ADMIN — ATORVASTATIN CALCIUM 40 MG: 40 TABLET, FILM COATED ORAL at 17:31

## 2024-04-26 RX ADMIN — APIXABAN 5 MG: 5 TABLET, FILM COATED ORAL at 17:31

## 2024-04-26 RX ADMIN — DOFETILIDE 250 MCG: 0.25 CAPSULE ORAL at 20:15

## 2024-04-26 RX ADMIN — METOPROLOL SUCCINATE 50 MG: 50 TABLET, EXTENDED RELEASE ORAL at 08:05

## 2024-04-26 RX ADMIN — TAMSULOSIN HYDROCHLORIDE 0.4 MG: 0.4 CAPSULE ORAL at 17:31

## 2024-04-26 RX ADMIN — DOFETILIDE 125 MCG: 0.12 CAPSULE ORAL at 20:15

## 2024-04-26 NOTE — ASSESSMENT & PLAN NOTE
Detail Level: Detailed Patient presented to the ED after having 3 days of chest pain, shortness of breath, lightheadedness, and severe fatigue.  His heart rate in the 150s.  While in the ED he was given 10 mg of IV Lopressor and spontaneously resolved to normal sinus rhythm.      While in ED  heart rate A-flutter  Patient initially reverted to normal sinus rhythm after IV Lopressor given in ED, patient converted back to symptomatic A-fib with rvr rate of 120s to 130s  Cardiology consulted  Continue Tikosyn and metoprolol  Echo-he left ventricular ejection fraction is 50%, lower in other views. Systolic function is low normal   Cardioversion was canceled for 4/26; patient converted to normal sinus rhythm this morning Tikosyn was increased to continue metoprolol  Continue Eliquis  TSH 2.819  CTA chest Negative  Continue telemetry   Add 1585x Cpt? (Do Not Bill If You Billed For The Procedure Placing The Sutures. This Is An Add-On Code That Must Be Billed With An E/M Visit Code): No

## 2024-04-26 NOTE — CASE MANAGEMENT
Case Management Progress Note    Patient name Nas Cantor Jr.  Location /-01 MRN [id number]  : 1945 Date 2024       LOS (days): 2  Geometric Mean LOS (GMLOS) (days): 1.8  Days to GMLOS:-0.3        OBJECTIVE:     Current admission status: Inpatient  Preferred Pharmacy:   [facility]/pharmacy #[id number] - [place] - [address]  [address]  [place] [address]  Phone: [phone] Fax: [phone]    [facility] - [place] - [address]  [address]  [place] [address]  Phone: [phone] Fax: [phone]    Primary Care Provider: Richard Lipscomb MD  Primary Insurance: BLUE CROSS  REP  Secondary Insurance:     PROGRESS NOTE:  CM attempted to meet with patient at bedside x2 today to complete IMM and review DCP.  Patient and spouse sleeping in room with lights out.

## 2024-04-26 NOTE — PROGRESS NOTES
General Cardiology   Progress Note   Nas Cantor Jr. 78 y.o. male MRN: [id number]  Unit/Bed#: -01 Encounter: [id number]      SUBJECTIVE:   No significant events overnight.  Patient denies any chest pain shortness of breath or palpitations.  Patient does have intermittent A-fib with.'s of clear sinus rhythm.  QT interval is within normal limits when patient is in sinus rhythm.    REVIEW OF SYSTEMS:  Constitutional:  Denies fever or chills   Eyes:  Denies change in visual acuity   HENT:  Denies nasal congestion or sore throat   Respiratory:  Denies cough or shortness of breath   Cardiovascular:  Denies chest pain or edema   GI:  Denies abdominal pain, nausea, vomiting, bloody stools or diarrhea   :  Denies dysuria, frequency, difficulty in micturition and nocturia  Musculoskeletal:  Denies back pain or joint pain   Neurologic:  Denies headache, focal weakness or sensory changes   Endocrine:  Denies polyuria or polydipsia   Lymphatic:  Denies swollen glands   Psychiatric:  Denies depression or anxiety     OBJECTIVE:   Vitals:  Vitals:    24 1424   BP: 98/63   Pulse: (!) 116   Resp:    Temp: 98.4 °F (36.9 °C)   SpO2: 95%     Body mass index is 32.5 kg/m².  Systolic (24hrs), Av , Min:98 , Max:129     Diastolic (24hrs), Av, Min:62, Max:93      Intake/Output Summary (Last 24 hours) at 2024 1613  Last data filed at 2024 0301  Gross per 24 hour   Intake --   Output 600 ml   Net -600 ml     Weight (last 2 days)       Date/Time Weight    24 1500 118 (260)            Telemetry Review: Paroxysmal A-fib        PHYSICAL EXAMS:  General:  Patient is not in acute distress, laying in the bed comfortably, awake, alert responding to commands.  Head: Normocephalic, Atraumatic.  HEENT:  Both pupils normal-size atraumatic, normocephalic, nonicteric  Neck:  JVP not raised. Trachea central  Respiratory:  Bronchovascular breathing all over the chest without any accompaniment  Cardiovascular:  "Irregularly irregular  GI:  Abdomen soft nontender. Liver and spleen normal size  Lymphatic:  No cervical or inguinal lymphadenopathy  Neurologic:  Patient is awake alert, responding to command, well-oriented to time and place and person moving   Extremities no edema    LABORATORY RESULTS:        CBC with diff:   Results from last 7 days   Lab Units 04/26/24 0520 04/25/24  0558 04/24/24 0441 04/23/24  1056   WBC Thousand/uL 4.95 4.93 5.63 7.04   HEMOGLOBIN g/dL 12.4 12.5 12.6 14.5   HEMATOCRIT % 36.5 37.2 37.6 42.3   MCV fL 93 93 93 92   PLATELETS Thousands/uL 140* 138* 143* 147*   RBC Million/uL 3.94 4.02 4.04 4.61   MCH pg 31.5 31.1 31.2 31.5   MCHC g/dL 34.0 33.6 33.5 34.3   RDW % 14.2 14.4 14.6 14.6   MPV fL 11.3 10.9 11.9 11.3   NRBC AUTO /100 WBCs  --   --   --  0       CMP:  Results from last 7 days   Lab Units 04/26/24  0520 04/25/24  0558 04/24/24 0441 04/23/24  1056   POTASSIUM mmol/L 3.8 3.8 3.8 3.7   CHLORIDE mmol/L 110* 110* 109* 107   CO2 mmol/L 26 26 25 27   BUN mg/dL 15 14 17 21   CREATININE mg/dL 1.18 1.13 1.16 1.30   CALCIUM mg/dL 8.7 8.6 8.6 9.4   AST U/L  --   --   --  11*   ALT U/L  --   --   --  13   ALK PHOS U/L  --   --   --  58   EGFR ml/min/1.73sq m 58 61 59 52       BMP:  Results from last 7 days   Lab Units 04/26/24 0520 04/25/24  0558 04/24/24  0441   POTASSIUM mmol/L 3.8 3.8 3.8   CHLORIDE mmol/L 110* 110* 109*   CO2 mmol/L 26 26 25   BUN mg/dL 15 14 17   CREATININE mg/dL 1.18 1.13 1.16   CALCIUM mg/dL 8.7 8.6 8.6            Results from last 7 days   Lab Units 04/26/24  0520 04/25/24  0558 04/24/24  0441 04/23/24  1056   MAGNESIUM mg/dL 2.0 2.0 2.0 2.1         Results from last 7 days   Lab Units 04/23/24  1056   TSH 3RD GENERATON uIU/mL 2.819     Results from last 7 days   Lab Units 04/23/24  1056   INR  1.04       Lipid Profile:   No results found for: \"CHOL\"  Lab Results   Component Value Date    HDL 45 05/18/2022    HDL 46 03/31/2021    HDL 45 08/22/2017     Lab Results "   Component Value Date    LDLCALC 81 2022    LDLCALC 126 (H) 2021    LDLCALC 129 (H) 2017     Lab Results   Component Value Date    TRIG 98 2022    TRIG 113 2021    TRIG 143 2021       Cardiac testing:  Results for orders placed during the hospital encounter of 10/13/20    Echo complete with contrast if indicated    [facility]  [address]  [place] [address]  [phone]    Transthoracic Echocardiogram  2D, M-mode, Doppler, and Color Doppler    Study date:  13-Oct-2020    Patient: PARKER PONCE  MR number: [id number]  Account number: [id number]  : 1945  Age: 74 years  Gender: Male  Status: Outpatient  Location: [facility]  Height: 75 in  Weight: 254.5 lb  BP: 124/ 72 mmHg    Indications: Non-ischemic cardiomyopathy.    Diagnoses: I42.9 - Cardiomyopathy, unspecified    Sonographer:  HILL Claudio  Primary Physician:  Richard Lipscomb MD  Referring Physician:  Lian Botello MD  Group:  [facility]  Interpreting Physician:  Lorenzo Khanna MD    SUMMARY    LEFT VENTRICLE:  Systolic function was at the lower limits of normal. Ejection fraction was estimated in the range of 50 % to 55 %.  There were no regional wall motion abnormalities.  Doppler parameters were consistent with abnormal left ventricular relaxation (grade 1 diastolic dysfunction).    RIGHT VENTRICLE:  The size was normal.  Systolic function was normal.    LEFT ATRIUM:  The atrium was mildly dilated.    RIGHT ATRIUM:  The atrium was mildly dilated.    MITRAL VALVE:  There was trace to mild regurgitation.    TRICUSPID VALVE:  There was trace regurgitation.  Estimated peak PA pressure was 38 mmHg.    HISTORY: PRIOR HISTORY: NICM, ARLENE, B/L pulmonary embolism, SVT, CKD II, Shortness of breath,    PROCEDURE: The study was performed in the [facility]. This was a routine study. The transthoracic approach was used. The  study included complete 2D imaging, M-mode, complete spectral Doppler, and color Doppler. The  heart rate was 69 bpm, at the start of the study. Images were obtained from the parasternal, apical, subcostal, and suprasternal notch acoustic windows. Image quality was adequate.    LEFT VENTRICLE: Size was normal. Systolic function was at the lower limits of normal. Ejection fraction was estimated in the range of 50 % to 55 %. There were no regional wall motion abnormalities. Wall thickness was normal. No evidence of  apical thrombus. DOPPLER: Doppler parameters were consistent with abnormal left ventricular relaxation (grade 1 diastolic dysfunction).    RIGHT VENTRICLE: The size was normal. Systolic function was normal. Wall thickness was normal.    LEFT ATRIUM: The atrium was mildly dilated.    RIGHT ATRIUM: The atrium was mildly dilated.    MITRAL VALVE: Valve structure was normal. There was normal leaflet separation. DOPPLER: The transmitral velocity was within the normal range. There was no evidence for stenosis. There was trace to mild regurgitation.    AORTIC VALVE: The valve was trileaflet. Leaflets exhibited normal thickness and normal cuspal separation. DOPPLER: Transaortic velocity was within the normal range. There was no evidence for stenosis. There was no significant  regurgitation.    TRICUSPID VALVE: The valve structure was normal. There was normal leaflet separation. DOPPLER: The transtricuspid velocity was within the normal range. There was no evidence for stenosis. There was trace regurgitation. Estimated peak PA  pressure was 38 mmHg.    PULMONIC VALVE: Leaflets exhibited normal thickness, no calcification, and normal cuspal separation. DOPPLER: The transpulmonic velocity was within the normal range. There was no significant regurgitation.    PERICARDIUM: There was no pericardial effusion. The pericardium was normal in appearance.    AORTA: The root exhibited normal size.    SYSTEMIC VEINS: IVC: The  inferior vena cava was normal in size.    SYSTEM MEASUREMENT TABLES    2D  %FS: 34.03 %  Ao Diam: 3.62 cm  EDV(Teich): 175.09 ml  EF(Teich): 62.11 %  ESV(Teich): 66.34 ml  IVSd: 1.1 cm  LA Area: 27.97 cm2  LA Diam: 5.41 cm  LVEDV MOD A4C: 165.87 ml  LVEF MOD A4C: 55.55 %  LVESV MOD A4C: 73.73 ml  LVIDd: 5.93 cm  LVIDs: 3.91 cm  LVLd A4C: 9.23 cm  LVLs A4C: 8.03 cm  LVPWd: 0.91 cm  RA Area: 20.75 cm2  RVIDd: 4.77 cm  SV MOD A4C: 92.15 ml  SV(Teich): 108.75 ml    CW  AV MaxPG: 10 mmHg  AV Vmax: 1.58 m/s  MR Vmax: 4.55 m/s  MR maxP.83 mmHg  TR MaxP.98 mmHg  TR Vmax: 2.96 m/s    MM  TAPSE: 2.38 cm    PW  MV A [place]: 1.02 m/s  MV Dec [place]: 3.21 m/s2  MV DecT: 190.81 ms  MV E [place]: 0.61 m/s  MV E/A Ratio: 0.6  MV PHT: 55.34 ms  MVA By PHT: 3.98 cm2    [employer] Commission Accredited Echocardiography Laboratory    Prepared and electronically signed by    Lorenzo Khanna MD  Signed 13-Oct-2020 16:09:08    No results found for this or any previous visit.    No results found for this or any previous visit.    No valid procedures specified.  Results for orders placed during the hospital encounter of 18    NM myocardial perfusion spect (stress and/or rest)    [facility]  [address] [place] [address] [phone]    Rest/Stress Gated SPECT Myocardial Perfusion Imaging After Exercise    Name: PARKER PONCE  MR #: [id number]  Account #: [id number]  Study date: 2018  : 1945  Age: 72 years  Gender: Male  Height: 75 in  Weight: 250 lb  BSA: 2.41 m squared    Allergies: NO KNOWN ALLERGIES    Diagnosis: R55. - Syncope and collapse    Primary Physician:  Richard Lipscomb MD  Interpreting Physician:  Lian Botello MD  Referring Physician:  Richard Lipscomb MD  Technician:  Juma Colunga BS, BA, AART(N)  Group:  [facility]  Other:  Dena Rogers MS, CCT    CLINICAL QUESTION: Detection of CAD    HISTORY: Patient presented with history of one syncopal  event and a history of pulmonary embolism from three months ago. Patient on blood thinner, Simvastatin and ASA. The patient is a 72 year old  male. Chest pain status:  no chest pain. Other symptoms: syncope with collapse. Coronary artery disease risk factors: dyslipidemia. Cardiovascular history: none significant. Co-morbidity: obesity. Medications: aspirin and a lipid lowering agent.    REST ECG: Normal sinus rhythm. Nonspecific T wave abnormalities were present.    PROCEDURE: Treadmill exercise testing was performed, using the Skyler protocol. Gated SPECT myocardial perfusion imaging was performed after stress and at rest. Systolic blood pressure was 128 mmHg, at the start of the study. Diastolic  blood pressure was 76 mmHg, at the start of the study. The heart rate was 68 bpm, at the start of the study.    SKYLER PROTOCOL:  HR bpm SBP mmHg DBP mmHg Symptoms Rhythm/conduct  Baseline 68 128 76 none NSR  Stage 1 130 164 78 -- occasional PAC's  Stage 2 142 -- -- mild dyspnea frequent PAC's  Immediate 142 -- -- -- frequent PAC's  Recovery 1 131 -- -- -- occasional PAC's, rare PVC's  Recovery 2 153 -- -- -- SVT  Recovery 3 146 -- -- -- SVT  Recovery 5 130 120 80 -- SVT  Recovery 10 82 120 70 -- NSR, rare PVC's  No medications or fluids given.    STRESS SUMMARY: Duration of exercise was 4 min. The patient exercised to protocol stage 2. Maximal work rate was 6.7 METs. Functional capacity was normal. Maximal heart rate during stress was 142 bpm ( 96 % of maximal predicted heart  rate). The heart rate response to stress was exaggerated. There was normal resting blood pressure with an appropriate response to stress. The rate-pressure product for the peak heart rate and blood pressure was 312[address]. There was no chest  pain during stress. The stress test was terminated due to achievement of target heart rate and mild dyspnea. The stress ECG was negative for ischemia and normal. Arrhythmia during stress: isolated  atrial premature beats, isolated premature  ventricular beats, and supraventricular tachycardia brief episodes self limiting.    ISOTOPE ADMINISTRATION:  Resting isotope administration Stress isotope administration  Agent Tetrofosmin Tetrofosmin  Dose 15.02 mCi 44.9 mCi  Date 02/09/2018 02/09/2018  Injection-image interval 30 min 30 min    The radiopharmaceutical was injected one minute before the end of exercise.    MYOCARDIAL PERFUSION IMAGING:  The image quality was fair. Rotating projection images reveal moderate diaphragmatic attenuation. The left ventricle was mildly dilated. The TID ratio was 0.94.    GATED SPECT:  The calculated left ventricular ejection fraction was 41 %. There was mild global left ventricular hypokinesis.    SUMMARY:  -  Stress results: Duration of exercise was 4 min. Target heart rate was achieved. There was no chest pain during stress.  -  ECG conclusions: The stress ECG was negative for ischemia and normal. Arrhythmia during stress: isolated atrial premature beats, isolated premature ventricular beats, and supraventricular tachycardia brief episodes self limiting.  -  Gated SPECT: The calculated left ventricular ejection fraction was 41 %. There was mild global left ventricular hypokinesis.    IMPRESSIONS: Moderate sized, moderate intensity fixed inferior defect likely representing diaphrgmatic attenuation. No ischemia. Inferior defect looks worse at rest. Left ventricular systolic function was mildly reduced.    Prepared and signed by    Lian Botello MD  Signed 02/12/2018 11:52:33      Meds/Allergies   all current active meds have been reviewed  Medications Prior to Admission   Medication    apixaban (Eliquis) 5 mg    dofetilide (TIKOSYN) 250 mcg capsule    finasteride (PROSCAR) 5 mg tablet    rosuvastatin (CRESTOR) 20 MG tablet    sildenafil (VIAGRA) 50 MG tablet    acetaminophen-codeine (TYLENOL #3) 300-30 mg per tablet    Diclofenac Sodium (VOLTAREN) 1 %    DULoxetine  "(CYMBALTA) 60 mg delayed release capsule    fluticasone (FLONASE) 50 mcg/act nasal spray    furosemide (LASIX) 40 mg tablet    lidocaine (LIDODERM) 5 %    metoprolol succinate (TOPROL-XL) 25 mg 24 hr tablet    Omega-3 1000 MG CAPS    sacubitril-valsartan (Entresto) 24-26 MG TABS    tamsulosin (FLOMAX) 0.4 mg          ASSESSMENT & PLAN   Principal Problem:    Atrial flutter (HCC)/atrial fibrillation  Active Problems:    Chronic kidney disease (CKD), stage II (mild)    BPH (benign prostatic hyperplasia)  Chronic recurrent pulmonary embolism on anticoagulation  Anticoagulation    This patient presented with high heart rate and was found to be in atrial fibrillation/flutter with.'s of rapid ventricular response.  Patient has been evaluated by electrophysiology Dr. Montero in the past.  Patient has been on Tikosyn at home.    Patient was scheduled for cardioversion.  However, given that patient has clearly exhibited.'s of sinus rhythm will hold off on cardioversion.    QT interval when patient is in sinus rhythm is 428 ms.  Will increase the dosage of Tikosyn to 375 mg twice a day.  Continue to monitor telemetry as well as follow-up EKGs.    Continue beta-blockers.  Continue anticoagulation.    Plan of care was discussed with patient.  He is agreeable with the plan of care.        Lian Botello MD  4/26/2024,4:13 PM    Portions of the record may have been created with voice recognition software.  Occasional wrong word or \"sound a like\" substitutions may have occurred due to the inherent limitations of voice recognition software.  Read the chart carefully and recognize, using context, where substitutions have occurred.   "

## 2024-04-26 NOTE — PROGRESS NOTES
[facility]  Progress Note  Name: Nas Estrada I  MRN: [id number]  Unit/Bed#: MS Kelly-01 I Date of Admission: 4/23/2024   Date of Service: 4/26/2024 I Hospital Day: 2    Assessment/Plan   * Atrial flutter (HCC)  Assessment & Plan  Patient presented to the ED after having 3 days of chest pain, shortness of breath, lightheadedness, and severe fatigue.  His heart rate in the 150s.  While in the ED he was given 10 mg of IV Lopressor and spontaneously resolved to normal sinus rhythm.      While in ED  heart rate A-flutter  Patient initially reverted to normal sinus rhythm after IV Lopressor given in ED, patient converted back to symptomatic A-fib with rvr rate of 120s to 130s  Cardiology consulted  Continue Tikosyn and metoprolol  Echo-he left ventricular ejection fraction is 50%, lower in other views. Systolic function is low normal   Cardioversion was canceled for 4/26; patient converted to normal sinus rhythm this morning Tikosyn was increased to continue metoprolol  Continue Eliquis  TSH 2.819  CTA chest Negative  Continue telemetry    Chest pain  Assessment & Plan  Resolved after IV metoprolol administered  EKG showed a flutter heart rates 130s  Troponin within normal limits x 2  Telemetry  Resolved    BPH (benign prostatic hyperplasia)  Assessment & Plan  Continue with tamsulosin and Proscar    Chronic kidney disease (CKD), stage II (mild)  Assessment & Plan  Lab Results   Component Value Date    EGFR 58 04/26/2024    EGFR 61 04/25/2024    EGFR 59 04/24/2024    CREATININE 1.18 04/26/2024    CREATININE 1.13 04/25/2024    CREATININE 1.16 04/24/2024     Creatinine baseline  Euvolemic  No longer takes diuretics         VTE Pharmacologic Prophylaxis: VTE Score: 4 Moderate Risk (Score 3-4) - Pharmacological DVT Prophylaxis Ordered: apixaban (Eliquis).    Mobility:   Basic Mobility Inpatient Raw Score: 24  JH-HLM Goal: 8: Walk 250 feet or more  JH-HLM Achieved: 1: Laying in bed  JH-HLM  Goal achieved. Continue to encourage appropriate mobility.    Patient Centered Rounds: I performed bedside rounds with nursing staff today.  Discussed with Karly at 106  Discussions with Specialists or Other Care Team Provider: Discussed with Pernell from cardiology at 106    Education and Discussions with Family / Patient: Updated  (wife) at bedside.  Wife Christy at bedside during rounding    Total Time Spent on Date of Encounter in care of patient: 35 mins. This time was spent on one or more of the following: performing physical exam; counseling and coordination of care; obtaining or reviewing history; documenting in the medical record; reviewing/ordering tests, medications or procedures; communicating with other healthcare professionals and discussing with patient's family/caregivers.    Current Length of Stay: 2 day(s)  Current Patient Status: Inpatient   Certification Statement: The patient will continue to require additional inpatient hospital stay due to further monitoring of heart rate and rhythm after increasing Tikosyn  Discharge Plan: Anticipate discharge tomorrow to home.    Code Status: Level 1 - Full Code    Subjective:   Denies any chest pain chest pain shortness of breath difficulty breathing or palpitations reports that he feels significantly better today and offers no acute complaints he is agreeable to staying overnight for us to monitor his heart rate    Objective:     Vitals:   Temp (24hrs), Av.4 °F (36.9 °C), Min:98.1 °F (36.7 °C), Max:98.6 °F (37 °C)    Temp:  [98.1 °F (36.7 °C)-98.6 °F (37 °C)] 98.6 °F (37 °C)  HR:  [] 115  Resp:  [18] 18  BP: (104-135)/(62-93) 110/81  SpO2:  [95 %-97 %] 97 %  Body mass index is 32.5 kg/m².     Input and Output Summary (last 24 hours):     Intake/Output Summary (Last 24 hours) at 2024 1302  Last data filed at 2024 0301  Gross per 24 hour   Intake 240 ml   Output 800 ml   Net -560 ml       Physical Exam:   Physical  Exam  Vitals reviewed.   Constitutional:       General: He is not in acute distress.  Cardiovascular:      Rate and Rhythm: Normal rate.   Pulmonary:      Effort: No respiratory distress.   Skin:     General: Skin is warm.   Neurological:      Mental Status: He is alert. Mental status is at baseline.   Psychiatric:         Mood and Affect: Mood normal.          Additional Data:     Labs:  Results from last 7 days   Lab Units 04/26/24  0520 04/24/24  0441 04/23/24  1056   WBC Thousand/uL 4.95   < > 7.04   HEMOGLOBIN g/dL 12.4   < > 14.5   HEMATOCRIT % 36.5   < > 42.3   PLATELETS Thousands/uL 140*   < > 147*   SEGS PCT %  --   --  70   LYMPHO PCT %  --   --  21   MONO PCT %  --   --  8   EOS PCT %  --   --  1    < > = values in this interval not displayed.     Results from last 7 days   Lab Units 04/26/24  0520 04/24/24 0441 04/23/24  1056   SODIUM mmol/L 141   < > 142   POTASSIUM mmol/L 3.8   < > 3.7   CHLORIDE mmol/L 110*   < > 107   CO2 mmol/L 26   < > 27   BUN mg/dL 15   < > 21   CREATININE mg/dL 1.18   < > 1.30   ANION GAP mmol/L 5   < > 8   CALCIUM mg/dL 8.7   < > 9.4   ALBUMIN g/dL  --   --  4.0   TOTAL BILIRUBIN mg/dL  --   --  0.57   ALK PHOS U/L  --   --  58   ALT U/L  --   --  13   AST U/L  --   --  11*   GLUCOSE RANDOM mg/dL 99   < > 151*    < > = values in this interval not displayed.     Results from last 7 days   Lab Units 04/23/24  1056   INR  1.04                   Lines/Drains:  Invasive Devices       Peripheral Intravenous Line  Duration             Peripheral IV 04/23/24 Left Antecubital 3 days                      Telemetry:  Telemetry Orders (From admission, onward)               24 Hour Telemetry Monitoring  Continuous x 24 Hours (Telem)        Question:  Reason for 24 Hour Telemetry  Answer:  Arrhythmias requiring acute medical intervention / PPM or ICD malfunction                       Recent Cultures (last 7 days):         Last 24 Hours Medication List:   Current Facility-Administered  Medications   Medication Dose Route Frequency Provider Last Rate    acetaminophen  650 mg Oral Q6H PRN Wilbert Gonzalez PA-C      apixaban  5 mg Oral BID Wilbertsamuel Gonzalez PA-C      atorvastatin  40 mg Oral Daily With Dinner Wilbert Gonzalez PA-C      dofetilide  125 mcg Oral Q12H FRANCIA Knox PA-C      dofetilide  250 mcg Oral Q12H FRANCIA Yair Knox PA-C      finasteride  5 mg Oral Daily Wilbert Gonzalez PA-C      metoprolol succinate  50 mg Oral Daily Lian Botello MD      tamsulosin  0.4 mg Oral Daily With Dinner Wilbert Gonzalez PA-C          Today, Patient Was Seen By: COLLIN Sheikh    **Please Note: This note may have been constructed using a voice recognition system.**

## 2024-04-26 NOTE — UTILIZATION REVIEW
NOTIFICATION OF INPATIENT ADMISSION   AUTHORIZATION REQUEST   SERVICING FACILITY:   [place] [address]  Tax ID: [id number]  NPI: [id number] ATTENDING PROVIDER:  Attending Name and NPI#: Fransisco Barba Md [[id number]]  Address: [address] [place] [address]  Phone: [phone]     ADMISSION INFORMATION:  Place of Service: Inpatient [facility]  Place of Service Code: 21  Inpatient Admission Date/Time: 4/24/24  2:16 PM  Discharge Date/Time: No discharge date for patient encounter.  Admitting Diagnosis Code/Description:  Palpitations [R00.2]  Chest pain [R07.9]  Dyspnea [R06.00]  Heart palpitations [R00.2]  Atrial fibrillation with rapid ventricular response (HCC) [I48.91]     UTILIZATION REVIEW CONTACT:  Clara Vargas, Utilization   Network Utilization Review Department  Phone: [phone]  Fax [phone]  Email: [email or web address]  Contact for approvals/pending authorizations, clinical reviews, and discharge.     PHYSICIAN ADVISORY SERVICES:  Medical Necessity Denial & Qzls-pp-Sefk Review  Phone: [phone]  Fax: [phone]  Email: [email or web address]     DISCHARGE SUPPORT TEAM:  For Patients Discharge Needs & Updates  Phone: [phone] opt. 2 Fax: [phone]  Email: [email or web address]

## 2024-04-26 NOTE — ASSESSMENT & PLAN NOTE
Lab Results   Component Value Date    EGFR 58 04/26/2024    EGFR 61 04/25/2024    EGFR 59 04/24/2024    CREATININE 1.18 04/26/2024    CREATININE 1.13 04/25/2024    CREATININE 1.16 04/24/2024     Creatinine baseline  Euvolemic  No longer takes diuretics

## 2024-04-26 NOTE — PLAN OF CARE
Problem: PAIN - ADULT  Goal: Verbalizes/displays adequate comfort level or baseline comfort level  Description: Interventions:  - Encourage patient to monitor pain and request assistance  - Assess pain using appropriate pain scale  - Administer analgesics based on type and severity of pain and evaluate response  - Implement non-pharmacological measures as appropriate and evaluate response  - Consider cultural and social influences on pain and pain management  - Notify physician/advanced practitioner if interventions unsuccessful or patient reports new pain  Outcome: Progressing     Problem: INFECTION - ADULT  Goal: Absence or prevention of progression during hospitalization  Description: INTERVENTIONS:  - Assess and monitor for signs and symptoms of infection  - Monitor lab/diagnostic results  - Monitor all insertion sites, i.e. indwelling lines, tubes, and drains  - Monitor endotracheal if appropriate and nasal secretions for changes in amount and color  - [place] appropriate cooling/warming therapies per order  - Administer medications as ordered  - Instruct and encourage patient and family to use good hand hygiene technique  - Identify and instruct in appropriate isolation precautions for identified infection/condition  Outcome: Progressing  Goal: Absence of fever/infection during neutropenic period  Description: INTERVENTIONS:  - Monitor WBC    Outcome: Progressing     Problem: SAFETY ADULT  Goal: Patient will remain free of falls  Description: INTERVENTIONS:  - Educate patient/family on patient safety including physical limitations  - Instruct patient to call for assistance with activity   - Consult OT/PT to assist with strengthening/mobility   - Keep Call bell within reach  - Keep bed low and locked with side rails adjusted as appropriate  - Keep care items and personal belongings within reach  - Initiate and maintain comfort rounds  - Make Fall Risk Sign visible to staff  - Offer Toileting every  Hours,  in advance of need  - Initiate/Maintain alarm  - Obtain necessary fall risk management equipment:   - Apply yellow socks and bracelet for high fall risk patients  - Consider moving patient to room near nurses station  Outcome: Progressing  Goal: Maintain or return to baseline ADL function  Description: INTERVENTIONS:  -  Assess patient's ability to carry out ADLs; assess patient's baseline for ADL function and identify physical deficits which impact ability to perform ADLs (bathing, care of mouth/teeth, toileting, grooming, dressing, etc.)  - Assess/evaluate cause of self-care deficits   - Assess range of motion  - Assess patient's mobility; develop plan if impaired  - Assess patient's need for assistive devices and provide as appropriate  - Encourage maximum independence but intervene and supervise when necessary  - Involve family in performance of ADLs  - Assess for home care needs following discharge   - Consider OT consult to assist with ADL evaluation and planning for discharge  - Provide patient education as appropriate  Outcome: Progressing  Goal: Maintains/Returns to pre admission functional level  Description: INTERVENTIONS:  - Perform AM-PAC 6 Click Basic Mobility/ Daily Activity assessment daily.  - Set and communicate daily mobility goal to care team and patient/family/caregiver.   - Collaborate with rehabilitation services on mobility goals if consulted  - Perform Range of Motion  times a day.  - Reposition patient every  hours.  - Dangle patient  times a day  - Stand patient  times a day  - Ambulate patient  times a day  - Out of bed to chair  times a day   - Out of bed for meals  times a day  - Out of bed for toileting  - Record patient progress and toleration of activity level   Outcome: Progressing     Problem: DISCHARGE PLANNING  Goal: Discharge to home or other facility with appropriate resources  Description: INTERVENTIONS:  - Identify barriers to discharge w/patient and caregiver  - Arrange for  needed discharge resources and transportation as appropriate  - Identify discharge learning needs (meds, wound care, etc.)  - Arrange for interpretive services to assist at discharge as needed  - Refer to Case Management Department for coordinating discharge planning if the patient needs post-hospital services based on physician/advanced practitioner order or complex needs related to functional status, cognitive ability, or social support system  Outcome: Progressing     Problem: Knowledge Deficit  Goal: Patient/family/caregiver demonstrates understanding of disease process, treatment plan, medications, and discharge instructions  Description: Complete learning assessment and assess knowledge base.  Interventions:  - Provide teaching at level of understanding  - Provide teaching via preferred learning methods  Outcome: Progressing

## 2024-04-27 VITALS
DIASTOLIC BLOOD PRESSURE: 77 MMHG | HEART RATE: 61 BPM | WEIGHT: 260 LBS | SYSTOLIC BLOOD PRESSURE: 148 MMHG | TEMPERATURE: 98.3 F | HEIGHT: 75 IN | RESPIRATION RATE: 16 BRPM | OXYGEN SATURATION: 97 % | BODY MASS INDEX: 32.33 KG/M2

## 2024-04-27 LAB
ANION GAP SERPL CALCULATED.3IONS-SCNC: 6 MMOL/L (ref 4–13)
ATRIAL RATE: 0 BPM
ATRIAL RATE: 54 BPM
BUN SERPL-MCNC: 16 MG/DL (ref 5–25)
CALCIUM SERPL-MCNC: 8.9 MG/DL (ref 8.4–10.2)
CHLORIDE SERPL-SCNC: 109 MMOL/L (ref 96–108)
CO2 SERPL-SCNC: 27 MMOL/L (ref 21–32)
CREAT SERPL-MCNC: 1.29 MG/DL (ref 0.6–1.3)
ERYTHROCYTE [DISTWIDTH] IN BLOOD BY AUTOMATED COUNT: 14.4 % (ref 11.6–15.1)
GFR SERPL CREATININE-BSD FRML MDRD: 52 ML/MIN/1.73SQ M
GLUCOSE SERPL-MCNC: 108 MG/DL (ref 65–140)
HCT VFR BLD AUTO: 38.4 % (ref 36.5–49.3)
HGB BLD-MCNC: 12.8 G/DL (ref 12–17)
MAGNESIUM SERPL-MCNC: 2 MG/DL (ref 1.9–2.7)
MCH RBC QN AUTO: 31.3 PG (ref 26.8–34.3)
MCHC RBC AUTO-ENTMCNC: 33.3 G/DL (ref 31.4–37.4)
MCV RBC AUTO: 94 FL (ref 82–98)
P AXIS: 49 DEGREES
PLATELET # BLD AUTO: 141 THOUSANDS/UL (ref 149–390)
PMV BLD AUTO: 11.5 FL (ref 8.9–12.7)
POTASSIUM SERPL-SCNC: 3.8 MMOL/L (ref 3.5–5.3)
PR INTERVAL: 178 MS
QRS AXIS: -38 DEGREES
QRS AXIS: 0 DEGREES
QRSD INTERVAL: 0 MS
QRSD INTERVAL: 144 MS
QT INTERVAL: 0 MS
QT INTERVAL: 442 MS
QTC INTERVAL: 0 MS
QTC INTERVAL: 419 MS
RBC # BLD AUTO: 4.09 MILLION/UL (ref 3.88–5.62)
SODIUM SERPL-SCNC: 142 MMOL/L (ref 135–147)
T WAVE AXIS: -48 DEGREES
T WAVE AXIS: 0 DEGREES
VENTRICULAR RATE: 0 BPM
VENTRICULAR RATE: 54 BPM
WBC # BLD AUTO: 5.46 THOUSAND/UL (ref 4.31–10.16)

## 2024-04-27 PROCEDURE — 99232 SBSQ HOSP IP/OBS MODERATE 35: CPT | Performed by: INTERNAL MEDICINE

## 2024-04-27 PROCEDURE — 93010 ELECTROCARDIOGRAM REPORT: CPT | Performed by: INTERNAL MEDICINE

## 2024-04-27 PROCEDURE — 93005 ELECTROCARDIOGRAM TRACING: CPT

## 2024-04-27 PROCEDURE — 80048 BASIC METABOLIC PNL TOTAL CA: CPT | Performed by: NURSE PRACTITIONER

## 2024-04-27 PROCEDURE — 99239 HOSP IP/OBS DSCHRG MGMT >30: CPT | Performed by: NURSE PRACTITIONER

## 2024-04-27 PROCEDURE — 85027 COMPLETE CBC AUTOMATED: CPT | Performed by: NURSE PRACTITIONER

## 2024-04-27 PROCEDURE — 83735 ASSAY OF MAGNESIUM: CPT | Performed by: NURSE PRACTITIONER

## 2024-04-27 RX ORDER — DOFETILIDE 0.12 MG/1
125 CAPSULE ORAL 2 TIMES DAILY
Qty: 180 CAPSULE | Refills: 1 | Status: SHIPPED | OUTPATIENT
Start: 2024-04-27

## 2024-04-27 RX ORDER — DOFETILIDE 0.25 MG/1
250 CAPSULE ORAL EVERY 12 HOURS SCHEDULED
Qty: 60 CAPSULE | Refills: 0 | Status: SHIPPED | OUTPATIENT
Start: 2024-04-27

## 2024-04-27 RX ORDER — ATORVASTATIN CALCIUM 40 MG/1
40 TABLET, FILM COATED ORAL
Qty: 30 TABLET | Refills: 0 | Status: SHIPPED | OUTPATIENT
Start: 2024-04-27

## 2024-04-27 RX ORDER — DOFETILIDE 0.12 MG/1
125 CAPSULE ORAL EVERY 12 HOURS SCHEDULED
Qty: 60 CAPSULE | Refills: 0 | Status: SHIPPED | OUTPATIENT
Start: 2024-04-27

## 2024-04-27 RX ADMIN — METOPROLOL SUCCINATE 50 MG: 50 TABLET, EXTENDED RELEASE ORAL at 09:50

## 2024-04-27 RX ADMIN — APIXABAN 5 MG: 5 TABLET, FILM COATED ORAL at 09:50

## 2024-04-27 RX ADMIN — DOFETILIDE 125 MCG: 0.12 CAPSULE ORAL at 12:26

## 2024-04-27 RX ADMIN — DOFETILIDE 250 MCG: 0.25 CAPSULE ORAL at 12:26

## 2024-04-27 RX ADMIN — FINASTERIDE 5 MG: 5 TABLET, FILM COATED ORAL at 09:50

## 2024-04-27 NOTE — DISCHARGE SUMMARY
[facility]  Discharge Note  Name: Nas Estrada I  MRN: [id number]  Unit/Bed#: [facility] I Date of Admission: 4/23/2024   Date of Service: 4/27/2024 I Hospital Day: 3    Assessment/Plan   * Atrial flutter (HCC)  Assessment & Plan  Patient presented to the ED after having 3 days of chest pain, shortness of breath, lightheadedness, and severe fatigue.  His heart rate in the 150s.  While in the ED he was given 10 mg of IV Lopressor and spontaneously resolved to normal sinus rhythm.      While in ED  heart rate A-flutter  Patient initially reverted to normal sinus rhythm after IV Lopressor given in ED, patient converted back to symptomatic A-fib with rvr rate of 120s to 130s  Cardiology consulted  Echo-he left ventricular ejection fraction is 50%, lower in other views. Systolic function is low normal   Cardioversion was canceled for 4/26; patient converted to normal sinus rhythm this morning Tikosyn was increased and continue metoprolol  Repeat EKG on 4/27 after the increase of Tikosyn revealed a QTc of  Continue Eliquis    Chest pain  Assessment & Plan  Resolved after IV metoprolol administered  EKG showed a flutter heart rates 130s  Troponin within normal limits x 2  Telemetry  Resolved    BPH (benign prostatic hyperplasia)  Assessment & Plan  Continue with tamsulosin and Proscar    Chronic kidney disease (CKD), stage II (mild)  Assessment & Plan  Lab Results   Component Value Date    EGFR 52 04/27/2024    EGFR 58 04/26/2024    EGFR 61 04/25/2024    CREATININE 1.29 04/27/2024    CREATININE 1.18 04/26/2024    CREATININE 1.13 04/25/2024     Creatinine baseline  Euvolemic  No longer takes diuretics          Discharging Physician / Practitioner: COLLIN Sheikh  PCP: Richard Lipscomb MD  Admission Date:   Admission Orders (From admission, onward)       Ordered        04/24/24 1416  INPATIENT ADMISSION  Once            04/23/24 1316  Place in Observation  Once                           Discharge Date: 04/27/24    Medical Problems       Resolved Problems  Date Reviewed: 12/28/2023   None         Consultations During Hospital Stay:  IP CONSULT TO CARDIOLOGY    Procedures Performed:   Echo complete w/ contrast if indicated    Result Date: 4/25/2024  Narrative:   Left Ventricle: Left ventricular cavity size is normal. Wall thickness is moderately increased. There is moderate concentric hypertrophy. There is moderate asymmetric hypertrophy of the basal septal wall. The left ventricular ejection fraction is 50%, lower in other views. Systolic function is low normal. Wall motion is normal.  Possible mild septal and lateral wall hypokinesis. Diastolic function is moderately abnormal, consistent with grade II (pseudonormal) relaxation.   Possible plaque in the left subclavian, nondiagnostic.   Technically difficult study.   Compared to previous echocardiogram 11/20/2023 the EF then was 55%.     XR chest 1 view portable    Result Date: 4/23/2024  Narrative: XR CHEST PORTABLE INDICATION: CP, SOB, rapid HR. COMPARISON: 10/10/2023 FINDINGS: Clear lungs. No pneumothorax or pleural effusion. Normal cardiomediastinal silhouette. Bones are unremarkable for age. Normal upper abdomen.     Impression: No acute cardiopulmonary disease. Workstation performed: [id number]     CTA ED chest PE study    Result Date: 4/23/2024  Narrative: CTA - CHEST WITH IV CONTRAST - PULMONARY ANGIOGRAM INDICATION:   uncontrolled tachycardia, known PE hx. per my review of the medical record, history of cardiomyopathy and A-fib with 3-day history of elevated heart rate, chest pain, and dyspnea. COMPARISON: CXR 4/23/2024, chest CT 10/10/2023 TECHNIQUE: CT angiogram timed for optimal opacification of the pulmonary arteries.  Axial, sagittal, and coronal 2D reformats created from source data.  Coronal 3D MIP postprocessing on the acquisition scanner. Radiation dose length product (DLP):  604 mGy-cm .  Radiation dose exposure minimized using  iterative reconstruction and automated exposure control. IV Contrast:  85 mL of iohexol (OMNIPAQUE) FINDINGS: PULMONARY ARTERIES:  No pulmonary embolus. LUNGS: No acute disease. Minimal paraseptal emphysema. AIRWAYS: No significant filling defects. PLEURA:  Unremarkable. HEART/GREAT VESSELS:  Normal for age. MEDIASTINUM AND SE:  Unremarkable. CHEST WALL AND LOWER NECK: Stable moderate gynecomastia. UPPER ABDOMEN:  Unremarkable. OSSEOUS STRUCTURES: Mild degenerative disease in the spine.     Impression: No pulmonary embolus. No acute pulmonary disease. Workstation performed: [id number]         Significant Findings / Test Results:   above    Incidental Findings:   no     Test Results Pending at Discharge (will require follow up):   no     Outpatient Tests Requested:  no    Complications:  no    Past Medical History:   Diagnosis Date    Cardiomyopathy (HCC)     Hyperlipidemia     Post-nasal drip     Pulmonary embolism (HCC)        Reason for Admission: Rapid Heart Rate (Pt has had a rapid heart rate for the past 3 days. Pt came in today because his sister told him he needed to be seen. )       Hospital Course:     Nas Cantor Jr. is a 78 y.o. male patient with past medical history of above who originally presented to the hospital on 4/23/2024 due to Rapid Heart Rate (Pt has had a rapid heart rate for the past 3 days. Pt came in today because his sister told him he needed to be seen. )  She came in with rapid heart rate was found to be in atrial flutter then converted back to normal sinus rhythm then unfortunately converted back to atrial fibrillation.  Initially was trying to    Please see above list of diagnoses and related plan for additional information.     Condition at Discharge: stable     Discharge Day Visit / Exam:     Subjective: Patient is ambulating in the room wife is at his bedside he offers no acute complaints  Vitals: Blood Pressure: 148/77 (04/27/24 1336)  Pulse: 61 (04/27/24 1057)  Temperature:  "98.3 °F (36.8 °C) (04/27/24 1057)  Temp Source: Oral (04/25/24 1500)  Respirations: 16 (04/27/24 0307)  Height: 6' 3\" (190.5 cm) (04/25/24 1500)  Weight - Scale: 118 kg (260 lb) (04/25/24 1500)  SpO2: 97 % (04/27/24 1057)  Exam:   Physical Exam  Vitals reviewed.   Constitutional:       General: He is not in acute distress.     Appearance: Normal appearance. He is obese. He is not ill-appearing.   Cardiovascular:      Rate and Rhythm: Normal rate.   Pulmonary:      Effort: No respiratory distress.   Skin:     General: Skin is warm.   Neurological:      Mental Status: He is alert. Mental status is at baseline.   Psychiatric:         Mood and Affect: Mood normal.       Discussion with Family: Wife at bedside Christy    Discharge instructions/Information to patient and family:   See after visit summary for information provided to patient and family.      Provisions for Follow-Up Care:  See after visit summary for information related to follow-up care and any pertinent home health orders.      Disposition:     Home    Planned Readmission: no     Discharge Statement:  I spent 45 minutes discharging the patient. This time was spent on the day of discharge. I had direct contact with the patient on the day of discharge. Greater than 50% of the total time was spent examining patient, answering all patient questions, arranging and discussing plan of care with patient as well as directly providing post-discharge instructions.  Additional time then spent on discharge activities.    Discharge Medications:  See after visit summary for reconciled discharge medications provided to patient and family.      ** Please Note: This note has been constructed using a voice recognition system **    "

## 2024-04-27 NOTE — PROGRESS NOTES
Patient due to peripheral IV change per hospital policy and patient ordered AM blood draw. Patient refusing both at this time. Stating he is going home today and its not necessary. Patient educated on important of blood work and peripheral IV change, however still refusing. On call SLIM made aware.

## 2024-04-27 NOTE — ASSESSMENT & PLAN NOTE
Patient presented to the ED after having 3 days of chest pain, shortness of breath, lightheadedness, and severe fatigue.  His heart rate in the 150s.  While in the ED he was given 10 mg of IV Lopressor and spontaneously resolved to normal sinus rhythm.      While in ED  heart rate A-flutter  Patient initially reverted to normal sinus rhythm after IV Lopressor given in ED, patient converted back to symptomatic A-fib with rvr rate of 120s to 130s  Cardiology consulted  Echo-he left ventricular ejection fraction is 50%, lower in other views. Systolic function is low normal   Cardioversion was canceled for 4/26; patient converted to normal sinus rhythm this morning Tikosyn was increased and continue metoprolol  Repeat EKG on 4/27 after the increase of Tikosyn revealed a QTc of  Continue Eliquis

## 2024-04-27 NOTE — NURSING NOTE
Patient d/c home with all of his belongings. Left in stable condition. D/C instructions given and he verbalized understanding

## 2024-04-27 NOTE — ASSESSMENT & PLAN NOTE
Lab Results   Component Value Date    EGFR 52 04/27/2024    EGFR 58 04/26/2024    EGFR 61 04/25/2024    CREATININE 1.29 04/27/2024    CREATININE 1.18 04/26/2024    CREATININE 1.13 04/25/2024     Creatinine baseline  Euvolemic  No longer takes diuretics

## 2024-04-27 NOTE — PLAN OF CARE
Problem: PAIN - ADULT  Goal: Verbalizes/displays adequate comfort level or baseline comfort level  Description: Interventions:  - Encourage patient to monitor pain and request assistance  - Assess pain using appropriate pain scale  - Administer analgesics based on type and severity of pain and evaluate response  - Implement non-pharmacological measures as appropriate and evaluate response  - Consider cultural and social influences on pain and pain management  - Notify physician/advanced practitioner if interventions unsuccessful or patient reports new pain  Outcome: Progressing     Problem: INFECTION - ADULT  Goal: Absence or prevention of progression during hospitalization  Description: INTERVENTIONS:  - Assess and monitor for signs and symptoms of infection  - Monitor lab/diagnostic results  - Monitor all insertion sites, i.e. indwelling lines, tubes, and drains  - Monitor endotracheal if appropriate and nasal secretions for changes in amount and color  - [place] appropriate cooling/warming therapies per order  - Administer medications as ordered  - Instruct and encourage patient and family to use good hand hygiene technique  - Identify and instruct in appropriate isolation precautions for identified infection/condition  Outcome: Progressing  Goal: Absence of fever/infection during neutropenic period  Description: INTERVENTIONS:  - Monitor WBC    Outcome: Progressing     Problem: SAFETY ADULT  Goal: Patient will remain free of falls  Description: INTERVENTIONS:  - Educate patient/family on patient safety including physical limitations  - Instruct patient to call for assistance with activity   - Consult OT/PT to assist with strengthening/mobility   - Keep Call bell within reach  - Keep bed low and locked with side rails adjusted as appropriate  - Keep care items and personal belongings within reach  - Initiate and maintain comfort rounds  - Make Fall Risk Sign visible to staff  - Offer Toileting every 2 Hours,  in advance of need  - Obtain necessary fall risk management equipment: nonskid socks  - Apply yellow socks and bracelet for high fall risk patients  - Consider moving patient to room near nurses station  Outcome: Progressing  Goal: Maintain or return to baseline ADL function  Description: INTERVENTIONS:  -  Assess patient's ability to carry out ADLs; assess patient's baseline for ADL function and identify physical deficits which impact ability to perform ADLs (bathing, care of mouth/teeth, toileting, grooming, dressing, etc.)  - Assess/evaluate cause of self-care deficits   - Assess range of motion  - Assess patient's mobility; develop plan if impaired  - Assess patient's need for assistive devices and provide as appropriate  - Encourage maximum independence but intervene and supervise when necessary  - Involve family in performance of ADLs  - Assess for home care needs following discharge   - Consider OT consult to assist with ADL evaluation and planning for discharge  - Provide patient education as appropriate  Outcome: Progressing  Goal: Maintains/Returns to pre admission functional level  Description: INTERVENTIONS:  - Perform AM-PAC 6 Click Basic Mobility/ Daily Activity assessment daily.  - Set and communicate daily mobility goal to care team and patient/family/caregiver.   - Collaborate with rehabilitation services on mobility goals if consulted  - Perform Range of Motion 4 times a day.  - Reposition patient every 2 hours.  - Dangle patient 4 times a day  - Stand patient 4 times a day  - Ambulate patient 4 times a day  - Out of bed to chair 4 times a day   - Out of bed for meals 4 times a day  - Out of bed for toileting  - Record patient progress and toleration of activity level   Outcome: Progressing     Problem: DISCHARGE PLANNING  Goal: Discharge to home or other facility with appropriate resources  Description: INTERVENTIONS:  - Identify barriers to discharge w/patient and caregiver  - Arrange for needed  discharge resources and transportation as appropriate  - Identify discharge learning needs (meds, wound care, etc.)  - Arrange for interpretive services to assist at discharge as needed  - Refer to Case Management Department for coordinating discharge planning if the patient needs post-hospital services based on physician/advanced practitioner order or complex needs related to functional status, cognitive ability, or social support system  Outcome: Progressing     Problem: Knowledge Deficit  Goal: Patient/family/caregiver demonstrates understanding of disease process, treatment plan, medications, and discharge instructions  Description: Complete learning assessment and assess knowledge base.  Interventions:  - Provide teaching at level of understanding  - Provide teaching via preferred learning methods  Outcome: Progressing

## 2024-04-27 NOTE — PLAN OF CARE
Problem: PAIN - ADULT  Goal: Verbalizes/displays adequate comfort level or baseline comfort level  Description: Interventions:  - Encourage patient to monitor pain and request assistance  - Assess pain using appropriate pain scale  - Administer analgesics based on type and severity of pain and evaluate response  - Implement non-pharmacological measures as appropriate and evaluate response  - Consider cultural and social influences on pain and pain management  - Notify physician/advanced practitioner if interventions unsuccessful or patient reports new pain  Outcome: Adequate for Discharge     Problem: INFECTION - ADULT  Goal: Absence or prevention of progression during hospitalization  Description: INTERVENTIONS:  - Assess and monitor for signs and symptoms of infection  - Monitor lab/diagnostic results  - Monitor all insertion sites, i.e. indwelling lines, tubes, and drains  - Monitor endotracheal if appropriate and nasal secretions for changes in amount and color  - Waldo appropriate cooling/warming therapies per order  - Administer medications as ordered  - Instruct and encourage patient and family to use good hand hygiene technique  - Identify and instruct in appropriate isolation precautions for identified infection/condition  Outcome: Adequate for Discharge  Goal: Absence of fever/infection during neutropenic period  Description: INTERVENTIONS:  - Monitor WBC    Outcome: Adequate for Discharge     Problem: SAFETY ADULT  Goal: Patient will remain free of falls  Description: INTERVENTIONS:  - Educate patient/family on patient safety including physical limitations  - Instruct patient to call for assistance with activity   - Consult OT/PT to assist with strengthening/mobility   - Keep Call bell within reach  - Keep bed low and locked with side rails adjusted as appropriate  - Keep care items and personal belongings within reach  - Initiate and maintain comfort rounds  - Make Fall Risk Sign visible to  staff  - Offer Toileting every  Hours, in advance of need  - Initiate/Maintain alarm  - Obtain necessary fall risk management equipment:   - Apply yellow socks and bracelet for high fall risk patients  - Consider moving patient to room near nurses station  Outcome: Adequate for Discharge  Goal: Maintain or return to baseline ADL function  Description: INTERVENTIONS:  -  Assess patient's ability to carry out ADLs; assess patient's baseline for ADL function and identify physical deficits which impact ability to perform ADLs (bathing, care of mouth/teeth, toileting, grooming, dressing, etc.)  - Assess/evaluate cause of self-care deficits   - Assess range of motion  - Assess patient's mobility; develop plan if impaired  - Assess patient's need for assistive devices and provide as appropriate  - Encourage maximum independence but intervene and supervise when necessary  - Involve family in performance of ADLs  - Assess for home care needs following discharge   - Consider OT consult to assist with ADL evaluation and planning for discharge  - Provide patient education as appropriate  Outcome: Adequate for Discharge  Goal: Maintains/Returns to pre admission functional level  Description: INTERVENTIONS:  - Perform AM-PAC 6 Click Basic Mobility/ Daily Activity assessment daily.  - Set and communicate daily mobility goal to care team and patient/family/caregiver.   - Collaborate with rehabilitation services on mobility goals if consulted  - Perform Range of Motion  times a day.  - Reposition patient every  hours.  - Dangle patient  times a day  - Stand patient  times a day  - Ambulate patient  times a day  - Out of bed to chair  times a day   - Out of bed for meals times a day  - Out of bed for toileting  - Record patient progress and toleration of activity level   Outcome: Adequate for Discharge     Problem: DISCHARGE PLANNING  Goal: Discharge to home or other facility with appropriate resources  Description: INTERVENTIONS:  -  Identify barriers to discharge w/patient and caregiver  - Arrange for needed discharge resources and transportation as appropriate  - Identify discharge learning needs (meds, wound care, etc.)  - Arrange for interpretive services to assist at discharge as needed  - Refer to Case Management Department for coordinating discharge planning if the patient needs post-hospital services based on physician/advanced practitioner order or complex needs related to functional status, cognitive ability, or social support system  Outcome: Adequate for Discharge     Problem: Knowledge Deficit  Goal: Patient/family/caregiver demonstrates understanding of disease process, treatment plan, medications, and discharge instructions  Description: Complete learning assessment and assess knowledge base.  Interventions:  - Provide teaching at level of understanding  - Provide teaching via preferred learning methods  Outcome: Adequate for Discharge

## 2024-04-27 NOTE — PROGRESS NOTES
General Cardiology   Progress Note   Nas Cantor Jr. 78 y.o. male MRN: [id number]  Unit/Bed#: -01 Encounter: [id number]      SUBJECTIVE:   No significant events overnight.  Patient maintaining sinus rhythm.    REVIEW OF SYSTEMS:  Constitutional:  Denies fever or chills   Eyes:  Denies change in visual acuity   HENT:  Denies nasal congestion or sore throat   Respiratory:  Denies cough or shortness of breath   Cardiovascular:  Denies chest pain or edema   GI:  Denies abdominal pain, nausea, vomiting, bloody stools or diarrhea   :  Denies dysuria, frequency, difficulty in micturition and nocturia  Musculoskeletal:  Denies back pain or joint pain   Neurologic:  Denies headache, focal weakness or sensory changes   Endocrine:  Denies polyuria or polydipsia   Lymphatic:  Denies swollen glands   Psychiatric:  Denies depression or anxiety     OBJECTIVE:   Vitals:  Vitals:    24 1057   BP: (!) 97/49   Pulse: 61   Resp:    Temp: 98.3 °F (36.8 °C)   SpO2: 97%     Body mass index is 32.5 kg/m².  Systolic (24hrs), Av , Min:97 , Max:125     Diastolic (24hrs), Av, Min:49, Max:85      Intake/Output Summary (Last 24 hours) at 2024 1207  Last data filed at 2024 0751  Gross per 24 hour   Intake --   Output 1175 ml   Net -1175 ml     Weight (last 2 days)       Date/Time Weight    24 1500 118 (260)            Telemetry Review: Sinus rhythm        PHYSICAL EXAMS:  General:  Patient is not in acute distress, laying in the bed comfortably, awake, alert responding to commands.  Head: Normocephalic, Atraumatic.  HEENT:  Both pupils normal-size atraumatic, normocephalic, nonicteric  Neck:  JVP not raised. Trachea central  Respiratory:  Bronchovascular breathing all over the chest without any accompaniment  Cardiovascular: Regular rate and rhythm muscle no murmurs  GI:  Abdomen soft nontender. Liver and spleen normal size  Lymphatic:  No cervical or inguinal lymphadenopathy  Neurologic:  Patient is awake  "alert, responding to command, well-oriented to time and place and person moving     LABORATORY RESULTS:        CBC with diff:   Results from last 7 days   Lab Units 04/27/24  0859 04/26/24 0520 04/25/24 0558 04/24/24 0441 04/23/24  1056   WBC Thousand/uL 5.46 4.95 4.93   < > 7.04   HEMOGLOBIN g/dL 12.8 12.4 12.5   < > 14.5   HEMATOCRIT % 38.4 36.5 37.2   < > 42.3   MCV fL 94 93 93   < > 92   PLATELETS Thousands/uL 141* 140* 138*   < > 147*   RBC Million/uL 4.09 3.94 4.02   < > 4.61   MCH pg 31.3 31.5 31.1   < > 31.5   MCHC g/dL 33.3 34.0 33.6   < > 34.3   RDW % 14.4 14.2 14.4   < > 14.6   MPV fL 11.5 11.3 10.9   < > 11.3   NRBC AUTO /100 WBCs  --   --   --   --  0    < > = values in this interval not displayed.       CMP:  Results from last 7 days   Lab Units 04/27/24  0859 04/26/24  0520 04/25/24  0558 04/24/24 0441 04/23/24  1056   POTASSIUM mmol/L 3.8 3.8 3.8   < > 3.7   CHLORIDE mmol/L 109* 110* 110*   < > 107   CO2 mmol/L 27 26 26   < > 27   BUN mg/dL 16 15 14   < > 21   CREATININE mg/dL 1.29 1.18 1.13   < > 1.30   CALCIUM mg/dL 8.9 8.7 8.6   < > 9.4   AST U/L  --   --   --   --  11*   ALT U/L  --   --   --   --  13   ALK PHOS U/L  --   --   --   --  58   EGFR ml/min/1.73sq m 52 58 61   < > 52    < > = values in this interval not displayed.       BMP:  Results from last 7 days   Lab Units 04/27/24  0859 04/26/24  0520 04/25/24  0558   POTASSIUM mmol/L 3.8 3.8 3.8   CHLORIDE mmol/L 109* 110* 110*   CO2 mmol/L 27 26 26   BUN mg/dL 16 15 14   CREATININE mg/dL 1.29 1.18 1.13   CALCIUM mg/dL 8.9 8.7 8.6            Results from last 7 days   Lab Units 04/27/24  0859 04/26/24  0520 04/25/24  0558 04/24/24  0441 04/23/24  1056   MAGNESIUM mg/dL 2.0 2.0 2.0 2.0 2.1         Results from last 7 days   Lab Units 04/23/24  1056   TSH 3RD GENERATON uIU/mL 2.819     Results from last 7 days   Lab Units 04/23/24  1056   INR  1.04       Lipid Profile:   No results found for: \"CHOL\"  Lab Results   Component Value Date    HDL " 45 2022    HDL 46 2021    HDL 45 2017     Lab Results   Component Value Date    LDLCALC 81 2022    LDLCALC 126 (H) 2021    LDLCALC 129 (H) 2017     Lab Results   Component Value Date    TRIG 98 2022    TRIG 113 2021    TRIG 143 2021       Cardiac testing:  Results for orders placed during the hospital encounter of 10/13/20    Echo complete with contrast if indicated    [facility]  [address] [facility] [place] [address]  [phone]    Transthoracic Echocardiogram  2D, M-mode, Doppler, and Color Doppler    Study date:  13-Oct-2020    Patient: PARKER PONCE  MR number: [id number]  Account number: [id number]  : 1945  Age: 74 years  Gender: Male  Status: Outpatient  Location: [facility]  Height: 75 in  Weight: 254.5 lb  BP: 124/ 72 mmHg    Indications: Non-ischemic cardiomyopathy.    Diagnoses: I42.9 - Cardiomyopathy, unspecified    Sonographer:  HILL Claudio  Primary Physician:  Richard Lipscomb MD  Referring Physician:  Lian Botello MD  Group:  [facility]  Interpreting Physician:  Lorenzo Khanna MD    SUMMARY    LEFT VENTRICLE:  Systolic function was at the lower limits of normal. Ejection fraction was estimated in the range of 50 % to 55 %.  There were no regional wall motion abnormalities.  Doppler parameters were consistent with abnormal left ventricular relaxation (grade 1 diastolic dysfunction).    RIGHT VENTRICLE:  The size was normal.  Systolic function was normal.    LEFT ATRIUM:  The atrium was mildly dilated.    RIGHT ATRIUM:  The atrium was mildly dilated.    MITRAL VALVE:  There was trace to mild regurgitation.    TRICUSPID VALVE:  There was trace regurgitation.  Estimated peak PA pressure was 38 mmHg.    HISTORY: PRIOR HISTORY: NICM, ARLENE, B/L pulmonary embolism, SVT, CKD II, Shortness of breath,    PROCEDURE: The study was performed in the [facility]  [place]. This was a routine study. The transthoracic approach was used. The study included complete 2D imaging, M-mode, complete spectral Doppler, and color Doppler. The  heart rate was 69 bpm, at the start of the study. Images were obtained from the parasternal, apical, subcostal, and suprasternal notch acoustic windows. Image quality was adequate.    LEFT VENTRICLE: Size was normal. Systolic function was at the lower limits of normal. Ejection fraction was estimated in the range of 50 % to 55 %. There were no regional wall motion abnormalities. Wall thickness was normal. No evidence of  apical thrombus. DOPPLER: Doppler parameters were consistent with abnormal left ventricular relaxation (grade 1 diastolic dysfunction).    RIGHT VENTRICLE: The size was normal. Systolic function was normal. Wall thickness was normal.    LEFT ATRIUM: The atrium was mildly dilated.    RIGHT ATRIUM: The atrium was mildly dilated.    MITRAL VALVE: Valve structure was normal. There was normal leaflet separation. DOPPLER: The transmitral velocity was within the normal range. There was no evidence for stenosis. There was trace to mild regurgitation.    AORTIC VALVE: The valve was trileaflet. Leaflets exhibited normal thickness and normal cuspal separation. DOPPLER: Transaortic velocity was within the normal range. There was no evidence for stenosis. There was no significant  regurgitation.    TRICUSPID VALVE: The valve structure was normal. There was normal leaflet separation. DOPPLER: The transtricuspid velocity was within the normal range. There was no evidence for stenosis. There was trace regurgitation. Estimated peak PA  pressure was 38 mmHg.    PULMONIC VALVE: Leaflets exhibited normal thickness, no calcification, and normal cuspal separation. DOPPLER: The transpulmonic velocity was within the normal range. There was no significant regurgitation.    PERICARDIUM: There was no pericardial effusion. The pericardium was normal in  appearance.    AORTA: The root exhibited normal size.    SYSTEMIC VEINS: IVC: The inferior vena cava was normal in size.    SYSTEM MEASUREMENT TABLES    2D  %FS: 34.03 %  Ao Diam: 3.62 cm  EDV(Teich): 175.09 ml  EF(Teich): 62.11 %  ESV(Teich): 66.34 ml  IVSd: 1.1 cm  LA Area: 27.97 cm2  LA Diam: 5.41 cm  LVEDV MOD A4C: 165.87 ml  LVEF MOD A4C: 55.55 %  LVESV MOD A4C: 73.73 ml  LVIDd: 5.93 cm  LVIDs: 3.91 cm  LVLd A4C: 9.23 cm  LVLs A4C: 8.03 cm  LVPWd: 0.91 cm  RA Area: 20.75 cm2  RVIDd: 4.77 cm  SV MOD A4C: 92.15 ml  SV(Teich): 108.75 ml    CW  AV MaxPG: 10 mmHg  AV Vmax: 1.58 m/s  MR Vmax: 4.55 m/s  MR maxP.83 mmHg  TR MaxP.98 mmHg  TR Vmax: 2.96 m/s    MM  TAPSE: 2.38 cm    PW  MV A [place]: 1.02 m/s  MV Dec [place]: 3.21 m/s2  MV DecT: 190.81 ms  MV E [place]: 0.61 m/s  MV E/A Ratio: 0.6  MV PHT: 55.34 ms  MVA By PHT: 3.98 cm2    [employer] Accredited Echocardiography Laboratory    Prepared and electronically signed by    Lorenzo Khanna MD  Signed 13-Oct-2020 16:09:08    No results found for this or any previous visit.    No results found for this or any previous visit.    No valid procedures specified.  Results for orders placed during the hospital encounter of 18    NM myocardial perfusion spect (stress and/or rest)    [address] [place] [address] [phone]    Rest/Stress Gated SPECT Myocardial Perfusion Imaging After Exercise    Name: PARKER PONCE  MR #: [id number]  Account #: [id number]  Study date: 2018  : 1945  Age: 72 years  Gender: Male  Height: 75 in  Weight: 250 lb  BSA: 2.41 m squared    Allergies: NO KNOWN ALLERGIES    Diagnosis: R55. - Syncope and collapse    Primary Physician:  Richard Lipscomb MD  Interpreting Physician:  Lian Botello MD  Referring Physician:  Richard Lipscomb MD  Technician:  Juma Colunga BS, BA, AART(N)  Group:  [facility]  Other:  Dena Rogers MS, CCT    CLINICAL  QUESTION: Detection of CAD    HISTORY: Patient presented with history of one syncopal event and a history of pulmonary embolism from three months ago. Patient on blood thinner, Simvastatin and ASA. The patient is a 72 year old  male. Chest pain status:  no chest pain. Other symptoms: syncope with collapse. Coronary artery disease risk factors: dyslipidemia. Cardiovascular history: none significant. Co-morbidity: obesity. Medications: aspirin and a lipid lowering agent.    REST ECG: Normal sinus rhythm. Nonspecific T wave abnormalities were present.    PROCEDURE: Treadmill exercise testing was performed, using the Skyler protocol. Gated SPECT myocardial perfusion imaging was performed after stress and at rest. Systolic blood pressure was 128 mmHg, at the start of the study. Diastolic  blood pressure was 76 mmHg, at the start of the study. The heart rate was 68 bpm, at the start of the study.    SKYLER PROTOCOL:  HR bpm SBP mmHg DBP mmHg Symptoms Rhythm/conduct  Baseline 68 128 76 none NSR  Stage 1 130 164 78 -- occasional PAC's  Stage 2 142 -- -- mild dyspnea frequent PAC's  Immediate 142 -- -- -- frequent PAC's  Recovery 1 131 -- -- -- occasional PAC's, rare PVC's  Recovery 2 153 -- -- -- SVT  Recovery 3 146 -- -- -- SVT  Recovery 5 130 120 80 -- SVT  Recovery 10 82 120 70 -- NSR, rare PVC's  No medications or fluids given.    STRESS SUMMARY: Duration of exercise was 4 min. The patient exercised to protocol stage 2. Maximal work rate was 6.7 METs. Functional capacity was normal. Maximal heart rate during stress was 142 bpm ( 96 % of maximal predicted heart  rate). The heart rate response to stress was exaggerated. There was normal resting blood pressure with an appropriate response to stress. The rate-pressure product for the peak heart rate and blood pressure was 63451. There was no chest  pain during stress. The stress test was terminated due to achievement of target heart rate and mild dyspnea. The  stress ECG was negative for ischemia and normal. Arrhythmia during stress: isolated atrial premature beats, isolated premature  ventricular beats, and supraventricular tachycardia brief episodes self limiting.    ISOTOPE ADMINISTRATION:  Resting isotope administration Stress isotope administration  Agent Tetrofosmin Tetrofosmin  Dose 15.02 mCi 44.9 mCi  Date 02/09/2018 02/09/2018  Injection-image interval 30 min 30 min    The radiopharmaceutical was injected one minute before the end of exercise.    MYOCARDIAL PERFUSION IMAGING:  The image quality was fair. Rotating projection images reveal moderate diaphragmatic attenuation. The left ventricle was mildly dilated. The TID ratio was 0.94.    GATED SPECT:  The calculated left ventricular ejection fraction was 41 %. There was mild global left ventricular hypokinesis.    SUMMARY:  -  Stress results: Duration of exercise was 4 min. Target heart rate was achieved. There was no chest pain during stress.  -  ECG conclusions: The stress ECG was negative for ischemia and normal. Arrhythmia during stress: isolated atrial premature beats, isolated premature ventricular beats, and supraventricular tachycardia brief episodes self limiting.  -  Gated SPECT: The calculated left ventricular ejection fraction was 41 %. There was mild global left ventricular hypokinesis.    IMPRESSIONS: Moderate sized, moderate intensity fixed inferior defect likely representing diaphrgmatic attenuation. No ischemia. Inferior defect looks worse at rest. Left ventricular systolic function was mildly reduced.    Prepared and signed by    Lian Botello MD  Signed 02/12/2018 11:52:33      Meds/Allergies   all current active meds have been reviewed  Medications Prior to Admission   Medication    apixaban (Eliquis) 5 mg    dofetilide (TIKOSYN) 250 mcg capsule    finasteride (PROSCAR) 5 mg tablet    rosuvastatin (CRESTOR) 20 MG tablet    sildenafil (VIAGRA) 50 MG tablet    acetaminophen-codeine (TYLENOL  "#3) 300-30 mg per tablet    Diclofenac Sodium (VOLTAREN) 1 %    DULoxetine (CYMBALTA) 60 mg delayed release capsule    fluticasone (FLONASE) 50 mcg/act nasal spray    furosemide (LASIX) 40 mg tablet    lidocaine (LIDODERM) 5 %    metoprolol succinate (TOPROL-XL) 25 mg 24 hr tablet    Omega-3 1000 MG CAPS    sacubitril-valsartan (Entresto) 24-26 MG TABS    tamsulosin (FLOMAX) 0.4 mg          ASSESSMENT & PLAN   Principal Problem:    Atrial flutter (HCC)  Active Problems:    Chronic kidney disease (CKD), stage II (mild)    BPH (benign prostatic hyperplasia)  Chronic recurrent pulmonary embolism  Anticoagulation    This patient presented with high heart rate and was found to have atrial fibrillation/flutter.  Initially thoughts were to consider cardioversion but patient had intermittent episodes of sinus rhythm.  In view of this cardioversion was not done.  Dosage of Tikosyn was increased to 375 mcg twice a day.  EKG was done today which showed sinus rhythm right bundle branch block and QTc within normal limits.  Patient is also on beta-blockers.  Continue Eliquis for anticoagulation.    Patient will have a follow-up appointment in the office and continue to follow-up with electrophysiology.  Plan of care discussed with patient and family.          Lian Botello MD  4/27/2024,12:07 PM    Portions of the record may have been created with voice recognition software.  Occasional wrong word or \"sound a like\" substitutions may have occurred due to the inherent limitations of voice recognition software.  Read the chart carefully and recognize, using context, where substitutions have occurred.   "

## 2024-05-24 ENCOUNTER — TELEPHONE (OUTPATIENT)
Age: 79
End: 2024-05-24

## 2024-08-03 ENCOUNTER — APPOINTMENT (EMERGENCY)
Dept: RADIOLOGY | Facility: HOSPITAL | Age: 79
End: 2024-08-03
Payer: COMMERCIAL

## 2024-08-03 ENCOUNTER — HOSPITAL ENCOUNTER (EMERGENCY)
Facility: HOSPITAL | Age: 79
Discharge: HOME | End: 2024-08-03
Attending: EMERGENCY MEDICINE
Payer: COMMERCIAL

## 2024-08-03 VITALS
WEIGHT: 284.39 LBS | HEART RATE: 75 BPM | RESPIRATION RATE: 20 BRPM | OXYGEN SATURATION: 100 % | DIASTOLIC BLOOD PRESSURE: 89 MMHG | TEMPERATURE: 97.8 F | BODY MASS INDEX: 35.55 KG/M2 | SYSTOLIC BLOOD PRESSURE: 155 MMHG

## 2024-08-03 DIAGNOSIS — R79.89 ELEVATED TROPONIN: ICD-10-CM

## 2024-08-03 DIAGNOSIS — I48.91 ATRIAL FIBRILLATION WITH RVR (HCC): Primary | ICD-10-CM

## 2024-08-03 LAB
ANION GAP SERPL CALCULATED.3IONS-SCNC: 7 MMOL/L (ref 4–13)
ATRIAL RATE: 170 BPM
ATRIAL RATE: 72 BPM
ATRIAL RATE: 84 BPM
BASOPHILS # BLD AUTO: 0.02 THOUSANDS/ÂΜL (ref 0–0.1)
BASOPHILS NFR BLD AUTO: 0 % (ref 0–1)
BNP SERPL-MCNC: 207 PG/ML (ref 0–100)
BUN SERPL-MCNC: 22 MG/DL (ref 5–25)
CALCIUM SERPL-MCNC: 8.8 MG/DL (ref 8.4–10.2)
CARDIAC TROPONIN I PNL SERPL HS: 93 NG/L
CHLORIDE SERPL-SCNC: 109 MMOL/L (ref 96–108)
CO2 SERPL-SCNC: 23 MMOL/L (ref 21–32)
CREAT SERPL-MCNC: 1.28 MG/DL (ref 0.6–1.3)
EOSINOPHIL # BLD AUTO: 0.05 THOUSAND/ÂΜL (ref 0–0.61)
EOSINOPHIL NFR BLD AUTO: 1 % (ref 0–6)
ERYTHROCYTE [DISTWIDTH] IN BLOOD BY AUTOMATED COUNT: 15.6 % (ref 11.6–15.1)
FLUAV RNA RESP QL NAA+PROBE: NEGATIVE
FLUBV RNA RESP QL NAA+PROBE: NEGATIVE
GFR SERPL CREATININE-BSD FRML MDRD: 53 ML/MIN/1.73SQ M
GLUCOSE SERPL-MCNC: 98 MG/DL (ref 65–140)
HCT VFR BLD AUTO: 38.1 % (ref 36.5–49.3)
HGB BLD-MCNC: 13.2 G/DL (ref 12–17)
IMM GRANULOCYTES # BLD AUTO: 0.06 THOUSAND/UL (ref 0–0.2)
IMM GRANULOCYTES NFR BLD AUTO: 1 % (ref 0–2)
LYMPHOCYTES # BLD AUTO: 1.76 THOUSANDS/ÂΜL (ref 0.6–4.47)
LYMPHOCYTES NFR BLD AUTO: 20 % (ref 14–44)
MAGNESIUM SERPL-MCNC: 2.1 MG/DL (ref 1.9–2.7)
MCH RBC QN AUTO: 32 PG (ref 26.8–34.3)
MCHC RBC AUTO-ENTMCNC: 34.6 G/DL (ref 31.4–37.4)
MCV RBC AUTO: 92 FL (ref 82–98)
MONOCYTES # BLD AUTO: 1.02 THOUSAND/ÂΜL (ref 0.17–1.22)
MONOCYTES NFR BLD AUTO: 12 % (ref 4–12)
NEUTROPHILS # BLD AUTO: 5.71 THOUSANDS/ÂΜL (ref 1.85–7.62)
NEUTS SEG NFR BLD AUTO: 66 % (ref 43–75)
NRBC BLD AUTO-RTO: 0 /100 WBCS
P AXIS: 78 DEGREES
P AXIS: 79 DEGREES
PLATELET # BLD AUTO: 156 THOUSANDS/UL (ref 149–390)
PMV BLD AUTO: 12.3 FL (ref 8.9–12.7)
POTASSIUM SERPL-SCNC: 3.6 MMOL/L (ref 3.5–5.3)
PR INTERVAL: 158 MS
PR INTERVAL: 158 MS
QRS AXIS: -36 DEGREES
QRS AXIS: -46 DEGREES
QRS AXIS: -47 DEGREES
QRSD INTERVAL: 130 MS
QRSD INTERVAL: 142 MS
QRSD INTERVAL: 144 MS
QT INTERVAL: 288 MS
QT INTERVAL: 398 MS
QT INTERVAL: 420 MS
QTC INTERVAL: 459 MS
QTC INTERVAL: 470 MS
QTC INTERVAL: 481 MS
RBC # BLD AUTO: 4.13 MILLION/UL (ref 3.88–5.62)
RSV RNA RESP QL NAA+PROBE: NEGATIVE
SARS-COV-2 RNA RESP QL NAA+PROBE: NEGATIVE
SODIUM SERPL-SCNC: 139 MMOL/L (ref 135–147)
T WAVE AXIS: -18 DEGREES
T WAVE AXIS: -2 DEGREES
T WAVE AXIS: 15 DEGREES
TSH SERPL DL<=0.05 MIU/L-ACNC: 2.71 UIU/ML (ref 0.45–4.5)
VENTRICULAR RATE: 168 BPM
VENTRICULAR RATE: 72 BPM
VENTRICULAR RATE: 84 BPM
WBC # BLD AUTO: 8.62 THOUSAND/UL (ref 4.31–10.16)

## 2024-08-03 PROCEDURE — 0241U HB NFCT DS VIR RESP RNA 4 TRGT: CPT

## 2024-08-03 PROCEDURE — 85025 COMPLETE CBC W/AUTO DIFF WBC: CPT

## 2024-08-03 PROCEDURE — 84443 ASSAY THYROID STIM HORMONE: CPT

## 2024-08-03 PROCEDURE — 99285 EMERGENCY DEPT VISIT HI MDM: CPT

## 2024-08-03 PROCEDURE — 83880 ASSAY OF NATRIURETIC PEPTIDE: CPT

## 2024-08-03 PROCEDURE — 96365 THER/PROPH/DIAG IV INF INIT: CPT

## 2024-08-03 PROCEDURE — 36415 COLL VENOUS BLD VENIPUNCTURE: CPT

## 2024-08-03 PROCEDURE — 80048 BASIC METABOLIC PNL TOTAL CA: CPT

## 2024-08-03 PROCEDURE — 93005 ELECTROCARDIOGRAM TRACING: CPT

## 2024-08-03 PROCEDURE — 83735 ASSAY OF MAGNESIUM: CPT

## 2024-08-03 PROCEDURE — 84484 ASSAY OF TROPONIN QUANT: CPT

## 2024-08-03 PROCEDURE — 71045 X-RAY EXAM CHEST 1 VIEW: CPT

## 2024-08-03 RX ORDER — DILTIAZEM HYDROCHLORIDE 5 MG/ML
INJECTION INTRAVENOUS
Status: DISCONTINUED
Start: 2024-08-03 | End: 2024-08-03 | Stop reason: HOSPADM

## 2024-08-03 RX ORDER — SODIUM CHLORIDE, SODIUM GLUCONATE, SODIUM ACETATE, POTASSIUM CHLORIDE, MAGNESIUM CHLORIDE, SODIUM PHOSPHATE, DIBASIC, AND POTASSIUM PHOSPHATE .53; .5; .37; .037; .03; .012; .00082 G/100ML; G/100ML; G/100ML; G/100ML; G/100ML; G/100ML; G/100ML
500 INJECTION, SOLUTION INTRAVENOUS ONCE
Status: COMPLETED | OUTPATIENT
Start: 2024-08-03 | End: 2024-08-03

## 2024-08-03 RX ADMIN — SODIUM CHLORIDE, SODIUM GLUCONATE, SODIUM ACETATE, POTASSIUM CHLORIDE, MAGNESIUM CHLORIDE, SODIUM PHOSPHATE, DIBASIC, AND POTASSIUM PHOSPHATE 500 ML: .53; .5; .37; .037; .03; .012; .00082 INJECTION, SOLUTION INTRAVENOUS at 09:34

## 2024-08-03 NOTE — ED PROVIDER NOTES
"History  Chief Complaint   Patient presents with    Palpitations     Patient reports \"palpitations, sob, weakness started x 1 day\"     This is a 78-year-old male with a medical history significant for paroxysmal A-fib, a flutter, PE on Eliquis, CKD stage II, HLD, cardiomyopathy who presents to the ED for evaluation of palpitations and generalized weakness.  Patient reports symptoms started yesterday.  Reports that while he was \"filling out bills\" he began to feel lightheaded.  Does report transient sensation over the last day of heart racing with mild lightheadedness, he denies any syncopal episodes.  Does feel mildly short of breath on exertion over the same time period, he denies any episodes of chest pain.  Patient is on Tikosyn twice daily, states he has been compliant with all doses, states he took his morning dose this morning.  Per chart review, patient historically has been on metoprolol but he states this was discontinued approximately 6 months prior, is not currently on any beta-blockers.  Patient does report cough over the last couple days, nonproductive in nature.  Patient denies any recent fevers, chills, headaches, syncope, neck pain, chest pain, abdominal pain, vomiting, diarrhea, dysuria.  Denies any recent falls.        Prior to Admission Medications   Prescriptions Last Dose Informant Patient Reported? Taking?   apixaban (Eliquis) 5 mg   No No   Sig: Take 1 tablet (5 mg total) by mouth 2 (two) times a day   atorvastatin (LIPITOR) 40 mg tablet   No No   Sig: Take 1 tablet (40 mg total) by mouth daily with dinner   dofetilide (TIKOSYN) 125 mcg capsule   No No   Sig: Take 1 capsule (125 mcg total) by mouth 2 (two) times a day   dofetilide (TIKOSYN) 125 mcg capsule   No No   Sig: Take 1 capsule (125 mcg total) by mouth every 12 (twelve) hours   dofetilide (TIKOSYN) 250 mcg capsule   No No   Sig: Take 1 capsule (250 mcg total) by mouth every 12 (twelve) hours   finasteride (PROSCAR) 5 mg tablet  Self No " No   Sig: TAKE 1 TABLET (5 MG TOTAL) BY MOUTH DAILY.   metoprolol succinate (TOPROL-XL) 25 mg 24 hr tablet  Self No No   Sig: Take 0.5 tablets (12.5 mg total) by mouth daily Do not start before 2023.   rosuvastatin (CRESTOR) 20 MG tablet   No No   Sig: Take 1 tablet (20 mg total) by mouth daily   sildenafil (VIAGRA) 50 MG tablet   No No   Sig: Take 1 tablet (50 mg total) by mouth daily as needed for erectile dysfunction   tamsulosin (FLOMAX) 0.4 mg  Self No No   Sig: Take 1 capsule (0.4 mg total) by mouth daily with dinner      Facility-Administered Medications: None       Past Medical History:   Diagnosis Date    Cardiomyopathy (HCC)     Hyperlipidemia     Post-nasal drip     Pulmonary embolism (HCC)        Past Surgical History:   Procedure Laterality Date    ARTHROSCOPY KNEE Left     30 years ago    MN CYSTO INSERTION TRANSPROSTATIC IMPLANT SINGLE N/A 2021    Procedure: CYSTOSCOPY WITH INSERTION UROLIFT;  Surgeon: Mahendra Cruz MD;  Location: [facility];  Service: Urology       History reviewed. No pertinent family history.  I have reviewed and agree with the history as documented.    E-Cigarette/Vaping    E-Cigarette Use Never User      E-Cigarette/Vaping Substances    Nicotine No     THC No     CBD No     Flavoring No     Other No     Unknown No      Social History     Tobacco Use    Smoking status: Former     Current packs/day: 0.00     Types: Cigarettes     Quit date:      Years since quittin.6    Smokeless tobacco: Never   Vaping Use    Vaping status: Never Used   Substance Use Topics    Alcohol use: Yes     Alcohol/week: 0.0 standard drinks of alcohol     Comment: socially    Drug use: No       Review of Systems   Constitutional:  Positive for fatigue. Negative for chills and fever.   HENT: Negative.     Eyes:  Negative for photophobia and visual disturbance.   Respiratory:  Positive for cough (non-productive) and shortness of breath (Reports intermittent dyspnea).     Cardiovascular:  Positive for palpitations and leg swelling (bilat). Negative for chest pain.   Gastrointestinal:  Negative for abdominal pain, diarrhea and vomiting.   Genitourinary:  Negative for difficulty urinating, dysuria, flank pain and hematuria.   Musculoskeletal:  Negative for arthralgias, neck pain and neck stiffness.   Neurological:  Positive for weakness (Reports generalized weakness) and light-headedness. Negative for dizziness, syncope and headaches.       Physical Exam  Physical Exam  Vitals and nursing note reviewed.   Constitutional:       General: He is not in acute distress.     Appearance: He is well-developed. He is not diaphoretic.   HENT:      Head: Normocephalic and atraumatic.      Nose: No rhinorrhea.      Mouth/Throat:      Pharynx: Oropharynx is clear.   Eyes:      Conjunctiva/sclera: Conjunctivae normal.   Cardiovascular:      Rate and Rhythm: Tachycardia present. Rhythm irregular.      Heart sounds: No murmur heard.  Pulmonary:      Effort: Pulmonary effort is normal. No respiratory distress.      Breath sounds: Normal breath sounds. No stridor. No wheezing, rhonchi or rales.   Abdominal:      General: There is no distension.      Palpations: Abdomen is soft.      Tenderness: There is no abdominal tenderness.   Musculoskeletal:         General: No swelling.      Cervical back: Normal range of motion and neck supple. No rigidity.      Right lower leg: Edema (1+ pedal edema) present.      Left lower leg: Edema (1+ pedal edema) present.   Skin:     General: Skin is warm and dry.      Capillary Refill: Capillary refill takes less than 2 seconds.   Neurological:      General: No focal deficit present.      Mental Status: He is alert and oriented to person, place, and time.   Psychiatric:         Mood and Affect: Mood normal.         Vital Signs  ED Triage Vitals   Temperature Pulse Respirations Blood Pressure SpO2   08/03/24 0911 08/03/24 0911 08/03/24 0911 08/03/24 0911 08/03/24 0911    97.8 °F (36.6 °C) (!) 171 20 134/91 98 %      Temp Source Heart Rate Source Patient Position - Orthostatic VS BP Location FiO2 (%)   08/03/24 0911 08/03/24 0911 08/03/24 0911 08/03/24 0911 --   Temporal Monitor Sitting Left arm       Pain Score       08/03/24 1145       No Pain           Vitals:    08/03/24 0928 08/03/24 1000 08/03/24 1030 08/03/24 1145   BP:  142/100 153/95 155/89   Pulse: 80 68 73 75   Patient Position - Orthostatic VS:    Lying         Visual Acuity      ED Medications  Medications   diltiazem (CARDIZEM) 25 mg/5 mL injection **ADS Override Pull** (0 mg  Hold 8/3/24 1051)   multi-electrolyte (ISOLYTE-S PH 7.4) bolus 500 mL (0 mL Intravenous Stopped 8/3/24 1051)       Diagnostic Studies  Results Reviewed       Procedure Component Value Units Date/Time    HS Troponin I 4hr [[id number]]     Lab Status: No result Specimen: Blood     FLU/RSV/COVID - if FLU/RSV clinically relevant [[id number]]  (Normal) Collected: 08/03/24 0944    Lab Status: Final result Specimen: Nares from Nose Updated: 08/03/24 1026     SARS-CoV-2 Negative     INFLUENZA A PCR Negative     INFLUENZA B PCR Negative     RSV PCR Negative    Narrative:      FOR PEDIATRIC PATIENTS - copy/paste COVID Guidelines URL to browser: [email or web address]    SARS-CoV-2 assay is a Nucleic Acid Amplification assay intended for the  qualitative detection of nucleic acid from SARS-CoV-2 in nasopharyngeal  swabs. Results are for the presumptive identification of SARS-CoV-2 RNA.    Positive results are indicative of infection with SARS-CoV-2, the virus  causing COVID-19, but do not rule out bacterial infection or co-infection  with other viruses. Laboratories within the [place] and its  territories are required to report all positive results to the appropriate  public health authorities. Negative results do not preclude SARS-CoV-2  infection and should not be used as the sole basis for  treatment or other  patient management decisions. Negative results must be combined with  clinical observations, patient history, and epidemiological information.  This test has not been FDA cleared or approved.    This test has been authorized by FDA under an Emergency Use Authorization  (EUA). This test is only authorized for the duration of time the  declaration that circumstances exist justifying the authorization of the  emergency use of an in vitro diagnostic tests for detection of SARS-CoV-2  virus and/or diagnosis of COVID-19 infection under section 564(b)(1) of  the Act, 21 U.S.C. 360bbb-3(b)(1), unless the authorization is terminated  or revoked sooner. The test has been validated but independent review by FDA  and CLIA is pending.    Test performed using [employer]: This RT-PCR assay targets N2,  a region unique to SARS-CoV-2. A conserved region in the E-gene was chosen  for pan-Sarbecovirus detection which includes SARS-CoV-2.    According to CMS-2020-01-R, this platform meets the definition of high-throughput technology.    TSH, 3rd generation with Free T4 reflex [[id number]]  (Normal) Collected: 08/03/24 0934    Lab Status: Final result Specimen: Blood from Arm, Left Updated: 08/03/24 1022     TSH 3RD GENERATON 2.715 uIU/mL     HS Troponin 0hr (reflex protocol) [[id number]]  (Abnormal) Collected: 08/03/24 0934    Lab Status: Final result Specimen: Blood from Arm, Left Updated: 08/03/24 1007     hs TnI 0hr 93 ng/L     HS Troponin I 2hr [[id number]]     Lab Status: No result Specimen: Blood     B-Type Natriuretic Peptide(BNP) [[id number]]  (Abnormal) Collected: 08/03/24 0934    Lab Status: Final result Specimen: Blood from Arm, Left Updated: 08/03/24 1006      pg/mL     Basic metabolic panel [[id number]]  (Abnormal) Collected: 08/03/24 0934    Lab Status: Final result Specimen: Blood from Arm, Left Updated: 08/03/24 0958     Sodium 139 mmol/L      Potassium 3.6 mmol/L      Chloride 109 mmol/L       CO2 23 mmol/L      ANION GAP 7 mmol/L      BUN 22 mg/dL      Creatinine 1.28 mg/dL      Glucose 98 mg/dL      Calcium 8.8 mg/dL      eGFR 53 ml/min/1.73sq m     Narrative:      National Kidney Disease Foundation guidelines for Chronic Kidney Disease (CKD):     Stage 1 with normal or high GFR (GFR > 90 mL/min/1.73 square meters)    Stage 2 Mild CKD (GFR = 60-89 mL/min/1.73 square meters)    Stage 3A Moderate CKD (GFR = 45-59 mL/min/1.73 square meters)    Stage 3B Moderate CKD (GFR = 30-44 mL/min/1.73 square meters)    Stage 4 Severe CKD (GFR = 15-29 mL/min/1.73 square meters)    Stage 5 End Stage CKD (GFR <15 mL/min/1.73 square meters)  Note: GFR calculation is accurate only with a steady state creatinine    Magnesium [[id number]]  (Normal) Collected: 08/03/24 0934    Lab Status: Final result Specimen: Blood from Arm, Left Updated: 08/03/24 0958     Magnesium 2.1 mg/dL     CBC and differential [[id number]]  (Abnormal) Collected: 08/03/24 0934    Lab Status: Final result Specimen: Blood from Arm, Left Updated: 08/03/24 0943     WBC 8.62 Thousand/uL      RBC 4.13 Million/uL      Hemoglobin 13.2 g/dL      Hematocrit 38.1 %      MCV 92 fL      MCH 32.0 pg      MCHC 34.6 g/dL      RDW 15.6 %      MPV 12.3 fL      Platelets 156 Thousands/uL      nRBC 0 /100 WBCs      Segmented % 66 %      Immature Grans % 1 %      Lymphocytes % 20 %      Monocytes % 12 %      Eosinophils Relative 1 %      Basophils Relative 0 %      Absolute Neutrophils 5.71 Thousands/µL      Absolute Immature Grans 0.06 Thousand/uL      Absolute Lymphocytes 1.76 Thousands/µL      Absolute Monocytes 1.02 Thousand/µL      Eosinophils Absolute 0.05 Thousand/µL      Basophils Absolute 0.02 Thousands/µL                    XR chest 1 view portable   Final Result by Lainey Quan MD (08/03 0955)      Low lung volumes producing vascular crowding. No acute disease.            Workstation performed: [id number]                    Procedures  ECG 12 Lead  Documentation Only    Date/Time: 8/3/2024 9:14 AM    Performed by: Abisai Kong PA-C  Authorized by: Abisai Kong PA-C    Patient location:  ED  Interpretation:     Interpretation: abnormal    Rate:     ECG rate:  168  Rhythm:     Rhythm: atrial fibrillation    Conduction:     Conduction: abnormal      Abnormal conduction: complete RBBB    T waves:     T waves: non-specific    ECG 12 Lead Documentation Only    Date/Time: 8/3/2024 9:47 AM    Performed by: Abisai Kong PA-C  Authorized by: Abisai Kong PA-C    Patient location:  ED  Rate:     ECG rate:  84    ECG rate assessment: normal    Rhythm:     Rhythm: sinus rhythm    Ectopy:     Ectopy: PAC    QRS:     QRS axis:  Left  Conduction:     Conduction: abnormal      Abnormal conduction: complete RBBB    ST segments:     ST segments:  Normal  T waves:     T waves: normal             ED Course  ED Course as of 08/03/24 1207   Sat Aug 03, 2024   0930 Patient initially A-fib with RVR on monitor, rate approximately 170s-180s.  Spontaneously broke into sinus rhythm at approximately 84 bpm during examination.  Will continue to monitor.   1030 Cardiology paged.   1105 Cardiology recommends trending troponins, consider admission based off symptoms and troponin.   1141 Reevaluated patient, he does state he feels better.  He reports he would like to be discharged at this time.  I did explain to patient that workup is currently incomplete and he will likely need admission.  Patient states regardless of this he would like to be discharged.  I did explain and verbalize all risk the patient's up to including possible worsening cardiac injury, to include possible death.  Patient reports he is aware of the risk and would still like to sign out AGAINST MEDICAL ADVICE.                                  SBIRT 22yo+      Flowsheet Row Most Recent Value   Initial Alcohol Screen: US AUDIT-C     1. How often do you have a drink containing alcohol? 0 Filed at: 08/03/2024 0914   2. How many  drinks containing alcohol do you have on a typical day you are drinking?  0 Filed at: 08/03/2024 0914   3a. Male UNDER 65: How often do you have five or more drinks on one occasion? 0 Filed at: 08/03/2024 0914   Audit-C Score 0 Filed at: 08/03/2024 0914   TOMAS: How many times in the past year have you...    Used an illegal drug or used a prescription medication for non-medical reasons? Never Filed at: 08/03/2024 0914                      Medical Decision Making  70-year-old male presents to the ED for evaluation of lightheadedness, dyspnea, palpitations that began yesterday.  Initially A-fib with RVR rate 160s/170s, return spontaneously to normal sinus rhythm during evaluation without medication or intervention.  Patient was initially elevated troponin 93, BNP mildly elevated 207.  During ED course patient stated that he wanted to leave.  I did advise he would need continued workup including trending troponins, likely admission and leaving would be AGAINST MEDICAL ADVICE.  Risk were discussed with patient up to and including possible death.  Patient has capacity, states he would like to sign an AMA regardless.  AGAINST MEDICAL ADVICE form signed by patient.  I did advise at minimum that patient should follow-up with his PCP as well as his cardiologist, advised patient that if he did change his mind he should come back to the ER for reevaluation.  Patient verbalized understanding of this.    Amount and/or Complexity of Data Reviewed  Labs: ordered.  Radiology: ordered.    Risk  Prescription drug management.                 Disposition  Final diagnoses:   Atrial fibrillation with RVR (HCC)   Elevated troponin     Time reflects when diagnosis was documented in both MDM as applicable and the Disposition within this note       Time User Action Codes Description Comment    8/3/2024 11:47 Abisai Blount Add [I48.91] Atrial fibrillation with RVR (HCC)     8/3/2024 11:47 Abisai Blount Add [R79.89] Elevated troponin            ED Disposition       ED Disposition   AMA    Condition   --    Date/Time   Sat Aug 3, 2024 1144    Comment   Date: 8/3/2024  Patient: Nas Cantor Jr.  Admitted: 8/3/2024  9:12 AM  Attending Provider: Jonathan Wright MD    Nas Cantor Jr. or his authorized caregiver has made the decision for the patient to leave the emergency department against the advice  of Abisai Kong PA-C. He or his authorized caregiver has been informed and understands the inherent risks, including death, possible cardiac injury and/or cardiac demise.  He or his authorized caregiver has decided to accept the responsibility for t his decision. Nas Cantor Jr. and all necessary parties have been advised that he may return for further evaluation or treatment. His condition at time of discharge was 1114.  Nas Cantor Jr. had current vital signs as follows:  /95   Puls e 73   Temp 97.8 °F (36.6 °C) (Temporal)   Resp 16   Wt 129 kg (284 lb 6.3 oz)                Follow-up Information       Follow up With Specialties Details Why Contact Info    Richard Lipscomb MD Internal Medicine Schedule an appointment as soon as possible for a visit  For re-check [address] [place] [address] [place] [id number]  [phone]              Discharge Medication List as of 8/3/2024 11:47 AM        CONTINUE these medications which have NOT CHANGED    Details   apixaban (Eliquis) 5 mg Take 1 tablet (5 mg total) by mouth 2 (two) times a day, Starting u 12/28/2023, Normal      atorvastatin (LIPITOR) 40 mg tablet Take 1 tablet (40 mg total) by mouth daily with dinner, Starting Sat 4/27/2024, Normal      !! dofetilide (TIKOSYN) 125 mcg capsule Take 1 capsule (125 mcg total) by mouth 2 (two) times a day, Starting Sat 4/27/2024, Normal      !! dofetilide (TIKOSYN) 125 mcg capsule Take 1 capsule (125 mcg total) by mouth every 12 (twelve) hours, Starting Sat 4/27/2024, Normal      !! dofetilide (TIKOSYN) 250 mcg capsule Take 1 capsule (250 mcg total) by mouth every  12 (twelve) hours, Starting Sat 4/27/2024, Normal      finasteride (PROSCAR) 5 mg tablet TAKE 1 TABLET (5 MG TOTAL) BY MOUTH DAILY., Starting Fri 5/26/2023, Normal      metoprolol succinate (TOPROL-XL) 25 mg 24 hr tablet Take 0.5 tablets (12.5 mg total) by mouth daily Do not start before July 8, 2023., Starting Sat 7/8/2023, Until Thu 12/28/2023, Normal      rosuvastatin (CRESTOR) 20 MG tablet Take 1 tablet (20 mg total) by mouth daily, Starting Thu 12/28/2023, Normal      sildenafil (VIAGRA) 50 MG tablet Take 1 tablet (50 mg total) by mouth daily as needed for erectile dysfunction, Starting Tue 3/19/2024, Normal      tamsulosin (FLOMAX) 0.4 mg Take 1 capsule (0.4 mg total) by mouth daily with dinner, Starting Wed 6/21/2023, Normal       !! - Potential duplicate medications found. Please discuss with provider.          No discharge procedures on file.    PDMP Review         Value Time User    PDMP Reviewed  Yes 6/2/2021  9:58 AM Luis Cartagena MD            ED Provider  Electronically Signed by             Abisai Kong PA-C  08/03/24 [id number]

## 2024-08-03 NOTE — DISCHARGE INSTRUCTIONS
Recommend if you change your mind to return to the ED immediately for reevaluation.  At minimum, please follow-up with your primary care provider as well as your cardiologist for further evaluation and management.

## 2024-08-05 LAB
ATRIAL RATE: 84 BPM
P AXIS: 78 DEGREES
PR INTERVAL: 158 MS
QRS AXIS: -46 DEGREES
QRSD INTERVAL: 144 MS
QT INTERVAL: 398 MS
QTC INTERVAL: 470 MS
T WAVE AXIS: 15 DEGREES
VENTRICULAR RATE: 84 BPM

## 2024-08-05 PROCEDURE — 93010 ELECTROCARDIOGRAM REPORT: CPT | Performed by: INTERNAL MEDICINE

## 2024-08-06 LAB
ATRIAL RATE: 72 BPM
P AXIS: 79 DEGREES
PR INTERVAL: 158 MS
QRS AXIS: -36 DEGREES
QRSD INTERVAL: 142 MS
QT INTERVAL: 420 MS
QTC INTERVAL: 459 MS
T WAVE AXIS: -18 DEGREES
VENTRICULAR RATE: 72 BPM

## 2024-08-09 LAB
ATRIAL RATE: 170 BPM
QRS AXIS: -47 DEGREES
QRSD INTERVAL: 130 MS
QT INTERVAL: 288 MS
QTC INTERVAL: 481 MS
T WAVE AXIS: -2 DEGREES
VENTRICULAR RATE: 168 BPM

## 2024-08-30 ENCOUNTER — OFFICE VISIT (OUTPATIENT)
Dept: CARDIOLOGY CLINIC | Facility: CLINIC | Age: 79
End: 2024-08-30
Payer: COMMERCIAL

## 2024-08-30 VITALS
SYSTOLIC BLOOD PRESSURE: 132 MMHG | WEIGHT: 281 LBS | HEIGHT: 76 IN | RESPIRATION RATE: 16 BRPM | DIASTOLIC BLOOD PRESSURE: 82 MMHG | OXYGEN SATURATION: 97 % | BODY MASS INDEX: 34.22 KG/M2 | HEART RATE: 70 BPM

## 2024-08-30 DIAGNOSIS — Z79.01 CHRONIC ANTICOAGULATION: ICD-10-CM

## 2024-08-30 DIAGNOSIS — I48.0 PAF (PAROXYSMAL ATRIAL FIBRILLATION) (HCC): Primary | ICD-10-CM

## 2024-08-30 DIAGNOSIS — I42.8 CARDIOMYOPATHY, NONISCHEMIC (HCC): ICD-10-CM

## 2024-08-30 DIAGNOSIS — N18.31 STAGE 3A CHRONIC KIDNEY DISEASE (HCC): ICD-10-CM

## 2024-08-30 DIAGNOSIS — E66.01 OBESITY, MORBID (HCC): ICD-10-CM

## 2024-08-30 PROCEDURE — 99214 OFFICE O/P EST MOD 30 MIN: CPT | Performed by: INTERNAL MEDICINE

## 2024-08-30 NOTE — PROGRESS NOTES
[facility]  [address] [place] [address]  Cardiology Follow Up    Nas Cantor Jr.  1945  [id number]      1. PAF (paroxysmal atrial fibrillation) (HCC)        2. Obesity, morbid (HCC)        3. Cardiomyopathy, nonischemic (HCC)        4. Stage 3a chronic kidney disease (HCC)        5. Chronic anticoagulation            Chief Complaint   Patient presents with    Follow-up       Interval History: Patient presents for follow-up visit.  Patient denies any history of chest pain shortness of breath.  Patient denies any history of leg edema or orthopnea PND.  No history of presyncope syncope.  Patient states compliance with the present list of medications.  Patient denies any bleeding issues.  Patient had history of A-fib ablation and is on Tikosyn followed by electrophysiology Dr. Montero      Patient Active Problem List   Diagnosis    Bilateral pulmonary embolism (HCC)    SOB (shortness of breath)    Hyperlipidemia    Lung nodule    Obesity    History of sickle cell trait    Leg edema, left    History of pulmonary embolism    Thrombocytopenia (HCC)    Chronic kidney disease (CKD), stage II (mild)    Tachycardia    Chronic low back pain    SVT (supraventricular tachycardia)    Chronic anticoagulation    ARLENE (obstructive sleep apnea)    Cardiomyopathy, nonischemic (HCC)    Dyslipidemia    BPH (benign prostatic hyperplasia)    Nephrolithiasis    Lumbar disc disease with radiculopathy - Left    Spinal stenosis of lumbar region without neurogenic claudication - Left    Chest pain    Acute kidney injury superimposed on CKD  (HCC)    COVID-19    BPH with obstruction/lower urinary tract symptoms    PAF (paroxysmal atrial fibrillation) (HCC)    Erectile dysfunction due to arterial insufficiency    Stage 3a chronic kidney disease (HCC)    Atrial flutter (HCC)    Obesity, morbid (HCC)     Past Medical History:   Diagnosis Date    Cardiomyopathy (HCC)     Hyperlipidemia     Post-nasal drip      Pulmonary embolism (HCC)      Social History     Socioeconomic History    Marital status: /Civil Union     Spouse name: Not on file    Number of children: 7    Years of education: Not on file    Highest education level: Not on file   Occupational History    Occupation: employed   Tobacco Use    Smoking status: Former     Current packs/day: 0.00     Types: Cigarettes     Quit date:      Years since quittin.6    Smokeless tobacco: Never   Vaping Use    Vaping status: Never Used   Substance and Sexual Activity    Alcohol use: Yes     Alcohol/week: 0.0 standard drinks of alcohol     Comment: socially    Drug use: No    Sexual activity: Not on file   Other Topics Concern    Not on file   Social History Narrative    Not on file     Social Determinants of Health     Financial Resource Strain: Not on file   Food Insecurity: No Food Insecurity (2024)    Hunger Vital Sign     Worried About Running Out of Food in the Last Year: Never true     Ran Out of Food in the Last Year: Never true   Transportation Needs: No Transportation Needs (2024)    PRAPARE - Transportation     Lack of Transportation (Medical): No     Lack of Transportation (Non-Medical): No   Physical Activity: Not on file   Stress: Not on file   Social Connections: Unknown (2024)    Received from [facility]    Social RICS Software     How often do you feel lonely or isolated from those around you? (Adult - for ages 18 years and over): Not on file   Intimate Partner Violence: Not on file   Housing Stability: Low Risk  (2024)    Housing Stability Vital Sign     Unable to Pay for Housing in the Last Year: No     Number of Times Moved in the Last Year: 1     Homeless in the Last Year: No      No family history on file.  Past Surgical History:   Procedure Laterality Date    ARTHROSCOPY KNEE Left     30 years ago    [place] CYSTO INSERTION TRANSPROSTATIC IMPLANT SINGLE N/A 2021    Procedure: CYSTOSCOPY WITH INSERTION UROLIFT;  Surgeon:  Mahendra Cruz MD;  Location: [facility] OR;  Service: Urology       Current Outpatient Medications:     apixaban (Eliquis) 5 mg, Take 1 tablet (5 mg total) by mouth 2 (two) times a day, Disp: 180 tablet, Rfl: 3    atorvastatin (LIPITOR) 40 mg tablet, Take 1 tablet (40 mg total) by mouth daily with dinner, Disp: 30 tablet, Rfl: 0    dofetilide (TIKOSYN) 125 mcg capsule, Take 1 capsule (125 mcg total) by mouth 2 (two) times a day, Disp: 180 capsule, Rfl: 1    finasteride (PROSCAR) 5 mg tablet, TAKE 1 TABLET (5 MG TOTAL) BY MOUTH DAILY., Disp: 90 tablet, Rfl: 5    metoprolol succinate (TOPROL-XL) 25 mg 24 hr tablet, Take 0.5 tablets (12.5 mg total) by mouth daily Do not start before July 8, 2023. (Patient taking differently: Take 12.5 mg by mouth daily As needed), Disp: 15 tablet, Rfl: 3    rosuvastatin (CRESTOR) 20 MG tablet, Take 1 tablet (20 mg total) by mouth daily, Disp: 90 tablet, Rfl: 3    sildenafil (VIAGRA) 50 MG tablet, Take 1 tablet (50 mg total) by mouth daily as needed for erectile dysfunction, Disp: 10 tablet, Rfl: 3    tamsulosin (FLOMAX) 0.4 mg, Take 1 capsule (0.4 mg total) by mouth daily with dinner, Disp: 60 capsule, Rfl: 3  No Known Allergies    Labs:  Admission on 08/03/2024, Discharged on 08/03/2024   Component Date Value    Ventricular Rate 08/03/2024 168     Atrial Rate 08/03/2024 170     QRSD Interval 08/03/2024 130     QT Interval 08/03/2024 288     QTC Interval 08/03/2024 481     QRS Axis 08/03/2024 -47     T Wave [place] 08/03/2024 -2     Ventricular Rate 08/03/2024 84     Atrial Rate 08/03/2024 84     CT Interval 08/03/2024 158     QRSD Interval 08/03/2024 144     QT Interval 08/03/2024 398     QTC Interval 08/03/2024 470     P Axis 08/03/2024 78     QRS Luther 08/03/2024 -46     T Wave Axis 08/03/2024 15     WBC 08/03/2024 8.62     RBC 08/03/2024 4.13     Hemoglobin 08/03/2024 13.2     Hematocrit 08/03/2024 38.1     MCV 08/03/2024 92     MCH 08/03/2024 32.0     MCHC 08/03/2024 34.6     RDW  08/03/2024 15.6 (H)     MPV 08/03/2024 12.3     Platelets 08/03/2024 156     nRBC 08/03/2024 0     Segmented % 08/03/2024 66     Immature Grans % 08/03/2024 1     Lymphocytes % 08/03/2024 20     Monocytes % 08/03/2024 12     Eosinophils Relative 08/03/2024 1     Basophils Relative 08/03/2024 0     Absolute Neutrophils 08/03/2024 5.71     Absolute Immature Grans 08/03/2024 0.06     Absolute Lymphocytes 08/03/2024 1.76     Absolute Monocytes 08/03/2024 1.02     Eosinophils Absolute 08/03/2024 0.05     Basophils Absolute 08/03/2024 0.02     Sodium 08/03/2024 139     Potassium 08/03/2024 3.6     Chloride 08/03/2024 109 (H)     CO2 08/03/2024 23     ANION GAP 08/03/2024 7     BUN 08/03/2024 22     Creatinine 08/03/2024 1.28     Glucose 08/03/2024 98     Calcium 08/03/2024 8.8     eGFR 08/03/2024 53     Magnesium 08/03/2024 2.1     hs TnI 0hr 08/03/2024 93 (H)     BNP 08/03/2024 207 (H)     TSH 3RD GENERATON 08/03/2024 2.715     SARS-CoV-2 08/03/2024 Negative     INFLUENZA A PCR 08/03/2024 Negative     INFLUENZA B PCR 08/03/2024 Negative     RSV PCR 08/03/2024 Negative     Ventricular Rate 08/03/2024 72     Atrial Rate 08/03/2024 72     SD Interval 08/03/2024 158     QRSD Interval 08/03/2024 142     QT Interval 08/03/2024 420     QTC Interval 08/03/2024 459     P Axis 08/03/2024 79     QRS [place] 08/03/2024 -36     T Wave [place] 08/03/2024 -18      Imaging: XR chest 1 view portable    Result Date: 8/3/2024  Narrative: XR CHEST PORTABLE INDICATION: A-fib, palpitations, SOB. COMPARISON: CXR and chest CT 4/23/2024. FINDINGS: Low lung volumes producing vascular crowding. No acute disease. No pneumothorax or pleural effusion. Normal cardiomediastinal silhouette. Bones are unremarkable for age. Normal upper abdomen.     Impression: Low lung volumes producing vascular crowding. No acute disease. Workstation performed: [id number]       Review of Systems:  Review of Systems  REVIEW OF SYSTEMS:  Constitutional:  Denies fever or chills  "  Eyes:  Denies change in visual acuity   HENT:  Denies nasal congestion or sore throat   Respiratory:  Denies cough or shortness of breath   Cardiovascular:  Denies chest pain or edema   GI:  Denies abdominal pain, nausea, vomiting, bloody stools or diarrhea   :  Denies dysuria, frequency, difficulty in micturition and nocturia  Musculoskeletal:  Denies back pain or joint pain   Neurologic:  Denies headache, focal weakness or sensory changes   Endocrine:  Denies polyuria or polydipsia   Lymphatic:  Denies swollen glands   Psychiatric:  Denies depression or anxiety    Physical Exam:    /82 (BP Location: Right arm, Patient Position: Sitting, Cuff Size: Large)   Pulse 70   Resp 16   Ht 6' 3.5\" (1.918 m)   Wt 127 kg (281 lb)   SpO2 97%   BMI 34.66 kg/m²     Physical Exam  PHYSICAL EXAM:  General:  Patient is not in acute distress   Head: Normocephalic, Atraumatic.  HEENT:  Both pupils normal-size atraumatic, normocephalic, nonicteric  Neck:  JVP not raised. Trachea central. No carotid bruit  Respiratory:  normal breath sounds no crackles. no rhonchi  Cardiovascular:  Regular rate and rhythm no S3 no murmurs  GI:  Abdomen soft nontender. No organomegaly.   Lymphatic:  No cervical or inguinal lymphadenopathy  Neurologic:  Patient is awake alert, oriented . Grossly nonfocal  Extremities no edema    Discussion/Summary:  Patient with multiple medical problems who seems to be doing reasonably well from cardiac standpoint. Previous studies reviewed with patient. Medications reviewed and possible side effects discussed. concepts of cardiovascular disease , signs and symptoms of heart disease. Dietary and risk factor modification reinforced. All questions answered.  Safety measures reviewed. Patient advised to report any problems prompting medical attention.    Last echocardiogram showed ejection fraction of 50%.    Symptoms to watch out from cardiac standpoint which would indicate the need for further cardiac " evaluation discussed.    Risks and benefits  and alternatives of anticoagulation to prevent thromboembolic risk from atrial fibrillation discussed at length.  Patient to report any bleeding issues.  Patient also has history of recurrent pulmonary embolism and needs to be on long-term anticoagulation.      Follow-up in 6 months or earlier as needed.  Patient is agreeable with the plan of care.

## 2024-09-26 ENCOUNTER — HOSPITAL ENCOUNTER (OUTPATIENT)
Dept: RADIOLOGY | Facility: HOSPITAL | Age: 79
End: 2024-09-26
Payer: COMMERCIAL

## 2024-09-26 DIAGNOSIS — M47.27 LUMBOSACRAL SPONDYLOSIS WITH RADICULOPATHY: ICD-10-CM

## 2024-09-26 PROCEDURE — 72114 X-RAY EXAM L-S SPINE BENDING: CPT

## 2024-11-01 ENCOUNTER — TELEPHONE (OUTPATIENT)
Dept: CARDIOLOGY CLINIC | Facility: CLINIC | Age: 79
End: 2024-11-01

## 2024-11-01 NOTE — TELEPHONE ENCOUNTER
[facility] had called & asked for the fax # at [place]  Received a request on a med hold for Eliquis from [facility] [employer]  The form is to completed & faxed back to: [phone] ASAP  The form is scanned into Media

## 2024-11-05 ENCOUNTER — HOSPITAL ENCOUNTER (OUTPATIENT)
Dept: MRI IMAGING | Facility: HOSPITAL | Age: 79
Discharge: HOME | End: 2024-11-05
Payer: COMMERCIAL

## 2024-11-05 DIAGNOSIS — M47.27 OTHER SPONDYLOSIS WITH RADICULOPATHY, LUMBOSACRAL REGION: ICD-10-CM

## 2024-11-05 DIAGNOSIS — M54.16 RADICULOPATHY, LUMBAR REGION: ICD-10-CM

## 2024-11-05 PROCEDURE — 72148 MRI LUMBAR SPINE W/O DYE: CPT

## 2024-11-06 NOTE — TELEPHONE ENCOUNTER
Caller: [facility]    Doctor: Dr Botello    Reason for call: [facility] was calling to check on the status of the med hold request form they faxed. I advised we received it but it does not look like it was filled out yet. Patient is having a procedure done on 11/13/24 and will need to be advised of the hold by this Friday 11/8/24.    Call back#: [phone]

## 2024-11-07 NOTE — TELEPHONE ENCOUNTER
Patient can hold Eliquis 3 days prior.  There is a letter in the chart dated today which can be faxed.  Thank you.

## 2024-11-10 DIAGNOSIS — I48.0 PAF (PAROXYSMAL ATRIAL FIBRILLATION) (HCC): ICD-10-CM

## 2024-11-12 RX ORDER — DOFETILIDE 0.12 MG/1
125 CAPSULE ORAL 2 TIMES DAILY
Qty: 180 CAPSULE | Refills: 1 | Status: SHIPPED | OUTPATIENT
Start: 2024-11-12

## 2024-12-17 ENCOUNTER — OFFICE VISIT (OUTPATIENT)
Dept: CARDIOLOGY CLINIC | Facility: CLINIC | Age: 79
End: 2024-12-17
Payer: COMMERCIAL

## 2024-12-17 VITALS
WEIGHT: 280.8 LBS | HEIGHT: 76 IN | DIASTOLIC BLOOD PRESSURE: 80 MMHG | HEART RATE: 136 BPM | BODY MASS INDEX: 34.19 KG/M2 | SYSTOLIC BLOOD PRESSURE: 136 MMHG

## 2024-12-17 DIAGNOSIS — I27.20 MILD PULMONARY HYPERTENSION (HCC): ICD-10-CM

## 2024-12-17 DIAGNOSIS — I42.8 CARDIOMYOPATHY, NONISCHEMIC (HCC): ICD-10-CM

## 2024-12-17 DIAGNOSIS — E08.49 DIABETES DUE TO UNDRL CONDITION W OTH DIABETIC NEURO COMP (HCC): ICD-10-CM

## 2024-12-17 DIAGNOSIS — E78.2 MIXED HYPERLIPIDEMIA: Primary | ICD-10-CM

## 2024-12-17 DIAGNOSIS — I48.0 PAF (PAROXYSMAL ATRIAL FIBRILLATION) (HCC): ICD-10-CM

## 2024-12-17 DIAGNOSIS — Z79.01 CHRONIC ANTICOAGULATION: ICD-10-CM

## 2024-12-17 PROCEDURE — 93000 ELECTROCARDIOGRAM COMPLETE: CPT | Performed by: PHYSICIAN ASSISTANT

## 2024-12-17 PROCEDURE — 99214 OFFICE O/P EST MOD 30 MIN: CPT | Performed by: PHYSICIAN ASSISTANT

## 2024-12-17 RX ORDER — METOPROLOL SUCCINATE 50 MG/1
50 TABLET, EXTENDED RELEASE ORAL DAILY
Qty: 30 TABLET | Refills: 3 | Status: SHIPPED | OUTPATIENT
Start: 2024-12-17 | End: 2025-04-16

## 2024-12-17 NOTE — PATIENT INSTRUCTIONS
Take metoprolol 50 daily     Return in one week for an EKG and BP check     Call in the meantime if you have any concerns

## 2024-12-17 NOTE — ASSESSMENT & PLAN NOTE
S/p SVT ablation 4/12/2021  Heart rate is uncontrolled and EKG shows atrial flutter with 2-1 conduction  Patient will increase metoprolol to 50 mg daily  Repeat EKG will be obtained in 2 weeks with blood pressure check    On Eliquis 5 mg twice daily for stroke risk reduction

## 2024-12-17 NOTE — PROGRESS NOTES
"[facility]   Outpatient Note  Nas Cantor Jr.  1945  [id number]  [facility] [address] [address] [phone]    Nas Cantor Jr. is a 79 y.o. male    Assessment and Plan:   Hyperlipidemia  Tolerating statin therapy  Continue Crestor 20 mg daily    PAF (paroxysmal atrial fibrillation) (HCC)  S/p SVT ablation 4/12/2021  Heart rate is uncontrolled and EKG shows atrial flutter with 2-1 conduction  Patient will increase metoprolol to 50 mg daily  Repeat EKG will be obtained in 2 weeks with blood pressure check    On Eliquis 5 mg twice daily for stroke risk reduction    Chronic anticoagulation  See additional comments    Cardiomyopathy, nonischemic (HCC)  Last known EF of 50% on echocardiogram 4/25/2024      Additional Plan:   Medications as detailed above.    No testing is ordered at this time.     Available lab and test results are reviewed with the patient and any additional required labs are ordered as noted.     Return visit will be in one month or earlier if there are problems.     The patient is encouraged to call in the meantime if there are questions or concerns.          Subjective:   The patient is seen in the office today for routine follow-up regarding PAF, nonischemic cardiomyopathy, HTN and HLD.      He stays that he is not feeling well today and feels like he \"is coming down with something.\" His HR is uncontrolled. And EKG shows atrial flutter with 2-1 conduction left anterior fascicular block and LVH with repolarization abnormality.    Otherwise he is without complaints of chest pain shortness of breath palpitations dizziness lightheadedness or syncope.  He denies any TIA or CVA symptoms.  He is tolerating his medications well.        Social History  Social History     Tobacco Use   Smoking Status Former    Current packs/day: 0.00    Types: Cigarettes    Quit date: 1984    " Years since quittin.9   Smokeless Tobacco Never   ,   Social History     Substance and Sexual Activity   Alcohol Use Yes    Alcohol/week: 0.0 standard drinks of alcohol    Comment: socially   ,   Social History     Substance and Sexual Activity   Drug Use No     History reviewed. No pertinent family history.    Medical and Surgical History  Past Medical History:   Diagnosis Date    Cardiomyopathy (HCC)     Chronic anticoagulation     Hyperlipidemia     Nonischemic cardiomyopathy (HCC)     PAF (paroxysmal atrial fibrillation) (HCC)     Post-nasal drip     Pulmonary embolism (HCC)      Past Surgical History:   Procedure Laterality Date    ARTHROSCOPY KNEE Left     30 years ago    [place] CYSTO INSERTION TRANSPROSTATIC IMPLANT SINGLE N/A 2021    Procedure: CYSTOSCOPY WITH INSERTION UROLIFT;  Surgeon: Mahendra Cruz MD;  Location: [facility] OR;  Service: Urology         Current Outpatient Medications:     apixaban (Eliquis) 5 mg, Take 1 tablet (5 mg total) by mouth 2 (two) times a day, Disp: 180 tablet, Rfl: 3    atorvastatin (LIPITOR) 40 mg tablet, Take 1 tablet (40 mg total) by mouth daily with dinner, Disp: 30 tablet, Rfl: 0    dofetilide (TIKOSYN) 125 mcg capsule, TAKE 1 CAPSULE (125 MCG TOTAL) BY MOUTH 2 (TWO) TIMES A DAY, Disp: 180 capsule, Rfl: 1    finasteride (PROSCAR) 5 mg tablet, TAKE 1 TABLET (5 MG TOTAL) BY MOUTH DAILY., Disp: 90 tablet, Rfl: 5    metoprolol succinate (TOPROL-XL) 50 mg 24 hr tablet, Take 1 tablet (50 mg total) by mouth daily, Disp: 30 tablet, Rfl: 3    rosuvastatin (CRESTOR) 20 MG tablet, Take 1 tablet (20 mg total) by mouth daily, Disp: 90 tablet, Rfl: 3    sildenafil (VIAGRA) 50 MG tablet, Take 1 tablet (50 mg total) by mouth daily as needed for erectile dysfunction, Disp: 10 tablet, Rfl: 3    tamsulosin (FLOMAX) 0.4 mg, Take 1 capsule (0.4 mg total) by mouth daily with dinner, Disp: 60 capsule, Rfl: 3  No Known Allergies    Review of Systems   Constitutional: Negative.   HENT:  "Negative.     Eyes: Negative.    Cardiovascular: Negative.  Negative for chest pain, claudication, cyanosis, dyspnea on exertion, irregular heartbeat, leg swelling, near-syncope, orthopnea, palpitations, paroxysmal nocturnal dyspnea and syncope.   Respiratory: Negative.  Negative for cough, hemoptysis, shortness of breath, sleep disturbances due to breathing, snoring, sputum production and wheezing.    Endocrine: Negative.    Hematologic/Lymphatic: Negative.    Skin: Negative.    Musculoskeletal: Negative.    Gastrointestinal: Negative.    Genitourinary: Negative.    Neurological: Negative.    Psychiatric/Behavioral: Negative.     Allergic/Immunologic: Negative.        Objective:   /80   Pulse (!) 136   Ht 6' 3.5\" (1.918 m)   Wt 127 kg (280 lb 12.8 oz)   BMI 34.63 kg/m²   Physical Exam  Vitals and nursing note reviewed.   Constitutional:       Appearance: He is well-developed.   HENT:      Head: Normocephalic and atraumatic.      Mouth/Throat:      Mouth: Mucous membranes are moist.   Eyes:      General: No scleral icterus.     Conjunctiva/sclera: Conjunctivae normal.   Neck:      Thyroid: No thyromegaly.      Vascular: No JVD.   Cardiovascular:      Rate and Rhythm: Normal rate and regular rhythm.      Heart sounds: Normal heart sounds, S1 normal and S2 normal. No murmur heard.     No friction rub. No gallop.   Pulmonary:      Effort: No respiratory distress.      Breath sounds: No wheezing or rales.   Abdominal:      General: Bowel sounds are normal. There is no distension.      Palpations: Abdomen is soft.      Tenderness: There is no abdominal tenderness.      Comments: Aorta not palpable   Musculoskeletal:         General: No tenderness or deformity. Normal range of motion.      Cervical back: Normal range of motion and neck supple.      Right lower leg: No edema.      Left lower leg: No edema.   Skin:     General: Skin is warm and dry.   Neurological:      General: No focal deficit present.      " "Mental Status: He is alert and oriented to person, place, and time.   Psychiatric:         Mood and Affect: Mood normal.         Behavior: Behavior normal.         Judgment: Judgment normal.         Lab Review:   No results found for: \"CHOL\"  Lab Results   Component Value Date    HDL 45 05/18/2022     Lab Results   Component Value Date    LDLCALC 81 05/18/2022     Lab Results   Component Value Date    TRIG 98 05/18/2022     Results Reviewed       None          Results Reviewed       None          Results Reviewed       None            Recent Cardiovascular Testing:   Echo 4/25/2024 low normal LVEF 50% G2 DD    ECG Review:   12/17/2024: atrial flutter with 2-1 conduction left anterior fascicular block and LVH with repolarization abnormality.    8/3/2024: Sinus rhythm with Premature supraventricular complexes  Left axis deviation  Right bundle branch block  T wave abnormality, consider lateral ischemia          "

## 2024-12-31 ENCOUNTER — CLINICAL SUPPORT (OUTPATIENT)
Dept: CARDIOLOGY CLINIC | Facility: CLINIC | Age: 79
End: 2024-12-31
Payer: COMMERCIAL

## 2024-12-31 VITALS — DIASTOLIC BLOOD PRESSURE: 70 MMHG | SYSTOLIC BLOOD PRESSURE: 110 MMHG | HEART RATE: 58 BPM

## 2024-12-31 DIAGNOSIS — I47.10 SVT (SUPRAVENTRICULAR TACHYCARDIA) (HCC): Primary | ICD-10-CM

## 2024-12-31 DIAGNOSIS — I42.8 CARDIOMYOPATHY, NONISCHEMIC (HCC): ICD-10-CM

## 2024-12-31 PROCEDURE — 93000 ELECTROCARDIOGRAM COMPLETE: CPT | Performed by: PHYSICIAN ASSISTANT

## 2024-12-31 NOTE — PROGRESS NOTES
Patient was in for a BP check and an EKG in office today as per Shabana Elias PA-C     BP was 110/70 today and a HR58 as per the ECG

## 2025-01-08 DIAGNOSIS — I48.0 PAF (PAROXYSMAL ATRIAL FIBRILLATION) (HCC): ICD-10-CM

## 2025-01-09 RX ORDER — METOPROLOL SUCCINATE 50 MG/1
50 TABLET, EXTENDED RELEASE ORAL DAILY
Qty: 90 TABLET | Refills: 1 | Status: SHIPPED | OUTPATIENT
Start: 2025-01-09

## 2025-01-15 ENCOUNTER — TELEPHONE (OUTPATIENT)
Age: 80
End: 2025-01-15

## 2025-01-15 NOTE — TELEPHONE ENCOUNTER
Pt called because he missed a call from the office however no notes were found. It does appear that he has a Vascular imaging test tomorrow. He may have been getting a reminder for same. Provided the patient information in regard the this.

## 2025-01-16 ENCOUNTER — HOSPITAL ENCOUNTER (OUTPATIENT)
Dept: VASCULAR ULTRASOUND | Facility: HOSPITAL | Age: 80
Discharge: HOME | End: 2025-01-16
Attending: STUDENT IN AN ORGANIZED HEALTH CARE EDUCATION/TRAINING PROGRAM
Payer: COMMERCIAL

## 2025-01-16 DIAGNOSIS — M21.619 BUNION: ICD-10-CM

## 2025-01-16 PROCEDURE — 93923 UPR/LXTR ART STDY 3+ LVLS: CPT | Performed by: SURGERY

## 2025-01-16 PROCEDURE — 93923 UPR/LXTR ART STDY 3+ LVLS: CPT

## 2025-01-18 ENCOUNTER — NURSE TRIAGE (OUTPATIENT)
Dept: OTHER | Facility: OTHER | Age: 80
End: 2025-01-18

## 2025-01-18 NOTE — TELEPHONE ENCOUNTER
"Answer Assessment - Initial Assessment Questions  1. DRUG NAME: \"What medicine do you need to have refilled?\"      Unknown- states its a little blue pill for his prostate    2. REFILLS REMAINING: \"How many refills are remaining?\" (Note: The label on the medicine or pill bottle will show how many refills are remaining. If there are no refills remaining, then a renewal may be needed.)      None    3. EXPIRATION DATE: \"What is the expiration date?\" (Note: The label states when the prescription will , and thus can no longer be refilled.)      N/a    4. PRESCRIBING HCP: \"Who prescribed it?\" Reason: If prescribed by specialist, call should be referred to that group.      Did confirm that medication is prescribed by Dr. Ibrahim his Urologist and not Dr. Botello    Protocols used: Medication Refill and Renewal Call-Adult-AH      Pt thinks he needs a script for either his finasteride or his tamsulosin? Asked pt to reach out to his Pharmacy to confirm since unable to confirm color of medication.     Pt will contact pharmacy now. Will call pt back to see which medication he needs sent to pharmacy.     Spoke with pt again who states he spoke with the pharmacy and was told that finasteride can be a blue pill but still did not receive full confirmation - states pharmacy did not want to be liable if incorrect. Asked pt if he had any of his pill bottles on hand. Stated he threw away the bottles. Pt states he thinks it is the finasteride that he needs refilled and would be able to tell by the tablets. Explained to pt that this [facility] RN could not send in a prescription refill without confirmation of correct medication.     Called [facility] to get clarification and to see if finasteride is the correct medication due for a refill. Was unable to connect to live pharmacy agent. Call continued to disconnect.      Encounter from 11/10/2024 Uro refill request for finasteride was declined due to pt stating he does not need and did " not need to schedule appt at the time.     Made pt aware that this [facility] RN  is unable to refill Rx for finasteride. Pt stated that he is not having any trouble or difficulty urinating but has continued to take his tamsulosin and finasteride and would like to now schedule an appointment with Urology.     Made pt aware that a message would be added to his triage and forwarded to the Urology office to contact him for an appointment. Pt aware and verbalized understanding.

## 2025-01-18 NOTE — TELEPHONE ENCOUNTER
"Regarding: refill  ----- Message from Jess MONTANA sent at 1/18/2025 12:22 PM EST -----  \"Dr. Botello prescribed me a little blue pill for my prostate, I can't find the name of it and I need a refill\"    "

## 2025-01-18 NOTE — TELEPHONE ENCOUNTER
Reason for Disposition  • [1] Caller has NON-URGENT medicine question about med that PCP prescribed AND [2] triager unable to answer question    Protocols used: Medication Refill and Renewal Call-Adult-

## 2025-01-20 NOTE — TELEPHONE ENCOUNTER
Patient has been scheduled as follows:    Date: 3/7/2025     Arrival Time: 10:25 AM     Visit Type: FOLLOW UP [facility]      Provider: COLLIN Kerr Department: [facility] [place]

## 2025-01-21 ENCOUNTER — OFFICE VISIT (OUTPATIENT)
Dept: CARDIOLOGY CLINIC | Facility: CLINIC | Age: 80
End: 2025-01-21
Payer: COMMERCIAL

## 2025-01-21 VITALS
HEIGHT: 76 IN | DIASTOLIC BLOOD PRESSURE: 92 MMHG | WEIGHT: 270 LBS | HEART RATE: 64 BPM | BODY MASS INDEX: 32.88 KG/M2 | SYSTOLIC BLOOD PRESSURE: 152 MMHG

## 2025-01-21 DIAGNOSIS — I47.10 SVT (SUPRAVENTRICULAR TACHYCARDIA) (HCC): ICD-10-CM

## 2025-01-21 DIAGNOSIS — I42.8 CARDIOMYOPATHY, NONISCHEMIC (HCC): ICD-10-CM

## 2025-01-21 DIAGNOSIS — E78.2 MIXED HYPERLIPIDEMIA: Primary | ICD-10-CM

## 2025-01-21 DIAGNOSIS — I27.20 MILD PULMONARY HYPERTENSION (HCC): ICD-10-CM

## 2025-01-21 DIAGNOSIS — E66.01 OBESITY, MORBID (HCC): ICD-10-CM

## 2025-01-21 DIAGNOSIS — I48.0 PAF (PAROXYSMAL ATRIAL FIBRILLATION) (HCC): ICD-10-CM

## 2025-01-21 DIAGNOSIS — I10 PRIMARY HYPERTENSION: ICD-10-CM

## 2025-01-21 DIAGNOSIS — N18.31 STAGE 3A CHRONIC KIDNEY DISEASE (HCC): ICD-10-CM

## 2025-01-21 DIAGNOSIS — E08.49 DIABETES DUE TO UNDRL CONDITION W OTH DIABETIC NEURO COMP (HCC): ICD-10-CM

## 2025-01-21 PROCEDURE — 99214 OFFICE O/P EST MOD 30 MIN: CPT | Performed by: PHYSICIAN ASSISTANT

## 2025-01-21 PROCEDURE — 93000 ELECTROCARDIOGRAM COMPLETE: CPT | Performed by: PHYSICIAN ASSISTANT

## 2025-01-21 RX ORDER — METOPROLOL SUCCINATE 50 MG/1
50 TABLET, EXTENDED RELEASE ORAL 2 TIMES DAILY
Qty: 180 TABLET | Refills: 1 | Status: SHIPPED | OUTPATIENT
Start: 2025-01-21

## 2025-01-21 NOTE — PATIENT INSTRUCTIONS
"Patient Education     Low-sodium diet   The Basics   Written by the doctors and editors at [facility]   What is sodium? -- This is the main ingredient in table salt. It is also found in lots of foods. The body needs a very small amount of sodium to work normally, but most people eat much more sodium than their body needs.  Who should eat less sodium? -- Nearly everyone eats too much sodium. The average American takes in 3400 milligrams of sodium each day. Experts say that most people should have no more than 2300 milligrams a day.  Some people with certain health conditions should follow a low-sodium diet. Ask your doctor how much sodium you should have.  Why should I eat less sodium? -- Reducing the amount of sodium you eat can have lots of health benefits:   It can lower your blood pressure. This means that it can help lower your risk of stroke, heart attack, kidney damage, and lots of other health problems.   It can reduce the amount of fluid in your body, which means that your heart doesn't have to work as hard.   It can keep the kidneys from having to work too hard. This is especially important in people who have kidney disease.   It can reduce swelling in the ankles and belly, which can be uncomfortable and make it hard to move.   It can lower the chances of forming kidney stones.   It can help keep your bones strong.  Which foods have the most sodium? -- Processed foods have the most sodium. These foods usually come in cans, boxes, jars, and bags. They tend to have a lot of sodium even if they don't taste salty. In fact, many sweet foods have a lot of sodium in them. The only way to know for sure how much sodium is in a food is to check the label (figure 1).  Here are some examples of foods that often have too much sodium:   Canned soups   Rice and noodle mixes   Sauces, dressings, and condiments (such as ketchup and mustard)   Pre-made frozen meals (also called \"TV dinners\")   Deli meats, hot dogs, and " "cheeses   Smoked, cured, or pickled foods   Salted snack foods and nuts   Restaurant meals  What should I do to reduce the amount of sodium in my diet? -- Many people think that eating a low-sodium diet just means not adding salt to their food. But this is not true. Not adding salt at the table or when cooking will help a little. But almost all of the sodium you eat is already in the food you buy at the grocery store or at restaurants (figure 2).  Here are some tips to help you eat less sodium:   Avoid processed foods when possible. This is the most important thing you can do to eat less sodium. Processed foods include most foods that are sold in cans, boxes, jars, and bags.   Instead of buying pre-made, processed foods, buy fresh or fresh-frozen fruits and vegetables. (\"Fresh-frozen\" means that the food is frozen without anything added to it.)   Buy meats, fish, chicken, and turkey that are fresh instead of canned or sold at the deli counter. (Meats sold at the deli counter are high in sodium.)   Try to eat at restaurants less often.   When possible, try to make meals from scratch at home using fresh ingredients.   If you do buy canned or packaged foods, choose ones that are labeled \"sodium free\" or \"very low sodium\" (table 1). Or choose foods that have less than 400 milligrams of sodium in each serving. The amount of sodium in each serving appears on the nutrition label (figure 1).  The table has some examples of foods to avoid and foods to choose instead (table 2).  Whatever changes you make, make them slowly. Choose 1 thing to do differently, and do that for a while. If you can keep doing that change easily, add another change. For instance, if you usually eat green beans from a can, try buying fresh or fresh-frozen green beans and cooking them at home without adding salt. If that works for you, keep doing it. Then, choose another thing to change.  If you try making a change and it doesn't work right away, don't " "give up. See if you can reduce sodium in other ways. The important thing is to take small steps and to keep doing the changes that work for you.  Can I still eat out at restaurants sometimes? -- One of best ways to limit your sodium is to only eat out at restaurants every once in a while. When you do eat out, try to choose places that offer healthier choices and fresh ingredients.  No matter where you eat, when choosing your food:   Ask your  if your meal can be made without salt.   Avoid foods that come with sauces or dips.   Choose plain grilled meats or fish and steamed vegetables.   Ask for oil and vinegar for your salad, rather than dressing.   If a meal you really want has more sodium than you should have, consider saving half of it to eat another day.  What if food just does not taste as good without sodium? -- Starting a low-sodium diet can be hard. The good news is that your taste buds can get used to having less sodium. But you have to give them some time to adjust.  It can also help to try other ways to add flavor to your foods. Try things like herbs, spices, lemon juice, and vinegar.  What about salt substitutes? -- Flavoring your food with a salt substitute is a good way to reduce how much salt you eat. But check with your doctor or nurse before trying this. Some salt substitutes can be dangerous if you have certain health problems or take certain medicines.  Do medicines have sodium? -- Yes, some medicines contain sodium. If you are buying medicines you can get without a prescription, look to see how much sodium they have. Avoid products that have \"sodium carbonate\" or \"sodium bicarbonate\" unless your doctor prescribes them. (Sodium bicarbonate is baking soda.)  All topics are updated as new evidence becomes available and our peer review process is complete.  This topic retrieved from [employer] on: Mar 22, 2024.  Topic 49004 Version 14.0  Release: 32.2.4 - C32.80  © 2024 Up[employer], Inc. and/or its " "affiliates. All rights reserved.  figure 1: Food labels can be tricky     To figure out how much sodium you are eating, check the label to find out how much sodium is in 1 serving. If you are having more than 1 serving, multiply that amount by the number of servings you plan to eat. For instance, if you are going to eat this whole can of soup, you should multiply 850 by 2. That means that you will be having 1700 milligrams of sodium. That's more sodium than many people are supposed to have in 1 day.  Graphic 29029 Version 8.0  figure 2: Sources of sodium in your diet     Graphic 04310 Version 2.0  table 1: A guide to common nutrient claims and what they mean  Salt/sodium-free  Less than 5 mg of sodium per serving   Very low sodium  35 mg or less of sodium per serving   Low sodium  140 mg or less of sodium per serving   Reduced sodium  At least 25% less sodium than the regular product   Light or lite in sodium  At least 50% less sodium than the regular product   No salt added or unsalted  No salt is added during processing, but these products may not be salt/sodium-free unless stated   mg: milligram; %: percent.  Graphic 71027 Version 7.0  table 2: Ways to cut down on salt (sodium)  Avoid these foods  Try these foods instead    Cured and smoked foods like murillo, sausage, smoked fish and meats, hot dogs, ham, lunch meats, corned beef, and pickles Fresh turkey, chicken, and lean beef   Canned fish (tuna, sardines) Unsalted tuna or sardines   Canned meats Fresh unprocessed meats, vegetable protein, and fish  Frozen and canned meats, vegetable protein, and fish that are labeled \"low-sodium\"   Salted pretzels, crackers, potato chips, tortilla chips, and nuts Low-sodium and unsalted versions of these foods   Most cheeses Low-sodium cheeses (check label for actual sodium content)   Sauces (tomato and cream, etc), tomato juices Low-sodium versions of these foods, such as low-sodium tomato juice   Processed, instant, and " "convenience foods like frozen dinners, packaged meals, canned soups, and boxed pasta blends Cook and freeze your own low-sodium meals, soups, and broths    If you do need to use convenience or processed foods, read the labels. Choose items with 140 to 200 mg of sodium per serving.    For an entire convenience meal (frozen dinner), try to find options with less than 500 to 600 mg of sodium.    If you used canned foods, look for those labeled \"sodium-free.\" Or you can rinse the canned food under water to lower the sodium content.   Fast foods and foods prepared at restaurants (unless without cheese, sauces, or added salt) Fresh foods and foods with sauces on the side  Request that food be prepared without cheese or added salt   Graphic 74635 Version 10.0  Consumer Information Use and Disclaimer   Disclaimer: This generalized information is a limited summary of diagnosis, treatment, and/or medication information. It is not meant to be comprehensive and should be used as a tool to help the user understand and/or assess potential diagnostic and treatment options. It does NOT include all information about conditions, treatments, medications, side effects, or risks that may apply to a specific patient. It is not intended to be medical advice or a substitute for the medical advice, diagnosis, or treatment of a health care provider based on the health care provider's examination and assessment of a patient's specific and unique circumstances. Patients must speak with a health care provider for complete information about their health, medical questions, and treatment options, including any risks or benefits regarding use of medications. This information does not endorse any treatments or medications as safe, effective, or approved for treating a specific patient. UpToDate, Inc. and its affiliates disclaim any warranty or liability relating to this information or the use thereof.The use of this information is governed by the " Terms of Use, available at https://www.[employer].com/en/know/clinical-effectiveness-terms. 2024© [employer] and its affiliates and/or licensors. All rights reserved.  Copyright   © 2024 [employer] and/or its affiliates. All rights reserved.

## 2025-01-21 NOTE — PROGRESS NOTES
"[facility]   Outpatient Note  Nas Cantor Jr.  1945  [id number]  [facility] [address] [address] [phone]    Nas Cantor Jr. is a 79 y.o. male    Assessment and Plan:   Hyperlipidemia  Tolerating statin therapy  Continue Crestor 20 mg daily    Cardiomyopathy, nonischemic (HCC)  Last known EF of 50% on echocardiogram 4/25/2024    PAF (paroxysmal atrial fibrillation) ([facility])  Rate controlled   Continue metoprolol to 50 mg BID    On Eliquis 5 mg twice daily for stroke risk reduction    SVT (supraventricular tachycardia) ([facility])  S/p SVT ablation 4/12/2021    HTN (hypertension)  Not optimally controlled  Will attempt to control with diet   Low sodium diet discussed       Additional Plan:   No Medication changes made or testing ordered today.     Available lab and test results are reviewed with the patient as noted.    Return visit will be in three months or earlier if there are problems.     The patient is encouraged to call in the meantime if there are questions or concerns.           Subjective:   The patient is seen in the office today for routine follow-up regarding PAF, nonischemic cardiomyopathy, HTN and HLD.  He had SVT ablation 4/12/    At last visit he was not feeling well today and felt like he \"is coming down with something.\" His HR was uncontrolled. And EKG showed atrial flutter with 2-1 conduction left anterior fascicular block and LVH with repolarization abnormality.    Since then HR has been controlled with metoprolol 50 mg Twice daily.  BP is not optimally controlled however he would like to try to control it with diet.  Low-sodium diet is discussed today.    Otherwise he is without complaints of chest pain shortness of breath palpitations dizziness lightheadedness or syncope.  He denies any TIA or CVA symptoms.  He is tolerating his medications well.        Social " History  Social History     Tobacco Use   Smoking Status Former    Current packs/day: 0.00    Types: Cigarettes    Quit date:     Years since quittin.0   Smokeless Tobacco Never   ,   Social History     Substance and Sexual Activity   Alcohol Use Yes    Alcohol/week: 0.0 standard drinks of alcohol    Comment: socially   ,   Social History     Substance and Sexual Activity   Drug Use No     History reviewed. No pertinent family history.    Medical and Surgical History  Past Medical History:   Diagnosis Date    Cardiomyopathy (HCC)     Chronic anticoagulation     Hyperlipidemia     Nonischemic cardiomyopathy (HCC)     PAF (paroxysmal atrial fibrillation) (HCC)     Post-nasal drip     Pulmonary embolism (HCC)      Past Surgical History:   Procedure Laterality Date    ARTHROSCOPY KNEE Left     30 years ago    [place] CYSTO INSERTION TRANSPROSTATIC IMPLANT SINGLE N/A 2021    Procedure: CYSTOSCOPY WITH INSERTION UROLIFT;  Surgeon: Mahendra Cruz MD;  Location: [facility];  Service: Urology         Current Outpatient Medications:     apixaban (Eliquis) 5 mg, Take 1 tablet (5 mg total) by mouth 2 (two) times a day, Disp: 180 tablet, Rfl: 3    dofetilide (TIKOSYN) 125 mcg capsule, TAKE 1 CAPSULE (125 MCG TOTAL) BY MOUTH 2 (TWO) TIMES A DAY, Disp: 180 capsule, Rfl: 1    finasteride (PROSCAR) 5 mg tablet, TAKE 1 TABLET (5 MG TOTAL) BY MOUTH DAILY., Disp: 90 tablet, Rfl: 5    metoprolol succinate (TOPROL-XL) 50 mg 24 hr tablet, Take 1 tablet (50 mg total) by mouth 2 (two) times a day, Disp: 180 tablet, Rfl: 1    rosuvastatin (CRESTOR) 20 MG tablet, Take 1 tablet (20 mg total) by mouth daily, Disp: 90 tablet, Rfl: 3    sildenafil (VIAGRA) 50 MG tablet, Take 1 tablet (50 mg total) by mouth daily as needed for erectile dysfunction, Disp: 10 tablet, Rfl: 3    tamsulosin (FLOMAX) 0.4 mg, Take 1 capsule (0.4 mg total) by mouth daily with dinner, Disp: 60 capsule, Rfl: 3    atorvastatin (LIPITOR) 40 mg tablet, Take 1  "tablet (40 mg total) by mouth daily with dinner (Patient not taking: Reported on 1/21/2025), Disp: 30 tablet, Rfl: 0  No Known Allergies    Review of Systems   Constitutional: Negative.   HENT: Negative.     Eyes: Negative.    Cardiovascular: Negative.  Negative for chest pain, claudication, cyanosis, dyspnea on exertion, irregular heartbeat, leg swelling, near-syncope, orthopnea, palpitations, paroxysmal nocturnal dyspnea and syncope.   Respiratory: Negative.  Negative for cough, hemoptysis, shortness of breath, sleep disturbances due to breathing, snoring, sputum production and wheezing.    Endocrine: Negative.    Hematologic/Lymphatic: Negative.    Skin: Negative.    Musculoskeletal: Negative.    Gastrointestinal: Negative.    Genitourinary: Negative.    Neurological: Negative.    Psychiatric/Behavioral: Negative.     Allergic/Immunologic: Negative.        Objective:   /92   Pulse 64   Ht 6' 3.5\" (1.918 m)   Wt 122 kg (270 lb)   BMI 33.30 kg/m²   Physical Exam  Vitals and nursing note reviewed.   Constitutional:       Appearance: He is well-developed.   HENT:      Head: Normocephalic and atraumatic.      Mouth/Throat:      Mouth: Mucous membranes are moist.   Eyes:      General: No scleral icterus.     Conjunctiva/sclera: Conjunctivae normal.   Neck:      Thyroid: No thyromegaly.      Vascular: No JVD.   Cardiovascular:      Rate and Rhythm: Normal rate and regular rhythm.      Heart sounds: Normal heart sounds, S1 normal and S2 normal. No murmur heard.     No friction rub. No gallop.   Pulmonary:      Effort: No respiratory distress.      Breath sounds: No wheezing or rales.   Abdominal:      General: Bowel sounds are normal. There is no distension.      Palpations: Abdomen is soft.      Tenderness: There is no abdominal tenderness.      Comments: Aorta not palpable   Musculoskeletal:         General: No tenderness or deformity. Normal range of motion.      Cervical back: Normal range of motion and " "neck supple.      Right lower leg: No edema.      Left lower leg: No edema.      Comments: R wrist brace   Skin:     General: Skin is warm and dry.   Neurological:      General: No focal deficit present.      Mental Status: He is alert and oriented to person, place, and time.   Psychiatric:         Mood and Affect: Mood normal.         Behavior: Behavior normal.         Judgment: Judgment normal.         Lab Review:   No results found for: \"CHOL\"  Lab Results   Component Value Date    HDL 45 05/18/2022     Lab Results   Component Value Date    LDLCALC 81 05/18/2022     Lab Results   Component Value Date    TRIG 98 05/18/2022     Results Reviewed       None          Results Reviewed       None          Results Reviewed       None            Recent Cardiovascular Testing:   Echo 4/25/2024 low normal LVEF 50% G2 DD    ECG Review:   1/21/2025 normal sinus rhythm, LAD, moderate voltage criteria for LVH, ST-T wave abnormality, nonspecific    12/17/2024: atrial flutter with 2-1 conduction left anterior fascicular block and LVH with repolarization abnormality.    8/3/2024: Sinus rhythm with Premature supraventricular complexes  Left axis deviation  Right bundle branch block  T wave abnormality, consider lateral ischemia          "

## 2025-01-21 NOTE — ASSESSMENT & PLAN NOTE
Rate controlled   Continue metoprolol to 50 mg BID    On Eliquis 5 mg twice daily for stroke risk reduction

## 2025-02-06 DIAGNOSIS — E78.2 MIXED HYPERLIPIDEMIA: Primary | ICD-10-CM

## 2025-02-06 DIAGNOSIS — E78.2 MIXED HYPERLIPIDEMIA: ICD-10-CM

## 2025-02-06 RX ORDER — ROSUVASTATIN CALCIUM 20 MG/1
20 TABLET, COATED ORAL DAILY
Qty: 90 TABLET | Refills: 3 | Status: SHIPPED | OUTPATIENT
Start: 2025-02-06

## 2025-02-26 DIAGNOSIS — N52.9 ERECTILE DYSFUNCTION, UNSPECIFIED ERECTILE DYSFUNCTION TYPE: ICD-10-CM

## 2025-02-27 RX ORDER — SILDENAFIL 50 MG/1
TABLET, FILM COATED ORAL
Qty: 10 TABLET | Refills: 3 | Status: SHIPPED | OUTPATIENT
Start: 2025-02-27 | End: 2025-03-07 | Stop reason: SDUPTHER

## 2025-03-04 ENCOUNTER — HOSPITAL ENCOUNTER (OUTPATIENT)
Dept: VASCULAR ULTRASOUND | Facility: HOSPITAL | Age: 80
Discharge: HOME | End: 2025-03-04
Attending: STUDENT IN AN ORGANIZED HEALTH CARE EDUCATION/TRAINING PROGRAM
Payer: COMMERCIAL

## 2025-03-04 DIAGNOSIS — M21.619 BUNION: ICD-10-CM

## 2025-03-04 PROCEDURE — 93925 LOWER EXTREMITY STUDY: CPT

## 2025-03-04 PROCEDURE — 93923 UPR/LXTR ART STDY 3+ LVLS: CPT

## 2025-03-07 ENCOUNTER — OFFICE VISIT (OUTPATIENT)
Dept: UROLOGY | Facility: CLINIC | Age: 80
End: 2025-03-07
Payer: COMMERCIAL

## 2025-03-07 VITALS
OXYGEN SATURATION: 98 % | SYSTOLIC BLOOD PRESSURE: 132 MMHG | TEMPERATURE: 98.2 F | DIASTOLIC BLOOD PRESSURE: 78 MMHG | HEIGHT: 76 IN | BODY MASS INDEX: 33.9 KG/M2 | WEIGHT: 278.4 LBS | HEART RATE: 58 BPM | RESPIRATION RATE: 16 BRPM

## 2025-03-07 DIAGNOSIS — N13.8 BPH WITH OBSTRUCTION/LOWER URINARY TRACT SYMPTOMS: Primary | ICD-10-CM

## 2025-03-07 DIAGNOSIS — N52.9 ERECTILE DYSFUNCTION, UNSPECIFIED ERECTILE DYSFUNCTION TYPE: ICD-10-CM

## 2025-03-07 DIAGNOSIS — N40.1 BPH WITH OBSTRUCTION/LOWER URINARY TRACT SYMPTOMS: Primary | ICD-10-CM

## 2025-03-07 PROCEDURE — 99213 OFFICE O/P EST LOW 20 MIN: CPT

## 2025-03-07 RX ORDER — SILDENAFIL 50 MG/1
50 TABLET, FILM COATED ORAL AS NEEDED
Qty: 20 TABLET | Refills: 3 | Status: SHIPPED | OUTPATIENT
Start: 2025-03-07

## 2025-03-07 NOTE — ASSESSMENT & PLAN NOTE
He denies any changes to his baseline urinary symptoms.  He continues with Flomax 0.4 mg daily and finasteride 5 mg daily.  [facility]  He is not currently interested in outlet obstructive surgery and prefers to continue with oral therapy.  He may be relocating to [place] soon so he can follow-up with us as needed.  Otherwise if he does not relocate we will see him in 1 year.

## 2025-03-07 NOTE — PROGRESS NOTES
Name: Nas Cantor Jr.      : 1945      MRN: [id number]  Encounter Provider: COLLIN Kerr  Encounter Date: 3/7/2025   Encounter department: [facility] [place]    :  Assessment & Plan  BPH with obstruction/lower urinary tract symptoms  He denies any changes to his baseline urinary symptoms.  He continues with Flomax 0.4 mg daily and finasteride 5 mg daily.  [facility]  He is not currently interested in outlet obstructive surgery and prefers to continue with oral therapy.  He may be relocating to [place] soon so he can follow-up with us as needed.  Otherwise if he does not relocate we will see him in 1 year.       Erectile dysfunction, unspecified erectile dysfunction type  Updated prescription for sildenafil 50 mg as needed was provided to him today.  Orders:    sildenafil (VIAGRA) 50 MG tablet; Take 1 tablet (50 mg total) by mouth as needed for erectile dysfunction          Interval HPI:    He presents today for overdue follow-up to request a refill for his sildenafil which he takes 50 mg as needed.  If he takes 100 mg he experiences dizziness.  Cialis was not as effective as sildenafil.  The last time we saw him we were having discussions regarding possible HoLEP which he wished to wait until early  after his daughter got .  He has since elected to defer any surgery at this time.  He prefers to continue with oral [facility].  He continues with Flomax 0.4 mg daily and finasteride 5 mg daily.  He denies any bothersome urinary symptoms today.  Denies any abdominal pain, flank pain, or gross hematuria.        History of Present Illness     Established patient with history of BPH status post UroLift in .  After this he continued to have lots I was put on Flomax which helped his symptoms some.  He however continued to be bothered by weak stream with intermittency and hesitancy and feeling of incomplete  emptying.  Also has some issues with urgency and frequency.  He was seen in follow-up and potentially scheduled for TURP but Dr. Moe recommended reevaluation and consideration for HoLEP given his gland size in 2020 was 76 cc.  He was also started on finasteride with the hopes of shrinking his gland some.    Patient underwent cystoscopy and TRUS by Dr. Ibrahim on 6/21/2023 showing enlarged prostate with obstructive changes in the bladder and prostate volume of 82 cc.  Dr. Ibrahim discussed proceeding with HoLEP surgery which patient was agreeable to.  He does take Eliquis for history of pulmonary embolism and would require cardiology clearance prior.  Surgery was scheduled for January 2024 but ultimately canceled and patient has been lost to follow-up since.    Patient also reporting issues with erectile dysfunction Viagra 50 mg was not effective enough and when he takes 100 mg he feels lightheaded.  Dr. Ibrahim recommended a trial of Cialis 20 mg.      Objective   There were no vitals taken for this visit.    Review of Systems   Constitutional:  Negative for chills and fever.   HENT:  Negative for congestion and sore throat.    Respiratory:  Negative for cough and shortness of breath.    Cardiovascular:  Negative for chest pain and leg swelling.   Gastrointestinal:  Negative for abdominal pain, constipation and diarrhea.   Genitourinary:  Negative for difficulty urinating, dysuria, frequency, hematuria and urgency.   Musculoskeletal:  Negative for back pain and gait problem.   Skin:  Negative for wound.   Allergic/Immunologic: Negative for immunocompromised state.   Hematological:  Does not bruise/bleed easily.       Physical Exam  Vitals and nursing note reviewed.   Constitutional:       General: He is not in acute distress.     Appearance: He is well-developed.   HENT:      Head: Normocephalic and atraumatic.   Eyes:      Conjunctiva/sclera: Conjunctivae normal.   Cardiovascular:      Rate and Rhythm: Normal rate and  regular rhythm.      Heart sounds: No murmur heard.  Pulmonary:      Effort: Pulmonary effort is normal. No respiratory distress.      Breath sounds: Normal breath sounds.   Abdominal:      Palpations: Abdomen is soft.      Tenderness: There is no abdominal tenderness.   Musculoskeletal:         General: No swelling.      Cervical back: Neck supple.   Skin:     General: Skin is warm and dry.      Capillary Refill: Capillary refill takes less than 2 seconds.   Neurological:      Mental Status: He is alert.   Psychiatric:         Mood and Affect: Mood normal.           Imaging:          Please Note:  Voice dictation software has been used to create this document. There may be inadvertent transcriptions errors.     COLLIN Kerr 03/07/25

## 2025-06-24 DIAGNOSIS — N52.9 ERECTILE DYSFUNCTION, UNSPECIFIED ERECTILE DYSFUNCTION TYPE: ICD-10-CM

## 2025-06-25 RX ORDER — SILDENAFIL 50 MG/1
50 TABLET, FILM COATED ORAL DAILY PRN
Qty: 20 TABLET | Refills: 3 | Status: SHIPPED | OUTPATIENT
Start: 2025-06-25

## 2025-08-22 DIAGNOSIS — I48.0 PAF (PAROXYSMAL ATRIAL FIBRILLATION) (HCC): ICD-10-CM

## 2025-08-22 RX ORDER — DOFETILIDE 0.12 MG/1
125 CAPSULE ORAL 2 TIMES DAILY
Qty: 180 CAPSULE | Refills: 1 | Status: SHIPPED | OUTPATIENT
Start: 2025-08-22

## (undated) DEVICE — PACK TUR

## (undated) DEVICE — LUBRICANT SURGILUBE TUBE 4 OZ  FLIP TOP

## (undated) DEVICE — GLOVE SRG BIOGEL 7

## (undated) DEVICE — SPONGE 4 X 4 XRAY 16 PLY STRL LF RFD

## (undated) DEVICE — CHLORHEXIDINE 4PCT 4 OZ

## (undated) DEVICE — INVIEW CLEAR LEGGINGS: Brand: CONVERTORS

## (undated) DEVICE — UROCATCH BAG